# Patient Record
Sex: FEMALE | Race: WHITE | NOT HISPANIC OR LATINO | Employment: OTHER | URBAN - METROPOLITAN AREA
[De-identification: names, ages, dates, MRNs, and addresses within clinical notes are randomized per-mention and may not be internally consistent; named-entity substitution may affect disease eponyms.]

---

## 2017-01-03 ENCOUNTER — OFFICE VISIT (OUTPATIENT)
Dept: FAMILY MEDICINE CLINIC | Facility: CLINIC | Age: 62
End: 2017-01-03

## 2017-01-03 VITALS
TEMPERATURE: 98 F | BODY MASS INDEX: 29.88 KG/M2 | SYSTOLIC BLOOD PRESSURE: 138 MMHG | WEIGHT: 175 LBS | DIASTOLIC BLOOD PRESSURE: 108 MMHG | OXYGEN SATURATION: 99 % | HEIGHT: 64 IN | HEART RATE: 123 BPM

## 2017-01-03 DIAGNOSIS — J20.9 ACUTE BRONCHITIS, UNSPECIFIED ORGANISM: Primary | ICD-10-CM

## 2017-01-03 DIAGNOSIS — R05.9 COUGH: ICD-10-CM

## 2017-01-03 DIAGNOSIS — R50.9 FEVER, UNSPECIFIED FEVER CAUSE: ICD-10-CM

## 2017-01-03 LAB
EXPIRATION DATE: NORMAL
FLUAV AG NPH QL: NEGATIVE
FLUBV AG NPH QL: NEGATIVE
INTERNAL CONTROL: NORMAL
Lab: NORMAL

## 2017-01-03 PROCEDURE — 87804 INFLUENZA ASSAY W/OPTIC: CPT | Performed by: INTERNAL MEDICINE

## 2017-01-03 PROCEDURE — 71020 CHG CHEST X-RAY 2 VW: CPT | Performed by: INTERNAL MEDICINE

## 2017-01-03 PROCEDURE — 99214 OFFICE O/P EST MOD 30 MIN: CPT | Performed by: INTERNAL MEDICINE

## 2017-01-03 RX ORDER — LEVOFLOXACIN 500 MG/1
500 TABLET, FILM COATED ORAL DAILY
Qty: 7 TABLET | Refills: 0 | Status: SHIPPED | OUTPATIENT
Start: 2017-01-03 | End: 2017-01-16

## 2017-01-03 RX ORDER — ALBUTEROL SULFATE 90 UG/1
2 AEROSOL, METERED RESPIRATORY (INHALATION) EVERY 4 HOURS PRN
COMMUNITY
End: 2017-05-22 | Stop reason: SDUPTHER

## 2017-01-16 ENCOUNTER — TRANSCRIBE ORDERS (OUTPATIENT)
Dept: PAIN MEDICINE | Facility: HOSPITAL | Age: 62
End: 2017-01-16

## 2017-01-16 ENCOUNTER — HOSPITAL ENCOUNTER (OUTPATIENT)
Dept: GENERAL RADIOLOGY | Facility: HOSPITAL | Age: 62
Discharge: HOME | End: 2017-01-16

## 2017-01-16 ENCOUNTER — HOSPITAL ENCOUNTER (OUTPATIENT)
Dept: PAIN MEDICINE | Facility: HOSPITAL | Age: 62
Discharge: HOME | End: 2017-01-16
Admitting: SPECIALIST

## 2017-01-16 ENCOUNTER — ANESTHESIA (OUTPATIENT)
Dept: PAIN MEDICINE | Facility: HOSPITAL | Age: 62
End: 2017-01-16

## 2017-01-16 ENCOUNTER — ANESTHESIA EVENT (OUTPATIENT)
Dept: PAIN MEDICINE | Facility: HOSPITAL | Age: 62
End: 2017-01-16

## 2017-01-16 VITALS
HEIGHT: 64 IN | HEART RATE: 76 BPM | RESPIRATION RATE: 16 BRPM | BODY MASS INDEX: 29.88 KG/M2 | OXYGEN SATURATION: 92 % | WEIGHT: 175 LBS | DIASTOLIC BLOOD PRESSURE: 47 MMHG | SYSTOLIC BLOOD PRESSURE: 112 MMHG | TEMPERATURE: 98.7 F

## 2017-01-16 DIAGNOSIS — R52 PAIN: ICD-10-CM

## 2017-01-16 DIAGNOSIS — M54.5 LOW BACK PAIN, UNSPECIFIED BACK PAIN LATERALITY, UNSPECIFIED CHRONICITY, WITH SCIATICA PRESENCE UNSPECIFIED: Primary | ICD-10-CM

## 2017-01-16 PROCEDURE — 25010000002 METHYLPREDNISOLONE PER 80 MG: Performed by: ANESTHESIOLOGY

## 2017-01-16 PROCEDURE — C1755 CATHETER, INTRASPINAL: HCPCS

## 2017-01-16 PROCEDURE — 99156 MOD SED OTH PHYS/QHP 5/>YRS: CPT

## 2017-01-16 PROCEDURE — 25010000002 MIDAZOLAM PER 1 MG: Performed by: ANESTHESIOLOGY

## 2017-01-16 PROCEDURE — 77003 FLUOROGUIDE FOR SPINE INJECT: CPT

## 2017-01-16 PROCEDURE — 25010000002 FENTANYL CITRATE (PF) 100 MCG/2ML SOLUTION: Performed by: ANESTHESIOLOGY

## 2017-01-16 RX ORDER — OXYCODONE AND ACETAMINOPHEN 7.5; 325 MG/1; MG/1
1 TABLET ORAL EVERY 6 HOURS PRN
COMMUNITY
End: 2017-09-06

## 2017-01-16 RX ORDER — LIDOCAINE HYDROCHLORIDE 10 MG/ML
1 INJECTION, SOLUTION INFILTRATION; PERINEURAL ONCE
Status: DISCONTINUED | OUTPATIENT
Start: 2017-01-16 | End: 2017-01-17 | Stop reason: HOSPADM

## 2017-01-16 RX ORDER — SODIUM CHLORIDE 0.9 % (FLUSH) 0.9 %
1-10 SYRINGE (ML) INJECTION AS NEEDED
Status: DISCONTINUED | OUTPATIENT
Start: 2017-01-16 | End: 2017-01-17 | Stop reason: HOSPADM

## 2017-01-16 RX ORDER — MIDAZOLAM HYDROCHLORIDE 1 MG/ML
1 INJECTION INTRAMUSCULAR; INTRAVENOUS
Status: DISCONTINUED | OUTPATIENT
Start: 2017-01-16 | End: 2017-01-17 | Stop reason: HOSPADM

## 2017-01-16 RX ORDER — METHYLPREDNISOLONE ACETATE 80 MG/ML
80 INJECTION, SUSPENSION INTRA-ARTICULAR; INTRALESIONAL; INTRAMUSCULAR; SOFT TISSUE ONCE
Status: COMPLETED | OUTPATIENT
Start: 2017-01-16 | End: 2017-01-16

## 2017-01-16 RX ORDER — FENTANYL CITRATE 50 UG/ML
50 INJECTION, SOLUTION INTRAMUSCULAR; INTRAVENOUS
Status: DISCONTINUED | OUTPATIENT
Start: 2017-01-16 | End: 2017-01-17 | Stop reason: HOSPADM

## 2017-01-16 RX ADMIN — MIDAZOLAM 2 MG: 1 INJECTION INTRAMUSCULAR; INTRAVENOUS at 09:27

## 2017-01-16 RX ADMIN — FENTANYL CITRATE 100 MCG: 50 INJECTION INTRAMUSCULAR; INTRAVENOUS at 09:29

## 2017-01-16 RX ADMIN — METHYLPREDNISOLONE ACETATE 80 MG: 80 INJECTION, SUSPENSION INTRA-ARTICULAR; INTRALESIONAL; INTRAMUSCULAR; SOFT TISSUE at 09:33

## 2017-01-16 NOTE — IP AVS SNAPSHOT
AFTER VISIT SUMMARY             Camelia Quintanilla           About your hospitalization     You last received care in the:  [facility]    Unit phone number:  [phone]       Medications    If you or your caregiver advised us that you are currently taking a medication and that medication is marked below as “Resume”, this simply indicates that we have reviewed those medications to make sure our new therapy recommendations do not interfere.  If you have concerns about medications other than those new ones which we are prescribing today, please consult the physician who prescribed them (or your primary physician).  Our review of your home medications is not meant to indicate that we are directing their use.             Your Medications      CHANGE how you take these medications     pantoprazole 40 MG EC tablet   Take 1 tablet by mouth Daily.   According to our records, you may have been taking this medication differently.   Commonly known as:  PROTONIX   What changed:  when to take this             CONTINUE taking these medications     albuterol 108 (90 BASE) MCG/ACT inhaler   Inhale 2 puffs Every 4 (Four) Hours As Needed for wheezing or shortness of air.   Commonly known as:  PROVENTIL HFA;VENTOLIN HFA           BYSTOLIC 5 MG tablet   Take 5 mg by mouth Daily.   Generic drug:  nebivolol           celecoxib 200 MG capsule   Take 200 mg by mouth 2 (two) times a day.   Commonly known as:  CeleBREX           clotrimazole-betamethasone 1-0.05 % cream   Apply  topically 2 (two) times a day.   Commonly known as:  LOTRISONE           furosemide 20 MG tablet   Take 20 mg by mouth As Needed.   Commonly known as:  LASIX           hydroxychloroquine 200 MG tablet   Take 200 mg by mouth 2 (Two) Times a Day.   Commonly known as:  PLAQUENIL           levothyroxine 88 MCG tablet   Take 88 mcg by mouth Daily.   Commonly known as:  SYNTHROID, LEVOTHROID           MAXALT-MLT 10 MG disintegrating tablet   Take 10  mg by mouth 1 (One) Time As Needed.   Generic drug:  rizatriptan MLT           nitroglycerin 0.4 MG SL tablet   Place 0.4 mg under the tongue every 5 (five) minutes.   Commonly known as:  NITROSTAT           oxyCODONE-acetaminophen 7.5-325 MG per tablet   Take 1 tablet by mouth Every 6 (Six) Hours As Needed.   Commonly known as:  PERCOCET           pregabalin 75 MG capsule   Take 75 mg by mouth 2 (Two) Times a Day.   Commonly known as:  LYRICA           simvastatin 80 MG tablet   Take 40 mg by mouth Daily.   Commonly known as:  ZOCOR           temazepam 15 MG capsule   Take 15 mg by mouth At Night As Needed for sleep.   Commonly known as:  RESTORIL           venlafaxine  MG 24 hr capsule   TAKE ONE CAPSULE BY MOUTH DAILY   Commonly known as:  EFFEXOR-XR           vitamin D 35065 UNITS capsule capsule   Take 50,000 Units by mouth Every 7 (Seven) Days.   Commonly known as:  ERGOCALCIFEROL                    Instructions for After Discharge        Discharge References/Attachments     EPIDURAL STEROID INJECTION (ENGLISH)    EPIDURAL STEROID INJECTION, CARE AFTER (ENGLISH)    CONSCIOUS SEDATION, ADULT, CARE AFTER (ENGLISH)       Follow-ups for After Discharge        Scheduled Appointments     Mar 07, 2017  1:30 PM EST   Lumbar Epidural 3rd with TREATMENT RM 1 [facility]   [facility] ([place])    [address] [facility] [address] [phone]              [employer] Signup     Our records indicate that you have an active [employer] account.    You can view your After Visit Summary by going to [employer] and logging in with your [employer] username and password.  If you don't have a [employer] username and password but a parent or guardian has access to your record, the parent or guardian should login with their own [employer] username and password and access your record to view the After Visit Summary.    If you have questions, you can email  [email or web address] or call [phone] to talk to our MyChart staff.  Remember, MyChart is NOT to be used for urgent needs.  For medical emergencies, dial 911.           Summary of Your Hospitalization        Reason for Hospitalization     Your primary diagnosis was:  Not on File      Care Providers     Not on file      Your Allergies  Date Reviewed: 1/16/2017    Allergen Reactions    Adhesive Tape Not Noted    Must use paper tape         Morphine And Related Hives      Patient Belongings Returned     Document Return of Belongings Flowsheet     Were the patient bedside belongings sent home?   --   Belongings Retrieved from Security & Sent Home   --    Belongings Sent to Safe   --   Medications Retrieved from Pharmacy & Sent Home   --              More Information      Epidural Steroid Injection  An epidural steroid injection is given to relieve pain in your neck, back, or legs that is caused by the irritation or swelling of a nerve root. This procedure involves injecting a steroid and numbing medicine (anesthetic) into the epidural space. The epidural space is the space between the outer covering of your spinal cord and the bones that form your backbone (vertebra).   LET YOUR HEALTH CARE PROVIDER KNOW ABOUT:   · Any allergies you have.  · All medicines you are taking, including vitamins, herbs, eye drops, creams, and over-the-counter medicines such as aspirin.  · Previous problems you or members of your family have had with the use of anesthetics.  · Any blood disorders or blood clotting disorders you have.  · Previous surgeries you have had.  · Medical conditions you have.  RISKS AND COMPLICATIONS  Generally, this is a safe procedure. However, as with any procedure, complications can occur. Possible complications of epidural steroid injection include:  · Headache.  · Bleeding.  · Infection.  · Allergic reaction to the medicines.  · Damage to your nerves.  The response to this procedure depends on the  underlying cause of the pain and its duration. People who have long-term (chronic) pain are less likely to benefit from epidural steroids than are those people whose pain comes on strong and suddenly.  BEFORE THE PROCEDURE   · Ask your health care provider about changing or stopping your regular medicines. You may be advised to stop taking blood-thinning medicines a few days before the procedure.  · You may be given medicines to reduce anxiety.  · Arrange for someone to take you home after the procedure.  PROCEDURE   · You will remain awake during the procedure. You may receive medicine to make you relaxed.  · You will be asked to lie on your stomach.  · The injection site will be cleaned.  · The injection site will be numbed with a medicine (local anesthetic).  · A needle will be injected through your skin into the epidural space.  · Your health care provider will use an X-ray machine to ensure that the steroid is delivered closest to the affected nerve. You may have minimal discomfort at this time.  · Once the needle is in the right position, the local anesthetic and the steroid will be injected into the epidural space.  · The needle will then be removed and a bandage will be applied to the injection site.  AFTER THE PROCEDURE   · You may be monitored for a short time before you go home.  · You may feel weakness or numbness in your arm or leg, which disappears within hours.  · You may be allowed to eat, drink, and take your regular medicine.  · You may have soreness at the site of the injection.     This information is not intended to replace advice given to you by your health care provider. Make sure you discuss any questions you have with your health care provider.     Document Released: 03/26/2009 Document Revised: 08/20/2014 Document Reviewed: 06/06/2014  [employer] Interactive Patient Education ©2016 [employer]          Epidural Steroid Injection, Care After  Refer to this sheet in the next few weeks. These  instructions provide you with information on caring for yourself after your procedure. Your health care provider may also give you more specific instructions. Your treatment has been planned according to current medical practices, but problems sometimes occur. Call your health care provider if you have any problems or questions after your procedure.  WHAT TO EXPECT AFTER THE PROCEDURE  After your procedure, it is typical to have minimal discomfort at the injection site.  HOME CARE INSTRUCTIONS   · Avoid the use of heat on the injection site for 1 day.  · Do not take a tub bath or soak in water the day of the procedure.  · Remove the bandage on the day after the procedure.  · Resume your normal activities the day after the procedure.  · Apply ice to reduce the soreness around the injection site:    Put ice in a plastic bag.    Place a towel between your skin and the bag.    Leave the ice on for 20 minutes, 2-3 times a day.  · Follow up with your health care provider 7-10 days after the procedure.  SEEK MEDICAL CARE IF:   · You have a fever.  · You continue to have pain and soreness around the injection site, even after taking medicines.  · You have significant nausea or vomiting.  · You have a severe headache.  SEEK IMMEDIATE MEDICAL CARE IF:   · You have severe pain at the injection site, which is not relieved by medicines.  · You have a severe headache, stiff neck, or sensitivity to light.  · You have any new numbness or weakness in your legs or arms.  · You lose control over your bladder or bowel movements.  · You have difficulty breathing.     This information is not intended to replace advice given to you by your health care provider. Make sure you discuss any questions you have with your health care provider.     Document Released: 04/03/2012 Document Revised: 01/08/2016 Document Reviewed: 06/06/2014  [employer] Interactive Patient Education ©2016 [employer].          Moderate Conscious Sedation, Adult, Care  After  Refer to this sheet in the next few weeks. These instructions provide you with information on caring for yourself after your procedure. Your health care provider may also give you more specific instructions. Your treatment has been planned according to current medical practices, but problems sometimes occur. Call your health care provider if you have any problems or questions after your procedure.  WHAT TO EXPECT AFTER THE PROCEDURE   After your procedure:  · You may feel sleepy, clumsy, and have poor balance for several hours.  · Vomiting may occur if you eat too soon after the procedure.  HOME CARE INSTRUCTIONS  · Do not participate in any activities where you could become injured for at least 24 hours. Do not:    Drive.    Swim.    Ride a bicycle.    Operate heavy machinery.    Cook.    Use power tools.    Climb ladders.    Work from a high place.  · Do not make important decisions or sign legal documents until you are improved.  · If you vomit, drink water, juice, or soup when you can drink without vomiting. Make sure you have little or no nausea before eating solid foods.  · Only take over-the-counter or prescription medicines for pain, discomfort, or fever as directed by your health care provider.  · Make sure you and your family fully understand everything about the medicines given to you, including what side effects may occur.  · You should not drink alcohol, take sleeping pills, or take medicines that cause drowsiness for at least 24 hours.  · If you smoke, do not smoke without supervision.  · If you are feeling better, you may resume normal activities 24 hours after you were sedated.  · Keep all appointments with your health care provider.  SEEK MEDICAL CARE IF:  · Your skin is pale or bluish in color.  · You continue to feel nauseous or vomit.  · Your pain is getting worse and is not helped by medicine.  · You have bleeding or swelling.  · You are still sleepy or feeling clumsy after 24  hours.  SEEK IMMEDIATE MEDICAL CARE IF:  · You develop a rash.  · You have difficulty breathing.  · You develop any type of allergic problem.  · You have a fever.  MAKE SURE YOU:  · Understand these instructions.  · Will watch your condition.  · Will get help right away if you are not doing well or get worse.     This information is not intended to replace advice given to you by your health care provider. Make sure you discuss any questions you have with your health care provider.     Document Released: 10/08/2014 Document Revised: 01/08/2016 Document Reviewed: 10/08/2014  [employer] Interactive Patient Education ©2016 Elsevier Inc.            SYMPTOMS OF A STROKE    Call 911 or have someone take you to the Emergency Department if you have any of the following:    · Sudden numbness or weakness of your face, arm or leg especially on one side of the body  · Sudden confusion, diffiiculty speaking or trouble understanding   · Changes in your vision or loss of sight in one eye  · Sudden severe headache with no known cause  · sudden dizziness, trouble walking, loss of balance or coordination    It is important to seek emergency care right away if you have further stroke symptoms. If you get emergency help quickly, the powerful clot-dissolving medicines can reduce the disabilities caused by a stroke.     For more information:    American Stroke Association  8-047-0-STROKE  www.strokeassociation.org           IF YOU SMOKE OR USE TOBACCO PLEASE READ THE FOLLOWING:    Why is smoking bad for me?  Smoking increases the risk of heart disease, lung disease, vascular disease, stroke, and cancer.     If you smoke, STOP!    If you would like more information on quitting smoking, please visit the [employer] website: [email or web address]   This link will provide additional resources including the QUIT line and the Beat the Pack support groups.     For more information:    American Cancer  Society  ([phone]    American Heart Association  1-331.125.4319               YOU ARE THE MOST IMPORTANT FACTOR IN YOUR RECOVERY.     Follow all instructions carefully.     I have reviewed my discharge instructions with my nurse, including the following information, if applicable:     Information about my illness and diagnosis   Follow up appointments (including lab draws)   Wound Care   Equipment Needs   Medications (new and continuing) along with side effects   Preventative information such as vaccines and smoking cessations   Diet   Pain   I know when to contact my Doctor's office or seek emergency care      I want my nurse to describe the side effects of my medications: YES NO   If the answer is no, I understand the side effects of my medications: YES NO   My nurse described the side effects of my medications in a way that I could understand: YES NO   I have taken my personal belongings and my own medications with me at discharge: YES NO            I have received this information and my questions have been answered. I have discussed any concerns I see with this plan with the nurse or physician. I understand these instructions.    Signature of Patient or Responsible Person: _____________________________________    Date: _________________  Time: __________________    Signature of Healthcare Provider: _______________________________________  Date: _________________  Time: __________________

## 2017-01-16 NOTE — H&P
H&P  Date of Service: 2016 9:21 AM  Maciel Tracey MD   Anesthesiology   Expand All Collapse All    []Hide copied text  []Arden for attribution information     [facility]     History and Physical     Patient Name: Camelia Quintanilla  : 1955  MRN: [id number]  Date of Admission: 2016     Subjective        Patient is a 61 y.o. female presents with chief complaint of chronic low back and hips: bilateral pain.  Onset of symptoms was gradual starting several years ago. Symptoms are associated/aggravated by any activity. Symptoms improve with lying down and heating pad. She has had several epidurals in the past, the last in . A recent MRI only showed mild lumbar degeneration and she has been on chronic narcotics for over 2 years.     The following portions of the patients history were reviewed and updated as appropriate: current medications, allergies, past medical history, past surgical history, past family history, past social history and problem list                       Objective        Past Medical History:    Medical History         Past Medical History   Diagnosis Date   • Depression with anxiety 2016   • HLD (hyperlipidemia) 2016   • Precordial pain 2016   • Vitamin D deficiency 2016         Past Surgical History:    Surgical History          Past Surgical History   Procedure Laterality Date   • Cholecystectomy       • Appendectomy       • Abdominal hernia repair       • Hysterectomy       • Wrist fusion Right           Family History: No family history on file.  Social History:        Social History   Substance Use Topics   • Smoking status: Never Smoker   • Smokeless tobacco: Never Used   • Alcohol use No         Vital Signs Range for the last 24 hours  Temperature: Temp: [36.5 °C (97.7 °F)] 36.5 °C (97.7 °F)   Temp Source: Temp src: Oral   BP: BP: (128)/(69) 128/69   Pulse: Heart Rate: [71] 71   Respirations: Resp: [16] 16   SPO2: SpO2: [95 %] 95 %   O2 Amount (l/min):   "   O2 Devices O2 Device: room air   Weight: Weight: [170 lb (77.1 kg)] 170 lb (77.1 kg)      Flowsheet Rows           First Filed Value     Admission Height   64\" (162.6 cm) Documented at 11/23/2016 0854     Admission Weight   170 lb (77.1 kg) Documented at 11/23/2016 0854            --------------------------------------------------------------------------------      Current Medications           Current Outpatient Prescriptions   Medication Sig Dispense Refill   • celecoxib (CeleBREX) 200 MG capsule Take 200 mg by mouth 2 (two) times a day.       • clotrimazole-betamethasone (LOTRISONE) 1-0.05 % cream Apply topically 2 (two) times a day.       • furosemide (LASIX) 20 MG tablet Take 20 mg by mouth.       • HYDROcodone-acetaminophen (NORCO)  MG per tablet Take 1 tablet by mouth every 6 (six) hours.       • hydroxychloroquine (PLAQUENIL) 200 MG tablet Take by mouth.       • hyoscyamine sulfate (ANASPAZ) 0.125 MG tablet dispersible disintegrating tablet Take by mouth.       • isosorbide mononitrate (IMDUR) 60 MG 24 hr tablet Take 60 mg by mouth.       • levothyroxine (SYNTHROID, LEVOTHROID) 88 MCG tablet Take 88 mcg by mouth.       • nebivolol (BYSTOLIC) 5 MG tablet         • nitroglycerin (NITROSTAT) 0.4 MG SL tablet Place 0.4 mg under the tongue every 5 (five) minutes.       • pantoprazole (PROTONIX) 40 MG EC tablet TAKE ONE TABLET BY MOUTH TWICE A DAY BEFORE MEALS       • pregabalin (LYRICA) 75 MG capsule Take 75 mg by mouth.       • ranolazine (RANEXA) 1000 MG 12 hr tablet         • rizatriptan MLT (MAXALT-MLT) 10 MG disintegrating tablet Take 10 mg by mouth.       • simvastatin (ZOCOR) 80 MG tablet Take 80 mg by mouth.       • temazepam (RESTORIL) 15 MG capsule Take 15 mg by mouth At Night As Needed for sleep.       • venlafaxine XR (EFFEXOR-XR) 150 MG 24 hr capsule Take 1 capsule by mouth daily. 30 capsule 2   • vitamin D (ERGOCALCIFEROL) 83167 UNITS capsule capsule Take 50,000 Units by mouth.       • " zolpidem (AMBIEN) 10 MG tablet Take 10 mg by mouth daily.          Current Facility-Administered Medications   Medication Dose Route Frequency Provider Last Rate Last Dose   • FentaNYL Citrate (PF) (SUBLIMAZE) injection 50 mcg 50 mcg Intravenous Q5 Min PRN Maciel Tracey MD         • iopamidol (ISOVUE-M 200) injection 41% 12 mL Epidural Once in imaging Maciel Tracey MD         • lidocaine (XYLOCAINE) 1 % injection 1 mL 1 mL Intradermal Once Maciel Tracey MD         • methylPREDNISolone acetate (DEPO-medrol) injection 80 mg 80 mg Intra-articular Once Maciel Tracey MD         • midazolam (VERSED) injection 1 mg 1 mg Intravenous Q5 Min PRN Maciel Tracey MD         • sodium chloride 0.9 % flush 1-10 mL 1-10 mL Intravenous PRN Maciel Tracey MD         • sodium chloride 0.9 % flush 1-10 mL 1-10 mL Intravenous PRN Maciel Tracey MD                  --------------------------------------------------------------------------------  Assessment/Plan         Anesthesia Evaluation  Airway   Mallampati: II Dental - normal exam    Pulmonary - normal exam  Cardiovascular - normal exam   Neuro/Psych GI/Hepatic/Renal/Endo      Musculoskeletal   (+) Straight Leg Test,  Abdominal    Substance History  OB/GYN       Other                         Diagnosis and Plan     Treatment Plan  ASA 3       Lumbar Epidural Steroid Injection(LESI), With fluoroscopy, With sedation  Anesthetic plan and risks discussed with patient.     * No Diagnosis Codes entered *                                 LDDD--about 50% improved

## 2017-01-16 NOTE — ANESTHESIA PROCEDURE NOTES
PAIN Epidural block    Patient location during procedure: pain clinic  Start Time: 1/16/2017 9:23 AM  Stop Time: 1/16/2017 9:35 AM  Indication:at surgeon's request and procedure for pain  Performed By  Anesthesiologist: JEN MARTINEZ  Preanesthetic Checklist  Completed: patient identified, site marked, surgical consent, pre-op evaluation, timeout performed, IV checked, risks and benefits discussed and monitors and equipment checked  Additional Notes  Post-Op Diagnosis Codes:     * DDD (degenerative disc disease), lumbar (M51.36)    Epidural Block Prep:  Pt Position:prone  Sterile Tech:gloves, mask, cap and sterile barrier  Monitoring:blood pressure monitoring, continuous pulse oximetry and EKG  Epidural Block Procedure:  Approach:left paramedian  Location:lumbar  Level:4-5  Needle Type:Haroon  Needle Gauge:18 G  Cath Depth at skin:10 cm  Aspiration:negative  Test Dose:negative  Medications:  Depomedrol:80 mg  Preservative Free Saline:4mL    Post Assessment:  Dressing:occlusive dressing applied  Complications:no

## 2017-02-02 DIAGNOSIS — K21.9 GASTROESOPHAGEAL REFLUX DISEASE WITHOUT ESOPHAGITIS: ICD-10-CM

## 2017-02-03 RX ORDER — PANTOPRAZOLE SODIUM 40 MG/1
40 TABLET, DELAYED RELEASE ORAL DAILY
Qty: 90 TABLET | Refills: 0 | Status: SHIPPED | OUTPATIENT
Start: 2017-02-03 | End: 2017-02-06 | Stop reason: SDUPTHER

## 2017-02-06 ENCOUNTER — OFFICE VISIT (OUTPATIENT)
Dept: FAMILY MEDICINE CLINIC | Facility: CLINIC | Age: 62
End: 2017-02-06

## 2017-02-06 VITALS
HEIGHT: 64 IN | BODY MASS INDEX: 31.1 KG/M2 | HEART RATE: 95 BPM | WEIGHT: 182.2 LBS | OXYGEN SATURATION: 97 % | SYSTOLIC BLOOD PRESSURE: 134 MMHG | DIASTOLIC BLOOD PRESSURE: 84 MMHG

## 2017-02-06 DIAGNOSIS — D50.9 IRON DEFICIENCY ANEMIA, UNSPECIFIED IRON DEFICIENCY ANEMIA TYPE: Primary | ICD-10-CM

## 2017-02-06 DIAGNOSIS — K21.9 GASTROESOPHAGEAL REFLUX DISEASE WITHOUT ESOPHAGITIS: ICD-10-CM

## 2017-02-06 DIAGNOSIS — G89.29 CHRONIC RIGHT SHOULDER PAIN: ICD-10-CM

## 2017-02-06 DIAGNOSIS — R05.9 COUGH: ICD-10-CM

## 2017-02-06 DIAGNOSIS — M25.511 CHRONIC RIGHT SHOULDER PAIN: ICD-10-CM

## 2017-02-06 DIAGNOSIS — I10 ESSENTIAL HYPERTENSION: ICD-10-CM

## 2017-02-06 PROCEDURE — 99214 OFFICE O/P EST MOD 30 MIN: CPT | Performed by: INTERNAL MEDICINE

## 2017-02-06 RX ORDER — DICYCLOMINE HYDROCHLORIDE 10 MG/1
10 CAPSULE ORAL 3 TIMES DAILY PRN
COMMUNITY
Start: 2016-02-11 | End: 2017-02-06 | Stop reason: SDUPTHER

## 2017-02-06 RX ORDER — PANTOPRAZOLE SODIUM 40 MG/1
40 TABLET, DELAYED RELEASE ORAL DAILY
Qty: 30 TABLET | Refills: 12 | Status: SHIPPED | OUTPATIENT
Start: 2017-02-06 | End: 2018-02-07 | Stop reason: SDUPTHER

## 2017-02-06 RX ORDER — DICYCLOMINE HYDROCHLORIDE 10 MG/1
10 CAPSULE ORAL 3 TIMES DAILY PRN
Qty: 30 CAPSULE | Refills: 2 | Status: SHIPPED | OUTPATIENT
Start: 2017-02-06 | End: 2017-10-10 | Stop reason: RX

## 2017-02-06 RX ORDER — LEVOTHYROXINE SODIUM 0.07 MG/1
75 TABLET ORAL DAILY
COMMUNITY
Start: 2016-11-09 | End: 2018-04-05 | Stop reason: SDUPTHER

## 2017-02-06 RX ORDER — TRAZODONE HYDROCHLORIDE 100 MG/1
100 TABLET ORAL NIGHTLY
COMMUNITY
Start: 2017-01-18 | End: 2018-03-06 | Stop reason: SINTOL

## 2017-02-21 RX ORDER — PREGABALIN 75 MG/1
75 CAPSULE ORAL 2 TIMES DAILY
Qty: 60 CAPSULE | Refills: 0 | Status: SHIPPED | OUTPATIENT
Start: 2017-02-21 | End: 2017-03-29 | Stop reason: SDUPTHER

## 2017-02-23 ENCOUNTER — OFFICE VISIT (OUTPATIENT)
Dept: ORTHOPEDIC SURGERY | Facility: CLINIC | Age: 62
End: 2017-02-23

## 2017-02-23 VITALS — BODY MASS INDEX: 30.73 KG/M2 | WEIGHT: 180 LBS | HEIGHT: 64 IN | TEMPERATURE: 97.6 F

## 2017-02-23 DIAGNOSIS — M25.511 ACUTE PAIN OF BOTH SHOULDERS: Primary | ICD-10-CM

## 2017-02-23 DIAGNOSIS — IMO0002 BURSITIS/TENDONITIS, SHOULDER: ICD-10-CM

## 2017-02-23 DIAGNOSIS — M25.512 ACUTE PAIN OF BOTH SHOULDERS: Primary | ICD-10-CM

## 2017-02-23 PROCEDURE — 20610 DRAIN/INJ JOINT/BURSA W/O US: CPT | Performed by: ORTHOPAEDIC SURGERY

## 2017-02-23 PROCEDURE — 73030 X-RAY EXAM OF SHOULDER: CPT | Performed by: ORTHOPAEDIC SURGERY

## 2017-02-23 PROCEDURE — 99204 OFFICE O/P NEW MOD 45 MIN: CPT | Performed by: ORTHOPAEDIC SURGERY

## 2017-02-23 RX ADMIN — BUPIVACAINE HYDROCHLORIDE 4 ML: 5 INJECTION, SOLUTION PERINEURAL at 08:04

## 2017-02-23 RX ADMIN — METHYLPREDNISOLONE ACETATE 80 MG: 80 INJECTION, SUSPENSION INTRA-ARTICULAR; INTRALESIONAL; INTRAMUSCULAR; SOFT TISSUE at 08:04

## 2017-02-23 RX ADMIN — BUPIVACAINE HYDROCHLORIDE 4 ML: 5 INJECTION, SOLUTION EPIDURAL; INTRACAUDAL at 08:04

## 2017-02-23 NOTE — PROGRESS NOTES
New bi-lateral Shoulder      Patient: Camelia Quintanilla        YOB: 1955    Medical Record Number: [id number]        Chief Complaints: bi-lateral shoulder pain      History of Present Illness: This is a  61 y.o. female who presents with complaints of bilateral shoulder pain right is been ongoing for several months left is been going on the last 1-2 weeks.  No history of injury change in activity that she can recall.  Her pain is sometimes a 10 out of 10.  She has night pain she cannot get her bra on.  She states it has been ongoing for a year in the right she states there is a small mass there that is tender the left again is only been going on for a couple weeks.  Symptoms are described as severe intermittent stabbing aching past medical history marked for rheumatoid arthritis thyroid disease and depression anxiety all of which are stable          Allergies:   Allergies   Allergen Reactions   • Adhesive Tape      Must use paper tape   • Morphine And Related Hives       Medications:   Home Medications:  Current Outpatient Prescriptions on File Prior to Visit   Medication Sig   • albuterol (PROVENTIL HFA;VENTOLIN HFA) 108 (90 BASE) MCG/ACT inhaler Inhale 2 puffs Every 4 (Four) Hours As Needed for wheezing or shortness of air.   • celecoxib (CeleBREX) 200 MG capsule Take 200 mg by mouth 2 (two) times a day.   • clotrimazole-betamethasone (LOTRISONE) 1-0.05 % cream Apply  topically 2 (two) times a day.   • dicyclomine (BENTYL) 10 MG capsule Take 1 capsule by mouth 3 (Three) Times a Day As Needed (abdominal pain).   • furosemide (LASIX) 20 MG tablet Take 20 mg by mouth As Needed.   • hydroxychloroquine (PLAQUENIL) 200 MG tablet Take 200 mg by mouth 2 (Two) Times a Day.   • levothyroxine (SYNTHROID, LEVOTHROID) 75 MCG tablet    • nebivolol (BYSTOLIC) 5 MG tablet Take 5 mg by mouth Daily.   • nitroglycerin (NITROSTAT) 0.4 MG SL tablet Place 0.4 mg under the tongue every 5 (five) minutes.   •  oxyCODONE-acetaminophen (PERCOCET) 7.5-325 MG per tablet Take 1 tablet by mouth Every 6 (Six) Hours As Needed.   • pantoprazole (PROTONIX) 40 MG EC tablet Take 1 tablet by mouth Daily.   • pregabalin (LYRICA) 75 MG capsule Take 1 capsule by mouth 2 (Two) Times a Day.   • simvastatin (ZOCOR) 80 MG tablet Take 40 mg by mouth Daily.   • traZODone (DESYREL) 100 MG tablet Take 100 mg by mouth Every Night.   • venlafaxine XR (EFFEXOR-XR) 150 MG 24 hr capsule TAKE ONE CAPSULE BY MOUTH DAILY   • vitamin D (ERGOCALCIFEROL) 99908 UNITS capsule capsule Take 50,000 Units by mouth Every 7 (Seven) Days.   • rizatriptan MLT (MAXALT-MLT) 10 MG disintegrating tablet Take 10 mg by mouth 1 (One) Time As Needed.   • Simethicone (GAS RELIEF EXTRA STRENGTH PO)      No current facility-administered medications on file prior to visit.      Current Medications:  Scheduled Meds:  Continuous Infusions:  No current facility-administered medications for this visit.   PRN Meds:.    Past Medical History   Diagnosis Date   • Depression with anxiety 9/21/2016   • HLD (hyperlipidemia) 9/21/2016   • Precordial pain 9/21/2016   • Vitamin D deficiency 9/21/2016        Past Surgical History   Procedure Laterality Date   • Cholecystectomy     • Appendectomy     • Abdominal hernia repair     • Hysterectomy     • Wrist fusion Right         Social History     Occupational History   • Not on file.     Social History Main Topics   • Smoking status: Never Smoker   • Smokeless tobacco: Never Used   • Alcohol use No   • Drug use: No   • Sexual activity: Not on file    Social History     Social History Narrative      No family history on file.          Review of Systems: 14 point review of systems are remarkable for the pertinent positives listed in the chart by the patient the remainder are negative    Review of Systems      Physical Exam: 61 y.o. female  General Appearance:    Alert, cooperative, in no acute distress                 Vitals:    02/23/17 1353  "  Temp: 97.6 °F (36.4 °C)   Weight: 180 lb (81.6 kg)   Height: 64\" (162.6 cm)      Patient is alert and read ×3 no acute distress appears her above-listed at height weight and age.  Affect is normal respiratory rate is normal unlabored. Heart rate regular rate rhythm, sclera, dentition and hearing are normal for the purpose of this exam.    Ortho Exam Physical exam of the right shoulder reveals no overlying skin changes no lymphedema no lymphadenopathy.  Patient has active flexion 180 with mild symptoms abduction is similar external rotation is to 50 and internal rotation to the upper lumbar spine with mild symptoms.  Patient has good rotator cuff strength 4+ over 5 with isometric strength testing with pain.  Patient has a positive impingement and a positive Matthew sign.  Patient has good cervical range of motion which is full and asymptomatic no radicular symptoms.  Patient has a normal elbow exam.  Good distal pulses are present the mass that she is feeling anteriorly as is around the insertion of the pectoralis major do not feel a true mass in this area  Patient has pain with overhead activity and a positive Neer sign and a positive empty can sign  Physical exam of the left shoulder reveals no overlying skin changes no lymphedema no lymphadenopathy.  Patient has active flexion 180 with mild symptoms abduction is similar external rotation is to 50 and internal rotation to the upper lumbar spine with mild symptoms.  Patient has good rotator cuff strength 4+ over 5 with isometric strength testing with pain.  Patient has a positive impingement and a positive Matthew sign.  Patient has good cervical range of motion which is full and asymptomatic no radicular symptoms.  Patient has a normal elbow exam.  Good distal pulses are presentPatient has pain with overhead activity and a positive Neer sign and a positive empty can sign        Large Joint Arthrocentesis  Date/Time: 2/23/2017 8:04 AM  Consent given by: " patient  Site marked: site marked  Timeout: Immediately prior to procedure a time out was called to verify the correct patient, procedure, equipment, support staff and site/side marked as required   Supporting Documentation  Indications: pain   Procedure Details  Location: shoulder - R subacromial bursa  Preparation: Patient was prepped and draped in the usual sterile fashion  Needle size: 25 G  Approach: posterior  Medications administered: 4 mL bupivacaine; 80 mg methylPREDNISolone acetate 80 MG/ML  Patient tolerance: patient tolerated the procedure well with no immediate complications    Large Joint Arthrocentesis  Date/Time: 2/23/2017 8:04 AM  Consent given by: patient  Site marked: site marked  Timeout: Immediately prior to procedure a time out was called to verify the correct patient, procedure, equipment, support staff and site/side marked as required   Supporting Documentation  Indications: pain   Procedure Details  Location: shoulder - L subacromial bursa  Preparation: Patient was prepped and draped in the usual sterile fashion  Needle size: 25 G  Approach: posterior  Medications administered: 4 mL bupivacaine (PF) 0.5 %; 80 mg methylPREDNISolone acetate 80 MG/ML  Patient tolerance: patient tolerated the procedure well with no immediate complications                Radiology:   AP, Scapular Y and Axillary Lateral of the right and left shoulder were ordered/reviewed to evauate shoulder pain.  I've no comparative films.  These are essentially normal she has some acromioclavicular arthritis otherwise a heads well seated within the glenoid    Assessment/Plan:  Bilateral shoulder impingement I think she would improve from an injection and physical therapy.  She would like to see Arthur she is currently on Celebrex I will have her continue that.  I'll see her back in 4 weeks if she fails to improve we will pursue other means of testing.

## 2017-02-24 RX ORDER — BUPIVACAINE HYDROCHLORIDE 5 MG/ML
4 INJECTION, SOLUTION PERINEURAL
Status: COMPLETED | OUTPATIENT
Start: 2017-02-23 | End: 2017-02-23

## 2017-02-24 RX ORDER — METHYLPREDNISOLONE ACETATE 80 MG/ML
80 INJECTION, SUSPENSION INTRA-ARTICULAR; INTRALESIONAL; INTRAMUSCULAR; SOFT TISSUE
Status: COMPLETED | OUTPATIENT
Start: 2017-02-23 | End: 2017-02-23

## 2017-02-24 RX ORDER — BUPIVACAINE HYDROCHLORIDE 5 MG/ML
4 INJECTION, SOLUTION EPIDURAL; INTRACAUDAL
Status: COMPLETED | OUTPATIENT
Start: 2017-02-23 | End: 2017-02-23

## 2017-03-09 ENCOUNTER — APPOINTMENT (OUTPATIENT)
Dept: PAIN MEDICINE | Facility: HOSPITAL | Age: 62
End: 2017-03-09

## 2017-03-16 ENCOUNTER — ANESTHESIA EVENT (OUTPATIENT)
Dept: PAIN MEDICINE | Facility: HOSPITAL | Age: 62
End: 2017-03-16

## 2017-03-16 ENCOUNTER — HOSPITAL ENCOUNTER (OUTPATIENT)
Dept: PAIN MEDICINE | Facility: HOSPITAL | Age: 62
Discharge: HOME | End: 2017-03-16
Admitting: SPECIALIST

## 2017-03-16 ENCOUNTER — ANESTHESIA (OUTPATIENT)
Dept: PAIN MEDICINE | Facility: HOSPITAL | Age: 62
End: 2017-03-16

## 2017-03-16 ENCOUNTER — HOSPITAL ENCOUNTER (OUTPATIENT)
Dept: GENERAL RADIOLOGY | Facility: HOSPITAL | Age: 62
Discharge: HOME | End: 2017-03-16

## 2017-03-16 VITALS
HEIGHT: 64 IN | WEIGHT: 175 LBS | RESPIRATION RATE: 16 BRPM | BODY MASS INDEX: 29.88 KG/M2 | DIASTOLIC BLOOD PRESSURE: 64 MMHG | OXYGEN SATURATION: 99 % | TEMPERATURE: 98.1 F | SYSTOLIC BLOOD PRESSURE: 122 MMHG | HEART RATE: 71 BPM

## 2017-03-16 DIAGNOSIS — R52 PAIN: ICD-10-CM

## 2017-03-16 PROCEDURE — 77003 FLUOROGUIDE FOR SPINE INJECT: CPT

## 2017-03-16 PROCEDURE — C1755 CATHETER, INTRASPINAL: HCPCS

## 2017-03-16 PROCEDURE — 25010000002 MIDAZOLAM PER 1 MG: Performed by: ANESTHESIOLOGY

## 2017-03-16 PROCEDURE — 25010000002 FENTANYL CITRATE (PF) 100 MCG/2ML SOLUTION: Performed by: ANESTHESIOLOGY

## 2017-03-16 RX ORDER — SODIUM CHLORIDE 0.9 % (FLUSH) 0.9 %
1-10 SYRINGE (ML) INJECTION AS NEEDED
Status: DISCONTINUED | OUTPATIENT
Start: 2017-03-16 | End: 2017-03-17 | Stop reason: HOSPADM

## 2017-03-16 RX ORDER — LIDOCAINE HYDROCHLORIDE 10 MG/ML
1 INJECTION, SOLUTION INFILTRATION; PERINEURAL ONCE
Status: DISCONTINUED | OUTPATIENT
Start: 2017-03-16 | End: 2017-03-17 | Stop reason: HOSPADM

## 2017-03-16 RX ORDER — BUPIVACAINE HYDROCHLORIDE 2.5 MG/ML
30 INJECTION, SOLUTION EPIDURAL; INFILTRATION; INTRACAUDAL ONCE
Status: DISCONTINUED | OUTPATIENT
Start: 2017-03-16 | End: 2017-03-17 | Stop reason: HOSPADM

## 2017-03-16 RX ORDER — FENTANYL CITRATE 50 UG/ML
50 INJECTION, SOLUTION INTRAMUSCULAR; INTRAVENOUS
Status: DISCONTINUED | OUTPATIENT
Start: 2017-03-16 | End: 2017-03-17 | Stop reason: HOSPADM

## 2017-03-16 RX ORDER — MIDAZOLAM HYDROCHLORIDE 1 MG/ML
1 INJECTION INTRAMUSCULAR; INTRAVENOUS
Status: DISCONTINUED | OUTPATIENT
Start: 2017-03-16 | End: 2017-03-17 | Stop reason: HOSPADM

## 2017-03-16 RX ORDER — METHYLPREDNISOLONE ACETATE 80 MG/ML
80 INJECTION, SUSPENSION INTRA-ARTICULAR; INTRALESIONAL; INTRAMUSCULAR; SOFT TISSUE ONCE
Status: DISCONTINUED | OUTPATIENT
Start: 2017-03-16 | End: 2017-03-17 | Stop reason: HOSPADM

## 2017-03-16 RX ADMIN — FENTANYL CITRATE 50 MCG: 50 INJECTION INTRAMUSCULAR; INTRAVENOUS at 13:05

## 2017-03-16 RX ADMIN — MIDAZOLAM 2 MG: 1 INJECTION INTRAMUSCULAR; INTRAVENOUS at 13:05

## 2017-03-16 NOTE — ANESTHESIA PROCEDURE NOTES
PAIN Epidural block    Patient location during procedure: pain clinic  Indication:procedure for pain  Performed By  Anesthesiologist: SHLOMO GOODE  Preanesthetic Checklist  Completed: patient identified, site marked, surgical consent, pre-op evaluation, timeout performed, risks and benefits discussed and monitors and equipment checked  Additional Notes  Depomedrol - 80mg    Needle position confirmed by fluoroscopy and epidurogram using 2cc of [id number].    Diagnosis  Post-Op Diagnosis Codes:     * Degeneration, intervertebral disc, lumbar (M51.36)     * Lumbar radiculopathy (M54.16)    Epidural Block Prep:  Pt Position:prone  Sterile Tech:cap, gloves, mask and sterile barrier  Prep:chlorhexidine gluconate and isopropyl alcohol  Monitoring:blood pressure monitoring, continuous pulse oximetry and EKG  Epidural Block Procedure:  Approach:left paramedian  Guidance: fluoroscopy  Location:lumbar  Level:5-6  Needle Type:Tuohy  Needle Gauge:20  Aspiration:negative  Medications:  Depomedrol:80 mg  Preservative Free Saline:2mL  Isovue:2mL    Post Assessment:  Post-procedure: bandaide.  Pt Tolerance:patient tolerated the procedure well with no apparent complications  Complications:no

## 2017-03-16 NOTE — H&P
"[facility]    History and Physical    Patient Name: Camelia Quintanilla  :  1955  MRN:  [id number]  Date of Admission: 3/16/2017    Subjective     Patient is a 62 y.o. female presents with chief complaint of acute, intermitent, moderate, severe low back and hips: bilateral pain.  Onset of symptoms was gradual starting several months ago.  Symptoms are associated/aggravated by lifting, straining or twisting. Symptoms improve with relaxation    The following portions of the patients history were reviewed and updated as appropriate: current medications, allergies, past medical history, past surgical history, past family history, past social history and problem list                Objective     Past Medical History:   Past Medical History   Diagnosis Date   • Depression with anxiety 2016   • HLD (hyperlipidemia) 2016   • Precordial pain 2016   • Vitamin D deficiency 2016     Past Surgical History:   Past Surgical History   Procedure Laterality Date   • Cholecystectomy     • Appendectomy     • Abdominal hernia repair     • Hysterectomy     • Wrist fusion Right    • Epidural block injection       Family History: No family history on file.  Social History:   Social History   Substance Use Topics   • Smoking status: Never Smoker   • Smokeless tobacco: Never Used   • Alcohol use No       Vital Signs Range for the last 24 hours  Temperature: Temp:  [36.7 °C (98.1 °F)] 36.7 °C (98.1 °F)   Temp Source: Temp src: Oral   BP: BP: (135)/(112) 135/112   Pulse: Heart Rate:  [66] 66   Respirations: Resp:  [16] 16   SPO2: SpO2:  [98 %] 98 %   O2 Amount (l/min):     O2 Devices O2 Device: room air   Weight: Weight:  [175 lb (79.4 kg)] 175 lb (79.4 kg)     Flowsheet Rows         First Filed Value    Admission Height  64\" (162.6 cm) Documented at 2017 1243    Admission Weight  175 lb (79.4 kg) Documented at 2017 1243    "       --------------------------------------------------------------------------------    Current Outpatient Prescriptions   Medication Sig Dispense Refill   • albuterol (PROVENTIL HFA;VENTOLIN HFA) 108 (90 BASE) MCG/ACT inhaler Inhale 2 puffs Every 4 (Four) Hours As Needed for wheezing or shortness of air.     • celecoxib (CeleBREX) 200 MG capsule Take 200 mg by mouth 2 (two) times a day.     • clotrimazole-betamethasone (LOTRISONE) 1-0.05 % cream Apply  topically 2 (two) times a day.     • dicyclomine (BENTYL) 10 MG capsule Take 1 capsule by mouth 3 (Three) Times a Day As Needed (abdominal pain). 30 capsule 2   • furosemide (LASIX) 20 MG tablet Take 20 mg by mouth As Needed.     • hydroxychloroquine (PLAQUENIL) 200 MG tablet Take 200 mg by mouth 2 (Two) Times a Day.     • levothyroxine (SYNTHROID, LEVOTHROID) 75 MCG tablet      • nebivolol (BYSTOLIC) 5 MG tablet Take 5 mg by mouth Daily.     • nitroglycerin (NITROSTAT) 0.4 MG SL tablet Place 0.4 mg under the tongue every 5 (five) minutes.     • oxyCODONE-acetaminophen (PERCOCET) 7.5-325 MG per tablet Take 1 tablet by mouth Every 6 (Six) Hours As Needed.     • pantoprazole (PROTONIX) 40 MG EC tablet Take 1 tablet by mouth Daily. 30 tablet 12   • pregabalin (LYRICA) 75 MG capsule Take 1 capsule by mouth 2 (Two) Times a Day. 60 capsule 0   • rizatriptan MLT (MAXALT-MLT) 10 MG disintegrating tablet Take 10 mg by mouth 1 (One) Time As Needed.     • Simethicone (GAS RELIEF EXTRA STRENGTH PO)      • simvastatin (ZOCOR) 80 MG tablet Take 40 mg by mouth Daily.     • traZODone (DESYREL) 100 MG tablet Take 100 mg by mouth Every Night.     • venlafaxine XR (EFFEXOR-XR) 150 MG 24 hr capsule TAKE ONE CAPSULE BY MOUTH DAILY 30 capsule 1     Current Facility-Administered Medications   Medication Dose Route Frequency Provider Last Rate Last Dose   • bupivacaine PF (MARCAINE) 0.25 % injection 30 mL  30 mL Infiltration Once Umer Fong MD       • FentaNYL Citrate (PF)  (SUBLIMAZE) injection 50 mcg  50 mcg Intravenous Q5 Min PRN Umer Fong MD       • iopamidol (ISOVUE-M 200) injection 41%  2 mL Injection Once in imaging Umer Fong MD       • lidocaine (XYLOCAINE) 1 % injection 1 mL  1 mL Intradermal Once Umer Fong MD       • methylPREDNISolone acetate (DEPO-medrol) injection 80 mg  80 mg Intramuscular Once Umer Fong MD       • midazolam (VERSED) injection 1 mg  1 mg Intravenous Q5 Min PRN Umer Fong MD       • sodium chloride 0.9 % flush 1-10 mL  1-10 mL Intravenous PRN Umer Fong MD       • sodium chloride 0.9 % flush 1-10 mL  1-10 mL Intravenous PRN Umer Fong MD           --------------------------------------------------------------------------------  Assessment/Plan      Anesthesia Evaluation     Patient summary reviewed and Nursing notes reviewed   no history of anesthetic complications:  NPO Status: > 6 hours   Airway   Mallampati: II  TM distance: >3 FB  Neck ROM: full  Dental - normal exam     Pulmonary - negative pulmonary ROS and normal exam   Cardiovascular - normal exam    (+) hypertension, PVD,       Neuro/Psych- neuro exam normal  (+) headaches, psychiatric history Depression,    GI/Hepatic/Renal/Endo    (+) obesity,  GERD, chronic renal disease, diabetes mellitus, hypothyroidism,     Musculoskeletal     (+) Straight Leg Test, radiculopathy Left lower extremity and Right lower extremity  (-)  LAURA test  Abdominal  - normal exam   Substance History      OB/GYN          Other                               Diagnosis and Plan    Treatment Plan  ASA 3   Patient has had previous injection/procedure with 25-50% improvement.   Procedures: Lumbar Epidural Steroid Injection(LESI), With fluoroscopy,       Anesthetic plan and risks discussed with patient.          Diagnosis     * Degeneration, intervertebral disc, lumbar [M51.36]     * Lumbar radiculopathy [M54.16]

## 2017-04-24 DIAGNOSIS — E03.9 ACQUIRED HYPOTHYROIDISM: ICD-10-CM

## 2017-04-24 DIAGNOSIS — E78.5 HYPERLIPIDEMIA, UNSPECIFIED HYPERLIPIDEMIA TYPE: ICD-10-CM

## 2017-04-24 DIAGNOSIS — I10 ESSENTIAL HYPERTENSION: ICD-10-CM

## 2017-04-24 DIAGNOSIS — E11.9 TYPE 2 DIABETES MELLITUS WITHOUT COMPLICATION, UNSPECIFIED LONG TERM INSULIN USE STATUS: ICD-10-CM

## 2017-04-24 DIAGNOSIS — D50.9 IRON DEFICIENCY ANEMIA, UNSPECIFIED IRON DEFICIENCY ANEMIA TYPE: Primary | ICD-10-CM

## 2017-04-30 ENCOUNTER — RESULTS ENCOUNTER (OUTPATIENT)
Dept: FAMILY MEDICINE CLINIC | Facility: CLINIC | Age: 62
End: 2017-04-30

## 2017-04-30 DIAGNOSIS — E11.9 TYPE 2 DIABETES MELLITUS WITHOUT COMPLICATION, UNSPECIFIED LONG TERM INSULIN USE STATUS: ICD-10-CM

## 2017-04-30 DIAGNOSIS — D50.9 IRON DEFICIENCY ANEMIA, UNSPECIFIED IRON DEFICIENCY ANEMIA TYPE: ICD-10-CM

## 2017-04-30 DIAGNOSIS — I10 ESSENTIAL HYPERTENSION: ICD-10-CM

## 2017-04-30 DIAGNOSIS — E78.5 HYPERLIPIDEMIA, UNSPECIFIED HYPERLIPIDEMIA TYPE: ICD-10-CM

## 2017-04-30 DIAGNOSIS — E03.9 ACQUIRED HYPOTHYROIDISM: ICD-10-CM

## 2017-05-03 LAB
ALBUMIN SERPL-MCNC: 4.1 G/DL (ref 3.5–5.2)
ALBUMIN/CREAT UR: 15.5 MG/G CREAT (ref 0–30)
ALBUMIN/GLOB SERPL: 1.6 G/DL
ALP SERPL-CCNC: 77 U/L (ref 39–117)
ALT SERPL-CCNC: 16 U/L (ref 1–33)
APPEARANCE UR: ABNORMAL
AST SERPL-CCNC: 21 U/L (ref 1–32)
BACTERIA #/AREA URNS HPF: ABNORMAL /HPF
BASOPHILS # BLD AUTO: 0.07 10*3/MM3 (ref 0–0.2)
BASOPHILS NFR BLD AUTO: 1.2 % (ref 0–1.5)
BILIRUB SERPL-MCNC: 0.3 MG/DL (ref 0.1–1.2)
BILIRUB UR QL STRIP: NEGATIVE
BUN SERPL-MCNC: 16 MG/DL (ref 8–23)
BUN/CREAT SERPL: 21.1 (ref 7–25)
CALCIUM SERPL-MCNC: 8.9 MG/DL (ref 8.6–10.5)
CHLORIDE SERPL-SCNC: 102 MMOL/L (ref 98–107)
CHOLEST SERPL-MCNC: 179 MG/DL (ref 0–200)
CO2 SERPL-SCNC: 26.7 MMOL/L (ref 22–29)
COLOR UR: YELLOW
CREAT SERPL-MCNC: 0.76 MG/DL (ref 0.57–1)
CREAT UR-MCNC: 195.3 MG/DL
EOSINOPHIL # BLD AUTO: 0.26 10*3/MM3 (ref 0–0.7)
EOSINOPHIL NFR BLD AUTO: 4.4 % (ref 0.3–6.2)
EPI CELLS #/AREA URNS HPF: ABNORMAL /HPF
ERYTHROCYTE [DISTWIDTH] IN BLOOD BY AUTOMATED COUNT: 19.4 % (ref 11.7–13)
FERRITIN SERPL-MCNC: 44.47 NG/ML (ref 13–150)
GLOBULIN SER CALC-MCNC: 2.5 GM/DL
GLUCOSE SERPL-MCNC: 93 MG/DL (ref 65–99)
GLUCOSE UR QL: NEGATIVE
HBA1C MFR BLD: 5.6 % (ref 4.8–5.6)
HCT VFR BLD AUTO: 40 % (ref 35.6–45.5)
HDLC SERPL-MCNC: 57 MG/DL (ref 40–60)
HGB BLD-MCNC: 12.3 G/DL (ref 11.9–15.5)
HGB UR QL STRIP: ABNORMAL
IMM GRANULOCYTES # BLD: 0.02 10*3/MM3 (ref 0–0.03)
IMM GRANULOCYTES NFR BLD: 0.3 % (ref 0–0.5)
IRON SATN MFR SERPL: 18 % (ref 20–50)
IRON SERPL-MCNC: 72 MCG/DL (ref 37–145)
KETONES UR QL STRIP: NEGATIVE
LDLC SERPL CALC-MCNC: 88 MG/DL (ref 0–100)
LEUKOCYTE ESTERASE UR QL STRIP: NEGATIVE
LYMPHOCYTES # BLD AUTO: 2.09 10*3/MM3 (ref 0.9–4.8)
LYMPHOCYTES NFR BLD AUTO: 35.2 % (ref 19.6–45.3)
MCH RBC QN AUTO: 28.5 PG (ref 26.9–32)
MCHC RBC AUTO-ENTMCNC: 30.8 G/DL (ref 32.4–36.3)
MCV RBC AUTO: 92.8 FL (ref 80.5–98.2)
MICROALBUMIN UR-MCNC: 30.2 UG/ML
MONOCYTES # BLD AUTO: 0.6 10*3/MM3 (ref 0.2–1.2)
MONOCYTES NFR BLD AUTO: 10.1 % (ref 5–12)
NEUTROPHILS # BLD AUTO: 2.9 10*3/MM3 (ref 1.9–8.1)
NEUTROPHILS NFR BLD AUTO: 48.8 % (ref 42.7–76)
NITRITE UR QL STRIP: NEGATIVE
PH UR STRIP: 5.5 [PH] (ref 5–8)
PLATELET # BLD AUTO: 237 10*3/MM3 (ref 140–500)
POTASSIUM SERPL-SCNC: 4.5 MMOL/L (ref 3.5–5.2)
PROT SERPL-MCNC: 6.6 G/DL (ref 6–8.5)
PROT UR QL STRIP: ABNORMAL
RBC # BLD AUTO: 4.31 10*6/MM3 (ref 3.9–5.2)
RBC #/AREA URNS HPF: ABNORMAL /HPF
SODIUM SERPL-SCNC: 143 MMOL/L (ref 136–145)
SP GR UR: 1.02 (ref 1–1.03)
T3FREE SERPL-MCNC: 3.2 PG/ML (ref 2–4.4)
T4 FREE SERPL-MCNC: 1.23 NG/DL (ref 0.93–1.7)
TIBC SERPL-MCNC: 394 MCG/DL
TRIGL SERPL-MCNC: 169 MG/DL (ref 0–150)
TSH SERPL DL<=0.005 MIU/L-ACNC: 0.42 MIU/ML (ref 0.27–4.2)
UIBC SERPL-MCNC: 322 MCG/DL
UROBILINOGEN UR STRIP-MCNC: ABNORMAL MG/DL
VLDLC SERPL CALC-MCNC: 33.8 MG/DL (ref 5–40)
WBC # BLD AUTO: 5.94 10*3/MM3 (ref 4.5–10.7)
WBC #/AREA URNS HPF: ABNORMAL /HPF

## 2017-05-15 ENCOUNTER — OFFICE VISIT (OUTPATIENT)
Dept: FAMILY MEDICINE CLINIC | Facility: CLINIC | Age: 62
End: 2017-05-15

## 2017-05-15 VITALS
OXYGEN SATURATION: 94 % | HEART RATE: 74 BPM | WEIGHT: 182.1 LBS | HEIGHT: 64 IN | SYSTOLIC BLOOD PRESSURE: 140 MMHG | BODY MASS INDEX: 31.09 KG/M2 | DIASTOLIC BLOOD PRESSURE: 82 MMHG

## 2017-05-15 DIAGNOSIS — R06.02 SHORTNESS OF BREATH: ICD-10-CM

## 2017-05-15 DIAGNOSIS — R05.3 PERSISTENT COUGH: Primary | ICD-10-CM

## 2017-05-15 PROCEDURE — 71020 XR CHEST PA AND LATERAL: CPT | Performed by: INTERNAL MEDICINE

## 2017-05-15 PROCEDURE — 99214 OFFICE O/P EST MOD 30 MIN: CPT | Performed by: INTERNAL MEDICINE

## 2017-05-15 PROCEDURE — 94761 N-INVAS EAR/PLS OXIMETRY MLT: CPT | Performed by: INTERNAL MEDICINE

## 2017-05-15 RX ORDER — TOPIRAMATE 100 MG/1
100 TABLET, FILM COATED ORAL
COMMUNITY
Start: 2015-02-27 | End: 2017-09-06

## 2017-05-15 RX ORDER — ALBUTEROL SULFATE 90 UG/1
108 AEROSOL, METERED RESPIRATORY (INHALATION)
COMMUNITY
End: 2017-05-15

## 2017-05-15 RX ORDER — ERGOCALCIFEROL 1.25 MG/1
50000 CAPSULE ORAL
COMMUNITY
Start: 2017-03-29

## 2017-05-16 DIAGNOSIS — R06.02 SHORTNESS OF BREATH: ICD-10-CM

## 2017-05-16 DIAGNOSIS — R05.3 PERSISTENT COUGH: Primary | ICD-10-CM

## 2017-05-18 DIAGNOSIS — R06.02 SHORTNESS OF BREATH: ICD-10-CM

## 2017-05-18 DIAGNOSIS — R05.3 PERSISTENT COUGH: Primary | ICD-10-CM

## 2017-05-18 DIAGNOSIS — R91.8 LUNG FIELD ABNORMAL FINDING ON EXAMINATION: ICD-10-CM

## 2017-05-23 RX ORDER — ALBUTEROL SULFATE 90 UG/1
2 AEROSOL, METERED RESPIRATORY (INHALATION) EVERY 4 HOURS PRN
Qty: 18 G | Refills: 0 | Status: SHIPPED | OUTPATIENT
Start: 2017-05-23 | End: 2017-12-28 | Stop reason: SDUPTHER

## 2017-05-26 ENCOUNTER — HOSPITAL ENCOUNTER (OUTPATIENT)
Dept: RESPIRATORY THERAPY | Facility: HOSPITAL | Age: 62
Discharge: HOME | End: 2017-05-26
Attending: INTERNAL MEDICINE

## 2017-05-26 ENCOUNTER — HOSPITAL ENCOUNTER (OUTPATIENT)
Dept: CT IMAGING | Facility: HOSPITAL | Age: 62
Discharge: HOME | End: 2017-05-26
Attending: INTERNAL MEDICINE | Admitting: INTERNAL MEDICINE

## 2017-05-26 LAB — CREAT BLDA-MCNC: 0.8 MG/DL (ref 0.6–1.3)

## 2017-05-26 PROCEDURE — A9270 NON-COVERED ITEM OR SERVICE: HCPCS | Performed by: INTERNAL MEDICINE

## 2017-05-26 PROCEDURE — 94060 EVALUATION OF WHEEZING: CPT

## 2017-05-26 PROCEDURE — 63710000001 ALBUTEROL PER 1 MG: Performed by: INTERNAL MEDICINE

## 2017-05-26 PROCEDURE — 0 IOPAMIDOL 61 % SOLUTION: Performed by: INTERNAL MEDICINE

## 2017-05-26 PROCEDURE — 71260 CT THORAX DX C+: CPT

## 2017-05-26 PROCEDURE — 82565 ASSAY OF CREATININE: CPT

## 2017-05-26 RX ORDER — ALBUTEROL SULFATE 2.5 MG/3ML
2.5 SOLUTION RESPIRATORY (INHALATION) ONCE
Status: COMPLETED | OUTPATIENT
Start: 2017-05-26 | End: 2017-05-26

## 2017-05-26 RX ADMIN — IOPAMIDOL 85 ML: 612 INJECTION, SOLUTION INTRAVENOUS at 13:21

## 2017-05-26 RX ADMIN — ALBUTEROL SULFATE 2.5 MG: 2.5 SOLUTION RESPIRATORY (INHALATION) at 13:46

## 2017-06-06 ENCOUNTER — OFFICE VISIT (OUTPATIENT)
Dept: FAMILY MEDICINE CLINIC | Facility: CLINIC | Age: 62
End: 2017-06-06

## 2017-06-06 VITALS
HEIGHT: 64 IN | OXYGEN SATURATION: 94 % | WEIGHT: 186.2 LBS | SYSTOLIC BLOOD PRESSURE: 118 MMHG | HEART RATE: 75 BPM | DIASTOLIC BLOOD PRESSURE: 86 MMHG | BODY MASS INDEX: 31.79 KG/M2

## 2017-06-06 DIAGNOSIS — F41.8 DEPRESSION WITH ANXIETY: ICD-10-CM

## 2017-06-06 DIAGNOSIS — J45.40 MODERATE PERSISTENT ASTHMA WITHOUT COMPLICATION: Primary | ICD-10-CM

## 2017-06-06 PROBLEM — M79.7 FIBROMYALGIA SYNDROME: Status: ACTIVE | Noted: 2017-06-06

## 2017-06-06 PROCEDURE — 99214 OFFICE O/P EST MOD 30 MIN: CPT | Performed by: INTERNAL MEDICINE

## 2017-06-06 PROCEDURE — 94664 DEMO&/EVAL PT USE INHALER: CPT | Performed by: INTERNAL MEDICINE

## 2017-06-06 RX ORDER — BUDESONIDE AND FORMOTEROL FUMARATE DIHYDRATE 160; 4.5 UG/1; UG/1
2 AEROSOL RESPIRATORY (INHALATION) 2 TIMES DAILY
Qty: 10.2 G | Refills: 6 | Status: SHIPPED | OUTPATIENT
Start: 2017-06-06 | End: 2017-12-08 | Stop reason: ALTCHOICE

## 2017-06-06 RX ORDER — ISOSORBIDE MONONITRATE 60 MG/1
60 TABLET, EXTENDED RELEASE ORAL DAILY
COMMUNITY
Start: 2017-05-17 | End: 2019-08-20

## 2017-06-06 RX ORDER — ASCORBIC ACID 500 MG
500 TABLET ORAL 2 TIMES DAILY
COMMUNITY
End: 2020-04-16

## 2017-06-06 RX ORDER — PREGABALIN 75 MG/1
75 CAPSULE ORAL 2 TIMES DAILY
Qty: 60 CAPSULE | Refills: 1 | Status: SHIPPED | OUTPATIENT
Start: 2017-06-06 | End: 2017-08-22 | Stop reason: SDUPTHER

## 2017-07-17 DIAGNOSIS — I10 ESSENTIAL HYPERTENSION: Primary | ICD-10-CM

## 2017-07-17 DIAGNOSIS — D50.9 IRON DEFICIENCY ANEMIA, UNSPECIFIED IRON DEFICIENCY ANEMIA TYPE: ICD-10-CM

## 2017-08-21 ENCOUNTER — RESULTS ENCOUNTER (OUTPATIENT)
Dept: FAMILY MEDICINE CLINIC | Facility: CLINIC | Age: 62
End: 2017-08-21

## 2017-08-21 DIAGNOSIS — D50.9 IRON DEFICIENCY ANEMIA, UNSPECIFIED IRON DEFICIENCY ANEMIA TYPE: ICD-10-CM

## 2017-08-21 DIAGNOSIS — I10 ESSENTIAL HYPERTENSION: ICD-10-CM

## 2017-08-22 RX ORDER — PREGABALIN 75 MG/1
75 CAPSULE ORAL 2 TIMES DAILY
Qty: 60 CAPSULE | Refills: 0 | Status: SHIPPED | OUTPATIENT
Start: 2017-08-22 | End: 2017-09-26 | Stop reason: SDUPTHER

## 2017-09-06 ENCOUNTER — OFFICE VISIT (OUTPATIENT)
Dept: FAMILY MEDICINE CLINIC | Facility: CLINIC | Age: 62
End: 2017-09-06

## 2017-09-06 VITALS
OXYGEN SATURATION: 97 % | HEART RATE: 65 BPM | SYSTOLIC BLOOD PRESSURE: 122 MMHG | HEIGHT: 64 IN | DIASTOLIC BLOOD PRESSURE: 78 MMHG | BODY MASS INDEX: 29.88 KG/M2 | WEIGHT: 175 LBS | TEMPERATURE: 98.1 F

## 2017-09-06 DIAGNOSIS — J45.41 MODERATE PERSISTENT ASTHMA WITH ACUTE EXACERBATION: Primary | ICD-10-CM

## 2017-09-06 DIAGNOSIS — G47.00 INSOMNIA, UNSPECIFIED TYPE: ICD-10-CM

## 2017-09-06 PROCEDURE — 99214 OFFICE O/P EST MOD 30 MIN: CPT | Performed by: INTERNAL MEDICINE

## 2017-09-06 RX ORDER — HYDROCODONE BITARTRATE AND ACETAMINOPHEN 10; 325 MG/1; MG/1
1 TABLET ORAL DAILY PRN
COMMUNITY
Start: 2017-09-01 | End: 2021-01-22

## 2017-09-06 NOTE — PROGRESS NOTES
Subjective   Camelia Quintanilla is a 62 y.o. female who presents today for:    Anemia; Anxiety; and Depression    History of Present Illness   Anemia: Labs at our initial visit in September, 2016, showed an anemia with microcytic indices. Follow- up labs showed an iron deficiency. This has improved but fully rebounded with an iron supplement bid.  She does not recall having been told she was anemic in the past.    Chronic cough and shortness of breath led to the diagnosis of asthma within the past year and she was started on Symbicort.  This has helped significantly.  She has stopped using the spacer.  Over the last 5 days, she has developed a worsening, nonproductive cough, shortness of breath with chest tightness, and her symptoms are usually worse in the morning.  She feels somewhat better after the Symbicort, but she often feels like her relief is not complete.  Therefore, she uses her albuterol inhaler was significant benefit.      Ms. Quintanilla  reports that she has never smoked. She has never used smokeless tobacco. She reports that she does not drink alcohol or use illicit drugs.     Allergies   Allergen Reactions   • Adhesive Tape      Must use paper tape   • Morphine And Related Hives       Current Outpatient Prescriptions:   •  albuterol (PROVENTIL HFA;VENTOLIN HFA) 108 (90 BASE) MCG/ACT inhaler, Inhale 2 puffs Every 4 (Four) Hours As Needed for Wheezing or Shortness of Air., Disp: 18 g, Rfl: 0  •  budesonide-formoterol (SYMBICORT) 160-4.5 MCG/ACT inhaler, Inhale 2 puffs 2 (Two) Times a Day., Disp: 10.2 g, Rfl: 6  •  celecoxib (CeleBREX) 200 MG capsule, Take 200 mg by mouth 2 (two) times a day., Disp: , Rfl:   •  clotrimazole-betamethasone (LOTRISONE) 1-0.05 % cream, Apply  topically 2 (two) times a day., Disp: , Rfl:   •  dicyclomine (BENTYL) 10 MG capsule, Take 1 capsule by mouth 3 (Three) Times a Day As Needed (abdominal pain)., Disp: 30 capsule, Rfl: 2  •  furosemide (LASIX) 20 MG tablet, Take 20 mg by mouth As  Needed., Disp: , Rfl:   •  HYDROcodone-acetaminophen (NORCO)  MG per tablet, Take 1 tablet by mouth 2 (Two) Times a Day As Needed., Disp: , Rfl:   •  hydroxychloroquine (PLAQUENIL) 200 MG tablet, Take 200 mg by mouth 2 (Two) Times a Day., Disp: , Rfl:   •  IRON PO, Take 1 tablet by mouth 2 (Two) Times a Day., Disp: , Rfl:   •  isosorbide mononitrate (IMDUR) 60 MG 24 hr tablet, Take 60 mg by mouth Daily., Disp: , Rfl:   •  levothyroxine (SYNTHROID, LEVOTHROID) 75 MCG tablet, , Disp: , Rfl:   •  nebivolol (BYSTOLIC) 5 MG tablet, Take 5 mg by mouth Daily., Disp: , Rfl:   •  nitroglycerin (NITROSTAT) 0.4 MG SL tablet, Place 0.4 mg under the tongue every 5 (five) minutes., Disp: , Rfl:   •  pantoprazole (PROTONIX) 40 MG EC tablet, Take 1 tablet by mouth Daily., Disp: 30 tablet, Rfl: 12  •  pregabalin (LYRICA) 75 MG capsule, Take 1 capsule by mouth 2 (Two) Times a Day., Disp: 60 capsule, Rfl: 0  •  Simethicone (GAS RELIEF EXTRA STRENGTH PO), , Disp: , Rfl:   •  traZODone (DESYREL) 100 MG tablet, Take 100 mg by mouth Every Night., Disp: , Rfl:   •  vitamin C (ASCORBIC ACID) 500 MG tablet, Take 500 mg by mouth 2 (Two) Times a Day., Disp: , Rfl:   •  vitamin D (ERGOCALCIFEROL) 81110 UNITS capsule capsule, , Disp: , Rfl:   •  simvastatin (ZOCOR) 80 MG tablet, Take 40 mg by mouth Daily., Disp: , Rfl:       Review of Systems   Constitutional: Negative for chills, diaphoresis, fever and unexpected weight change.   HENT: Positive for congestion and rhinorrhea. Negative for sore throat.    Eyes: Negative for visual disturbance.   Respiratory: Positive for cough, chest tightness and shortness of breath.    Cardiovascular: Negative for chest pain, palpitations and leg swelling.   Gastrointestinal: Negative for nausea.   Allergic/Immunologic: Positive for environmental allergies.       Objective   Vitals:    09/06/17 1114   BP: 122/78   BP Location: Left arm   Patient Position: Sitting   Cuff Size: Adult   Pulse: 65   SpO2: 97%  "  Weight: 175 lb (79.4 kg)   Height: 64\" (162.6 cm)     Physical Exam  Well-developed, well-nourished, in no acute distress.  Alert and oriented ×4 and answers all questions appropriately.  Clear serous effusion behind both eardrums.  No erythema.  Boggy turbinates with clear drainage bilaterally.  No cervical or supraclavicular lymphadenopathy.  Normal respiratory effort noted.  Bibasilar crackles appreciated.  She also has crackles on the right anteriorly.  No wheezes appreciated.  Mildly prolonged expiratory phase.  Regular rate and rhythm without murmur.  No lower extremity edema and pedal pulses are full bilaterally    Assessment/Plan   Camelia was seen today for anemia and asthma.    Diagnoses and all orders for this visit:    Moderate persistent asthma with acute exacerbation    Insomnia, unspecified type  Comments:  Stop the trazoodone altogether.    Try the inhaler adjustments first and see how she does this week.  If no better next week, we will pursue additional evaluation with a chest x-ray and probably had Singulair to her regimen.  She will call if her symptoms escalate/worsen in the interim.  "

## 2017-09-06 NOTE — PATIENT INSTRUCTIONS
Albuterol MDI: 2 puffs before the Symbicort this week and whenever you feel like her asthma flares.  2-4 puffs with a spacer as needed in between the Symbicort doses.    Symbicort MDI: 2 puffs with a spacer twice a day every day and rinse your mouth out afterwards.

## 2017-09-28 RX ORDER — PREGABALIN 75 MG/1
75 CAPSULE ORAL 2 TIMES DAILY
Qty: 60 CAPSULE | Refills: 2 | Status: SHIPPED | OUTPATIENT
Start: 2017-09-28 | End: 2018-01-22 | Stop reason: SDUPTHER

## 2017-12-08 ENCOUNTER — OFFICE VISIT (OUTPATIENT)
Dept: FAMILY MEDICINE CLINIC | Facility: CLINIC | Age: 62
End: 2017-12-08

## 2017-12-08 VITALS
TEMPERATURE: 97.8 F | HEIGHT: 64 IN | WEIGHT: 180.1 LBS | OXYGEN SATURATION: 95 % | BODY MASS INDEX: 30.75 KG/M2 | HEART RATE: 76 BPM | SYSTOLIC BLOOD PRESSURE: 110 MMHG | DIASTOLIC BLOOD PRESSURE: 78 MMHG

## 2017-12-08 DIAGNOSIS — J45.40 MODERATE PERSISTENT ASTHMA WITHOUT COMPLICATION: Primary | ICD-10-CM

## 2017-12-08 PROCEDURE — 99214 OFFICE O/P EST MOD 30 MIN: CPT | Performed by: INTERNAL MEDICINE

## 2017-12-08 RX ORDER — AZITHROMYCIN 500 MG/1
500 TABLET, FILM COATED ORAL DAILY
Qty: 5 TABLET | Refills: 0 | Status: SHIPPED | OUTPATIENT
Start: 2017-12-08 | End: 2018-01-04

## 2017-12-08 NOTE — PROGRESS NOTES
Subjective   Camelia Quintanilla is a 62 y.o. female who presents today for:    Asthma; Cough; and Sinus Problem    History of Present Illness   Chronic cough and shortness of breath led to the diagnosis of asthma within the past year and she was started on Symbicort.  This initially helped significantly.       In early September, she developed a worsening, nonproductive cough, shortness of breath with chest tightness.  She felt somewhat better after the Symbicort, but not complete relief.  Therefore, she used her albuterol inhaler with significant but temporary benefit.  We asked her to use it more regularly prior to her Symbicort, then wean off of it.  Unfortunately, she was still requiring albuterol 4-5 times per week, sometimes 2-3 times per day.    Within the last week, she has developed upper respiratory infection symptoms once again.  This initially improved but it started to get worse.  She had a fever as high as 100°F yesterday, but has had no temperature today.    Her previous evaluation with a CT scan has showed residual scarring at both lung bases, but no other abnormalities.    Ms. Quintanilla  reports that she has never smoked. She has never used smokeless tobacco. She reports that she does not drink alcohol or use illicit drugs.     Allergies   Allergen Reactions   • Adhesive Tape      Must use paper tape   • Morphine And Related Hives       Current Outpatient Prescriptions:   •  albuterol (PROVENTIL HFA;VENTOLIN HFA) 108 (90 BASE) MCG/ACT inhaler, Inhale 2 puffs Every 4 (Four) Hours As Needed for Wheezing or Shortness of Air., Disp: 18 g, Rfl: 0  •  budesonide-formoterol (SYMBICORT) 160-4.5 MCG/ACT inhaler, Inhale 2 puffs 2 (Two) Times a Day., Disp: 10.2 g, Rfl: 6  •  celecoxib (CeleBREX) 200 MG capsule, Take 200 mg by mouth 2 (two) times a day., Disp: , Rfl:   •  clotrimazole-betamethasone (LOTRISONE) 1-0.05 % cream, Apply  topically 2 (two) times a day., Disp: , Rfl:   •  furosemide (LASIX) 20 MG tablet, Take 20  mg by mouth As Needed., Disp: , Rfl:   •  HYDROcodone-acetaminophen (NORCO)  MG per tablet, Take 1 tablet by mouth 2 (Two) Times a Day As Needed., Disp: , Rfl:   •  hydroxychloroquine (PLAQUENIL) 200 MG tablet, Take 200 mg by mouth 2 (Two) Times a Day., Disp: , Rfl:   •  hyoscyamine (LEVSIN) 0.125 MG SL tablet, Take 1 tablet by mouth Every 6 (Six) Hours As Needed for Cramping., Disp: 30 tablet, Rfl: 0  •  IRON PO, Take 1 tablet by mouth 2 (Two) Times a Day., Disp: , Rfl:   •  isosorbide mononitrate (IMDUR) 60 MG 24 hr tablet, Take 60 mg by mouth Daily., Disp: , Rfl:   •  levothyroxine (SYNTHROID, LEVOTHROID) 75 MCG tablet, Take 75 mcg by mouth Daily., Disp: , Rfl:   •  nebivolol (BYSTOLIC) 5 MG tablet, Take 5 mg by mouth Daily., Disp: , Rfl:   •  nitroglycerin (NITROSTAT) 0.4 MG SL tablet, Place 0.4 mg under the tongue every 5 (five) minutes., Disp: , Rfl:   •  pantoprazole (PROTONIX) 40 MG EC tablet, Take 1 tablet by mouth Daily., Disp: 30 tablet, Rfl: 12  •  pregabalin (LYRICA) 75 MG capsule, Take 1 capsule by mouth 2 (Two) Times a Day., Disp: 60 capsule, Rfl: 2  •  Simethicone (GAS RELIEF EXTRA STRENGTH PO), As Needed., Disp: , Rfl:   •  simvastatin (ZOCOR) 80 MG tablet, Take 40 mg by mouth Daily., Disp: , Rfl:   •  traZODone (DESYREL) 100 MG tablet, Take 100 mg by mouth Every Night., Disp: , Rfl:   •  vitamin C (ASCORBIC ACID) 500 MG tablet, Take 500 mg by mouth 2 (Two) Times a Day., Disp: , Rfl:   •  vitamin D (ERGOCALCIFEROL) 37332 UNITS capsule capsule, 50,000 Units Every 7 (Seven) Days., Disp: , Rfl:       Review of Systems   Constitutional: Positive for chills, fatigue and fever.   HENT: Positive for congestion.    Respiratory: Positive for cough, shortness of breath and wheezing.    Cardiovascular: Negative for chest pain, palpitations and leg swelling.   Gastrointestinal: Negative for abdominal pain, nausea and vomiting.   Musculoskeletal: Negative for arthralgias.         Objective   Vitals:     "12/08/17 1406   BP: 110/78   BP Location: Left arm   Patient Position: Sitting   Cuff Size: Large Adult   Pulse: 76   Temp: 97.8 °F (36.6 °C)   TempSrc: Oral   SpO2: 95%   Weight: 81.7 kg (180 lb 1.6 oz)   Height: 162.6 cm (64\")     Physical Exam  WD/WN, in NAD.  Conjunctivae not injected.  No cervical or SC LAD.  Speaks in full sentences.  Bibasilar crackles (chronic).  No wheezes.  Normal respiratory effort.      Assessment/Plan   Camelia was seen today for asthma, cough and sinus problem.    Diagnoses and all orders for this visit:    Moderate persistent asthma without complication  Acute bronchitis    -     fluticasone-salmeterol (ADVAIR DISKUS) 500-50 MCG/DOSE DISKUS; Inhale 1 puff 2 (Two) Times a Day.  -     azithromycin (ZITHROMAX) 500 MG tablet; Take 1 tablet by mouth Daily.    I believe this acute, five-day situation represents an acute bronchitis.  Therefore, she will take Zithromax.  She also has evidence of sinusitis, so we will extend that course to about a week.  If she is no better in about 4 or 5 days, she should contact our office so that we can add a steroid to the mix.  Otherwise, she will continue the albuterol when needed and the Symbicort twice a day every day.    Once she recovers from this acute infection, we will have her change the Symbicort over to Advair 500/50 (sample and prescription given to patient today).  Our hope is that this will work better than the Symbicort.  She may still need the addition of Singulair to the mix to help.    We will hold off starting a steroid or giving her a steroid injection today since she is not wheezing.  "

## 2017-12-29 RX ORDER — ALBUTEROL SULFATE 90 UG/1
AEROSOL, METERED RESPIRATORY (INHALATION)
Qty: 18 G | Refills: 1 | Status: SHIPPED | OUTPATIENT
Start: 2017-12-29 | End: 2019-05-02

## 2017-12-29 RX ORDER — PREDNISONE 10 MG/1
TABLET ORAL
Qty: 30 TABLET | Refills: 0 | Status: SHIPPED | OUTPATIENT
Start: 2017-12-29 | End: 2018-01-22

## 2018-01-04 ENCOUNTER — OFFICE VISIT (OUTPATIENT)
Dept: FAMILY MEDICINE CLINIC | Facility: CLINIC | Age: 63
End: 2018-01-04

## 2018-01-04 VITALS
TEMPERATURE: 98.1 F | SYSTOLIC BLOOD PRESSURE: 128 MMHG | DIASTOLIC BLOOD PRESSURE: 76 MMHG | WEIGHT: 178.1 LBS | OXYGEN SATURATION: 93 % | HEIGHT: 64 IN | HEART RATE: 75 BPM | BODY MASS INDEX: 30.4 KG/M2

## 2018-01-04 DIAGNOSIS — J45.40 MODERATE PERSISTENT ASTHMA, UNSPECIFIED WHETHER COMPLICATED: ICD-10-CM

## 2018-01-04 DIAGNOSIS — R05.3 COUGH, PERSISTENT: Primary | ICD-10-CM

## 2018-01-04 PROCEDURE — 99214 OFFICE O/P EST MOD 30 MIN: CPT | Performed by: NURSE PRACTITIONER

## 2018-01-04 PROCEDURE — 71046 X-RAY EXAM CHEST 2 VIEWS: CPT | Performed by: NURSE PRACTITIONER

## 2018-01-04 RX ORDER — LEVOFLOXACIN 750 MG/1
750 TABLET ORAL DAILY
Qty: 10 TABLET | Refills: 0 | Status: SHIPPED | OUTPATIENT
Start: 2018-01-04 | End: 2018-01-14

## 2018-01-04 NOTE — PROGRESS NOTES
Subjective   Camelia Quintanilla is a 62 y.o. female.     Cough and Wheezing     HPI Comments: Ms. Quintanilla presents today with complaints of a persistent cough. She is a patient of Dr. Hinds, this is the first time I have seen her and this is a new problem to me. She was seen by Dr. Hinds on 12/8/17 for these same symptoms and treated for acute bronchitis/moderate persistent asthma, acute sinusitis. She was started on 500 mg of Azithromycin, and she was changed from Symbicort to Advair. Her symptoms did not improve over the following weeks so she was started on Prednisone 10 mg with a tapering dose over 12 days. She started the medication on 12/29/17. She states she has increased the use of her nebulizer treatments to more than twice a day. She has been taking Day Quil and Night Quil for her cough. She reports having a low grade fever of 99 yesterday.     I have reviewed the patient's medical history in detail and updated the computerized patient record.    The following portions of the patient's history were reviewed and updated as appropriate: allergies, current medications, past family history, past medical history, past social history, past surgical history and problem list.       Current Outpatient Prescriptions:   •  celecoxib (CeleBREX) 200 MG capsule, Take 200 mg by mouth 2 (two) times a day., Disp: , Rfl:   •  clotrimazole-betamethasone (LOTRISONE) 1-0.05 % cream, Apply  topically 2 (two) times a day., Disp: , Rfl:   •  furosemide (LASIX) 20 MG tablet, Take 20 mg by mouth As Needed., Disp: , Rfl:   •  HYDROcodone-acetaminophen (NORCO)  MG per tablet, Take 1 tablet by mouth 2 (Two) Times a Day As Needed., Disp: , Rfl:   •  hydroxychloroquine (PLAQUENIL) 200 MG tablet, Take 200 mg by mouth 2 (Two) Times a Day., Disp: , Rfl:   •  hyoscyamine (LEVSIN) 0.125 MG SL tablet, Take 1 tablet by mouth Every 6 (Six) Hours As Needed for Cramping., Disp: 30 tablet, Rfl: 0  •  IRON PO, Take 1 tablet by mouth 2 (Two)  Times a Day., Disp: , Rfl:   •  isosorbide mononitrate (IMDUR) 60 MG 24 hr tablet, Take 60 mg by mouth Daily., Disp: , Rfl:   •  levothyroxine (SYNTHROID, LEVOTHROID) 75 MCG tablet, Take 75 mcg by mouth Daily., Disp: , Rfl:   •  nebivolol (BYSTOLIC) 5 MG tablet, Take 5 mg by mouth Daily., Disp: , Rfl:   •  nitroglycerin (NITROSTAT) 0.4 MG SL tablet, Place 0.4 mg under the tongue every 5 (five) minutes., Disp: , Rfl:   •  pantoprazole (PROTONIX) 40 MG EC tablet, Take 1 tablet by mouth Daily., Disp: 30 tablet, Rfl: 12  •  predniSONE (DELTASONE) 10 MG tablet, 4 PO daily x 3 days; 3 PO daily x 3 days; 2 PO daily x 3 days; 1 PO daily x 3 days, Disp: 30 tablet, Rfl: 0  •  pregabalin (LYRICA) 75 MG capsule, Take 1 capsule by mouth 2 (Two) Times a Day., Disp: 60 capsule, Rfl: 2  •  Simethicone (GAS RELIEF EXTRA STRENGTH PO), As Needed., Disp: , Rfl:   •  simvastatin (ZOCOR) 80 MG tablet, Take 40 mg by mouth Daily., Disp: , Rfl:   •  traZODone (DESYREL) 100 MG tablet, Take 100 mg by mouth Every Night., Disp: , Rfl:   •  VENTOLIN  (90 Base) MCG/ACT inhaler, INHALE 2 PUFFS EVERY 4 HOURS AS NEEDED FOR WHEEZING OR SHORTNESS OF AIR, Disp: 18 g, Rfl: 1  •  vitamin C (ASCORBIC ACID) 500 MG tablet, Take 500 mg by mouth 2 (Two) Times a Day., Disp: , Rfl:   •  vitamin D (ERGOCALCIFEROL) 93442 UNITS capsule capsule, 50,000 Units Every 7 (Seven) Days., Disp: , Rfl:   •  levoFLOXacin (LEVAQUIN) 750 MG tablet, Take 1 tablet by mouth Daily for 10 days., Disp: 10 tablet, Rfl: 0    Review of Systems   Constitutional: Positive for fatigue and fever (low grade). Negative for chills.   HENT: Negative.    Respiratory: Positive for cough, shortness of breath (with coughing) and wheezing.    Cardiovascular: Negative.    Skin: Negative.    Neurological: Negative.        Objective    Vitals:    01/04/18 1020   BP: 128/76   Pulse: 75   Temp: 98.1 °F (36.7 °C)   SpO2: 93%     Physical Exam   Constitutional: She is oriented to person, place, and  time.   HENT:   Head: Normocephalic.   Right Ear: External ear normal.   Left Ear: External ear normal.   Nose: Nose normal.   Mouth/Throat: Oropharynx is clear and moist.   Neck: Neck supple.   Cardiovascular: Normal rate, regular rhythm and normal heart sounds.    Pulmonary/Chest: Effort normal. No respiratory distress. She has decreased breath sounds in the right lower field and the left lower field. She has wheezes in the right middle field and the left middle field. She has no rhonchi. She has no rales.   Lymphadenopathy:     She has no cervical adenopathy.   Neurological: She is alert and oriented to person, place, and time.   Skin: Skin is warm and dry.   Psychiatric:   No acute distress   Vitals reviewed.      Assessment/Plan   Camelia was seen today for cough and wheezing.    Diagnoses and all orders for this visit:    Cough, persistent  -     XR Chest PA & Lateral  -     Ambulatory Referral to Pulmonology  -     levoFLOXacin (LEVAQUIN) 750 MG tablet; Take 1 tablet by mouth Daily for 10 days.    Moderate persistent asthma, unspecified whether complicated  -     Ambulatory Referral to Pulmonology  -     levoFLOXacin (LEVAQUIN) 750 MG tablet; Take 1 tablet by mouth Daily for 10 days.       1. I reviewed the chest x-ray my self and then with Dr. Hinds. We discussed that she will benefit from seeing a pulmonologist since she is having breakthrough symptoms of her asthma with the medications she has been on for the past month.   2. At this time she is to go back on the Symbicort 160/4.5 2 puffs twice a day ( a sample has been provided), she is to start Levofloxacin 750 mg daily x 10 days and she is to complete the prednisone she was started on last week.   3. She is to follow up with Dr. Hinds at her next scheduled appointment.

## 2018-01-05 DIAGNOSIS — J45.40 MODERATE PERSISTENT ASTHMA WITHOUT COMPLICATION: Primary | ICD-10-CM

## 2018-01-05 RX ORDER — MONTELUKAST SODIUM 10 MG/1
10 TABLET ORAL NIGHTLY
Qty: 30 TABLET | Refills: 0 | Status: SHIPPED | OUTPATIENT
Start: 2018-01-05 | End: 2018-01-31 | Stop reason: SDUPTHER

## 2018-01-22 ENCOUNTER — OFFICE VISIT (OUTPATIENT)
Dept: FAMILY MEDICINE CLINIC | Facility: CLINIC | Age: 63
End: 2018-01-22

## 2018-01-22 VITALS
DIASTOLIC BLOOD PRESSURE: 82 MMHG | BODY MASS INDEX: 30.71 KG/M2 | HEART RATE: 68 BPM | WEIGHT: 179.9 LBS | SYSTOLIC BLOOD PRESSURE: 140 MMHG | OXYGEN SATURATION: 98 % | HEIGHT: 64 IN

## 2018-01-22 DIAGNOSIS — M79.7 FIBROMYALGIA SYNDROME: ICD-10-CM

## 2018-01-22 DIAGNOSIS — Z23 FLU VACCINE NEED: ICD-10-CM

## 2018-01-22 DIAGNOSIS — J45.40 MODERATE PERSISTENT ASTHMA WITHOUT COMPLICATION: Primary | ICD-10-CM

## 2018-01-22 DIAGNOSIS — I10 ESSENTIAL HYPERTENSION: ICD-10-CM

## 2018-01-22 PROBLEM — E23.6 PITUITARY CYST (HCC): Status: ACTIVE | Noted: 2017-07-17

## 2018-01-22 PROCEDURE — 99214 OFFICE O/P EST MOD 30 MIN: CPT | Performed by: INTERNAL MEDICINE

## 2018-01-22 RX ORDER — BUDESONIDE AND FORMOTEROL FUMARATE DIHYDRATE 160; 4.5 UG/1; UG/1
2 AEROSOL RESPIRATORY (INHALATION)
Qty: 10.2 G | Refills: 5 | Status: SHIPPED | OUTPATIENT
Start: 2018-01-22 | End: 2019-03-15 | Stop reason: SDUPTHER

## 2018-01-22 RX ORDER — PREGABALIN 75 MG/1
75 CAPSULE ORAL 2 TIMES DAILY
Qty: 60 CAPSULE | Refills: 5 | Status: SHIPPED | OUTPATIENT
Start: 2018-01-22 | End: 2019-03-06 | Stop reason: ALTCHOICE

## 2018-01-22 RX ORDER — BUDESONIDE AND FORMOTEROL FUMARATE DIHYDRATE 160; 4.5 UG/1; UG/1
2 AEROSOL RESPIRATORY (INHALATION)
COMMUNITY
End: 2018-01-22 | Stop reason: SDUPTHER

## 2018-01-22 NOTE — PROGRESS NOTES
Subjective   Camelia Quintanilla is a 62 y.o. female who presents today for:    Bronchitis (F/u) and Elevated Blood Pressure (over the weekend readings of 150/70, 160/90)    History of Present Illness   Asthma  Chronic cough and shortness of breath led to the diagnosis of asthma last year and she was started on Symbicort.  This initially helped significantly.  However, her cough worsened in September and she developed an upper respiratory infection with fever in December.  Despite treating her with Zithromax and a course of prednisone and changing her inhaler from Symbicort to Advair, her symptoms have persisted.  Fever has resolved.    She still needs her albuterol at least twice a day.  Occasionally, she will use the third or fourth time.    HTN  Chronic, essential HTN.  She has been evaluated for chest pain, reportedly undergoing cardiac catheterization about 3 years ago. She states that she was diagnosed with a heart murmur and an irregular rhythm, but no cardiovascular disease. She was started on Ranexa and Imdur at that time, to the best of her recollection. She does not recall when she was started on a statin.    FMS  She was told she has fibromyalgia and has been maintained on Lyrica with decent benefit.  She is also maintained on hydroxychloroquine for rheumatoid arthritis that was diagnosed over 20 years ago.  She denies any history of lung involvement from her rheumatoid arthritis.  In addition to Lyrica and hydroxychloroquine, she takes Celebrex and Norco 10 on occasion.    Ms. Quintanilla  reports that she has never smoked. She has never used smokeless tobacco. She reports that she does not drink alcohol or use illicit drugs.     Allergies   Allergen Reactions   • Adhesive Tape      Must use paper tape   • Morphine And Related Hives       Current Outpatient Prescriptions:   •  budesonide-formoterol (SYMBICORT) 160-4.5 MCG/ACT inhaler, Inhale 2 puffs 2 (Two) Times a Day., Disp: 10.2 g, Rfl: 5  •  celecoxib (CeleBREX)  200 MG capsule, Take 200 mg by mouth 2 (two) times a day., Disp: , Rfl:   •  clotrimazole-betamethasone (LOTRISONE) 1-0.05 % cream, Apply  topically 2 (two) times a day., Disp: , Rfl:   •  furosemide (LASIX) 20 MG tablet, Take 20 mg by mouth As Needed., Disp: , Rfl:   •  HYDROcodone-acetaminophen (NORCO)  MG per tablet, Take 1 tablet by mouth 2 (Two) Times a Day As Needed., Disp: , Rfl:   •  hydroxychloroquine (PLAQUENIL) 200 MG tablet, Take 200 mg by mouth 2 (Two) Times a Day., Disp: , Rfl:   •  hyoscyamine (LEVSIN) 0.125 MG SL tablet, Take 1 tablet by mouth Every 6 (Six) Hours As Needed for Cramping., Disp: 30 tablet, Rfl: 0  •  IRON PO, Take 1 tablet by mouth 2 (Two) Times a Day., Disp: , Rfl:   •  isosorbide mononitrate (IMDUR) 60 MG 24 hr tablet, Take 60 mg by mouth Daily., Disp: , Rfl:   •  levothyroxine (SYNTHROID, LEVOTHROID) 75 MCG tablet, Take 75 mcg by mouth Daily., Disp: , Rfl:   •  montelukast (SINGULAIR) 10 MG tablet, Take 1 tablet by mouth Every Night., Disp: 30 tablet, Rfl: 0  •  nebivolol (BYSTOLIC) 5 MG tablet, Take 5 mg by mouth Daily., Disp: , Rfl:   •  nitroglycerin (NITROSTAT) 0.4 MG SL tablet, Place 0.4 mg under the tongue every 5 (five) minutes., Disp: , Rfl:   •  pantoprazole (PROTONIX) 40 MG EC tablet, Take 1 tablet by mouth Daily., Disp: 30 tablet, Rfl: 12  •  pregabalin (LYRICA) 75 MG capsule, Take 1 capsule by mouth 2 (Two) Times a Day., Disp: 60 capsule, Rfl: 5  •  Simethicone (GAS RELIEF EXTRA STRENGTH PO), As Needed., Disp: , Rfl:   •  simvastatin (ZOCOR) 80 MG tablet, Take 40 mg by mouth Daily., Disp: , Rfl:   •  traZODone (DESYREL) 100 MG tablet, Take 100 mg by mouth Every Night., Disp: , Rfl:   •  VENTOLIN  (90 Base) MCG/ACT inhaler, INHALE 2 PUFFS EVERY 4 HOURS AS NEEDED FOR WHEEZING OR SHORTNESS OF AIR, Disp: 18 g, Rfl: 1  •  vitamin C (ASCORBIC ACID) 500 MG tablet, Take 500 mg by mouth 2 (Two) Times a Day., Disp: , Rfl:   •  vitamin D (ERGOCALCIFEROL) 39755 UNITS  "capsule capsule, 50,000 Units Every 7 (Seven) Days., Disp: , Rfl:       Review of Systems      Objective   Vitals:    01/22/18 1214 01/22/18 1244   BP: 140/76 140/82   BP Location: Left arm Left arm   Patient Position: Sitting    Cuff Size: Adult    Pulse: 68    SpO2: 98%    Weight: 81.6 kg (179 lb 14.4 oz)    Height: 162.6 cm (64\")      Physical Exam  Well-developed, well-nourished, in no acute distress.  No thyromegaly or mass.  No cervical or supraclavicular lymphadenopathy noted.  Few bibasilar crackles appreciated  No wheezes appreciated.  Normal respiratory effort noted.  She coughs intermittently during the exam.  Regular rate and rhythm.  No S3 or S4.  No evidence of active synovitis in any extremity.      Assessment/Plan   Camelia was seen today for bronchitis and elevated blood pressure.    Diagnoses and all orders for this visit:    Moderate persistent asthma without complication    -     budesonide-formoterol (SYMBICORT) 160-4.5 MCG/ACT inhaler; Inhale 2 puffs 2 (Two) Times a Day.  Use it with a spacer.  Use albuterol immediately before doing the Symbicort.  If this does not give her any significant relief, she may need referral to a pulmonologist.  We may consider stopping her beta blocker to maximize her response to the beta agonists.  She has done well off Ranexa, so we may try stopping her Imdur.      Essential hypertension  No medication changes today, but we may need to make changes as noted above her pulmonary symptoms.    Fibromyalgia syndrome  -     pregabalin (LYRICA) 75 MG capsule; Take 1 capsule by mouth 2 (Two) Times a Day.  JANE report was obtained and reviewed during the course of today's office visit.  Lyrica prescription was provided today for the next 6 months.    Flu vaccine need  -     Flu Vaccine Quad PF 3YR+          "

## 2018-01-23 PROCEDURE — G0008 ADMIN INFLUENZA VIRUS VAC: HCPCS | Performed by: INTERNAL MEDICINE

## 2018-01-23 PROCEDURE — 90686 IIV4 VACC NO PRSV 0.5 ML IM: CPT | Performed by: INTERNAL MEDICINE

## 2018-01-31 DIAGNOSIS — J45.40 MODERATE PERSISTENT ASTHMA WITHOUT COMPLICATION: ICD-10-CM

## 2018-01-31 RX ORDER — MONTELUKAST SODIUM 10 MG/1
TABLET ORAL
Qty: 30 TABLET | Refills: 12 | Status: SHIPPED | OUTPATIENT
Start: 2018-01-31 | End: 2019-02-07 | Stop reason: SDUPTHER

## 2018-02-07 DIAGNOSIS — K21.9 GASTROESOPHAGEAL REFLUX DISEASE WITHOUT ESOPHAGITIS: ICD-10-CM

## 2018-02-07 RX ORDER — PANTOPRAZOLE SODIUM 40 MG/1
TABLET, DELAYED RELEASE ORAL
Qty: 90 TABLET | Refills: 3 | Status: SHIPPED | OUTPATIENT
Start: 2018-02-07 | End: 2018-05-07 | Stop reason: SDUPTHER

## 2018-03-06 ENCOUNTER — OFFICE VISIT (OUTPATIENT)
Dept: FAMILY MEDICINE CLINIC | Facility: CLINIC | Age: 63
End: 2018-03-06

## 2018-03-06 VITALS
OXYGEN SATURATION: 95 % | SYSTOLIC BLOOD PRESSURE: 132 MMHG | DIASTOLIC BLOOD PRESSURE: 84 MMHG | WEIGHT: 179.4 LBS | HEIGHT: 64 IN | HEART RATE: 67 BPM | BODY MASS INDEX: 30.63 KG/M2

## 2018-03-06 DIAGNOSIS — M25.571 ACUTE RIGHT ANKLE PAIN: ICD-10-CM

## 2018-03-06 DIAGNOSIS — J45.40 MODERATE PERSISTENT ASTHMA WITHOUT COMPLICATION: ICD-10-CM

## 2018-03-06 DIAGNOSIS — J30.89 CHRONIC NON-SEASONAL ALLERGIC RHINITIS, UNSPECIFIED TRIGGER: ICD-10-CM

## 2018-03-06 DIAGNOSIS — I10 ESSENTIAL HYPERTENSION: Primary | ICD-10-CM

## 2018-03-06 DIAGNOSIS — L91.8 SKIN TAG: ICD-10-CM

## 2018-03-06 PROCEDURE — 99214 OFFICE O/P EST MOD 30 MIN: CPT | Performed by: INTERNAL MEDICINE

## 2018-03-06 RX ORDER — IPRATROPIUM BROMIDE 42 UG/1
1 SPRAY, METERED NASAL 4 TIMES DAILY PRN
Qty: 15 ML | Refills: 12 | Status: SHIPPED | OUTPATIENT
Start: 2018-03-06 | End: 2019-08-20

## 2018-03-06 RX ORDER — PREDNISONE 20 MG/1
20 TABLET ORAL 2 TIMES DAILY
COMMUNITY
Start: 2018-03-01 | End: 2019-03-06

## 2018-03-06 RX ORDER — FLUTICASONE PROPIONATE 50 MCG
1 SPRAY, SUSPENSION (ML) NASAL 2 TIMES DAILY
Qty: 16 G | Refills: 12 | Status: SHIPPED | OUTPATIENT
Start: 2018-03-06 | End: 2020-11-03

## 2018-03-06 NOTE — PROGRESS NOTES
Subjective   Camelia Quintanilla is a 62 y.o. female who presents today for:    Hypertension (6 week f/u); Asthma (f/u); Fibromyalgia (f/u); Suspicious Skin Lesion (On back); Earache (Lt); and Leg Pain (Rt Lower)    History of Present Illness     She has chronic, benign, essential hypertension. This has been fairly well controlled with Bystolic and furosemide. She denies any cardiovascular symptoms.  She reportedly underwent cardiovascular testing approximately 3 or 4 years ago and was started on her next and indoor at that time, although she reports no findings of coronary artery disease.    She also takes simvastatin for dyslipidemia. She does not recall if it was started at the time of her cardiovascular evaluation.    She has chronic rheumatoid arthritis. She also reports having been diagnosed with fibromyalgia syndrome despite the diagnosis of rheumatoid arthritis.    She has asthma that was recently diagnosed. She now sees a pulmonologist and a high resolution CT scan is pending.     She has hypothyroidism treated with Synthroid 75 mcg once daily and followed by an endocrinologist.     Ms. Quintanilla  reports that she has never smoked. She has never used smokeless tobacco. She reports that she does not drink alcohol or use illicit drugs.     Allergies   Allergen Reactions   • Adhesive Tape      Must use paper tape   • Morphine And Related Hives       Current Outpatient Prescriptions:   •  budesonide-formoterol (SYMBICORT) 160-4.5 MCG/ACT inhaler, Inhale 2 puffs 2 (Two) Times a Day., Disp: 10.2 g, Rfl: 5  •  celecoxib (CeleBREX) 200 MG capsule, Take 200 mg by mouth 2 (two) times a day., Disp: , Rfl:   •  clotrimazole-betamethasone (LOTRISONE) 1-0.05 % cream, Apply  topically 2 (two) times a day., Disp: , Rfl:   •  furosemide (LASIX) 20 MG tablet, Take 20 mg by mouth As Needed., Disp: , Rfl:   •  HYDROcodone-acetaminophen (NORCO)  MG per tablet, Take 1 tablet by mouth 2 (Two) Times a Day As Needed., Disp: , Rfl:   •   hydroxychloroquine (PLAQUENIL) 200 MG tablet, Take 200 mg by mouth 2 (Two) Times a Day., Disp: , Rfl:   •  hyoscyamine (LEVSIN) 0.125 MG SL tablet, Take 1 tablet by mouth Every 6 (Six) Hours As Needed for Cramping., Disp: 30 tablet, Rfl: 0  •  IRON PO, Take 1 tablet by mouth 2 (Two) Times a Day., Disp: , Rfl:   •  isosorbide mononitrate (IMDUR) 60 MG 24 hr tablet, Take 60 mg by mouth Daily., Disp: , Rfl:   •  levothyroxine (SYNTHROID, LEVOTHROID) 75 MCG tablet, Take 75 mcg by mouth Daily., Disp: , Rfl:   •  montelukast (SINGULAIR) 10 MG tablet, TAKE ONE TABLET BY MOUTH ONCE NIGHTLY, Disp: 30 tablet, Rfl: 12  •  nebivolol (BYSTOLIC) 5 MG tablet, Take 5 mg by mouth Daily., Disp: , Rfl:   •  nitroglycerin (NITROSTAT) 0.4 MG SL tablet, Place 0.4 mg under the tongue every 5 (five) minutes., Disp: , Rfl:   •  pantoprazole (PROTONIX) 40 MG EC tablet, TAKE ONE TABLET BY MOUTH DAILY, Disp: 90 tablet, Rfl: 3  •  predniSONE (DELTASONE) 20 MG tablet, Take 20 mg by mouth 2 (Two) Times a Day., Disp: , Rfl:   •  pregabalin (LYRICA) 75 MG capsule, Take 1 capsule by mouth 2 (Two) Times a Day., Disp: 60 capsule, Rfl: 5  •  Simethicone (GAS RELIEF EXTRA STRENGTH PO), As Needed., Disp: , Rfl:   •  simvastatin (ZOCOR) 80 MG tablet, Take 40 mg by mouth Daily., Disp: , Rfl:   •  VENTOLIN  (90 Base) MCG/ACT inhaler, INHALE 2 PUFFS EVERY 4 HOURS AS NEEDED FOR WHEEZING OR SHORTNESS OF AIR, Disp: 18 g, Rfl: 1  •  vitamin C (ASCORBIC ACID) 500 MG tablet, Take 500 mg by mouth 2 (Two) Times a Day., Disp: , Rfl:   •  vitamin D (ERGOCALCIFEROL) 09754 UNITS capsule capsule, 50,000 Units Every 7 (Seven) Days., Disp: , Rfl:       Review of Systems   Constitutional: Negative.    HENT: Positive for congestion, ear pain, postnasal drip, sinus pressure and sore throat.    Eyes: Negative.    Respiratory: Positive for cough, shortness of breath and wheezing.    Cardiovascular: Negative.    Gastrointestinal: Negative.    Musculoskeletal: Positive for  "myalgias.   Allergic/Immunologic: Positive for environmental allergies.   Neurological: Negative.    Psychiatric/Behavioral: Negative.          Objective   Vitals:    03/06/18 1235 03/06/18 1307   BP: 142/80 132/84   BP Location: Left arm Left arm   Patient Position: Sitting    Cuff Size: Adult Large Adult   Pulse: 67    SpO2: 95%    Weight: 81.4 kg (179 lb 6.4 oz)    Height: 162.6 cm (64.02\")      Physical Exam    Well-developed and well-nourished, in no acute distress.  Regular rate and rhythm. No murmur appreciated.  Normal respiratory effort. Few bibasilar crackles. No wheezes noted.  Serous effusions behind both tympanic membranes. No erythema noted.  Oropharynx shows cobblestoning minimal erythema. No exudates.  Turbinates are boggy with pallor and clear discharge bilaterally. No purulent discharge appreciated.  No cervical or supraclavicular lymphadenopathy appreciated.  No lower extremity edema.  Mildly irritated skin tag on the back. No suspicious lesions.  Right ankle tenderness anteromedially and proximally, to the distal tibia; There is pain with plantar flexion of it with dorsiflexion versus resistance and even worsened but not with inversion. There is mild edema at the site. There is no erythema.      Assessment/Plan   Camelia was seen today for hypertension, asthma, fibromyalgia, suspicious skin lesion, earache and leg pain.    Diagnoses and all orders for this visit:    Essential hypertension  Comments:  Stop the OTC nasal spray    Moderate persistent asthma without complication  Comments:  Follow up with the pulmonologist; no allergist consult for now.    Chronic non-seasonal allergic rhinitis, unspecified trigger  Comments:  FLUTICASONE NS twice daily every day; IPRATROPIUM NS up to 4 times per day as need for congestion and runny nose.  Orders:  -     fluticasone (FLONASE) 50 MCG/ACT nasal spray; 1 spray into each nostril 2 (Two) Times a Day.  -     ipratropium (ATROVENT) 0.06 % nasal spray; 1 spray " into each nostril 4 (Four) Times a Day As Needed for Rhinitis.    Skin tag  Reassured patient.    Acute right ankle pain  Likely mild tenosynovitis. Continue the Celebrex already prescribed for her. Use ice water baths.  Limit ambulation.  She has a Cam walker at home; she was instructed to wear it while ambulating farther than trips to the bathroom.  Call if no better in 3-4 weeks (will need ortho's input).

## 2018-04-05 ENCOUNTER — OFFICE VISIT (OUTPATIENT)
Dept: FAMILY MEDICINE CLINIC | Facility: CLINIC | Age: 63
End: 2018-04-05

## 2018-04-05 VITALS
OXYGEN SATURATION: 98 % | HEIGHT: 64 IN | HEART RATE: 73 BPM | DIASTOLIC BLOOD PRESSURE: 74 MMHG | BODY MASS INDEX: 31.09 KG/M2 | WEIGHT: 182.1 LBS | SYSTOLIC BLOOD PRESSURE: 116 MMHG

## 2018-04-05 DIAGNOSIS — M25.571 ACUTE RIGHT ANKLE PAIN: ICD-10-CM

## 2018-04-05 DIAGNOSIS — J45.40 MODERATE PERSISTENT ASTHMA WITHOUT COMPLICATION: ICD-10-CM

## 2018-04-05 DIAGNOSIS — L91.8 INFLAMED SKIN TAG: Primary | ICD-10-CM

## 2018-04-05 PROCEDURE — 99213 OFFICE O/P EST LOW 20 MIN: CPT | Performed by: INTERNAL MEDICINE

## 2018-04-05 PROCEDURE — 11200 RMVL SKIN TAGS UP TO&INC 15: CPT | Performed by: INTERNAL MEDICINE

## 2018-04-05 RX ORDER — LEVOTHYROXINE SODIUM 0.07 MG/1
TABLET ORAL
COMMUNITY
Start: 2018-03-27 | End: 2019-08-20

## 2018-04-05 RX ORDER — SIMVASTATIN 80 MG
TABLET ORAL
COMMUNITY
Start: 2018-03-09 | End: 2018-04-05 | Stop reason: SDUPTHER

## 2018-04-05 NOTE — PROGRESS NOTES
Skin Problem (skin tag removal middle back)    Procedure note  Diagnosis: irritated skin tag on the mid back.  Indication: bleeding, irritation.  Description of procedure: Risks benefits and alternatives of the procedure were discussed with the patient and verbal consent was obtained.  Site was cleansed with isopropyl alcohol.  Site was then anesthetized with 0.5 mL of lidocaine 1% without epinephrine.  Hyfrecator was used for cauterization of the base, and in the process, the skin tag was  from the trunk.  Base of the skin tag was 5 mm in diameter.  Cautery was effective for hemostasis.  Site was dressed with antibiotic ointment and a bandage.    Patient was instructed to cleanse that site gently and apply antibiotic ointment and a bandage for one week, or until fully healed.  Return for signs/symptoms of infection.        Chief complaint: Right ankle pain, chronic  History of present illness: She has chronic rheumatoid arthritis and has a diagnosis of fibromyalgia as well.  She developed right ankle pain approximately 2 months ago.  She saw me one month ago and her exam was consistent with tenosynovitis of the ankle.  I asked her to wear the cam walker that she had at home, continue taking Celebrex that was prescribed for her rheumatoid arthritis, and report back today.  Ankle pain has improved significantly.  She has no pain when she ambulates short distances within the house.  She has pain when she will ambulates with the cam walker.  Unfortunately, she frequently goes without the boot while doing chores in her home, and that is when the pain recurs.  Overall, it is significantly better.  She has used ice packs to the site but is unable to do ice baths.      Review of systems: No leg edema.  Chronic joint pain at multiple sites.  Chronic cough and shortness of breath with occasional bouts of wheezing, improved.      Current Outpatient Prescriptions:   •  budesonide-formoterol (SYMBICORT) 160-4.5 MCG/ACT  inhaler, Inhale 2 puffs 2 (Two) Times a Day., Disp: 10.2 g, Rfl: 5  •  celecoxib (CeleBREX) 200 MG capsule, Take 200 mg by mouth 2 (two) times a day., Disp: , Rfl:   •  clotrimazole-betamethasone (LOTRISONE) 1-0.05 % cream, Apply  topically 2 (two) times a day., Disp: , Rfl:   •  fluticasone (FLONASE) 50 MCG/ACT nasal spray, 1 spray into each nostril 2 (Two) Times a Day., Disp: 16 g, Rfl: 12  •  furosemide (LASIX) 20 MG tablet, Take 20 mg by mouth As Needed., Disp: , Rfl:   •  HYDROcodone-acetaminophen (NORCO)  MG per tablet, Take 1 tablet by mouth 2 (Two) Times a Day As Needed., Disp: , Rfl:   •  hydroxychloroquine (PLAQUENIL) 200 MG tablet, Take 200 mg by mouth 2 (Two) Times a Day., Disp: , Rfl:   •  hyoscyamine (LEVSIN) 0.125 MG SL tablet, Take 1 tablet by mouth Every 6 (Six) Hours As Needed for Cramping., Disp: 30 tablet, Rfl: 0  •  ipratropium (ATROVENT) 0.06 % nasal spray, 1 spray into each nostril 4 (Four) Times a Day As Needed for Rhinitis., Disp: 15 mL, Rfl: 12  •  IRON PO, Take 1 tablet by mouth 2 (Two) Times a Day., Disp: , Rfl:   •  isosorbide mononitrate (IMDUR) 60 MG 24 hr tablet, Take 60 mg by mouth Daily., Disp: , Rfl:   •  levothyroxine (SYNTHROID, LEVOTHROID) 75 MCG tablet, TAKE ONE TABLET BY MOUTH DAILY, Disp: , Rfl:   •  montelukast (SINGULAIR) 10 MG tablet, TAKE ONE TABLET BY MOUTH ONCE NIGHTLY, Disp: 30 tablet, Rfl: 12  •  nebivolol (BYSTOLIC) 5 MG tablet, Take 5 mg by mouth Daily., Disp: , Rfl:   •  nitroglycerin (NITROSTAT) 0.4 MG SL tablet, Place 0.4 mg under the tongue every 5 (five) minutes., Disp: , Rfl:   •  pantoprazole (PROTONIX) 40 MG EC tablet, TAKE ONE TABLET BY MOUTH DAILY, Disp: 90 tablet, Rfl: 3  •  pregabalin (LYRICA) 75 MG capsule, Take 1 capsule by mouth 2 (Two) Times a Day., Disp: 60 capsule, Rfl: 5  •  Simethicone (GAS RELIEF EXTRA STRENGTH PO), As Needed., Disp: , Rfl:   •  simvastatin (ZOCOR) 80 MG tablet, Take 40 mg by mouth Daily., Disp: , Rfl:   •  VENTOLIN HFA  "108 (90 Base) MCG/ACT inhaler, INHALE 2 PUFFS EVERY 4 HOURS AS NEEDED FOR WHEEZING OR SHORTNESS OF AIR, Disp: 18 g, Rfl: 1  •  vitamin C (ASCORBIC ACID) 500 MG tablet, Take 500 mg by mouth 2 (Two) Times a Day., Disp: , Rfl:   •  vitamin D (ERGOCALCIFEROL) 99176 UNITS capsule capsule, 50,000 Units Every 7 (Seven) Days., Disp: , Rfl:   •  predniSONE (DELTASONE) 20 MG tablet, Take 20 mg by mouth 2 (Two) Times a Day., Disp: , Rfl:     /74 (BP Location: Right arm, Patient Position: Sitting, Cuff Size: Large Adult)   Pulse 73   Ht 162.6 cm (64\")   Wt 82.6 kg (182 lb 1.6 oz)   SpO2 98%   BMI 31.26 kg/m²     Mild tenderness at the distal right lower leg, medial aspect.  Pain with forced plantar flexion and dorsiflexion versus resistance.  No erythema or warmth.  No significant ankle effusion.  Normal respiratory effort.  Speaks in full sentences.          Camelia was seen today for skin problem.    Diagnoses and all orders for this visit:    Acute right ankle pain    Moderate persistent asthma without complication    Continue wearing the cam walker while ambulating any significant distance.  Continue the ice packs for flares.  Continue the Celebrex as prescribed for her rheumatoid arthritis.  Call if it worsens or if no better over the next 1-2 months.    We also discussed the results of her recent CT scan that showed bronchiectasis.  This is explained to her.  I have asked her to continue seeing the pulmonologist for assistance in treating her underlying lung disease/asthma.    With regard to the matters noted above, she was counseled for 15 minutes of the 20 minute office visit following the procedure to remove her skin tag.    "

## 2018-04-12 ENCOUNTER — TRANSCRIBE ORDERS (OUTPATIENT)
Dept: ADMINISTRATIVE | Facility: HOSPITAL | Age: 63
End: 2018-04-12

## 2018-04-12 DIAGNOSIS — J45.51 SEVERE PERSISTENT ASTHMA WITH ACUTE EXACERBATION: Primary | ICD-10-CM

## 2018-04-23 ENCOUNTER — HOSPITAL ENCOUNTER (OUTPATIENT)
Dept: INFUSION THERAPY | Facility: HOSPITAL | Age: 63
Discharge: HOME | End: 2018-04-23
Attending: INTERNAL MEDICINE

## 2018-05-03 NOTE — H&P
Seen and examined. No change in H&P. Patient has asthma with preeminent cough, dry in nature and difficult to control symptoms despite maximum conventional treatment. There's bronchiectasis on imaging (outside facility) raising the suspicion for infection and ABPA.   Plan for bronch with BAL RML and anterior basal segment of the RLL.   Patient does not have asthma exacerbation at this point.  We discussed the risks of procedures including bleeding, pneumothorax and respiratory failure,. Patient is agreeable.

## 2018-05-07 DIAGNOSIS — K21.9 GASTROESOPHAGEAL REFLUX DISEASE WITHOUT ESOPHAGITIS: ICD-10-CM

## 2018-05-07 RX ORDER — PANTOPRAZOLE SODIUM 40 MG/1
TABLET, DELAYED RELEASE ORAL
Qty: 90 TABLET | Refills: 2 | Status: SHIPPED | OUTPATIENT
Start: 2018-05-07 | End: 2019-02-01 | Stop reason: SDUPTHER

## 2018-05-11 ENCOUNTER — HOSPITAL ENCOUNTER (OUTPATIENT)
Facility: HOSPITAL | Age: 63
Setting detail: HOSPITAL OUTPATIENT SURGERY
Discharge: HOME | End: 2018-05-11
Attending: INTERNAL MEDICINE | Admitting: INTERNAL MEDICINE

## 2018-05-11 ENCOUNTER — ANESTHESIA EVENT (OUTPATIENT)
Dept: GASTROENTEROLOGY | Facility: HOSPITAL | Age: 63
End: 2018-05-11

## 2018-05-11 ENCOUNTER — ANESTHESIA (OUTPATIENT)
Dept: GASTROENTEROLOGY | Facility: HOSPITAL | Age: 63
End: 2018-05-11

## 2018-05-11 VITALS
WEIGHT: 179.5 LBS | HEART RATE: 66 BPM | SYSTOLIC BLOOD PRESSURE: 113 MMHG | OXYGEN SATURATION: 94 % | BODY MASS INDEX: 30.64 KG/M2 | RESPIRATION RATE: 18 BRPM | HEIGHT: 64 IN | TEMPERATURE: 97.7 F | DIASTOLIC BLOOD PRESSURE: 62 MMHG

## 2018-05-11 DIAGNOSIS — J47.9 BRONCHIECTASIS (HCC): ICD-10-CM

## 2018-05-11 LAB
APPEARANCE FLD: ABNORMAL
COLOR FLD: COLORLESS
EOSINOPHIL NFR FLD MANUAL: 1 %
LYMPHOCYTES NFR FLD MANUAL: 21 %
MONOS+MACROS NFR FLD: 48 %
NEUTROPHILS NFR FLD MANUAL: 30 %
RBC # FLD AUTO: 104 /MM3
WBC # FLD: 122 /MM3

## 2018-05-11 PROCEDURE — 89051 BODY FLUID CELL COUNT: CPT | Performed by: INTERNAL MEDICINE

## 2018-05-11 PROCEDURE — 87206 SMEAR FLUORESCENT/ACID STAI: CPT | Performed by: INTERNAL MEDICINE

## 2018-05-11 PROCEDURE — 25010000002 PROPOFOL 1000 MG/ML EMULSION: Performed by: ANESTHESIOLOGY

## 2018-05-11 PROCEDURE — 88312 SPECIAL STAINS GROUP 1: CPT | Performed by: INTERNAL MEDICINE

## 2018-05-11 PROCEDURE — 87071 CULTURE AEROBIC QUANT OTHER: CPT | Performed by: INTERNAL MEDICINE

## 2018-05-11 PROCEDURE — 87305 ASPERGILLUS AG IA: CPT | Performed by: INTERNAL MEDICINE

## 2018-05-11 PROCEDURE — 87116 MYCOBACTERIA CULTURE: CPT | Performed by: INTERNAL MEDICINE

## 2018-05-11 PROCEDURE — 87205 SMEAR GRAM STAIN: CPT | Performed by: INTERNAL MEDICINE

## 2018-05-11 PROCEDURE — 88112 CYTOPATH CELL ENHANCE TECH: CPT | Performed by: INTERNAL MEDICINE

## 2018-05-11 PROCEDURE — 25010000002 PROPOFOL 10 MG/ML EMULSION: Performed by: ANESTHESIOLOGY

## 2018-05-11 RX ORDER — LIDOCAINE HYDROCHLORIDE 10 MG/ML
INJECTION, SOLUTION EPIDURAL; INFILTRATION; INTRACAUDAL; PERINEURAL AS NEEDED
Status: DISCONTINUED | OUTPATIENT
Start: 2018-05-11 | End: 2018-05-11 | Stop reason: HOSPADM

## 2018-05-11 RX ORDER — SODIUM CHLORIDE 0.9 % (FLUSH) 0.9 %
3 SYRINGE (ML) INJECTION AS NEEDED
Status: DISCONTINUED | OUTPATIENT
Start: 2018-05-11 | End: 2018-05-11 | Stop reason: HOSPADM

## 2018-05-11 RX ORDER — SODIUM CHLORIDE, SODIUM LACTATE, POTASSIUM CHLORIDE, CALCIUM CHLORIDE 600; 310; 30; 20 MG/100ML; MG/100ML; MG/100ML; MG/100ML
1000 INJECTION, SOLUTION INTRAVENOUS CONTINUOUS
Status: DISCONTINUED | OUTPATIENT
Start: 2018-05-11 | End: 2018-05-11 | Stop reason: HOSPADM

## 2018-05-11 RX ORDER — LIDOCAINE HYDROCHLORIDE 20 MG/ML
INJECTION, SOLUTION EPIDURAL; INFILTRATION; INTRACAUDAL; PERINEURAL AS NEEDED
Status: DISCONTINUED | OUTPATIENT
Start: 2018-05-11 | End: 2018-05-11 | Stop reason: HOSPADM

## 2018-05-11 RX ORDER — LIDOCAINE HYDROCHLORIDE 20 MG/ML
INJECTION, SOLUTION INFILTRATION; PERINEURAL AS NEEDED
Status: DISCONTINUED | OUTPATIENT
Start: 2018-05-11 | End: 2018-05-11 | Stop reason: SURG

## 2018-05-11 RX ORDER — PROPOFOL 10 MG/ML
VIAL (ML) INTRAVENOUS AS NEEDED
Status: DISCONTINUED | OUTPATIENT
Start: 2018-05-11 | End: 2018-05-11 | Stop reason: SURG

## 2018-05-11 RX ADMIN — PROPOFOL 200 MCG/KG/MIN: 10 INJECTION, EMULSION INTRAVENOUS at 08:21

## 2018-05-11 RX ADMIN — SODIUM CHLORIDE, POTASSIUM CHLORIDE, SODIUM LACTATE AND CALCIUM CHLORIDE: 600; 310; 30; 20 INJECTION, SOLUTION INTRAVENOUS at 08:12

## 2018-05-11 RX ADMIN — PROPOFOL 40 MG: 10 INJECTION, EMULSION INTRAVENOUS at 08:20

## 2018-05-11 RX ADMIN — SODIUM CHLORIDE, POTASSIUM CHLORIDE, SODIUM LACTATE AND CALCIUM CHLORIDE 1000 ML: 600; 310; 30; 20 INJECTION, SOLUTION INTRAVENOUS at 07:43

## 2018-05-11 RX ADMIN — LIDOCAINE HYDROCHLORIDE 50 MG: 20 INJECTION, SOLUTION INFILTRATION; PERINEURAL at 08:17

## 2018-05-11 RX ADMIN — PROPOFOL 160 MG: 10 INJECTION, EMULSION INTRAVENOUS at 08:17

## 2018-05-11 RX ADMIN — PROPOFOL 30 MG: 10 INJECTION, EMULSION INTRAVENOUS at 08:24

## 2018-05-11 NOTE — ANESTHESIA POSTPROCEDURE EVALUATION
"Patient: Camelia Quintanilla    Procedure Summary     Date:  05/11/18 Room / Location:   [facility]    Anesthesia Start:  0812 Anesthesia Stop:  0842    Procedure:  BRONCHOSCOPY (N/A Bronchus) Diagnosis:      Surgeon:  Emma Blakely MD Provider:  Jenifer Chapman MD    Anesthesia Type:  general ASA Status:  3          Anesthesia Type: general  Last vitals  BP   105/70 (05/11/18 0901)   Temp   36.5 °C (97.7 °F) (05/11/18 0901)   Pulse   69 (05/11/18 0901)   Resp   16 (05/11/18 0901)     SpO2   96 % (05/11/18 0901)     Post Anesthesia Care and Evaluation    Patient location during evaluation: PHASE II  Patient participation: complete - patient participated  Level of consciousness: awake  Pain management: adequate  Airway patency: patent  Anesthetic complications: No anesthetic complications    Cardiovascular status: acceptable  Respiratory status: acceptable  Hydration status: acceptable    Comments: /70 (BP Location: Left arm, Patient Position: Lying)   Pulse 69   Temp 36.5 °C (97.7 °F) (Oral)   Resp 16   Ht 162.5 cm (63.98\")   Wt 81.4 kg (179 lb 8 oz)   SpO2 96%   BMI 30.83 kg/m²       "

## 2018-05-11 NOTE — ANESTHESIA PREPROCEDURE EVALUATION
Anesthesia Evaluation     Patient summary reviewed and Nursing notes reviewed   history of anesthetic complications: PONV  NPO Solid Status: > 8 hours  NPO Liquid Status: > 2 hours           Airway   Mallampati: II  Dental - normal exam     Pulmonary - normal exam   (+) asthma,   Cardiovascular - normal exam    (+) hypertension, PVD, hyperlipidemia,       Neuro/Psych  (+) headaches, psychiatric history Anxiety and Depression,     GI/Hepatic/Renal/Endo    (+)  GERD,  diabetes mellitus, hypothyroidism,     Musculoskeletal     Abdominal    Substance History      OB/GYN          Other   (+) arthritis                     Anesthesia Plan    ASA 3     general     Anesthetic plan and risks discussed with patient.

## 2018-05-11 NOTE — ANESTHESIA PROCEDURE NOTES
Airway  Urgency: elective    Airway not difficult    General Information and Staff    Patient location during procedure: OR  Anesthesiologist: JOSH GUERRERO    Indications and Patient Condition  Indications for airway management: airway protection    Preoxygenated: yes  MILS maintained throughout  Mask difficulty assessment: 0 - not attempted    Final Airway Details  Final airway type: supraglottic airway      Successful airway: classic  Size 4    Number of attempts at approach: 1

## 2018-05-13 LAB
BACTERIA SPEC AEROBE CULT: NORMAL
GRAM STN SPEC: NORMAL
GRAM STN SPEC: NORMAL

## 2018-05-14 LAB
CYTO UR: NORMAL
LAB AP CASE REPORT: NORMAL
LAB AP CLINICAL INFORMATION: NORMAL
Lab: NORMAL
PATH REPORT.FINAL DX SPEC: NORMAL
PATH REPORT.GROSS SPEC: NORMAL

## 2018-05-15 LAB — GALACTOMANNAN AG SPEC IA-ACNC: 0.07 INDEX (ref 0–0.49)

## 2018-06-22 LAB
MYCOBACTERIUM SPEC CULT: NORMAL
NIGHT BLUE STAIN TISS: NORMAL

## 2018-08-14 DIAGNOSIS — M79.7 FIBROMYALGIA SYNDROME: ICD-10-CM

## 2018-09-06 ENCOUNTER — OFFICE VISIT (OUTPATIENT)
Dept: FAMILY MEDICINE CLINIC | Facility: CLINIC | Age: 63
End: 2018-09-06

## 2018-09-06 VITALS
OXYGEN SATURATION: 97 % | HEIGHT: 64 IN | SYSTOLIC BLOOD PRESSURE: 122 MMHG | BODY MASS INDEX: 30.22 KG/M2 | HEART RATE: 78 BPM | WEIGHT: 177 LBS | DIASTOLIC BLOOD PRESSURE: 74 MMHG

## 2018-09-06 DIAGNOSIS — R30.0 DYSURIA: ICD-10-CM

## 2018-09-06 DIAGNOSIS — I10 ESSENTIAL HYPERTENSION: Primary | ICD-10-CM

## 2018-09-06 DIAGNOSIS — H60.502 ACUTE OTITIS EXTERNA OF LEFT EAR, UNSPECIFIED TYPE: ICD-10-CM

## 2018-09-06 LAB
BILIRUB BLD-MCNC: NEGATIVE MG/DL
CLARITY, POC: CLEAR
COLOR UR: ABNORMAL
GLUCOSE UR STRIP-MCNC: NEGATIVE MG/DL
KETONES UR QL: ABNORMAL
LEUKOCYTE EST, POC: NEGATIVE
NITRITE UR-MCNC: NEGATIVE MG/ML
PH UR: 5.5 [PH] (ref 5–8)
PROT UR STRIP-MCNC: NEGATIVE MG/DL
RBC # UR STRIP: ABNORMAL /UL
SP GR UR: 1.03 (ref 1–1.03)
UROBILINOGEN UR QL: NORMAL

## 2018-09-06 PROCEDURE — 69209 REMOVE IMPACTED EAR WAX UNI: CPT | Performed by: INTERNAL MEDICINE

## 2018-09-06 PROCEDURE — 81003 URINALYSIS AUTO W/O SCOPE: CPT | Performed by: INTERNAL MEDICINE

## 2018-09-06 PROCEDURE — 99214 OFFICE O/P EST MOD 30 MIN: CPT | Performed by: INTERNAL MEDICINE

## 2018-09-06 RX ORDER — BACLOFEN 10 MG/1
10 TABLET ORAL AS NEEDED
COMMUNITY

## 2018-09-06 NOTE — PROGRESS NOTES
Subjective   Camelia Quintanilla is a 63 y.o. female who presents today for:    Hypertension    History of Present Illness   She has chronic, benign, essential hypertension. This has been fairly well controlled with Bystolic and furosemide. She denies any cardiovascular symptoms.  She reportedly underwent cardiovascular testing approximately 3 or 4 years ago and was started on her statin and Imdur at that time, although she reports no findings of coronary artery disease.    Acute onset of left flank pain one day ago.  This has coincided with a month-long history of urinary frequency and a sense of incomplete bladder emptying.  She has had some mild dysuria as well.    While sliding down a and in [place], she fell, landing on the left side of her face.  This sent a significant amount of saline into the left ear.  She has had pain in that left ear since then, for the past week.    Ms. Quintanilla  reports that she has never smoked. She has never used smokeless tobacco. She reports that she does not drink alcohol or use drugs.     Allergies   Allergen Reactions   • Adhesive Tape Swelling     Must use paper tape   • Morphine And Related Hives and Itching       Current Outpatient Prescriptions:   •  baclofen (LIORESAL) 10 MG tablet, Take 10 mg by mouth 3 (Three) Times a Day., Disp: , Rfl:   •  budesonide-formoterol (SYMBICORT) 160-4.5 MCG/ACT inhaler, Inhale 2 puffs 2 (Two) Times a Day., Disp: 10.2 g, Rfl: 5  •  celecoxib (CeleBREX) 200 MG capsule, Take 200 mg by mouth 2 (two) times a day., Disp: , Rfl:   •  clotrimazole-betamethasone (LOTRISONE) 1-0.05 % cream, Apply  topically 2 (two) times a day., Disp: , Rfl:   •  fluticasone (FLONASE) 50 MCG/ACT nasal spray, 1 spray into each nostril 2 (Two) Times a Day., Disp: 16 g, Rfl: 12  •  furosemide (LASIX) 20 MG tablet, Take 20 mg by mouth As Needed., Disp: , Rfl:   •  HYDROcodone-acetaminophen (NORCO)  MG per tablet, Take 1 tablet by mouth 2 (Two) Times a Day As Needed., Disp: , Rfl:    •  hydroxychloroquine (PLAQUENIL) 200 MG tablet, Take 200 mg by mouth 2 (Two) Times a Day., Disp: , Rfl:   •  hyoscyamine (LEVSIN) 0.125 MG SL tablet, DISSOLVE ONE TABLET UNDER THE TONGUE EVERY 6 HOURS AS NEEDED FOR CRAMPING, Disp: 30 tablet, Rfl: 0  •  ipratropium (ATROVENT) 0.06 % nasal spray, 1 spray into each nostril 4 (Four) Times a Day As Needed for Rhinitis., Disp: 15 mL, Rfl: 12  •  IRON PO, Take 1 tablet by mouth 2 (Two) Times a Day., Disp: , Rfl:   •  isosorbide mononitrate (IMDUR) 60 MG 24 hr tablet, Take 60 mg by mouth Daily., Disp: , Rfl:   •  levothyroxine (SYNTHROID, LEVOTHROID) 75 MCG tablet, TAKE ONE TABLET BY MOUTH DAILY, Disp: , Rfl:   •  montelukast (SINGULAIR) 10 MG tablet, TAKE ONE TABLET BY MOUTH ONCE NIGHTLY, Disp: 30 tablet, Rfl: 12  •  nebivolol (BYSTOLIC) 5 MG tablet, Take 5 mg by mouth Daily., Disp: , Rfl:   •  nitroglycerin (NITROSTAT) 0.4 MG SL tablet, Place 0.4 mg under the tongue every 5 (five) minutes., Disp: , Rfl:   •  pantoprazole (PROTONIX) 40 MG EC tablet, TAKE ONE TABLET BY MOUTH DAILY, Disp: 90 tablet, Rfl: 2  •  pregabalin (LYRICA) 75 MG capsule, Take 1 capsule by mouth 2 (Two) Times a Day., Disp: 60 capsule, Rfl: 5  •  Simethicone (GAS RELIEF EXTRA STRENGTH PO), As Needed., Disp: , Rfl:   •  simvastatin (ZOCOR) 80 MG tablet, Take 40 mg by mouth Daily., Disp: , Rfl:   •  tiotropium (SPIRIVA) 18 MCG per inhalation capsule, Place 1 capsule into inhaler and inhale Daily., Disp: , Rfl:   •  VENTOLIN  (90 Base) MCG/ACT inhaler, INHALE 2 PUFFS EVERY 4 HOURS AS NEEDED FOR WHEEZING OR SHORTNESS OF AIR, Disp: 18 g, Rfl: 1  •  vitamin D (ERGOCALCIFEROL) 73783 UNITS capsule capsule, Take 50,000 Units by mouth Every 7 (Seven) Days., Disp: , Rfl:   •  predniSONE (DELTASONE) 20 MG tablet, Take 20 mg by mouth 2 (Two) Times a Day., Disp: , Rfl:   •  vitamin C (ASCORBIC ACID) 500 MG tablet, Take 500 mg by mouth 2 (Two) Times a Day., Disp: , Rfl:       Review of Systems  "  Constitutional: Negative for diaphoresis, fatigue and unexpected weight change.   HENT: Positive for ear pain and hearing loss.    Eyes: Negative for visual disturbance.   Respiratory: Negative for cough and chest tightness.    Cardiovascular: Negative for chest pain, palpitations and leg swelling.   Gastrointestinal: Positive for abdominal pain (Left flank).   Genitourinary: Positive for difficulty urinating, dysuria and frequency.   Musculoskeletal: Negative for myalgias.   Neurological: Negative for dizziness, syncope, numbness and headaches.   Hematological: Does not bruise/bleed easily.         Objective   Vitals:    09/06/18 1425   BP: 122/74   Pulse: 78   SpO2: 97%   Weight: 80.3 kg (177 lb)   Height: 162.6 cm (64\")     Physical Exam    Right canal and TM: Normal.  Left TM and canal: There is evidence of redness and still in the ER, although it back to the eardrum.  There is mild erythema of the canal and eardrum itself.  There is a minimal, serous effusion behind the eardrum.  The tympanic membrane is also retracted.  No evidence of cervical lymphadenopathy in the neck.  Palpation of the neck is nontender.  Regular rate and rhythm.  Mildly diminished S1 and S2.  No S3 or S4 or murmur.  Bowel sounds are normoactive; no bruit noted.  There is mild-moderate left CVA tenderness without rebound or guarding.  There is also suprapubic tenderness.  There is no lower extremity edema.  Pedal pulses are full bilaterally.        Camelia was seen today for hypertension.    Diagnoses and all orders for this visit:    Essential hypertension  Stable- no change in furosemide.    Dysuria  -     Urine Culture - Urine, Urine, Clean Catch  -     Urinalysis, Microscopic Only - Urine, Clean Catch  -     POC Urinalysis Dipstick is positive for blood and ketones.  Will treat if C&S is (+); hold ABX until then.    Acute otitis externa of left ear, unspecified type  -     neomycin-polymyxin-hydrocortisone (CORTISPORIN) 3.5-89013-5 " otic solution; Administer 3 drops into the left ear 4 (Four) Times a Day.

## 2018-09-08 LAB
BACTERIA #/AREA URNS HPF: ABNORMAL /[HPF]
BACTERIA UR CULT: NO GROWTH
BACTERIA UR CULT: NORMAL
CRYSTALS URNS MICRO: ABNORMAL
EPI CELLS #/AREA URNS HPF: ABNORMAL /HPF
MUCOUS THREADS URNS QL MICRO: PRESENT
RBC #/AREA URNS HPF: ABNORMAL /HPF
UNIDENT CRYS URNS QL MICRO: PRESENT
WBC #/AREA URNS HPF: ABNORMAL /HPF

## 2018-09-26 DIAGNOSIS — R92.8 ABNORMAL MAMMOGRAM OF BOTH BREASTS: Primary | ICD-10-CM

## 2018-10-29 ENCOUNTER — OFFICE VISIT (OUTPATIENT)
Dept: FAMILY MEDICINE CLINIC | Facility: CLINIC | Age: 63
End: 2018-10-29

## 2018-10-29 VITALS
SYSTOLIC BLOOD PRESSURE: 122 MMHG | OXYGEN SATURATION: 98 % | BODY MASS INDEX: 30.05 KG/M2 | DIASTOLIC BLOOD PRESSURE: 64 MMHG | TEMPERATURE: 98.1 F | HEART RATE: 81 BPM | WEIGHT: 176 LBS | HEIGHT: 64 IN

## 2018-10-29 DIAGNOSIS — J01.40 ACUTE NON-RECURRENT PANSINUSITIS: Primary | ICD-10-CM

## 2018-10-29 PROCEDURE — 99213 OFFICE O/P EST LOW 20 MIN: CPT | Performed by: NURSE PRACTITIONER

## 2018-10-29 RX ORDER — AMOXICILLIN AND CLAVULANATE POTASSIUM 875; 125 MG/1; MG/1
1 TABLET, FILM COATED ORAL 2 TIMES DAILY
Qty: 14 TABLET | Refills: 0 | Status: SHIPPED | OUTPATIENT
Start: 2018-10-29 | End: 2018-11-05

## 2018-10-29 NOTE — PROGRESS NOTES
Subjective   Camelia Quintanilla is a 63 y.o. female.     Chief Complaint   Patient presents with   • Sinusitis     a lot of drainage      She is a patient of Dr. Hinds and this is a new problem to me.      URI    This is a new problem. The current episode started in the past 7 days. The problem has been unchanged. There has been no fever (99.0). Associated symptoms include congestion, coughing, a plugged ear sensation, sinus pain, a sore throat and wheezing. Pertinent negatives include no headaches or rhinorrhea. She has tried antihistamine and decongestant for the symptoms.      I have reviewed the patient's medical history in detail and updated the computerized patient record.    The following portions of the patient's history were reviewed and updated as appropriate: allergies, current medications, past family history, past medical history, past social history, past surgical history and problem list.     Current Outpatient Prescriptions on File Prior to Visit   Medication Sig   • baclofen (LIORESAL) 10 MG tablet Take 10 mg by mouth 3 (Three) Times a Day.   • budesonide-formoterol (SYMBICORT) 160-4.5 MCG/ACT inhaler Inhale 2 puffs 2 (Two) Times a Day.   • celecoxib (CeleBREX) 200 MG capsule Take 200 mg by mouth 2 (two) times a day.   • clotrimazole-betamethasone (LOTRISONE) 1-0.05 % cream Apply  topically 2 (two) times a day.   • fluticasone (FLONASE) 50 MCG/ACT nasal spray 1 spray into each nostril 2 (Two) Times a Day.   • furosemide (LASIX) 20 MG tablet Take 20 mg by mouth As Needed.   • HYDROcodone-acetaminophen (NORCO)  MG per tablet Take 1 tablet by mouth 2 (Two) Times a Day As Needed.   • hydroxychloroquine (PLAQUENIL) 200 MG tablet Take 200 mg by mouth 2 (Two) Times a Day.   • hyoscyamine (LEVSIN) 0.125 MG SL tablet DISSOLVE ONE TABLET UNDER THE TONGUE EVERY 6 HOURS AS NEEDED FOR CRAMPING   • ipratropium (ATROVENT) 0.06 % nasal spray 1 spray into each nostril 4 (Four) Times a Day As Needed for  Rhinitis.   • IRON PO Take 1 tablet by mouth 2 (Two) Times a Day.   • isosorbide mononitrate (IMDUR) 60 MG 24 hr tablet Take 60 mg by mouth Daily.   • levothyroxine (SYNTHROID, LEVOTHROID) 75 MCG tablet TAKE ONE TABLET BY MOUTH DAILY   • montelukast (SINGULAIR) 10 MG tablet TAKE ONE TABLET BY MOUTH ONCE NIGHTLY   • nebivolol (BYSTOLIC) 5 MG tablet Take 5 mg by mouth Daily.   • nitroglycerin (NITROSTAT) 0.4 MG SL tablet Place 0.4 mg under the tongue every 5 (five) minutes.   • pantoprazole (PROTONIX) 40 MG EC tablet TAKE ONE TABLET BY MOUTH DAILY   • pregabalin (LYRICA) 75 MG capsule Take 1 capsule by mouth 2 (Two) Times a Day.   • Simethicone (GAS RELIEF EXTRA STRENGTH PO) As Needed.   • simvastatin (ZOCOR) 80 MG tablet Take 40 mg by mouth Daily.   • tiotropium (SPIRIVA) 18 MCG per inhalation capsule Place 1 capsule into inhaler and inhale Daily.   • VENTOLIN  (90 Base) MCG/ACT inhaler INHALE 2 PUFFS EVERY 4 HOURS AS NEEDED FOR WHEEZING OR SHORTNESS OF AIR   • vitamin C (ASCORBIC ACID) 500 MG tablet Take 500 mg by mouth 2 (Two) Times a Day.   • vitamin D (ERGOCALCIFEROL) 04273 UNITS capsule capsule Take 50,000 Units by mouth Every 7 (Seven) Days.   • neomycin-polymyxin-hydrocortisone (CORTISPORIN) 3.5-66508-0 otic solution Administer 3 drops into the left ear 4 (Four) Times a Day.   • predniSONE (DELTASONE) 20 MG tablet Take 20 mg by mouth 2 (Two) Times a Day.     No current facility-administered medications on file prior to visit.        Review of Systems   Constitutional: Negative for chills and fever.   HENT: Positive for congestion, sinus pressure and sore throat. Negative for dental problem and rhinorrhea.    Respiratory: Positive for cough and wheezing.    Cardiovascular: Negative.    Musculoskeletal: Negative for arthralgias and myalgias.   Neurological: Negative for headache.       Objective    Vitals:    10/29/18 1305   BP: 122/64   Pulse: 81   Temp: 98.1 °F (36.7 °C)   SpO2: 98%     Physical Exam    Constitutional: She is oriented to person, place, and time. She appears well-developed and well-nourished. She appears ill.   HENT:   Right Ear: Hearing, tympanic membrane, external ear and ear canal normal.   Left Ear: Hearing, tympanic membrane, external ear and ear canal normal.   Nose: Mucosal edema (red and swollen), rhinorrhea (clear drainage) and congestion present. Right sinus exhibits maxillary sinus tenderness and frontal sinus tenderness. Left sinus exhibits maxillary sinus tenderness and frontal sinus tenderness.   Mouth/Throat: Oropharynx is clear and moist.   Cardiovascular: Normal rate, regular rhythm and normal heart sounds.    Pulmonary/Chest: Effort normal and breath sounds normal. No respiratory distress. She has no wheezes. She has no rales.   Neurological: She is alert and oriented to person, place, and time.   Skin: Skin is warm and dry.   Psychiatric:   No acute distress   Vitals reviewed.        Assessment/Plan   Camelia was seen today for sinusitis.    Diagnoses and all orders for this visit:    Acute non-recurrent pansinusitis  -     amoxicillin-clavulanate (AUGMENTIN) 875-125 MG per tablet; Take 1 tablet by mouth 2 (Two) Times a Day for 7 days.          You have been diagnosed with acute sinusitis. You have been prescribed an antibiotic along with symptomatic treatment.  You may take Mucinex D for relieving congestion and cough.  If you have high blood pressure, do not take Mucinex D, instead opting for plain Mucinex and Coricidin HBP.  Take Ibuprofen or Tylenol as needed for pain or fever.  Oral antihistamine, such as Zyrtec or Claritin may help reduce ear pressure and relieve some nasal symptoms.  A saline nasal spray may be used to keep nose clear from discharge.  Be sure that you are increasing your intake of clear to decaffeinated fluids and get plenty of rest.  If your symptoms worsen or persist follow up as needed.

## 2018-11-20 ENCOUNTER — OFFICE VISIT (OUTPATIENT)
Dept: FAMILY MEDICINE CLINIC | Facility: CLINIC | Age: 63
End: 2018-11-20

## 2018-11-20 VITALS
WEIGHT: 179 LBS | BODY MASS INDEX: 30.56 KG/M2 | HEART RATE: 88 BPM | TEMPERATURE: 98.4 F | OXYGEN SATURATION: 96 % | RESPIRATION RATE: 14 BRPM | DIASTOLIC BLOOD PRESSURE: 68 MMHG | HEIGHT: 64 IN | SYSTOLIC BLOOD PRESSURE: 110 MMHG

## 2018-11-20 DIAGNOSIS — J01.41 ACUTE RECURRENT PANSINUSITIS: Primary | ICD-10-CM

## 2018-11-20 PROCEDURE — 99213 OFFICE O/P EST LOW 20 MIN: CPT | Performed by: NURSE PRACTITIONER

## 2018-11-20 RX ORDER — CLARITHROMYCIN 500 MG/1
500 TABLET, COATED ORAL 2 TIMES DAILY
Qty: 20 TABLET | Refills: 0 | Status: SHIPPED | OUTPATIENT
Start: 2018-11-20 | End: 2018-11-30

## 2018-11-20 NOTE — PROGRESS NOTES
Subjective   Camelia Quintanilla is a 63 y.o. female.     Chief Complaint   Patient presents with   • Nasal Congestion     x 1 month    • Cough     Sinus Problem   This is a recurrent problem. The current episode started in the past 7 days. The problem has been gradually worsening since onset. There has been no fever. Associated symptoms include congestion, coughing, headaches, a hoarse voice, sinus pressure and a sore throat. Pertinent negatives include no chills or shortness of breath. Past treatments include antibiotics (was on augmentin but stopped because it caused stomach upset). The treatment provided mild relief.     I have reviewed the patient's medical history in detail and updated the computerized patient record.     The following portions of the patient's history were reviewed and updated as appropriate: allergies, current medications, past family history, past medical history, past social history, past surgical history and problem list.       Current Outpatient Medications:   •  baclofen (LIORESAL) 10 MG tablet, Take 10 mg by mouth 3 (Three) Times a Day., Disp: , Rfl:   •  budesonide-formoterol (SYMBICORT) 160-4.5 MCG/ACT inhaler, Inhale 2 puffs 2 (Two) Times a Day., Disp: 10.2 g, Rfl: 5  •  celecoxib (CeleBREX) 200 MG capsule, Take 200 mg by mouth 2 (two) times a day., Disp: , Rfl:   •  clotrimazole-betamethasone (LOTRISONE) 1-0.05 % cream, Apply  topically 2 (two) times a day., Disp: , Rfl:   •  fluticasone (FLONASE) 50 MCG/ACT nasal spray, 1 spray into each nostril 2 (Two) Times a Day., Disp: 16 g, Rfl: 12  •  furosemide (LASIX) 20 MG tablet, Take 20 mg by mouth As Needed., Disp: , Rfl:   •  HYDROcodone-acetaminophen (NORCO)  MG per tablet, Take 1 tablet by mouth 2 (Two) Times a Day As Needed., Disp: , Rfl:   •  hydroxychloroquine (PLAQUENIL) 200 MG tablet, Take 200 mg by mouth 2 (Two) Times a Day., Disp: , Rfl:   •  hyoscyamine (LEVSIN) 0.125 MG SL tablet, DISSOLVE ONE TABLET UNDER THE TONGUE EVERY 6  HOURS AS NEEDED FOR CRAMPING, Disp: 30 tablet, Rfl: 0  •  ipratropium (ATROVENT) 0.06 % nasal spray, 1 spray into each nostril 4 (Four) Times a Day As Needed for Rhinitis., Disp: 15 mL, Rfl: 12  •  IRON PO, Take 1 tablet by mouth 2 (Two) Times a Day., Disp: , Rfl:   •  isosorbide mononitrate (IMDUR) 60 MG 24 hr tablet, Take 60 mg by mouth Daily., Disp: , Rfl:   •  levothyroxine (SYNTHROID, LEVOTHROID) 75 MCG tablet, TAKE ONE TABLET BY MOUTH DAILY, Disp: , Rfl:   •  montelukast (SINGULAIR) 10 MG tablet, TAKE ONE TABLET BY MOUTH ONCE NIGHTLY, Disp: 30 tablet, Rfl: 12  •  nebivolol (BYSTOLIC) 5 MG tablet, Take 5 mg by mouth Daily., Disp: , Rfl:   •  neomycin-polymyxin-hydrocortisone (CORTISPORIN) 3.5-94308-0 otic solution, Administer 3 drops into the left ear 4 (Four) Times a Day., Disp: 10 mL, Rfl: 0  •  nitroglycerin (NITROSTAT) 0.4 MG SL tablet, Place 0.4 mg under the tongue every 5 (five) minutes., Disp: , Rfl:   •  pantoprazole (PROTONIX) 40 MG EC tablet, TAKE ONE TABLET BY MOUTH DAILY, Disp: 90 tablet, Rfl: 2  •  predniSONE (DELTASONE) 20 MG tablet, Take 20 mg by mouth 2 (Two) Times a Day., Disp: , Rfl:   •  pregabalin (LYRICA) 75 MG capsule, Take 1 capsule by mouth 2 (Two) Times a Day., Disp: 60 capsule, Rfl: 5  •  Simethicone (GAS RELIEF EXTRA STRENGTH PO), As Needed., Disp: , Rfl:   •  simvastatin (ZOCOR) 80 MG tablet, Take 40 mg by mouth Daily., Disp: , Rfl:   •  tiotropium (SPIRIVA) 18 MCG per inhalation capsule, Place 1 capsule into inhaler and inhale Daily., Disp: , Rfl:   •  VENTOLIN  (90 Base) MCG/ACT inhaler, INHALE 2 PUFFS EVERY 4 HOURS AS NEEDED FOR WHEEZING OR SHORTNESS OF AIR, Disp: 18 g, Rfl: 1  •  vitamin C (ASCORBIC ACID) 500 MG tablet, Take 500 mg by mouth 2 (Two) Times a Day., Disp: , Rfl:   •  vitamin D (ERGOCALCIFEROL) 41952 UNITS capsule capsule, Take 50,000 Units by mouth Every 7 (Seven) Days., Disp: , Rfl:     Review of Systems   Constitutional: Negative for chills and fever.    HENT: Positive for congestion, hoarse voice, sinus pressure and sore throat.    Respiratory: Positive for cough. Negative for chest tightness, shortness of breath and wheezing.    Cardiovascular: Negative.    Musculoskeletal: Negative for myalgias.   Neurological: Positive for headache.     Vitals:    11/20/18 0931   BP: 110/68   Pulse: 88   Resp: 14   Temp: 98.4 °F (36.9 °C)   SpO2: 96%     Objective   Physical Exam   Constitutional: She appears well-developed and well-nourished.   Afebrile   HENT:   Right Ear: Hearing, tympanic membrane, external ear and ear canal normal.   Left Ear: Hearing, tympanic membrane, external ear and ear canal normal.   Nose: Mucosal edema (red and swollen), rhinorrhea and congestion present. Right sinus exhibits maxillary sinus tenderness and frontal sinus tenderness. Left sinus exhibits maxillary sinus tenderness and frontal sinus tenderness.   Mouth/Throat: Uvula is midline, oropharynx is clear and moist and mucous membranes are normal.         Assessment/Plan   Camelia was seen today for nasal congestion and cough.    Diagnoses and all orders for this visit:    Acute recurrent pansinusitis    Other orders  -     clarithromycin (BIAXIN) 500 MG tablet; Take 1 tablet by mouth 2 (Two) Times a Day for 10 days.    Ms. Quintanilla had failed treatment on Augmentin due to GI intolerance. I will start her on Clarithromycin 500 mg twice daily x 10 days for her sinusitis. She has been instructed to take an antihistamine also to aid with clearing up her sinus drainage. Follow up as needed.

## 2019-02-01 DIAGNOSIS — K21.9 GASTROESOPHAGEAL REFLUX DISEASE WITHOUT ESOPHAGITIS: ICD-10-CM

## 2019-02-01 RX ORDER — PANTOPRAZOLE SODIUM 40 MG/1
TABLET, DELAYED RELEASE ORAL
Qty: 90 TABLET | Refills: 1 | Status: SHIPPED | OUTPATIENT
Start: 2019-02-01 | End: 2019-08-05

## 2019-02-07 DIAGNOSIS — J45.40 MODERATE PERSISTENT ASTHMA WITHOUT COMPLICATION: ICD-10-CM

## 2019-02-08 RX ORDER — MONTELUKAST SODIUM 10 MG/1
10 TABLET ORAL NIGHTLY
Qty: 30 TABLET | Refills: 12 | Status: SHIPPED | OUTPATIENT
Start: 2019-02-08 | End: 2020-11-03

## 2019-03-06 ENCOUNTER — OFFICE VISIT (OUTPATIENT)
Dept: FAMILY MEDICINE CLINIC | Facility: CLINIC | Age: 64
End: 2019-03-06

## 2019-03-06 VITALS
SYSTOLIC BLOOD PRESSURE: 136 MMHG | HEIGHT: 64 IN | HEART RATE: 82 BPM | WEIGHT: 173.5 LBS | BODY MASS INDEX: 29.62 KG/M2 | OXYGEN SATURATION: 97 % | DIASTOLIC BLOOD PRESSURE: 82 MMHG

## 2019-03-06 DIAGNOSIS — I10 ESSENTIAL HYPERTENSION: Primary | ICD-10-CM

## 2019-03-06 DIAGNOSIS — F41.8 DEPRESSION WITH ANXIETY: ICD-10-CM

## 2019-03-06 DIAGNOSIS — D50.9 IRON DEFICIENCY ANEMIA, UNSPECIFIED IRON DEFICIENCY ANEMIA TYPE: ICD-10-CM

## 2019-03-06 PROCEDURE — 99214 OFFICE O/P EST MOD 30 MIN: CPT | Performed by: INTERNAL MEDICINE

## 2019-03-06 RX ORDER — GABAPENTIN 300 MG/1
600 CAPSULE ORAL NIGHTLY
COMMUNITY
Start: 2019-02-11

## 2019-03-06 RX ORDER — TIOTROPIUM BROMIDE INHALATION SPRAY 1.56 UG/1
1 SPRAY, METERED RESPIRATORY (INHALATION) EVERY MORNING
COMMUNITY
Start: 2019-01-30

## 2019-03-06 RX ORDER — SIMVASTATIN 40 MG
40 TABLET ORAL DAILY
COMMUNITY
Start: 2019-01-24 | End: 2019-08-05

## 2019-03-06 RX ORDER — VENLAFAXINE HYDROCHLORIDE 75 MG/1
75 CAPSULE, EXTENDED RELEASE ORAL DAILY
Qty: 30 CAPSULE | Refills: 5 | Status: SHIPPED | OUTPATIENT
Start: 2019-03-06 | End: 2019-04-12

## 2019-03-06 RX ORDER — NYSTATIN 100000 U/G
OINTMENT TOPICAL
COMMUNITY
End: 2019-08-05

## 2019-03-06 NOTE — PROGRESS NOTES
Subjective   Camelia Quintanilla is a 63 y.o. female who presents today for:    Hypertension    History of Present Illness       She also has history of depression treated with sertraline and venlafaxine for quite some time.  Her symptoms included marked anhedonia and reclusiveness and some anxiety symptoms mainly manifesting as a [place] phobia.  The medications were helping this regard.  She has been able to walk off of them over the last few years, stopping her Effexor in the spring 2017.  She has done fairly well since then, mainly because of an improvement in her overall health.  However, she feels the depressive and anxiety symptoms have started to creep back into her life this past winter.      She was found to have moderate persistent asthma and is now on Symbicort and Spiriva with good benefit.  She rarely uses her rescue inhaler.    She was also found to have an iron deficiency anemia.  She feels significantly better since correction of her anemia.  Her hemoglobin was over 14 in January.    She has underlying coronary artery disease and chronic hypertension.  She is maintained on Bystolic 5 mg once a day.  She also takes furosemide when needed for an increase in edema.    Ms. Quintanilla  reports that  has never smoked. she has never used smokeless tobacco. She reports that she does not drink alcohol or use drugs.     Allergies   Allergen Reactions   • Adhesive Tape Swelling     Must use paper tape   • Morphine And Related Hives and Itching       Current Outpatient Medications:   •  baclofen (LIORESAL) 10 MG tablet, Take 10 mg by mouth 3 (Three) Times a Day., Disp: , Rfl:   •  budesonide-formoterol (SYMBICORT) 160-4.5 MCG/ACT inhaler, Inhale 2 puffs 2 (Two) Times a Day., Disp: 10.2 g, Rfl: 5  •  celecoxib (CeleBREX) 200 MG capsule, Take 200 mg by mouth 2 (two) times a day., Disp: , Rfl:   •  clotrimazole-betamethasone (LOTRISONE) 1-0.05 % cream, Apply  topically 2 (two) times a day., Disp: , Rfl:   •  fluticasone (FLONASE)  50 MCG/ACT nasal spray, 1 spray into each nostril 2 (Two) Times a Day., Disp: 16 g, Rfl: 12  •  furosemide (LASIX) 20 MG tablet, Take 20 mg by mouth As Needed., Disp: , Rfl:   •  gabapentin (NEURONTIN) 300 MG capsule, Take 300 mg by mouth 2 (Two) Times a Day., Disp: , Rfl:   •  HYDROcodone-acetaminophen (NORCO)  MG per tablet, Take 1 tablet by mouth 2 (Two) Times a Day As Needed., Disp: , Rfl:   •  hydroxychloroquine (PLAQUENIL) 200 MG tablet, Take 200 mg by mouth 2 (Two) Times a Day., Disp: , Rfl:   •  hyoscyamine (LEVSIN) 0.125 MG SL tablet, DISSOLVE ONE TABLET UNDER THE TONGUE EVERY 6 HOURS AS NEEDED FOR CRAMPING, Disp: 30 tablet, Rfl: 0  •  ipratropium (ATROVENT) 0.06 % nasal spray, 1 spray into each nostril 4 (Four) Times a Day As Needed for Rhinitis., Disp: 15 mL, Rfl: 12  •  IRON PO, Take 1 tablet by mouth 2 (Two) Times a Day., Disp: , Rfl:   •  isosorbide mononitrate (IMDUR) 60 MG 24 hr tablet, Take 60 mg by mouth Daily., Disp: , Rfl:   •  levothyroxine (SYNTHROID, LEVOTHROID) 75 MCG tablet, TAKE ONE TABLET BY MOUTH DAILY, Disp: , Rfl:   •  montelukast (SINGULAIR) 10 MG tablet, Take 1 tablet by mouth Every Night., Disp: 30 tablet, Rfl: 12  •  nebivolol (BYSTOLIC) 5 MG tablet, Take 5 mg by mouth Daily., Disp: , Rfl:   •  nitroglycerin (NITROSTAT) 0.4 MG SL tablet, Place 0.4 mg under the tongue every 5 (five) minutes., Disp: , Rfl:   •  nystatin (MYCOSTATIN) 987598 UNIT/GM ointment, by Intratympanic route., Disp: , Rfl:   •  pantoprazole (PROTONIX) 40 MG EC tablet, TAKE ONE TABLET BY MOUTH DAILY, Disp: 90 tablet, Rfl: 1  •  Simethicone (GAS RELIEF EXTRA STRENGTH PO), As Needed., Disp: , Rfl:   •  simvastatin (ZOCOR) 40 MG tablet, Take 40 mg by mouth Daily., Disp: , Rfl:   •  SPIRIVA RESPIMAT 1.25 MCG/ACT aerosol solution inhaler, , Disp: , Rfl:   •  VENTOLIN  (90 Base) MCG/ACT inhaler, INHALE 2 PUFFS EVERY 4 HOURS AS NEEDED FOR WHEEZING OR SHORTNESS OF AIR, Disp: 18 g, Rfl: 1  •  vitamin C  "(ASCORBIC ACID) 500 MG tablet, Take 500 mg by mouth 2 (Two) Times a Day., Disp: , Rfl:   •  vitamin D (ERGOCALCIFEROL) 61082 UNITS capsule capsule, Take 50,000 Units by mouth Every 7 (Seven) Days., Disp: , Rfl:       Review of Systems   Constitutional: Negative for diaphoresis, fatigue, fever and unexpected weight change.   Eyes: Negative for visual disturbance.   Respiratory: Positive for cough (Rarely), shortness of breath (Intermittently) and wheezing (Rarely).    Cardiovascular: Negative for chest pain and palpitations.   Gastrointestinal: Negative for constipation and diarrhea.        Persistent reflux, controlled with the PPI.   Genitourinary: Negative for difficulty urinating.   Neurological: Negative for dizziness, syncope, speech difficulty, weakness and light-headedness.   Psychiatric/Behavioral: Positive for dysphoric mood. The patient is nervous/anxious.         Per HPI.         Objective   Vitals:    03/06/19 1210   BP: 136/82   BP Location: Right arm   Patient Position: Sitting   Cuff Size: Adult   Pulse: 82   SpO2: 97%   Weight: 78.7 kg (173 lb 8 oz)   Height: 162.6 cm (64\")     Physical Exam    Well-developed, well-nourished, in good spirits.  Alert and oriented x4.  Answers all questions appropriately.  Mood is upbeat and affect is appropriate.  Sclera are anicteric and the conjunctive are pink.  No periorbital edema appreciated.  No carotid bruit.  No thyromegaly or mass.  Chest is clear bilaterally.  Respiratory effort is normal.  Regular rate and rhythm.  No murmur appreciated.  No rub or gallop noted.  Abdomen is soft and nontender.  Bowel sounds are normoactive.  No bruit noted.  No lower extremity edema.  Pedal pulses are full bilaterally.        Camelia was seen today for hypertension.    Diagnoses and all orders for this visit:    Essential hypertension  Stable on the Bystolic only.    Iron deficiency anemia, unspecified iron deficiency anemia type  Outside labs were reviewed with the patient " today.  Hemoglobin looks good, she may cut the iron back to once a day.  At some point down the road she may try stopping it, but that will require continued monitoring thereafter.    Depression with anxiety  -     venlafaxine XR (EFFEXOR XR) 75 MG 24 hr capsule; Take 1 capsule by mouth Daily.  Will resume the Effexor XR 75 mg once a day.  She has done well on it in the past, so I doubt she will suffer any untoward side effects with it this time.

## 2019-03-15 RX ORDER — BUDESONIDE AND FORMOTEROL FUMARATE DIHYDRATE 160; 4.5 UG/1; UG/1
AEROSOL RESPIRATORY (INHALATION)
Qty: 10.2 G | Refills: 5 | Status: SHIPPED | OUTPATIENT
Start: 2019-03-15 | End: 2019-08-05

## 2019-03-27 ENCOUNTER — OFFICE VISIT (OUTPATIENT)
Dept: FAMILY MEDICINE CLINIC | Facility: CLINIC | Age: 64
End: 2019-03-27

## 2019-03-27 VITALS
HEIGHT: 64 IN | SYSTOLIC BLOOD PRESSURE: 112 MMHG | WEIGHT: 171.6 LBS | OXYGEN SATURATION: 95 % | HEART RATE: 68 BPM | DIASTOLIC BLOOD PRESSURE: 68 MMHG | BODY MASS INDEX: 29.3 KG/M2 | TEMPERATURE: 98.2 F

## 2019-03-27 DIAGNOSIS — Z00.00 MEDICARE ANNUAL WELLNESS VISIT, INITIAL: Primary | ICD-10-CM

## 2019-03-27 DIAGNOSIS — D50.0 IRON DEFICIENCY ANEMIA DUE TO CHRONIC BLOOD LOSS: ICD-10-CM

## 2019-03-27 DIAGNOSIS — I10 ESSENTIAL HYPERTENSION: ICD-10-CM

## 2019-03-27 DIAGNOSIS — E78.2 MIXED HYPERLIPIDEMIA: ICD-10-CM

## 2019-03-27 PROBLEM — J47.9 BRONCHIECTASIS WITHOUT COMPLICATION (HCC): Status: ACTIVE | Noted: 2019-03-27

## 2019-03-27 LAB
FERRITIN SERPL-MCNC: 123 NG/ML (ref 13–150)
HCT VFR BLD AUTO: 42.8 % (ref 34–46.6)
HGB BLD-MCNC: 14 G/DL (ref 12–15.9)

## 2019-03-27 PROCEDURE — 99214 OFFICE O/P EST MOD 30 MIN: CPT | Performed by: INTERNAL MEDICINE

## 2019-03-27 PROCEDURE — 82728 ASSAY OF FERRITIN: CPT | Performed by: INTERNAL MEDICINE

## 2019-03-27 PROCEDURE — G0438 PPPS, INITIAL VISIT: HCPCS | Performed by: INTERNAL MEDICINE

## 2019-03-27 PROCEDURE — 36415 COLL VENOUS BLD VENIPUNCTURE: CPT | Performed by: INTERNAL MEDICINE

## 2019-03-27 PROCEDURE — 96160 PT-FOCUSED HLTH RISK ASSMT: CPT | Performed by: INTERNAL MEDICINE

## 2019-03-27 PROCEDURE — 85014 HEMATOCRIT: CPT | Performed by: INTERNAL MEDICINE

## 2019-03-27 PROCEDURE — 85018 HEMOGLOBIN: CPT | Performed by: INTERNAL MEDICINE

## 2019-03-27 RX ORDER — ALBUTEROL SULFATE 2.5 MG/3ML
2.5 SOLUTION RESPIRATORY (INHALATION) EVERY 6 HOURS PRN
COMMUNITY

## 2019-03-27 NOTE — PROGRESS NOTES
Subjective   Camelia Quintanilla is a 64 y.o. female.     History of Present Illness   Patient was seen for a Medicare wellness exam.  She has a history of iron deficiency and levels were drawn today.  Blood pressures been running 110s over 80s.  Her lipids were controlled with diet exercise and a statin.    Dictated utilizing Dragon dictation. If there are questions or for further clarification, please contact me.  The following portions of the patient's history were reviewed and updated as appropriate: allergies, current medications, past family history, past medical history, past social history, past surgical history and problem list.    Review of Systems   Constitutional: Negative for fatigue and fever.   HENT: Positive for congestion. Negative for trouble swallowing.    Eyes: Negative for discharge and visual disturbance.   Respiratory: Negative for choking and shortness of breath.    Cardiovascular: Negative for chest pain and palpitations.   Gastrointestinal: Negative for abdominal pain and blood in stool.   Endocrine: Negative.    Genitourinary: Negative for genital sores and hematuria.   Musculoskeletal: Negative for gait problem and joint swelling.   Skin: Negative for color change, pallor, rash and wound.   Allergic/Immunologic: Positive for environmental allergies. Negative for immunocompromised state.   Neurological: Negative for facial asymmetry and speech difficulty.   Psychiatric/Behavioral: Negative for hallucinations and suicidal ideas.       Objective   Physical Exam   Constitutional: She is oriented to person, place, and time. She appears well-developed and well-nourished.   HENT:   Head: Normocephalic.   Eyes: Conjunctivae are normal. Pupils are equal, round, and reactive to light.   Neck: Normal range of motion. Neck supple.   Cardiovascular: Normal rate, regular rhythm and normal heart sounds.   Pulmonary/Chest: Effort normal and breath sounds normal.   Abdominal: Soft. Bowel sounds are normal.    Musculoskeletal: Normal range of motion.   Neurological: She is alert and oriented to person, place, and time.   Skin: Skin is warm and dry.   Psychiatric: She has a normal mood and affect. Her behavior is normal. Judgment and thought content normal.   Nursing note and vitals reviewed.      Assessment/Plan   Problems Addressed this Visit        Cardiovascular and Mediastinum    Hypertension    HLD (hyperlipidemia)       Hematopoietic and Hemostatic    Iron deficiency anemia    Relevant Orders    Ferritin    Hemoglobin & Hematocrit, Blood (Completed)    Occult Blood X 1, Stool - Stool, Per Rectum      Other Visit Diagnoses     Medicare annual wellness visit, initial    -  Primary

## 2019-03-27 NOTE — PATIENT INSTRUCTIONS
Medicare Wellness  Personal Prevention Plan of Service     Date of Office Visit:  2019  Encounter Provider:  Reji Romo MD  Place of Service:  [facility]  Patient Name: Camelia Quintanilla  :  1955    As part of the Medicare Wellness portion of your visit today, we are providing you with this personalized preventive plan of services (PPPS). This plan is based upon recommendations of the [place] Preventive Services Task Force (USPSTF) and the Advisory Committee on Immunization Practices (ACIP).    This lists the preventive care services that should be considered, and provides dates of when you are due. Items listed as completed are up-to-date and do not require any further intervention.    Health Maintenance   Topic Date Due   • PNEUMOCOCCAL VACCINE (19-64 MEDIUM RISK) (1 of 1 - PPSV23) 1974   • TDAP/TD VACCINES (1 - Tdap) 1974   • ZOSTER VACCINE (1 of 2) 2005   • HEPATITIS C SCREENING  2016   • DIABETIC FOOT EXAM  2016   • PAP SMEAR  2016   • URINE MICROALBUMIN  2018   • HEMOGLOBIN A1C  2019   • LIPID PANEL  2020   • MEDICARE ANNUAL WELLNESS  2020   • DIABETIC EYE EXAM  2020   • MAMMOGRAM  10/18/2020   • COLONOSCOPY  2026   • INFLUENZA VACCINE  Addressed       No orders of the defined types were placed in this encounter.      No Follow-up on file.

## 2019-03-27 NOTE — PROGRESS NOTES
Initial Medicare Wellness Visit   The ABC's of the Annual Wellness Visit    Chief Complaint   Patient presents with   • new pcpdd       HPI:  Camelia Quintanilla, -1955, is a 64 y.o. female who presents for an Initial Medicare Wellness Visit.    Recent Hospitalizations:  Recently treated at the following:  Other: [facility].    Current Medical Providers:  Patient Care Team:  Mian Hinds MD as PCP - General (Internal Medicine)  Emanuel Keller MD as Consulting Physician (Rheumatology)  Citlali Yi MD as Consulting Physician (Physical Medicine and Rehabilitation)  Deedee Jett MD as Consulting Physician (Cardiology)  Freedom Storey MD as Consulting Physician (Endocrinology)  Jose Alfredo oRdrigues MD as Consulting Physician (Gastroenterology)  Sekou Greenwood MD as Surgeon (Orthopedic Surgery)  Terrell Paez MD as Consulting Physician (Neurosurgery)    Health Habits and Functional and Cognitive Screening and Depression Screening:  Functional & Cognitive Status 3/27/2019   Do you have difficulty preparing food and eating? No   Do you have difficulty bathing yourself, getting dressed or grooming yourself? No   Do you have difficulty using the toilet? No   Do you have difficulty moving around from place to place? No   Do you have trouble with steps or getting out of a bed or a chair? No   In the past year have you fallen or experienced a near fall? No   Current Diet Well Balanced Diet   Dental Exam Up to date   Eye Exam Up to date   Exercise (times per week) 7 times per week   Current Exercise Activities Include Aerobics   Do you need help using the phone?  No   Are you deaf or do you have serious difficulty hearing?  No   Do you need help with transportation? No   Do you need help shopping? No   Do you need help preparing meals?  No   Do you need help with housework?  No   Do you need help with laundry? No   Do you need help taking your medications? No   Do you need help managing money? No   Do you ever drive  or ride in a car without wearing a seat belt? No   Have you felt unusual stress, anger or loneliness in the last month? No   Who do you live with? Spouse   If you need help, do you have trouble finding someone available to you? No   Have you been bothered in the last four weeks by sexual problems? No   Do you have difficulty concentrating, remembering or making decisions? No       Compared to one year ago, the patient feels her physical health is the same and her mental health is the same.    Depression Screen:  PHQ-2/PHQ-9 Depression Screening 3/27/2019   Little interest or pleasure in doing things 0   Feeling down, depressed, or hopeless 0   Trouble falling or staying asleep, or sleeping too much 0   Feeling tired or having little energy 0   Poor appetite or overeating 0   Feeling bad about yourself - or that you are a failure or have let yourself or your family down 0   Trouble concentrating on things, such as reading the newspaper or watching television 0   Moving or speaking so slowly that other people could have noticed. Or the opposite - being so fidgety or restless that you have been moving around a lot more than usual 0   Thoughts that you would be better off dead, or of hurting yourself in some way 0   Total Score 0   If you checked off any problems, how difficult have these problems made it for you to do your work, take care of things at home, or get along with other people? Not difficult at all         Past Medical/Family/Social History:  The following portions of the patient's history were reviewed and updated as appropriate: allergies, current medications, past family history, past medical history, past social history, past surgical history and problem list.    Allergies   Allergen Reactions   • Adhesive Tape Swelling     Must use paper tape   • Morphine And Related Hives and Itching         Current Outpatient Medications:   •  albuterol (PROVENTIL) (2.5 MG/3ML) 0.083% nebulizer solution, Take 2.5 mg  by nebulization Every 6 (Six) Hours As Needed for Wheezing (asthma)., Disp: , Rfl:   •  baclofen (LIORESAL) 10 MG tablet, Take 10 mg by mouth 3 (Three) Times a Day., Disp: , Rfl:   •  celecoxib (CeleBREX) 200 MG capsule, Take 200 mg by mouth 2 (two) times a day., Disp: , Rfl:   •  clotrimazole-betamethasone (LOTRISONE) 1-0.05 % cream, Apply  topically 2 (two) times a day., Disp: , Rfl:   •  furosemide (LASIX) 20 MG tablet, Take 20 mg by mouth As Needed., Disp: , Rfl:   •  gabapentin (NEURONTIN) 300 MG capsule, Take 300 mg by mouth 2 (Two) Times a Day., Disp: , Rfl:   •  HYDROcodone-acetaminophen (NORCO)  MG per tablet, Take 1 tablet by mouth 2 (Two) Times a Day As Needed., Disp: , Rfl:   •  hydroxychloroquine (PLAQUENIL) 200 MG tablet, Take 200 mg by mouth 2 (Two) Times a Day., Disp: , Rfl:   •  hyoscyamine (LEVSIN) 0.125 MG SL tablet, DISSOLVE ONE TABLET UNDER THE TONGUE EVERY 6 HOURS AS NEEDED FOR CRAMPING, Disp: 30 tablet, Rfl: 0  •  IRON PO, Take 1 tablet by mouth 2 (Two) Times a Day., Disp: , Rfl:   •  isosorbide mononitrate (IMDUR) 60 MG 24 hr tablet, Take 60 mg by mouth Daily., Disp: , Rfl:   •  levothyroxine (SYNTHROID, LEVOTHROID) 75 MCG tablet, TAKE ONE TABLET BY MOUTH DAILY, Disp: , Rfl:   •  montelukast (SINGULAIR) 10 MG tablet, Take 1 tablet by mouth Every Night., Disp: 30 tablet, Rfl: 12  •  nebivolol (BYSTOLIC) 5 MG tablet, Take 5 mg by mouth Daily., Disp: , Rfl:   •  nitroglycerin (NITROSTAT) 0.4 MG SL tablet, Place 0.4 mg under the tongue every 5 (five) minutes., Disp: , Rfl:   •  pantoprazole (PROTONIX) 40 MG EC tablet, TAKE ONE TABLET BY MOUTH DAILY, Disp: 90 tablet, Rfl: 1  •  Simethicone (GAS RELIEF EXTRA STRENGTH PO), As Needed., Disp: , Rfl:   •  simvastatin (ZOCOR) 40 MG tablet, Take 40 mg by mouth Daily., Disp: , Rfl:   •  SPIRIVA RESPIMAT 1.25 MCG/ACT aerosol solution inhaler, , Disp: , Rfl:   •  SYMBICORT 160-4.5 MCG/ACT inhaler, INHALE TWO PUFFS BY MOUTH TWICE A DAY, Disp: 10.2 g,  Rfl: 5  •  venlafaxine XR (EFFEXOR XR) 75 MG 24 hr capsule, Take 1 capsule by mouth Daily., Disp: 30 capsule, Rfl: 5  •  VENTOLIN  (90 Base) MCG/ACT inhaler, INHALE 2 PUFFS EVERY 4 HOURS AS NEEDED FOR WHEEZING OR SHORTNESS OF AIR, Disp: 18 g, Rfl: 1  •  vitamin C (ASCORBIC ACID) 500 MG tablet, Take 500 mg by mouth 2 (Two) Times a Day., Disp: , Rfl:   •  vitamin D (ERGOCALCIFEROL) 77072 UNITS capsule capsule, Take 50,000 Units by mouth Every 7 (Seven) Days., Disp: , Rfl:   •  fluticasone (FLONASE) 50 MCG/ACT nasal spray, 1 spray into each nostril 2 (Two) Times a Day., Disp: 16 g, Rfl: 12  •  ipratropium (ATROVENT) 0.06 % nasal spray, 1 spray into each nostril 4 (Four) Times a Day As Needed for Rhinitis., Disp: 15 mL, Rfl: 12  •  nystatin (MYCOSTATIN) 496222 UNIT/GM ointment, by Intratympanic route., Disp: , Rfl:     Aspirin use counseling: Does not need ASA (and currently is not on it)    Current medication list contains no high risk medications.  No harmful drug interactions have been identified.     Family History   Problem Relation Age of Onset   • COPD Mother    • Cancer Mother         heart tumor   • COPD Father    • Hypertension Father    • No Known Problems Sister    • Cancer Brother         testicular   • Cancer Paternal Aunt         breast   • Malig Hyperthermia Neg Hx        Social History     Tobacco Use   • Smoking status: Never Smoker   • Smokeless tobacco: Never Used   Substance Use Topics   • Alcohol use: No       Past Surgical History:   Procedure Laterality Date   • ABDOMINAL HERNIA REPAIR     • APPENDECTOMY     • BRONCHOSCOPY N/A 5/11/2018    Procedure: BRONCHOSCOPY;  Surgeon: Emma Blakely MD;  Location: [facility] ENDOSCOPY;  Service: Pulmonary   • CHOLECYSTECTOMY     • EPIDURAL BLOCK     • HYSTERECTOMY     • WRIST FUSION Right        Patient Active Problem List   Diagnosis   • Hypertension   • Hypothyroidism   • Gastroesophageal reflux disease   • Irritable bowel syndrome   • Migraine with  "aura   • Renal artery stenosis (CMS/HCC)   • Rheumatoid arthritis (CMS/HCC)   • Thyroid nodule   • Fatigue   • Type 2 diabetes mellitus (CMS/HCC)   • Chronic adrenal insufficiency (CMS/HCC)   • Depression with anxiety   • HLD (hyperlipidemia)   • Vitamin D deficiency   • Precordial pain   • Iron deficiency anemia   • Acute pain of both shoulders   • Bursitis/tendonitis, shoulder   • Moderate persistent asthma without complication   • Fibromyalgia syndrome   • Pituitary cyst (CMS/HCC)       Review of Systems    Objective     Vitals:    03/27/19 1425   BP: 112/68   Pulse: 68   Temp: 98.2 °F (36.8 °C)   SpO2: 95%   Weight: 77.8 kg (171 lb 9.6 oz)   Height: 162.6 cm (64\")       Patient's Body mass index is 29.46 kg/m². BMI is within normal parameters. No follow-up required..      No exam data present    The patient has no evidence of cognitve impairment.     Physical Exam    Recent Lab Results:  Lab Results   Component Value Date    GLU 93 05/02/2017     Lab Results   Component Value Date    TRIG 169 (H) 05/02/2017    HDL 57 05/02/2017    VLDL 33.8 05/02/2017         Assessment/Plan   Age-appropriate Screening Schedule:  Refer to the list below for future screening recommendations based on patient's age, sex and/or medical conditions.      Health Maintenance   Topic Date Due   • PNEUMOCOCCAL VACCINE (19-64 MEDIUM RISK) (1 of 1 - PPSV23) 03/09/1974   • TDAP/TD VACCINES (1 - Tdap) 03/09/1974   • ZOSTER VACCINE (1 of 2) 03/09/2005   • DIABETIC FOOT EXAM  09/21/2016   • PAP SMEAR  09/21/2016   • URINE MICROALBUMIN  05/02/2018   • HEMOGLOBIN A1C  07/21/2019   • LIPID PANEL  01/21/2020   • DIABETIC EYE EXAM  03/27/2020   • MAMMOGRAM  10/18/2020   • COLONOSCOPY  02/04/2026   • INFLUENZA VACCINE  Addressed       Medicare Risks and Personalized Health Plan:  NA      CMS-Preventive Services Quick Reference  Medicare Preventive Services Addressed:  NA    Advance Care Planning:  Patient has an advance directive - a copy has been " provided and is visible in patient header    Diagnoses and all orders for this visit:    1. Essential hypertension (Primary)    2. Iron deficiency anemia due to chronic blood loss    3. Mixed hyperlipidemia        An After Visit Summary and PPPS with all of these plans were given to the patient.      Follow Up:  No Follow-up on file.

## 2019-04-03 ENCOUNTER — LAB (OUTPATIENT)
Dept: FAMILY MEDICINE CLINIC | Facility: CLINIC | Age: 64
End: 2019-04-03

## 2019-04-03 DIAGNOSIS — D50.0 IRON DEFICIENCY ANEMIA DUE TO CHRONIC BLOOD LOSS: Primary | ICD-10-CM

## 2019-04-03 LAB — HEMOCCULT STL QL IA: NEGATIVE

## 2019-04-03 PROCEDURE — 82274 ASSAY TEST FOR BLOOD FECAL: CPT | Performed by: INTERNAL MEDICINE

## 2019-04-12 ENCOUNTER — OFFICE VISIT (OUTPATIENT)
Dept: FAMILY MEDICINE CLINIC | Facility: CLINIC | Age: 64
End: 2019-04-12

## 2019-04-12 VITALS
RESPIRATION RATE: 14 BRPM | TEMPERATURE: 98.4 F | WEIGHT: 168 LBS | OXYGEN SATURATION: 96 % | HEART RATE: 81 BPM | HEIGHT: 64 IN | DIASTOLIC BLOOD PRESSURE: 70 MMHG | BODY MASS INDEX: 28.68 KG/M2 | SYSTOLIC BLOOD PRESSURE: 110 MMHG

## 2019-04-12 DIAGNOSIS — N81.10 BLADDER PROLAPSE, FEMALE, ACQUIRED: ICD-10-CM

## 2019-04-12 DIAGNOSIS — R19.7 DIARRHEA, UNSPECIFIED TYPE: Primary | ICD-10-CM

## 2019-04-12 LAB
ALBUMIN SERPL-MCNC: 4.3 G/DL (ref 3.5–5.2)
ALP SERPL-CCNC: 63 U/L (ref 39–117)
ALT SERPL W P-5'-P-CCNC: 18 U/L (ref 1–33)
AMYLASE SERPL-CCNC: 36 U/L (ref 28–100)
AST SERPL-CCNC: 20 U/L (ref 1–32)
BILIRUB CONJ SERPL-MCNC: <0.2 MG/DL (ref 0.2–0.3)
BILIRUB INDIRECT SERPL-MCNC: ABNORMAL MG/DL
BILIRUB SERPL-MCNC: 0.3 MG/DL (ref 0.2–1.2)
PROT SERPL-MCNC: 7.5 G/DL (ref 6–8.5)

## 2019-04-12 PROCEDURE — 36415 COLL VENOUS BLD VENIPUNCTURE: CPT | Performed by: INTERNAL MEDICINE

## 2019-04-12 PROCEDURE — 99213 OFFICE O/P EST LOW 20 MIN: CPT | Performed by: INTERNAL MEDICINE

## 2019-04-12 PROCEDURE — 82150 ASSAY OF AMYLASE: CPT | Performed by: INTERNAL MEDICINE

## 2019-04-12 PROCEDURE — 80076 HEPATIC FUNCTION PANEL: CPT | Performed by: INTERNAL MEDICINE

## 2019-04-12 RX ORDER — CHOLESTYRAMINE 4 G/9G
1 POWDER, FOR SUSPENSION ORAL
Qty: 30 PACKET | Refills: 1 | Status: SHIPPED | OUTPATIENT
Start: 2019-04-12 | End: 2019-08-05

## 2019-04-12 NOTE — PROGRESS NOTES
Subjective   Camelia Quintanilla is a 64 y.o. female.     History of Present Illness   She was seen for diarrhea.  Started diarrhea approximately 1 week ago.  Patient did start an SNRI and does have irritable bowel.  She was advised to stop the medication she has been on it less than 1 month.  She was given stool containers for stool workup.  Also was blood was also taken.  Given Questran to slow up the diarrhea.  She was also referred to GYN for possible bladder prolapse.  Follow-up in 1 week.  She was also instructed to continue her pound weight at 3-4 8 ounces daily.    Dictated utilizing Dragon dictation. If there are questions or for further clarification, please contact me.  The following portions of the patient's history were reviewed and updated as appropriate: allergies, current medications, past family history, past medical history, past social history, past surgical history and problem list.    Review of Systems   Constitutional: Negative for fatigue and fever.   HENT: Positive for congestion. Negative for trouble swallowing.    Eyes: Negative for discharge and visual disturbance.   Respiratory: Negative for choking and shortness of breath.    Cardiovascular: Negative for chest pain and palpitations.   Gastrointestinal: Positive for diarrhea. Negative for abdominal pain and blood in stool.   Endocrine: Negative.    Genitourinary: Negative for genital sores and hematuria.   Musculoskeletal: Negative for gait problem and joint swelling.   Skin: Negative for color change, pallor, rash and wound.   Allergic/Immunologic: Positive for environmental allergies. Negative for immunocompromised state.   Neurological: Negative for facial asymmetry and speech difficulty.   Psychiatric/Behavioral: Negative for hallucinations and suicidal ideas.       Objective   Physical Exam   Constitutional: She is oriented to person, place, and time. She appears well-developed and well-nourished.   HENT:   Head: Normocephalic.   Eyes:  Conjunctivae are normal. Pupils are equal, round, and reactive to light.   Neck: Normal range of motion. Neck supple.   Cardiovascular: Normal rate, regular rhythm and normal heart sounds.   Pulmonary/Chest: Effort normal and breath sounds normal.   Abdominal: Soft. Bowel sounds are normal. She exhibits no distension and no mass. There is no tenderness. There is no rebound and no guarding. No hernia.   Musculoskeletal: Normal range of motion.   Neurological: She is alert and oriented to person, place, and time.   Skin: Skin is warm and dry.   Psychiatric: She has a normal mood and affect. Her behavior is normal. Judgment and thought content normal.   Nursing note and vitals reviewed.      Assessment/Plan   Problems Addressed this Visit     None      Visit Diagnoses     Diarrhea, unspecified type    -  Primary    Relevant Orders    Fecal Leukocytes - Stool, Per Rectum    Clostridium Difficile Toxin, PCR - Stool, Per Rectum    Stool Culture - Stool, Per Rectum    Amylase    Hepatic Function Panel    Bladder prolapse, female, acquired        Relevant Orders    Ambulatory Referral to Obstetrics / Gynecology (Completed)

## 2019-05-02 ENCOUNTER — OFFICE VISIT (OUTPATIENT)
Dept: OBSTETRICS AND GYNECOLOGY | Facility: CLINIC | Age: 64
End: 2019-05-02

## 2019-05-02 VITALS
SYSTOLIC BLOOD PRESSURE: 111 MMHG | BODY MASS INDEX: 28.99 KG/M2 | DIASTOLIC BLOOD PRESSURE: 73 MMHG | WEIGHT: 169 LBS | HEART RATE: 74 BPM

## 2019-05-02 DIAGNOSIS — R39.15 URGENCY OF URINATION: Primary | ICD-10-CM

## 2019-05-02 PROCEDURE — 99203 OFFICE O/P NEW LOW 30 MIN: CPT | Performed by: OBSTETRICS & GYNECOLOGY

## 2019-05-02 RX ORDER — AMLODIPINE BESYLATE 2.5 MG/1
2.5 TABLET ORAL NIGHTLY
COMMUNITY
Start: 2019-04-17 | End: 2020-11-03

## 2019-05-03 NOTE — PROGRESS NOTES
Subjective   Camelia Quintanilla is a 64 y.o. female.     Cc:  Urinary issues    History of Present Illness - Patient is a 64 year old female who presented with complaints of incomplete emptying of bladder.  Patient also reports urgency symptoms.  When she tries to empty, small volumes are released.  No dysuria or frequency.  Patient wears a pad from time to time, but not daily.  For heavy exertion, she leaks but she does not describe routine stress incontinence symptoms.  She denies fevers or chills.  Patient had a hysterectomy in past for benign indications.  She has had no major complications in pregnancy or deliveries.    The following portions of the patient's history were reviewed and updated as appropriate:   She  has a past medical history of Depression with anxiety (9/21/2016), Fibromyalgia syndrome (6/6/2017), Heart palpitations, HLD (hyperlipidemia) (9/21/2016), Hypertension, Lung infection, Moderate persistent asthma without complication (6/6/2017), PONV (postoperative nausea and vomiting), Precordial pain (9/21/2016), and Vitamin D deficiency (9/21/2016).  She  has a past surgical history that includes Cholecystectomy; Appendectomy; Abdominal hernia repair; Hysterectomy; Wrist fusion (Right); Epidural block injection; and Bronchoscopy (N/A, 5/11/2018).  Her family history includes COPD in her father and mother; Cancer in her brother, mother, and paternal aunt; Hypertension in her father; No Known Problems in her sister.  She  reports that she has never smoked. She has never used smokeless tobacco. She reports that she does not drink alcohol or use drugs.  Current Outpatient Medications   Medication Sig Dispense Refill   • albuterol (PROVENTIL) (2.5 MG/3ML) 0.083% nebulizer solution Take 2.5 mg by nebulization Every 6 (Six) Hours As Needed for Wheezing (asthma).     • amLODIPine (NORVASC) 2.5 MG tablet      • baclofen (LIORESAL) 10 MG tablet Take 10 mg by mouth 3 (Three) Times a Day.     • celecoxib (CeleBREX)  200 MG capsule Take 200 mg by mouth 2 (two) times a day.     • cholestyramine (QUESTRAN) 4 g packet Take 1 packet by mouth 3 (Three) Times a Day With Meals. 30 packet 1   • clotrimazole-betamethasone (LOTRISONE) 1-0.05 % cream Apply  topically 2 (two) times a day.     • fluticasone (FLONASE) 50 MCG/ACT nasal spray 1 spray into each nostril 2 (Two) Times a Day. 16 g 12   • furosemide (LASIX) 20 MG tablet Take 20 mg by mouth As Needed.     • gabapentin (NEURONTIN) 300 MG capsule Take 300 mg by mouth 2 (Two) Times a Day.     • HYDROcodone-acetaminophen (NORCO)  MG per tablet Take 1 tablet by mouth 2 (Two) Times a Day As Needed.     • hydroxychloroquine (PLAQUENIL) 200 MG tablet Take 200 mg by mouth 2 (Two) Times a Day.     • hyoscyamine (LEVSIN) 0.125 MG SL tablet Dissolve one Tablet Under The Toungue Every 6  Hours as Needed for cramping 30 tablet 3   • ipratropium (ATROVENT) 0.06 % nasal spray 1 spray into each nostril 4 (Four) Times a Day As Needed for Rhinitis. 15 mL 12   • isosorbide mononitrate (IMDUR) 60 MG 24 hr tablet Take 60 mg by mouth Daily.     • ISOSORBIDE MONONITRATE PO Take 80 mg by mouth Daily.     • levothyroxine (SYNTHROID, LEVOTHROID) 75 MCG tablet TAKE ONE TABLET BY MOUTH DAILY     • montelukast (SINGULAIR) 10 MG tablet Take 1 tablet by mouth Every Night. 30 tablet 12   • nebivolol (BYSTOLIC) 5 MG tablet Take 5 mg by mouth Daily.     • nitroglycerin (NITROSTAT) 0.4 MG SL tablet Place 0.4 mg under the tongue every 5 (five) minutes.     • nystatin (MYCOSTATIN) 599163 UNIT/GM ointment by Intratympanic route.     • pantoprazole (PROTONIX) 40 MG EC tablet TAKE ONE TABLET BY MOUTH DAILY 90 tablet 1   • Simethicone (GAS RELIEF EXTRA STRENGTH PO) As Needed.     • simvastatin (ZOCOR) 40 MG tablet Take 40 mg by mouth Daily.     • SPIRIVA RESPIMAT 1.25 MCG/ACT aerosol solution inhaler      • SYMBICORT 160-4.5 MCG/ACT inhaler INHALE TWO PUFFS BY MOUTH TWICE A DAY 10.2 g 5   • vitamin C (ASCORBIC ACID)  500 MG tablet Take 500 mg by mouth 2 (Two) Times a Day.     • vitamin D (ERGOCALCIFEROL) 98278 UNITS capsule capsule Take 50,000 Units by mouth Every 7 (Seven) Days.       No current facility-administered medications for this visit.      She is allergic to adhesive tape and morphine and related..    Review of Systems   Constitutional: Negative for chills and fever.   HENT: Negative for dental problem and hearing loss.    Eyes: Negative for photophobia and visual disturbance.   Respiratory: Negative for shortness of breath and wheezing.    Cardiovascular: Negative for chest pain and palpitations.   Gastrointestinal: Negative for diarrhea and vomiting.   Endocrine: Negative for polydipsia and polyuria.   Genitourinary: Positive for difficulty urinating and urgency.   Musculoskeletal: Negative for back pain and gait problem.   Skin: Negative for pallor and rash.   Neurological: Negative for tremors and speech difficulty.   Hematological: Negative for adenopathy. Does not bruise/bleed easily.   Psychiatric/Behavioral: Negative for behavioral problems and confusion.       Objective   Physical Exam   Constitutional: She appears well-developed and well-nourished.   HENT:   Left Ear: External ear normal.   Nose: Nose normal.   Eyes: Conjunctivae are normal.   Neck: Normal range of motion. Neck supple. No thyromegaly present.   Cardiovascular: Normal rate, regular rhythm and normal heart sounds.   Pulmonary/Chest: Effort normal. No stridor. She has no wheezes.   Abdominal: Soft. There is no tenderness. There is no guarding.   Genitourinary: Pelvic exam was performed with patient supine. There is no tenderness or lesion on the right labia. There is no tenderness or lesion on the left labia. No tenderness in the vagina. No signs of injury around the vagina.   Genitourinary Comments: Uterus and cervix absent.  Adnexa nonpalpable.   Musculoskeletal: Normal range of motion. She exhibits no edema.   Neurological: She is alert.  Coordination normal.   Skin: Skin is warm and dry.   Psychiatric: She has a normal mood and affect. Her behavior is normal. Judgment and thought content normal.   Vitals reviewed.      Assessment/Plan   Camelia was seen today for urine leakage.    Diagnoses and all orders for this visit:    Urgency of urination  -     Urine Culture - Urine, Urine, Clean Catch  -     If urine culture is positive, treat with course of antibiotics.  If urine culture is negative or patient does not improve, consider Detrol/Ditropan.  If that treatment fails, consideration for urologic work up.  Plan reviewed with patient.    Julio Umaña MD

## 2019-05-04 LAB
BACTERIA UR CULT: NO GROWTH
BACTERIA UR CULT: NORMAL

## 2019-05-06 RX ORDER — TOLTERODINE TARTRATE 2 MG/1
2 TABLET, EXTENDED RELEASE ORAL 2 TIMES DAILY
Qty: 60 TABLET | Refills: 3 | Status: SHIPPED | OUTPATIENT
Start: 2019-05-06 | End: 2019-08-05

## 2019-05-07 ENCOUNTER — TELEPHONE (OUTPATIENT)
Dept: OBSTETRICS AND GYNECOLOGY | Facility: CLINIC | Age: 64
End: 2019-05-07

## 2019-05-07 NOTE — TELEPHONE ENCOUNTER
----- Message from Julio Umaña MD sent at 5/6/2019  3:51 PM EDT -----  Eugenia - Let her know that her urine culture was negative.  I called in some medication to try to stabilize her bladder.

## 2019-05-20 ENCOUNTER — TELEPHONE (OUTPATIENT)
Dept: FAMILY MEDICINE CLINIC | Facility: CLINIC | Age: 64
End: 2019-05-20

## 2019-05-20 DIAGNOSIS — R92.2 INCONCLUSIVE MAMMOGRAM: ICD-10-CM

## 2019-05-20 DIAGNOSIS — N63.0 BREAST NODULE: Primary | ICD-10-CM

## 2019-05-20 NOTE — TELEPHONE ENCOUNTER
HAD ABNORMAL MAMMOGRAM 6 MONTHS AGO, THEY NEED AN ORDER FOR MAMMOGRAM AND US OF RIGHT BREAST SENT TO [facility].

## 2019-05-25 ENCOUNTER — OFFICE VISIT (OUTPATIENT)
Dept: RETAIL CLINIC | Facility: CLINIC | Age: 64
End: 2019-05-25

## 2019-05-25 VITALS
BODY MASS INDEX: 29.37 KG/M2 | DIASTOLIC BLOOD PRESSURE: 72 MMHG | SYSTOLIC BLOOD PRESSURE: 118 MMHG | HEART RATE: 84 BPM | WEIGHT: 172 LBS | HEIGHT: 64 IN | TEMPERATURE: 99 F | OXYGEN SATURATION: 95 % | RESPIRATION RATE: 18 BRPM

## 2019-05-25 DIAGNOSIS — R05.9 COUGH: ICD-10-CM

## 2019-05-25 DIAGNOSIS — J40 BRONCHITIS: Primary | ICD-10-CM

## 2019-05-25 DIAGNOSIS — R50.9 FEVER, UNSPECIFIED FEVER CAUSE: ICD-10-CM

## 2019-05-25 DIAGNOSIS — R06.2 WHEEZING ON EXPIRATION: ICD-10-CM

## 2019-05-25 PROCEDURE — 99213 OFFICE O/P EST LOW 20 MIN: CPT | Performed by: NURSE PRACTITIONER

## 2019-05-25 RX ORDER — BENZONATATE 100 MG/1
100 CAPSULE ORAL 3 TIMES DAILY PRN
Qty: 30 CAPSULE | Refills: 0 | Status: SHIPPED | OUTPATIENT
Start: 2019-05-25 | End: 2019-06-04

## 2019-05-25 RX ORDER — PREDNISONE 10 MG/1
TABLET ORAL
Qty: 48 TABLET | Refills: 0 | Status: SHIPPED | OUTPATIENT
Start: 2019-05-25 | End: 2019-05-25

## 2019-05-25 RX ORDER — AZITHROMYCIN 250 MG/1
TABLET, FILM COATED ORAL
Qty: 6 TABLET | Refills: 0 | Status: SHIPPED | OUTPATIENT
Start: 2019-05-25 | End: 2019-08-05

## 2019-05-25 RX ORDER — PREDNISONE 20 MG/1
TABLET ORAL
Qty: 20 TABLET | Refills: 0 | Status: SHIPPED | OUTPATIENT
Start: 2019-05-25 | End: 2019-07-22

## 2019-05-25 RX ORDER — ATORVASTATIN CALCIUM 20 MG/1
20 TABLET, FILM COATED ORAL NIGHTLY
COMMUNITY
End: 2021-06-15 | Stop reason: DRUGHIGH

## 2019-05-25 NOTE — PROGRESS NOTES
"Subjective   Camelia Quintanilla is a 64 y.o. female.     /72 (BP Location: Left arm, Patient Position: Sitting, Cuff Size: Adult)   Pulse 84   Temp 99 °F (37.2 °C) (Oral)   Resp 18   Ht 162.6 cm (64\")   Wt 78 kg (172 lb)   SpO2 95%   BMI 29.52 kg/m²       Cough   This is a new problem. The current episode started in the past 7 days. The problem has been gradually worsening. The problem occurs constantly. The cough is productive of purulent sputum. Associated symptoms include chills, headaches, nasal congestion, postnasal drip, rhinorrhea and a sore throat. Nothing aggravates the symptoms. She has tried ipratropium inhaler, rest, steroid inhaler and a beta-agonist inhaler for the symptoms. The treatment provided no relief. Her past medical history is significant for bronchitis, environmental allergies and pneumonia.        The following portions of the patient's history were reviewed and updated as appropriate: current medications, past family history, past medical history, past social history, past surgical history and problem list.    Review of Systems   Constitutional: Positive for chills.   HENT: Positive for postnasal drip, rhinorrhea and sore throat.    Eyes: Negative.    Respiratory: Positive for cough.    Cardiovascular: Negative.    Gastrointestinal: Negative.    Endocrine: Negative.    Genitourinary: Negative.    Musculoskeletal: Negative.    Skin: Negative.    Allergic/Immunologic: Positive for environmental allergies.   Neurological: Positive for headache.   Hematological: Negative.    Psychiatric/Behavioral: Negative.        Objective   Physical Exam   Constitutional: She is oriented to person, place, and time. She appears well-developed and well-nourished.   HENT:   Head: Normocephalic and atraumatic.   Right Ear: Hearing, tympanic membrane, external ear and ear canal normal.   Left Ear: Hearing, tympanic membrane, external ear and ear canal normal.   Nose: Sinus tenderness and congestion " present.   Mouth/Throat: Uvula is midline and oropharynx is clear and moist.   Eyes: Conjunctivae and EOM are normal. Pupils are equal, round, and reactive to light.   Neck: Normal range of motion.   Cardiovascular: Normal rate, regular rhythm and normal heart sounds.   Pulmonary/Chest: Effort normal. She has decreased breath sounds. She has wheezes in the right upper field, the left upper field and the left lower field.   Abdominal: Soft. Bowel sounds are normal.   Musculoskeletal:   Slight limp   Neurological: She is alert and oriented to person, place, and time.   Skin: Skin is warm and dry. Capillary refill takes less than 2 seconds.   Psychiatric: She has a normal mood and affect.   Vitals reviewed.        Assessment/Plan   Camelia was seen today for cough.    Diagnoses and all orders for this visit:    Bronchitis  -     azithromycin (ZITHROMAX) 250 MG tablet; Take 2 tablets the first day, then 1 tablet daily for 4 days.    Cough  -     benzonatate (TESSALON PERLES) 100 MG capsule; Take 1 capsule by mouth 3 (Three) Times a Day As Needed for Cough for up to 10 days.    Wheezing on expiration  -     predniSONE (DELTASONE) 20 MG tablet; Take 2 tabs PO BID for 5 days    Fever, unspecified fever cause    Other orders  -     Discontinue: predniSONE (DELTASONE) 10 MG (48) tablet pack; As directed      Pt to take otc tylenol for fever. Pt has nebulizer at home, she prefers to do tx at home. States she did not do it yet because she wanted to be assessed first. Pt to take neb tx every 4 to 6 hours prn. F/u if no improvement, pt v/u     Acute Bronchitis, Adult  Acute bronchitis is when air tubes (bronchi) in the lungs suddenly get swollen. The condition can make it hard to breathe. It can also cause these symptoms:  · A cough.  · Coughing up clear, yellow, or green mucus.  · Wheezing.  · Chest congestion.  · Shortness of breath.  · A fever.  · Body aches.  · Chills.  · A sore throat.    Follow these instructions at  home:  Medicines  · Take over-the-counter and prescription medicines only as told by your doctor.  · If you were prescribed an antibiotic medicine, take it as told by your doctor. Do not stop taking the antibiotic even if you start to feel better.  General instructions    · Rest.  · Drink enough fluids to keep your pee (urine) pale yellow.  · Avoid smoking and secondhand smoke. If you smoke and you need help quitting, ask your doctor. Quitting will help your lungs heal faster.  · Use an inhaler, cool mist vaporizer, or humidifier as told by your doctor.  · Keep all follow-up visits as told by your doctor. This is important.  How is this prevented?  To lower your risk of getting this condition again:  · Wash your hands often with soap and water. If you cannot use soap and water, use hand .  · Avoid contact with people who have cold symptoms.  · Try not to touch your hands to your mouth, nose, or eyes.  · Make sure to get the flu shot every year.    Contact a doctor if:  · Your symptoms do not get better in 2 weeks.  Get help right away if:  · You cough up blood.  · You have chest pain.  · You have very bad shortness of breath.  · You become dehydrated.  · You faint (pass out) or keep feeling like you are going to pass out.  · You keep throwing up (vomiting).  · You have a very bad headache.  · Your fever or chills gets worse.  This information is not intended to replace advice given to you by your health care provider. Make sure you discuss any questions you have with your health care provider.  Document Released: 06/05/2009 Document Revised: 08/01/2018 Document Reviewed: 06/07/2017  [employer] Patient Education © 2019 [employer]

## 2019-05-25 NOTE — PATIENT INSTRUCTIONS
Acute Bronchitis, Adult  Acute bronchitis is when air tubes (bronchi) in the lungs suddenly get swollen. The condition can make it hard to breathe. It can also cause these symptoms:  · A cough.  · Coughing up clear, yellow, or green mucus.  · Wheezing.  · Chest congestion.  · Shortness of breath.  · A fever.  · Body aches.  · Chills.  · A sore throat.    Follow these instructions at home:  Medicines  · Take over-the-counter and prescription medicines only as told by your doctor.  · If you were prescribed an antibiotic medicine, take it as told by your doctor. Do not stop taking the antibiotic even if you start to feel better.  General instructions    · Rest.  · Drink enough fluids to keep your pee (urine) pale yellow.  · Avoid smoking and secondhand smoke. If you smoke and you need help quitting, ask your doctor. Quitting will help your lungs heal faster.  · Use an inhaler, cool mist vaporizer, or humidifier as told by your doctor.  · Keep all follow-up visits as told by your doctor. This is important.  How is this prevented?  To lower your risk of getting this condition again:  · Wash your hands often with soap and water. If you cannot use soap and water, use hand .  · Avoid contact with people who have cold symptoms.  · Try not to touch your hands to your mouth, nose, or eyes.  · Make sure to get the flu shot every year.    Contact a doctor if:  · Your symptoms do not get better in 2 weeks.  Get help right away if:  · You cough up blood.  · You have chest pain.  · You have very bad shortness of breath.  · You become dehydrated.  · You faint (pass out) or keep feeling like you are going to pass out.  · You keep throwing up (vomiting).  · You have a very bad headache.  · Your fever or chills gets worse.  This information is not intended to replace advice given to you by your health care provider. Make sure you discuss any questions you have with your health care provider.  Document Released: 06/05/2009  Document Revised: 08/01/2018 Document Reviewed: 06/07/2017  [employer] Interactive Patient Education © 2019 [employer]

## 2019-07-01 ENCOUNTER — TELEPHONE (OUTPATIENT)
Dept: FAMILY MEDICINE CLINIC | Facility: CLINIC | Age: 64
End: 2019-07-01

## 2019-07-01 NOTE — TELEPHONE ENCOUNTER
PATIENT WANTS TO KNOW IF RLS HAS SEEN MAMMOGRAM REPORT AND ALSO NEEDS U/S GUIDED BIOPSY DONE. PLEASE ADVISE.   Patient Seen in: [facility] Emergency Department    History   Patient presents with:  Alcohol Intoxication (neurologic)    Stated Complaint: etoh intoxication    HPI    45-year-old female with history of alcohol abuse represents to the emergency lauri atraumatic. Sclerae anicteric. Conjunctivae show no pallor. Oropharynx clear, mucous membranes moist   Neck: supple, no rigidity   Lungs: good air exchange and clear   Heart: regular rate rhythm and no murmur   Abdomen: Soft and nontender.   No abdominal she once again is now refusing evaluation and will not participate in crisis workers discussion. She is requesting to leave and her mother has arrived to the emergency room to take her home.   Patient strongly encouraged to seek help with her alcohol abuse

## 2019-07-02 DIAGNOSIS — N63.0 BREAST NODULE: Primary | ICD-10-CM

## 2019-07-08 ENCOUNTER — TELEPHONE (OUTPATIENT)
Dept: SURGERY | Facility: CLINIC | Age: 64
End: 2019-07-08

## 2019-07-08 NOTE — TELEPHONE ENCOUNTER
Scheduled New PT appt with Dr MARINELLI  7-29-19 arrive 11:30      Mailed pw    Spoke to pt she v/u with appt    pre screened for possible bx    Good on all questions.    vivek

## 2019-07-22 ENCOUNTER — OFFICE VISIT (OUTPATIENT)
Dept: SURGERY | Facility: CLINIC | Age: 64
End: 2019-07-22

## 2019-07-22 ENCOUNTER — HOSPITAL ENCOUNTER (OUTPATIENT)
Dept: SURGERY | Facility: HOSPITAL | Age: 64
Discharge: HOME | End: 2019-07-22

## 2019-07-22 ENCOUNTER — HOSPITAL ENCOUNTER (OUTPATIENT)
Dept: SURGERY | Facility: HOSPITAL | Age: 64
Discharge: HOME | End: 2019-07-22
Admitting: SURGERY

## 2019-07-22 VITALS
HEART RATE: 74 BPM | HEIGHT: 64 IN | DIASTOLIC BLOOD PRESSURE: 62 MMHG | WEIGHT: 171 LBS | OXYGEN SATURATION: 96 % | BODY MASS INDEX: 29.19 KG/M2 | SYSTOLIC BLOOD PRESSURE: 126 MMHG

## 2019-07-22 DIAGNOSIS — I20.8 ANGINA EFFORT: ICD-10-CM

## 2019-07-22 DIAGNOSIS — N64.4 MASTODYNIA: ICD-10-CM

## 2019-07-22 DIAGNOSIS — G89.29 OTHER CHRONIC PAIN: ICD-10-CM

## 2019-07-22 DIAGNOSIS — R92.8 ABNORMAL ULTRASOUND OF BREAST: Primary | ICD-10-CM

## 2019-07-22 DIAGNOSIS — M06.9 RHEUMATOID ARTHRITIS, INVOLVING UNSPECIFIED SITE, UNSPECIFIED RHEUMATOID FACTOR PRESENCE: ICD-10-CM

## 2019-07-22 DIAGNOSIS — R92.8 ABNORMAL ULTRASOUND OF BREAST: ICD-10-CM

## 2019-07-22 DIAGNOSIS — R92.8 ABNORMAL MAMMOGRAM: ICD-10-CM

## 2019-07-22 PROCEDURE — 88305 TISSUE EXAM BY PATHOLOGIST: CPT | Performed by: SURGERY

## 2019-07-22 PROCEDURE — 19083 BX BREAST 1ST LESION US IMAG: CPT | Performed by: SURGERY

## 2019-07-22 PROCEDURE — 19084 BX BREAST ADD LESION US IMAG: CPT | Performed by: SURGERY

## 2019-07-22 PROCEDURE — 99204 OFFICE O/P NEW MOD 45 MIN: CPT | Performed by: SURGERY

## 2019-07-22 RX ORDER — ACETAMINOPHEN,DIPHENHYDRAMINE HCL 500; 25 MG/1; MG/1
1 TABLET, FILM COATED ORAL NIGHTLY PRN
COMMUNITY
End: 2021-03-20

## 2019-07-22 RX ORDER — RANOLAZINE 1000 MG/1
1000 TABLET, EXTENDED RELEASE ORAL EVERY 12 HOURS SCHEDULED
COMMUNITY
Start: 2019-07-02

## 2019-07-22 NOTE — PROGRESS NOTES
Chief Complaint: Camelia Quintanilla is a 64 y.o. female who was seen in consultation at the request of Reji Romo MD  for abnormal breast imaging.  Right Breast     History of Present Illness:  Patient presents with abnormal breast imaging and breast pain.  She reports breast pain UOQ at the site of the 11:00 lesion on her imaging.   She noted no new palpable masses, skin changes, nipple discharge, nipple changes prior to her most recent imaging.    Her most recent imaging includes the followin/10/2018   [facility] Quintanilla  BILATERAL SCREENING MAMMOGRAM  Heterogeneously dense. Interval occurring a small nodule projecting as central left breast. Small superficial nodule left lateral subareolar location is stable. Small nodule also developed outer hemisphere right breast middle third. Larger nodule central aspect of the right breast containing a single coarse calcification appears stable.  BIRADS Category 0    10/11/2018   Peter Quintanilla  BILATERAL DIAGNOSTIC MAMMOGRAM  BILATERAL BREAST ULTRASOUND  Heterogeneously dense. The developing nodular density deep central left breast along the nipple line does not efface. This will be further evaluated with left breast ultrasound today. Stable benign-appearing nodule in the anterior lateral periareolar left breast. The developing nodular density in the lateral hemisphere of the right breast also dos note efface.  ULTRASOUND  Left breast:  Nodule in the deep central left breast appears to correspond to a simple appearing cyst along the 12:00 retroareolar left 6 x 8 x 7 mm. no suspicious solid masses in the deep central left breast.  Right breast:  10:00 axis, 4 cm from the nipple, demonstrates an area of dense breast tissue measuring 1.5 x 1.8 x 0.9 cm. This corresponds to an island of benign dense breast tissue and coarse calcification that has been stable for several years. 8:00 axis, 4 cm from the nipple, demonstrates an ovoid,  solid-appearing structure measuring 1 x 0.5 x 0.5 cm. may correspond to the developing nodular density on mammography. This may represent a proabably benign fibroadenoma. Upon review 2011, this may have been present since that time. Six-month follow-up right breast mammogram and ultrasound will be recommended. There is also an incidental simple appearing cyst 9:00 axis, 3 cm from the nipple, measuring 5 x 3 x 6 mm.  BIRADS Category 3    06/27/2019   [facility]  DIAGNOSTIC RIGHT DIGITAL MAMMOGRAM  RIGHT BREAST ULTRASOUND  Heterogeneously dense.  The right breast mass 8:00-:00 at 6.6 cm from the nipple appears stable. Measuring 1.1 cm AP x 0.7 cm TRV.  Right breast focal asymmetry at 10:00-11:00 at mid depth at about 5 cm from the nipple, that contains a stable course calcifications, appears to have become larger since 10/11/2018.  ULTRASOUND: Right breast 8:00 at 4 cm from the nipple and 11:00 at 3 cm from the nipple.  Right breast mixed solid and cystic mass in the right breast at 8:00 at 4 cm from the nipple is stable in size, 0.4 x 0.5 x 1.0 cm. Since the margins of this sonographic mass have become partially irregular and angulated, the mass is considered to be suspicious for possible malignancy.  Right breast 11:00 at 3 cm from the nipple measures 0.7 x 1.1 x 1.6 cm. Today, a new solid mural nodule that measures 0.4 x 0.7 cm and is mostly hypoechoic with a punctate hyperechoic focus and no internal vascular flow was seen by the radiologist. This new nodule is concerning for possible malignancy.  IMPRESSION:  1. Right breast at 8:00 at 4 cm from the nipple needs to be biopsied. BIRADS Category 4.  2. The right breast sonographic calcified hypoechoic area at 11:00 at 3 cm from the nipple needs to be biopsied under ultrasound guidance. BIRADS Category 4.    On the right breast she has had 2 excisional biopsies for cyst 10 or 15 years ago.  In the left breast she has had 2 excisional  biopsies for cyst.  She has had her uterus and ovaries removed in 1978, is postmenopausal, and takes nor hormones.  Her family history includes the following: She has 1 daughter, 1 son, 2 sisters, one maternal aunt, 4 paternal aunts.  She has a paternal aunt who had breast cancer at uncertain age.  She has chronic pain from the fibromyalgia and takes baclofen, gabapentin, Norco one per day with Dr. Holm.  She has angina and sees Dr. Trimble for this and is not on any blood thinner.  She has rheumatoid arthritis and sees Dr. Keller for this and takes Celebrex and Plaquenil.  She is here for evaluation.    Review of Systems:  Review of Systems   HENT:   Positive for tinnitus.    Respiratory: Positive for wheezing.    Cardiovascular: Positive for palpitations.   Gastrointestinal: Positive for constipation.   Endocrine: Positive for hot flashes.   Musculoskeletal: Positive for arthralgias, back pain and myalgias.   Psychiatric/Behavioral: Positive for sleep disturbance. The patient is nervous/anxious.    All other systems reviewed and are negative.       Past Medical and Surgical History:  Breast Biopsy History:  Patient has had the following breast biopsies:Bilateral Breast Biopsies 10-15 yrs ago; benign   Breast Cancer HIstory:  Patient does not have a past medical history of breast cancer.  Breast Operations, and year:  None   Obstetric/Gynecologic History:  Age menstrual periods began: 13  Patient is postmenopausal due to removal of her uterus and both ovaries in the following year: [id number]   Number of pregnancies:1  Number of live births: 1  Number of abortions or miscarriages: 0  Age of delivery of first child: 21  Patient did not breast feed.  Length of time taking birth control pills: 4 yrs   Patient took hormone replacement during the following dates:  10 yrs  Patient had uterus and ovaries removed.     Past Surgical History:   Procedure Laterality Date   • ABDOMINAL HERNIA REPAIR     • APPENDECTOMY     •  "BREAST BIOPSY      right and left benign 10-15 yrs ago   • BRONCHOSCOPY N/A 5/11/2018    Procedure: BRONCHOSCOPY;  Surgeon: Emma Blakely MD;  Location: [facility] ENDOSCOPY;  Service: Pulmonary   • CARDIAC CATHETERIZATION     • CHOLECYSTECTOMY     • EPIDURAL BLOCK     • HYSTERECTOMY     • WRIST FUSION Right      Past Medical History:   Diagnosis Date   • Depression with anxiety 9/21/2016   • Fibromyalgia syndrome 6/6/2017   • Heart palpitations    • HLD (hyperlipidemia) 9/21/2016   • Hypertension    • Lung infection    • Moderate persistent asthma without complication 6/6/2017   • PONV (postoperative nausea and vomiting)    • Precordial pain 9/21/2016   • Vitamin D deficiency 9/21/2016       Prior Hospitalizations, other than for surgery or childbirth, and year:  Heart Cath    Social History     Socioeconomic History   • Marital status:      Spouse name: Not on file   • Number of children: Not on file   • Years of education: Not on file   • Highest education level: Not on file   Tobacco Use   • Smoking status: Never Smoker   • Smokeless tobacco: Never Used   Substance and Sexual Activity   • Alcohol use: No   • Drug use: No   • Sexual activity: Defer     Patient is .  Patient is retired.  Patient does not drink caffeine.    Family History:  Family History   Problem Relation Age of Onset   • COPD Mother    • Cancer Mother         heart tumor   • COPD Father    • Hypertension Father    • No Known Problems Sister    • Cancer Brother         testicular   • Breast cancer Paternal Aunt         unknown age    • Malig Hyperthermia Neg Hx        Vital Signs:  /62 (BP Location: Left arm, Patient Position: Sitting, Cuff Size: Adult)   Pulse 74   Ht 162.6 cm (64\")   Wt 77.6 kg (171 lb)   LMP  (LMP Unknown)   SpO2 96%   Breastfeeding? No   BMI 29.35 kg/m²      Medications:    Current Outpatient Medications:   •  albuterol (PROVENTIL) (2.5 MG/3ML) 0.083% nebulizer solution, Take 2.5 mg by nebulization " Every 6 (Six) Hours As Needed for Wheezing (asthma)., Disp: , Rfl:   •  amLODIPine (NORVASC) 2.5 MG tablet, , Disp: , Rfl:   •  atorvastatin (LIPITOR) 20 MG tablet, Take 20 mg by mouth Daily., Disp: , Rfl:   •  baclofen (LIORESAL) 10 MG tablet, Take 10 mg by mouth 3 (Three) Times a Day., Disp: , Rfl:   •  celecoxib (CeleBREX) 200 MG capsule, Take 200 mg by mouth 2 (two) times a day., Disp: , Rfl:   •  clotrimazole-betamethasone (LOTRISONE) 1-0.05 % cream, Apply  topically 2 (two) times a day., Disp: , Rfl:   •  diphenhydrAMINE-acetaminophen (TYLENOL PM)  MG tablet per tablet, Take 1 tablet by mouth At Night As Needed for Sleep., Disp: , Rfl:   •  fluticasone (FLONASE) 50 MCG/ACT nasal spray, 1 spray into each nostril 2 (Two) Times a Day., Disp: 16 g, Rfl: 12  •  furosemide (LASIX) 20 MG tablet, Take 20 mg by mouth As Needed., Disp: , Rfl:   •  gabapentin (NEURONTIN) 300 MG capsule, Take 300 mg by mouth 2 (Two) Times a Day., Disp: , Rfl:   •  HYDROcodone-acetaminophen (NORCO)  MG per tablet, Take 1 tablet by mouth 2 (Two) Times a Day As Needed., Disp: , Rfl:   •  hydroxychloroquine (PLAQUENIL) 200 MG tablet, Take 200 mg by mouth 2 (Two) Times a Day., Disp: , Rfl:   •  hyoscyamine (LEVSIN) 0.125 MG SL tablet, Dissolve one Tablet Under The Toungue Every 6  Hours as Needed for cramping, Disp: 30 tablet, Rfl: 3  •  ipratropium (ATROVENT) 0.06 % nasal spray, 1 spray into each nostril 4 (Four) Times a Day As Needed for Rhinitis., Disp: 15 mL, Rfl: 12  •  isosorbide mononitrate (IMDUR) 60 MG 24 hr tablet, Take 60 mg by mouth Daily., Disp: , Rfl:   •  ISOSORBIDE MONONITRATE PO, Take 80 mg by mouth Daily., Disp: , Rfl:   •  levothyroxine (SYNTHROID, LEVOTHROID) 75 MCG tablet, TAKE ONE TABLET BY MOUTH DAILY, Disp: , Rfl:   •  montelukast (SINGULAIR) 10 MG tablet, Take 1 tablet by mouth Every Night., Disp: 30 tablet, Rfl: 12  •  nebivolol (BYSTOLIC) 5 MG tablet, Take 5 mg by mouth Daily., Disp: , Rfl:   •   "nitroglycerin (NITROSTAT) 0.4 MG SL tablet, Place 0.4 mg under the tongue every 5 (five) minutes., Disp: , Rfl:   •  pantoprazole (PROTONIX) 40 MG EC tablet, TAKE ONE TABLET BY MOUTH DAILY, Disp: 90 tablet, Rfl: 1  •  ranolazine (RANEXA) 1000 MG 12 hr tablet, , Disp: , Rfl:   •  SPIRIVA RESPIMAT 1.25 MCG/ACT aerosol solution inhaler, , Disp: , Rfl:   •  SYMBICORT 160-4.5 MCG/ACT inhaler, INHALE TWO PUFFS BY MOUTH TWICE A DAY, Disp: 10.2 g, Rfl: 5  •  vitamin D (ERGOCALCIFEROL) 63650 UNITS capsule capsule, Take 50,000 Units by mouth Every 7 (Seven) Days., Disp: , Rfl:   •  azithromycin (ZITHROMAX) 250 MG tablet, Take 2 tablets the first day, then 1 tablet daily for 4 days., Disp: 6 tablet, Rfl: 0  •  cholestyramine (QUESTRAN) 4 g packet, Take 1 packet by mouth 3 (Three) Times a Day With Meals., Disp: 30 packet, Rfl: 1  •  nystatin (MYCOSTATIN) 100446 UNIT/GM ointment, by Intratympanic route., Disp: , Rfl:   •  predniSONE (DELTASONE) 20 MG tablet, Take 2 tabs PO BID for 5 days, Disp: 20 tablet, Rfl: 0  •  Simethicone (GAS RELIEF EXTRA STRENGTH PO), As Needed., Disp: , Rfl:   •  simvastatin (ZOCOR) 40 MG tablet, Take 40 mg by mouth Daily., Disp: , Rfl:   •  tolterodine (DETROL) 2 MG tablet, Take 1 tablet by mouth 2 (Two) Times a Day for 120 days., Disp: 60 tablet, Rfl: 3  •  vitamin C (ASCORBIC ACID) 500 MG tablet, Take 500 mg by mouth 2 (Two) Times a Day., Disp: , Rfl:      Allergies:  Allergies   Allergen Reactions   • Adhesive Tape Swelling     Must use paper tape   • Morphine And Related Hives and Itching       Physical Examination:  /62 (BP Location: Left arm, Patient Position: Sitting, Cuff Size: Adult)   Pulse 74   Ht 162.6 cm (64\")   Wt 77.6 kg (171 lb)   LMP  (LMP Unknown)   SpO2 96%   Breastfeeding? No   BMI 29.35 kg/m²   General Appearance:  Patient is in no distress.  She is well kept and has an overweight build.   Psychiatric:  Patient with appropriate mood and affect. Alert and oriented to " self, time, and place.    Breast, RIGHT:  medium sized, 38B,  symmetric with the contralateral side.  Breast skin is without erythema, edema, rashes.  There are no visible abnormalities upon inspection during the arm-raising maneuver or with hands on hips in the sitting position. There is no nipple retraction, discharge or nipple/areolar skin changes.There are no masses palpable in the sitting or supine positions.  There are well-healed periareolar incisions x2 around the right areola from past excisional biopsies of cysts.  There is tenderness to palpation at the 11:00, 3 cm from the nipple location of known imaging finding.  No overlying skin changes.    Breast, LEFT:  medium sized, 38B, symmetric with the contralateral side.  Breast skin is without erythema, edema, rashes.  There are no visible abnormalities upon inspection during the arm-raising maneuver or with hands on hips in the sitting position. There is no nipple  discharge or nipple/areolar skin changes.there is nipple clefting on the left that is asymmetric from the right that has been there for many many years she tells me.  There are no masses palpable in the sitting or supine positions.  Radial incision lower inner quadrant from remote excisional biopsy of the cyst.  Irregular and somewhat hypertrophic excision lower outer quadrant left breast from remote excision of cyst.    Lymphatic:  There is no axillary, cervical, infraclavicular, or supraclavicular adenopathy bilaterally.  Eyes:  Pupils are round and reactive to light.  Cardiovascular:  Heart rate and rhythm are regular.  Respiratory:  Lungs are clear bilaterally with no crackles or wheezes in any lung field.  Gastrointestinal:  Abdomen is soft, nondistended, and nontender.     Musculoskeletal:  Good strength in all 4 extremities.   There is good range of motion in both shoulders.    Skin:  No new skin lesions or rashes on the skin excluding the breast (see breast exam  above).        Imagin/10/2018   [facility] [employer]  [facility]  BILATERAL SCREENING MAMMOGRAM  Heterogeneously dense. Interval occurring a small nodule projecting as central left breast. Small superficial nodule left lateral subareolar location is stable. Small nodule also developed outer hemisphere right breast middle third. Larger nodule central aspect of the right breast containing a single coarse calcification appears stable.  BIRADS Category 0    10/11/2018   [facility]   [facility]  BILATERAL DIAGNOSTIC MAMMOGRAM  BILATERAL BREAST ULTRASOUND  Heterogeneously dense. The developing nodular density deep central left breast along the nipple line does not efface. This will be further evaluated with left breast ultrasound today. Stable benign-appearing nodule in the anterior lateral periareolar left breast. The developing nodular density in the lateral hemisphere of the right breast also dos note efface.  ULTRASOUND  Left breast:  Nodule in the deep central left breast appears to correspond to a simple appearing cyst along the 12:00 retroareolar left 6 x 8 x 7 mm. no suspicious solid masses in the deep central left breast.  Right breast:  10:00 axis, 4 cm from the nipple, demonstrates an area of dense breast tissue measuring 1.5 x 1.8 x 0.9 cm. This corresponds to an island of benign dense breast tissue and coarse calcification that has been stable for several years. 8:00 axis, 4 cm from the nipple, demonstrates an ovoid, solid-appearing structure measuring 1 x 0.5 x 0.5 cm. may correspond to the developing nodular density on mammography. This may represent a proabably benign fibroadenoma. Upon review , this may have been present since that time. Six-month follow-up right breast mammogram and ultrasound will be recommended. There is also an incidental simple appearing cyst 9:00 axis, 3 cm from the nipple, measuring 5 x 3 x 6 mm.  BIRADS Category 3    2019   [facility]   [facility]  DIAGNOSTIC RIGHT DIGITAL MAMMOGRAM  RIGHT BREAST ULTRASOUND  Heterogeneously dense.  The right breast mass 8:00-:00 at 6.6 cm from the nipple appears stable. Measuring 1.1 cm AP x 0.7 cm TRV.  Right breast focal asymmetry at 10:00-11:00 at mid depth at about 5 cm from the nipple, that contains a stable course calcifications, appears to have become larger since 10/11/2018.  ULTRASOUND: Right breast 8:00 at 4 cm from the nipple and 11:00 at 3 cm from the nipple.  Right breast mixed solid and cystic mass in the right breast at 8:00 at 4 cm from the nipple is stable in size, 0.4 x 0.5 x 1.0 cm. Since the margins of this sonographic mass have become partially irregular and angulated, the mass is considered to be suspicious for possible malignancy.  Right breast 11:00 at 3 cm from the nipple measures 0.7 x 1.1 x 1.6 cm. Today, a new solid mural nodule that measures 0.4 x 0.7 cm and is mostly hypoechoic with a punctate hyperechoic focus and no internal vascular flow was seen by the radiologist. This new nodule is concerning for possible malignancy.  IMPRESSION:  3. Right breast at 8:00 at 4 cm from the nipple needs to be biopsied. BIRADS Category 4.  4. The right breast sonographic calcified hypoechoic area at 11:00 at 3 cm from the nipple needs to be biopsied under ultrasound guidance. BIRADS Category 4.      Procedures:  Percutaneous ultrasound-guided vacuum-assisted excisional breast biopsy x 2 separate sites, with 2 separate incisions  Indication:  ultrasound-visible breast mass x 2 separate sites , BR4 at each site  Location: RIGHT 11:0, 3 CFN and RIGHT breast 8:00, 4 CFN  Consent:  The risks, benefits, and alternatives to the procedure were discussed with the patient, who understood and wished to proceed.  The risks described included, but were not limited to, bleeding, infection, pneumothorax, and inadequate sampling requiring either repeat percutaneous or open excisional biopsy.  Description of Procedure:    After the patient was positioned supine on the procedure table, I located each  lesion using ultrasound.  At the right breast 11:00 3 cm from the nipple location there is a hypoechoic irregularly marginated wider than tall mass. It measures in the antiradial dimension 1.39 x 1.0 cm and in the radial dimension 1.38 x 0.79 cm.  The second lesion is at the right breast 8:00, 4 cm from the nipple location is a 1.1 x 0.6 cm hypoechoic lesion in the antiradial dimension, that is wider than tall with good through-transmission.  It measures in the radial dimension 0.68 x 0.56 cm.each of the 2 separate biopsy sites, I performed the following: I prepped and draped the breast skin in sterile fashion.  I anesthetized the breast skin at each separate site of anticipated mammotomy with 1% lidocaine with epinephrine.  I then anesthetized the underlying subcutaneous tissue and breast parenchyma surrounding each separate lesion with 1% lidocaine with epinephrine under ultrasound visualization and guidance. I then made a nicking incision each separate site with an 11blade and inserted the 10G encore biopsy device from inferolateral to superomedial  under each separate lesion under ultrasound guidance.   I then took 15 sequential core samples from the first site and 5 core samples from the second site, using the automated sampling of the encore device.  There will be a remnant from the first biopsy site lesion.  The second biopsy site lesion appeared to be removed in its entirety.   I removed the probe, then placed a hydromark marker, using a stiff introducer, into each separate biopsy site. We held manual compression for 10 minutes, placed steri-strips at the mammotomy site, and wrapped the patient in a 6 inch super ace wrap with an ice-pack.  Marker placed: hydromark x2  Tolerance: The patient tolerated the procedure well.  Disposition: We will see her back within a week to review her pathology.    Assessment:   Diagnosis Plan   1.  Abnormal ultrasound of breast  US Guided Breast Biopsy With & Without Device initial Right    US Guided Breast Biopsy With & Without Device Each Additional Right    Tissue Pathology Exam   2. Abnormal mammogram     3. Mastodynia     4. Rheumatoid arthritis, involving unspecified site, unspecified rheumatoid factor presence (CMS/HCC)     5. Other chronic pain     6. Angina effort (CMS/HCC)     1-2  Right breast 11:00, 3 cm from the nipple, tender to palpation, no exam correlate, 1.6 cm on ultrasound, BI-RADS 4.    Right breast 8:00, 4 cm from the nipple, no exam correlate, 1 cm on ultrasound, BI-RADS 4.    3  Right breast tenderness at the site of the 11:00 imaging finding above.    4  Dr. Keller sees and she is taking Celebrex and Plaquenil.    5  For fibromyalgia, baclofen, gabapentin, Norco 10/325, 1/day,- managed by Dr Rae    6-  Angina, managed by her cardiologist Dr Teran- takes Imdur, mononitrate, Bystolic, Nitrostat, Ranexa, Lasix, amlodipine    Plan:  We reviewed her most recent imaging, her history, and her exam together today.  The lesion of concern on her imaging is: Right breast 11:00, 3 center meter from the nipple and right breast 8:00, 4 cm from the nipple.    We discussed that the designation of a BIRADS 4 signifies that there is a 3-89% possibility of malignancy accounting for the imaging finding. We discussed the recommendation to biopsy all BIRADS 4 lesions. We discussed a second option, and recommended only if 1- the above lesion was not technically amenable to either percutaneous or to surgical biopsy, or 2-if the patient refused biopsy. This option would be imaging surveillance in 3-6 months. She understood and wished to proceed with biopsy.    We plan to perform the following biopsy : Right breast percutaneous ultrasound-guided core biopsy x2 separate sites today one in the upper outer quadrant and one in the lower outer quadrant at the site of imaging abnormalities.        He tolerated  the procedures well.    I will call her with the pathology report as it returns, and we have given her after care instructions post biopsy.    Her next routine bilateral mammogram will be due October 12, 2019 at [facility].      Giovanna Collier MD        We have spent 50 minutes in visit today, 25 in face to face .      Next Appointment:  Return for post biopsy visit.      EMR Dragon/transcription disclaimer:    Much of this encounter note is an electronic transcription/translocation of spoken language to printed text.  The electronic translation of spoken language may permit erroneous, or at times, nonsensical words or phrases to be inadvertently transcribed.  Although I have reviewed the note from such areas, some may still exist.

## 2019-07-23 ENCOUNTER — TELEPHONE (OUTPATIENT)
Dept: SURGERY | Facility: CLINIC | Age: 64
End: 2019-07-23

## 2019-07-23 LAB
CYTO UR: NORMAL
LAB AP CASE REPORT: NORMAL
LAB AP CLINICAL INFORMATION: NORMAL
LAB AP DIAGNOSIS COMMENT: NORMAL
PATH REPORT.FINAL DX SPEC: NORMAL
PATH REPORT.GROSS SPEC: NORMAL

## 2019-07-23 NOTE — TELEPHONE ENCOUNTER
Pathology from July 22, 2019 right breast biopsies in the office.  Right breast 8:00, 4 cm from the nipple returned as focal atypical duct hyperplasia involving a single gland, 0.22 mm maximally.  Ramone cyst, sclerosing adenosis, fibroadenomatoid hyperplasia, associated micro calcifications.  Right breast 11:00, 3 center meter from the nipple, duct ectasia, apocrine metaplasia, sclerosing adenosis, usual duct hyperplasia, associated micro calcifications.  I will call and let her know.      We will arrange medical oncology and discuss excision at her FU vist.    Final Diagnosis   1.  Breast, Right 8 o'clock Position, 4 cm FN, Core Biopsy:                 A.  Focal atypical duct hyperplasia involving a single gland, 0.22 mm maximally.               B.  Clustered cysts, sclerosing adenosis, and fibroadenomatoid hyperplasia with associated microcalcifications.       2.  Breast, Right 11 o'clock Position, 3 cm FN, Core Biopsy:                A.  Duct ectasia, apocrine metaplasia, sclerosing adenosis, and usual duct hyperplasia with associated                     microcalcifications.      [facility]/brb    Electronically signed by Rita Ramos MD on 7/23/2019 at 1332   Comment    This case is shared at internal consensus conference with Rao Davis and  who concur.  This case is discussed with Dr. Collier.     [facility]/brb

## 2019-08-02 ENCOUNTER — PREP FOR SURGERY (OUTPATIENT)
Dept: OTHER | Facility: HOSPITAL | Age: 64
End: 2019-08-02

## 2019-08-02 ENCOUNTER — OFFICE VISIT (OUTPATIENT)
Dept: SURGERY | Facility: CLINIC | Age: 64
End: 2019-08-02

## 2019-08-02 ENCOUNTER — TELEPHONE (OUTPATIENT)
Dept: SURGERY | Facility: CLINIC | Age: 64
End: 2019-08-02

## 2019-08-02 ENCOUNTER — TRANSCRIBE ORDERS (OUTPATIENT)
Dept: SURGERY | Facility: CLINIC | Age: 64
End: 2019-08-02

## 2019-08-02 VITALS
SYSTOLIC BLOOD PRESSURE: 118 MMHG | OXYGEN SATURATION: 97 % | HEIGHT: 64 IN | HEART RATE: 66 BPM | BODY MASS INDEX: 29.4 KG/M2 | DIASTOLIC BLOOD PRESSURE: 64 MMHG | WEIGHT: 172.2 LBS

## 2019-08-02 DIAGNOSIS — N60.91 ATYPICAL DUCTAL HYPERPLASIA OF RIGHT BREAST: Primary | ICD-10-CM

## 2019-08-02 DIAGNOSIS — M06.9 RHEUMATOID ARTHRITIS, INVOLVING UNSPECIFIED SITE, UNSPECIFIED RHEUMATOID FACTOR PRESENCE: ICD-10-CM

## 2019-08-02 DIAGNOSIS — I20.8 ANGINA EFFORT: ICD-10-CM

## 2019-08-02 DIAGNOSIS — R92.8 ABNORMAL MAMMOGRAM: ICD-10-CM

## 2019-08-02 DIAGNOSIS — N60.91 ATYPICAL HYPERPLASIA OF RIGHT BREAST: ICD-10-CM

## 2019-08-02 DIAGNOSIS — N60.11 FIBROCYSTIC DISEASE OF RIGHT BREAST: ICD-10-CM

## 2019-08-02 DIAGNOSIS — G89.29 OTHER CHRONIC PAIN: ICD-10-CM

## 2019-08-02 PROCEDURE — 99215 OFFICE O/P EST HI 40 MIN: CPT | Performed by: SURGERY

## 2019-08-02 RX ORDER — SODIUM CHLORIDE, SODIUM LACTATE, POTASSIUM CHLORIDE, CALCIUM CHLORIDE 600; 310; 30; 20 MG/100ML; MG/100ML; MG/100ML; MG/100ML
100 INJECTION, SOLUTION INTRAVENOUS CONTINUOUS
Status: CANCELLED | OUTPATIENT
Start: 2019-08-15

## 2019-08-02 RX ORDER — DIAZEPAM 5 MG/1
10 TABLET ORAL ONCE
Status: CANCELLED | OUTPATIENT
Start: 2019-08-15 | End: 2019-08-02

## 2019-08-02 RX ORDER — CEFAZOLIN SODIUM 2 G/100ML
2 INJECTION, SOLUTION INTRAVENOUS ONCE
Status: CANCELLED | OUTPATIENT
Start: 2019-08-29 | End: 2019-08-02

## 2019-08-02 NOTE — PROGRESS NOTES
Chief Complaint: Camelia Quintanilla is a 64 y.o. female who was seen in consultation at the request of   Reji Romo MD   for abnormal breast imaging.  Right Breast, Post Biopsy Visit.     History of Present Illness:  Patient presents with abnormal breast imaging and breast pain.  She reports breast pain UOQ at the site of the 11:00 lesion on her imaging.   She noted no new palpable masses, skin changes, nipple discharge, nipple changes prior to her most recent imaging.    Her most recent imaging includes the followin/10/2018   Peter Quintanilla  BILATERAL SCREENING MAMMOGRAM  Heterogeneously dense. Interval occurring a small nodule projecting as central left breast. Small superficial nodule left lateral subareolar location is stable. Small nodule also developed outer hemisphere right breast middle third. Larger nodule central aspect of the right breast containing a single coarse calcification appears stable.  BIRADS Category 0    10/11/2018   Peter Quintanilla  BILATERAL DIAGNOSTIC MAMMOGRAM  BILATERAL BREAST ULTRASOUND  Heterogeneously dense. The developing nodular density deep central left breast along the nipple line does not efface. This will be further evaluated with left breast ultrasound today. Stable benign-appearing nodule in the anterior lateral periareolar left breast. The developing nodular density in the lateral hemisphere of the right breast also dos note efface.  ULTRASOUND  Left breast:  Nodule in the deep central left breast appears to correspond to a simple appearing cyst along the 12:00 retroareolar left 6 x 8 x 7 mm. no suspicious solid masses in the deep central left breast.  Right breast:  10:00 axis, 4 cm from the nipple, demonstrates an area of dense breast tissue measuring 1.5 x 1.8 x 0.9 cm. This corresponds to an island of benign dense breast tissue and coarse calcification that has been stable for several years. 8:00 axis, 4 cm from the nipple,  demonstrates an ovoid, solid-appearing structure measuring 1 x 0.5 x 0.5 cm. may correspond to the developing nodular density on mammography. This may represent a proabably benign fibroadenoma. Upon review 2011, this may have been present since that time. Six-month follow-up right breast mammogram and ultrasound will be recommended. There is also an incidental simple appearing cyst 9:00 axis, 3 cm from the nipple, measuring 5 x 3 x 6 mm.  BIRADS Category 3    06/27/2019   [facility]  DIAGNOSTIC RIGHT DIGITAL MAMMOGRAM  RIGHT BREAST ULTRASOUND  Heterogeneously dense.  The right breast mass 8:00-:00 at 6.6 cm from the nipple appears stable. Measuring 1.1 cm AP x 0.7 cm TRV.  Right breast focal asymmetry at 10:00-11:00 at mid depth at about 5 cm from the nipple, that contains a stable course calcifications, appears to have become larger since 10/11/2018.  ULTRASOUND: Right breast 8:00 at 4 cm from the nipple and 11:00 at 3 cm from the nipple.  Right breast mixed solid and cystic mass in the right breast at 8:00 at 4 cm from the nipple is stable in size, 0.4 x 0.5 x 1.0 cm. Since the margins of this sonographic mass have become partially irregular and angulated, the mass is considered to be suspicious for possible malignancy.  Right breast 11:00 at 3 cm from the nipple measures 0.7 x 1.1 x 1.6 cm. Today, a new solid mural nodule that measures 0.4 x 0.7 cm and is mostly hypoechoic with a punctate hyperechoic focus and no internal vascular flow was seen by the radiologist. This new nodule is concerning for possible malignancy.  IMPRESSION:  1. Right breast at 8:00 at 4 cm from the nipple needs to be biopsied. BIRADS Category 4.  2. The right breast sonographic calcified hypoechoic area at 11:00 at 3 cm from the nipple needs to be biopsied under ultrasound guidance. BIRADS Category 4.    On the right breast she has had 2 excisional biopsies for cyst 10 or 15 years ago.  In the left breast she has  had 2 excisional biopsies for cyst.  She has had her uterus and ovaries removed in 1978, is postmenopausal, and takes nor hormones.  Her family history includes the following: She has 1 daughter, 1 son, 2 sisters, one maternal aunt, 4 paternal aunts.  She has a paternal aunt who had breast cancer at uncertain age.  She has chronic pain from the fibromyalgia and takes baclofen, gabapentin, Norco one per day with Dr. oHlm.  She has angina and sees Dr. Trimble for this and is not on any blood thinner.  She has rheumatoid arthritis and sees Dr. Keller for this and takes Celebrex and Plaquenil.    Interval History:  Pathology from July 22, 2019 right breast biopsies in the office.  Right breast 8:00, 4 cm from the nipple returned as focal atypical duct hyperplasia involving a single gland, 0.22 mm maximally.  Ramone cyst, sclerosing adenosis, fibroadenomatoid hyperplasia, associated micro calcifications.  Right breast 11:00, 3 center meter from the nipple, duct ectasia, apocrine metaplasia, sclerosing adenosis, usual duct hyperplasia, associated micro calcifications.    Reports some bruising. LOQ. Denies redness, warmth or drainage from the incision.    Review of Systems:  Review of Systems   Constitutional: Positive for unexpected weight change (1.2 lb wt gain).   HENT:   Positive for tinnitus.    Respiratory: Positive for wheezing.    Cardiovascular: Positive for palpitations.   Gastrointestinal: Positive for constipation.   Endocrine: Positive for hot flashes.   Musculoskeletal: Positive for arthralgias, back pain and myalgias.   Psychiatric/Behavioral: Positive for sleep disturbance. The patient is nervous/anxious.    All other systems reviewed and are negative.       Past Medical and Surgical History:  Breast Biopsy History:  Patient has had the following breast biopsies:Bilateral Breast Biopsies 10-15 yrs ago; benign   Breast Cancer HIstory:  Patient does not have a past medical history of breast cancer.  Breast  Operations, and year:  None   Obstetric/Gynecologic History:  Age menstrual periods began: 13  Patient is postmenopausal due to removal of her uterus and both ovaries in the following year: [id number]   Number of pregnancies:1  Number of live births: 1  Number of abortions or miscarriages: 0  Age of delivery of first child: 21  Patient did not breast feed.  Length of time taking birth control pills: 4 yrs   Patient took hormone replacement during the following dates:  10 yrs  Patient had uterus and ovaries removed.     Past Surgical History:   Procedure Laterality Date   • ABDOMINAL HERNIA REPAIR     • APPENDECTOMY     • BREAST BIOPSY      right and left benign 10-15 yrs ago   • BRONCHOSCOPY N/A 5/11/2018    Procedure: BRONCHOSCOPY;  Surgeon: Emma Blakely MD;  Location: [facility] ENDOSCOPY;  Service: Pulmonary   • CARDIAC CATHETERIZATION     • CHOLECYSTECTOMY     • EPIDURAL BLOCK     • HYSTERECTOMY     • WRIST FUSION Right      Past Medical History:   Diagnosis Date   • Depression with anxiety 9/21/2016   • Fibromyalgia syndrome 6/6/2017   • Heart palpitations    • HLD (hyperlipidemia) 9/21/2016   • Hypertension    • Lung infection    • Moderate persistent asthma without complication 6/6/2017   • PONV (postoperative nausea and vomiting)    • Precordial pain 9/21/2016   • Vitamin D deficiency 9/21/2016       Prior Hospitalizations, other than for surgery or childbirth, and year:  Heart Cath    Social History     Socioeconomic History   • Marital status:      Spouse name: Not on file   • Number of children: Not on file   • Years of education: Not on file   • Highest education level: Not on file   Tobacco Use   • Smoking status: Never Smoker   • Smokeless tobacco: Never Used   Substance and Sexual Activity   • Alcohol use: No   • Drug use: No   • Sexual activity: Defer     Patient is .  Patient is retired.  Patient does not drink caffeine.    Family History:  Family History   Problem Relation Age of Onset  "  • COPD Mother    • Cancer Mother         heart tumor   • COPD Father    • Hypertension Father    • No Known Problems Sister    • Cancer Brother         testicular   • Breast cancer Paternal Aunt         unknown age    • Malig Hyperthermia Neg Hx        Vital Signs:  /64 (BP Location: Left arm, Patient Position: Sitting, Cuff Size: Adult)   Pulse 66   Ht 162.6 cm (64\")   Wt 78.1 kg (172 lb 3.2 oz)   LMP  (LMP Unknown)   SpO2 97%   Breastfeeding? No   BMI 29.56 kg/m²      Medications:    Current Outpatient Medications:   •  albuterol (PROVENTIL) (2.5 MG/3ML) 0.083% nebulizer solution, Take 2.5 mg by nebulization Every 6 (Six) Hours As Needed for Wheezing (asthma)., Disp: , Rfl:   •  amLODIPine (NORVASC) 2.5 MG tablet, , Disp: , Rfl:   •  atorvastatin (LIPITOR) 20 MG tablet, Take 20 mg by mouth Daily., Disp: , Rfl:   •  azithromycin (ZITHROMAX) 250 MG tablet, Take 2 tablets the first day, then 1 tablet daily for 4 days., Disp: 6 tablet, Rfl: 0  •  baclofen (LIORESAL) 10 MG tablet, Take 10 mg by mouth 3 (Three) Times a Day., Disp: , Rfl:   •  celecoxib (CeleBREX) 200 MG capsule, Take 200 mg by mouth 2 (two) times a day., Disp: , Rfl:   •  cholestyramine (QUESTRAN) 4 g packet, Take 1 packet by mouth 3 (Three) Times a Day With Meals., Disp: 30 packet, Rfl: 1  •  clotrimazole-betamethasone (LOTRISONE) 1-0.05 % cream, Apply  topically 2 (two) times a day., Disp: , Rfl:   •  diphenhydrAMINE-acetaminophen (TYLENOL PM)  MG tablet per tablet, Take 1 tablet by mouth At Night As Needed for Sleep., Disp: , Rfl:   •  fluticasone (FLONASE) 50 MCG/ACT nasal spray, 1 spray into each nostril 2 (Two) Times a Day., Disp: 16 g, Rfl: 12  •  furosemide (LASIX) 20 MG tablet, Take 20 mg by mouth As Needed., Disp: , Rfl:   •  gabapentin (NEURONTIN) 300 MG capsule, Take 300 mg by mouth 2 (Two) Times a Day., Disp: , Rfl:   •  HYDROcodone-acetaminophen (NORCO)  MG per tablet, Take 1 tablet by mouth 2 (Two) Times a Day " As Needed., Disp: , Rfl:   •  hydroxychloroquine (PLAQUENIL) 200 MG tablet, Take 200 mg by mouth 2 (Two) Times a Day., Disp: , Rfl:   •  hyoscyamine (LEVSIN) 0.125 MG SL tablet, Dissolve one Tablet Under The Toungue Every 6  Hours as Needed for cramping, Disp: 30 tablet, Rfl: 3  •  ipratropium (ATROVENT) 0.06 % nasal spray, 1 spray into each nostril 4 (Four) Times a Day As Needed for Rhinitis., Disp: 15 mL, Rfl: 12  •  isosorbide mononitrate (IMDUR) 60 MG 24 hr tablet, Take 60 mg by mouth Daily., Disp: , Rfl:   •  ISOSORBIDE MONONITRATE PO, Take 80 mg by mouth Daily., Disp: , Rfl:   •  levothyroxine (SYNTHROID, LEVOTHROID) 75 MCG tablet, TAKE ONE TABLET BY MOUTH DAILY, Disp: , Rfl:   •  montelukast (SINGULAIR) 10 MG tablet, Take 1 tablet by mouth Every Night., Disp: 30 tablet, Rfl: 12  •  nebivolol (BYSTOLIC) 5 MG tablet, Take 5 mg by mouth Daily., Disp: , Rfl:   •  nitroglycerin (NITROSTAT) 0.4 MG SL tablet, Place 0.4 mg under the tongue every 5 (five) minutes., Disp: , Rfl:   •  nystatin (MYCOSTATIN) 406831 UNIT/GM ointment, by Intratympanic route., Disp: , Rfl:   •  pantoprazole (PROTONIX) 40 MG EC tablet, TAKE ONE TABLET BY MOUTH DAILY, Disp: 90 tablet, Rfl: 1  •  ranolazine (RANEXA) 1000 MG 12 hr tablet, , Disp: , Rfl:   •  Simethicone (GAS RELIEF EXTRA STRENGTH PO), As Needed., Disp: , Rfl:   •  simvastatin (ZOCOR) 40 MG tablet, Take 40 mg by mouth Daily., Disp: , Rfl:   •  SPIRIVA RESPIMAT 1.25 MCG/ACT aerosol solution inhaler, , Disp: , Rfl:   •  SYMBICORT 160-4.5 MCG/ACT inhaler, INHALE TWO PUFFS BY MOUTH TWICE A DAY, Disp: 10.2 g, Rfl: 5  •  tolterodine (DETROL) 2 MG tablet, Take 1 tablet by mouth 2 (Two) Times a Day for 120 days., Disp: 60 tablet, Rfl: 3  •  vitamin C (ASCORBIC ACID) 500 MG tablet, Take 500 mg by mouth 2 (Two) Times a Day., Disp: , Rfl:   •  vitamin D (ERGOCALCIFEROL) 89445 UNITS capsule capsule, Take 50,000 Units by mouth Every 7 (Seven) Days., Disp: , Rfl:      Allergies:  Allergies  "  Allergen Reactions   • Adhesive Tape Swelling     Must use paper tape   • Morphine And Related Hives and Itching       Physical Examination:  /64 (BP Location: Left arm, Patient Position: Sitting, Cuff Size: Adult)   Pulse 66   Ht 162.6 cm (64\")   Wt 78.1 kg (172 lb 3.2 oz)   LMP  (LMP Unknown)   SpO2 97%   Breastfeeding? No   BMI 29.56 kg/m²   General Appearance:  Patient is in no distress.  She is well kept and has an overweight build.   Psychiatric:  Patient with appropriate mood and affect. Alert and oriented to self, time, and place.    Breast, RIGHT:  medium sized, 38B,  symmetric with the contralateral side.  Breast skin is without erythema, edema, rashes.  There are no visible abnormalities upon inspection during the arm-raising maneuver or with hands on hips in the sitting position. There is no nipple retraction, discharge or nipple/areolar skin changes.There are no masses palpable in the sitting or supine positions.  There are well-healed periareolar incisions x2 around the right areola from past excisional biopsies of cysts.  2 well-healing mammotomy's lateral right breast with no erythema warmth or drainage.  There are mild ecchymoses lower outer quadrant from her recent biopsy.    Breast, LEFT:  medium sized, 38B, symmetric with the contralateral side.  Breast skin is without erythema, edema, rashes.  There are no visible abnormalities upon inspection during the arm-raising maneuver or with hands on hips in the sitting position. There is no nipple  discharge or nipple/areolar skin changes.there is nipple clefting on the left that is asymmetric from the right that has been there for many many years she tells me.  There are no masses palpable in the sitting or supine positions.  Radial incision lower inner quadrant from remote excisional biopsy of the cyst.  Irregular and somewhat hypertrophic excision lower outer quadrant left breast from remote excision of cyst.    Lymphatic:  There is no " axillary, cervical, infraclavicular, or supraclavicular adenopathy bilaterally.  Eyes:  Pupils are round and reactive to light.  Cardiovascular:  Heart rate and rhythm are regular.  Respiratory:  Lungs are clear bilaterally with no crackles or wheezes in any lung field.  Gastrointestinal:  Abdomen is soft, nondistended, and nontender.     Musculoskeletal:  Good strength in all 4 extremities.   There is good range of motion in both shoulders.    Skin:  No new skin lesions or rashes on the skin excluding the breast (see breast exam above).        Imagin/10/2018   [facility]  BILATERAL SCREENING MAMMOGRAM  Heterogeneously dense. Interval occurring a small nodule projecting as central left breast. Small superficial nodule left lateral subareolar location is stable. Small nodule also developed outer hemisphere right breast middle third. Larger nodule central aspect of the right breast containing a single coarse calcification appears stable.  BIRADS Category 0    10/11/2018   [facility]  BILATERAL DIAGNOSTIC MAMMOGRAM  BILATERAL BREAST ULTRASOUND  Heterogeneously dense. The developing nodular density deep central left breast along the nipple line does not efface. This will be further evaluated with left breast ultrasound today. Stable benign-appearing nodule in the anterior lateral periareolar left breast. The developing nodular density in the lateral hemisphere of the right breast also dos note efface.  ULTRASOUND  Left breast:  Nodule in the deep central left breast appears to correspond to a simple appearing cyst along the 12:00 retroareolar left 6 x 8 x 7 mm. no suspicious solid masses in the deep central left breast.  Right breast:  10:00 axis, 4 cm from the nipple, demonstrates an area of dense breast tissue measuring 1.5 x 1.8 x 0.9 cm. This corresponds to an island of benign dense breast tissue and coarse calcification that has been stable for several years. 8:00  axis, 4 cm from the nipple, demonstrates an ovoid, solid-appearing structure measuring 1 x 0.5 x 0.5 cm. may correspond to the developing nodular density on mammography. This may represent a proabably benign fibroadenoma. Upon review 2011, this may have been present since that time. Six-month follow-up right breast mammogram and ultrasound will be recommended. There is also an incidental simple appearing cyst 9:00 axis, 3 cm from the nipple, measuring 5 x 3 x 6 mm.  BIRADS Category 3    06/27/2019   [facility]  DIAGNOSTIC RIGHT DIGITAL MAMMOGRAM  RIGHT BREAST ULTRASOUND  Heterogeneously dense.  The right breast mass 8:00-:00 at 6.6 cm from the nipple appears stable. Measuring 1.1 cm AP x 0.7 cm TRV.  Right breast focal asymmetry at 10:00-11:00 at mid depth at about 5 cm from the nipple, that contains a stable course calcifications, appears to have become larger since 10/11/2018.  ULTRASOUND: Right breast 8:00 at 4 cm from the nipple and 11:00 at 3 cm from the nipple.  Right breast mixed solid and cystic mass in the right breast at 8:00 at 4 cm from the nipple is stable in size, 0.4 x 0.5 x 1.0 cm. Since the margins of this sonographic mass have become partially irregular and angulated, the mass is considered to be suspicious for possible malignancy.  Right breast 11:00 at 3 cm from the nipple measures 0.7 x 1.1 x 1.6 cm. Today, a new solid mural nodule that measures 0.4 x 0.7 cm and is mostly hypoechoic with a punctate hyperechoic focus and no internal vascular flow was seen by the radiologist. This new nodule is concerning for possible malignancy.  IMPRESSION:  3. Right breast at 8:00 at 4 cm from the nipple needs to be biopsied. BIRADS Category 4.  4. The right breast sonographic calcified hypoechoic area at 11:00 at 3 cm from the nipple needs to be biopsied under ultrasound guidance. BIRADS Category 4.    Pathology:   Pathology from July 22, 2019 right breast biopsies in the  office.  Right breast 8:00, 4 cm from the nipple returned as focal atypical duct hyperplasia involving a single gland, 0.22 mm maximally.  Ramone cyst, sclerosing adenosis, fibroadenomatoid hyperplasia, associated micro calcifications.  Right breast 11:00, 3 center meter from the nipple, duct ectasia, apocrine metaplasia, sclerosing adenosis, usual duct hyperplasia, associated micro calcifications.  I will call and let her know.        We will arrange medical oncology and discuss excision at her FU vist.         Final Diagnosis   1.  Breast, Right 8 o'clock Position, 4 cm FN, Core Biopsy:                 A.  Focal atypical duct hyperplasia involving a single gland, 0.22 mm maximally.               B.  Clustered cysts, sclerosing adenosis, and fibroadenomatoid hyperplasia with associated microcalcifications.       2.  Breast, Right 11 o'clock Position, 3 cm FN, Core Biopsy:                A.  Duct ectasia, apocrine metaplasia, sclerosing adenosis, and usual duct hyperplasia with associated                     microcalcifications.      mec/brb    Electronically signed by Rita Ramos MD on 7/23/2019 at 1332   Comment     This case is shared at internal consensus conference with Rao Davis and  who concur.  This case is discussed with Dr. Collier.     mec/brb                   Procedures:  Percutaneous ultrasound-guided vacuum-assisted excisional breast biopsy x 2 separate sites, with 2 separate incisions 7-22-19  Indication:  ultrasound-visible breast mass x 2 separate sites , BR4 at each site  Location: RIGHT 11:0, 3 CFN and RIGHT breast 8:00, 4 CFN  Consent:  The risks, benefits, and alternatives to the procedure were discussed with the patient, who understood and wished to proceed.  The risks described included, but were not limited to, bleeding, infection, pneumothorax, and inadequate sampling requiring either repeat percutaneous or open excisional biopsy.  Description of Procedure:   After the  patient was positioned supine on the procedure table, I located each  lesion using ultrasound.  At the right breast 11:00 3 cm from the nipple location there is a hypoechoic irregularly marginated wider than tall mass. It measures in the antiradial dimension 1.39 x 1.0 cm and in the radial dimension 1.38 x 0.79 cm.  The second lesion is at the right breast 8:00, 4 cm from the nipple location is a 1.1 x 0.6 cm hypoechoic lesion in the antiradial dimension, that is wider than tall with good through-transmission.  It measures in the radial dimension 0.68 x 0.56 cm.each of the 2 separate biopsy sites, I performed the following: I prepped and draped the breast skin in sterile fashion.  I anesthetized the breast skin at each separate site of anticipated mammotomy with 1% lidocaine with epinephrine.  I then anesthetized the underlying subcutaneous tissue and breast parenchyma surrounding each separate lesion with 1% lidocaine with epinephrine under ultrasound visualization and guidance. I then made a nicking incision each separate site with an 11blade and inserted the 10G encore biopsy device from inferolateral to superomedial  under each separate lesion under ultrasound guidance.   I then took 15 sequential core samples from the first site and 5 core samples from the second site, using the automated sampling of the encore device.  There will be a remnant from the first biopsy site lesion.  The second biopsy site lesion appeared to be removed in its entirety.   I removed the probe, then placed a hydromark marker, using a stiff introducer, into each separate biopsy site. We held manual compression for 10 minutes, placed steri-strips at the mammotomy site, and wrapped the patient in a 6 inch super ace wrap with an ice-pack.  Marker placed: hydromark x2  Tolerance: The patient tolerated the procedure well.  Disposition: We will see her back within a week to review her pathology.    Assessment:   Diagnosis Plan   1. Atypical  ductal hyperplasia of right breast  Ambulatory Referral to Hematology / Oncology    MRI Breast Bilateral With & Without Contrast    Mammo Diagnostic Right With CAD   2. Abnormal mammogram     3. Rheumatoid arthritis, involving unspecified site, unspecified rheumatoid factor presence (CMS/HCC)     4. Other chronic pain     5. Angina effort (CMS/HCC)     6. Fibrocystic disease of right breast       6-  Right breast 11:00, 3 cm from the nipple, tender to palpation, no exam correlate, 1.6 cm on ultrasound, BI-RADS 4.  Pathology from July 22, 2019 right breast biopsies in the office.  Right breast 11:00, 3 center meter from the nipple, duct ectasia, apocrine metaplasia, sclerosing adenosis, usual duct hyperplasia, associated micro calcifications.    1-  Right breast 8:00, 4 cm from the nipple, no exam correlate, 1 cm on ultrasound, BI-RADS 4.  Pathology from July 22, 2019 right breast biopsies in the office.  Right breast 8:00, 4 cm from the nipple returned as focal atypical duct hyperplasia involving a single gland, 0.22 mm maximally.  Ramone cyst, sclerosing adenosis, fibroadenomatoid hyperplasia, associated micro calcifications.      3-  Dr. Keller sees and she is taking Celebrex and Plaquenil.    4-  For fibromyalgia, baclofen, gabapentin, Norco 10/325, 1/day,- managed by Dr Rae    5-  Angina, managed by her cardiologist Dr Teran- takes Imdur, mononitrate, Bystolic, Nitrostat, Ranexa, Lasix, amlodipine    Plan:  We reviewed her pathology and examination together today.    We discussed that atypical hyperplasia confers a lifetime risk for developing breast cancer in either breast.  We discussed the typical management is increased surveillance and risk reduction.  For increased surveillance we recommend annual MRI along with mammography.  For risk reduction we recommend hormone blocking medications and weight management.    We also discussed excision of the area in the right breast 8:00 location at the site of  atypical hyperplasia.  We discussed the procedure of right breast needle localized lumpectomy for atypical duct hyperplasia.  Prior to surgery we will have her get a breast MRI and also a right mammogram (to assess for clip position).  I will arrange for her to see medical oncology.  We discussed the risks of the surgery being bleeding and infection.  She wished to proceed.    I performed a CRA risk assessment based on her family history, reproductive history and recent atypical biopsy.  This returned her lifetime breast cancer risk as 17 to 31%  Return to her 5-year breast cancer risk as 4.2%        Her next routine bilateral mammogram will be due October 12, 2019 at [facility].      Giovanna Collier MD        We have spent 40 minutes in visit today, 30 in face to face .      Next Appointment:  Return for surgery.      EMR Dragon/transcription disclaimer:    Much of this encounter note is an electronic transcription/translocation of spoken language to printed text.  The electronic translation of spoken language may permit erroneous, or at times, nonsensical words or phrases to be inadvertently transcribed.  Although I have reviewed the note from such areas, some may still exist.

## 2019-08-05 ENCOUNTER — APPOINTMENT (OUTPATIENT)
Dept: PREADMISSION TESTING | Facility: HOSPITAL | Age: 64
End: 2019-08-05

## 2019-08-05 VITALS
DIASTOLIC BLOOD PRESSURE: 73 MMHG | RESPIRATION RATE: 18 BRPM | OXYGEN SATURATION: 96 % | TEMPERATURE: 98.4 F | BODY MASS INDEX: 29.09 KG/M2 | HEIGHT: 64 IN | SYSTOLIC BLOOD PRESSURE: 117 MMHG | WEIGHT: 170.38 LBS | HEART RATE: 75 BPM

## 2019-08-05 RX ORDER — POLYETHYLENE GLYCOL 3350 17 G/17G
17 POWDER, FOR SOLUTION ORAL NIGHTLY
COMMUNITY

## 2019-08-05 RX ORDER — NEBIVOLOL 2.5 MG/1
10 TABLET ORAL EVERY MORNING
COMMUNITY
End: 2019-08-20

## 2019-08-05 RX ORDER — PANTOPRAZOLE SODIUM 40 MG/1
40 TABLET, DELAYED RELEASE ORAL NIGHTLY
COMMUNITY
End: 2019-08-28 | Stop reason: SDUPTHER

## 2019-08-05 RX ORDER — BUDESONIDE AND FORMOTEROL FUMARATE DIHYDRATE 160; 4.5 UG/1; UG/1
2 AEROSOL RESPIRATORY (INHALATION) DAILY
COMMUNITY

## 2019-08-05 RX ORDER — ISOSORBIDE MONONITRATE 120 MG/1
120 TABLET, EXTENDED RELEASE ORAL DAILY
COMMUNITY

## 2019-08-07 ENCOUNTER — APPOINTMENT (OUTPATIENT)
Dept: OTHER | Facility: HOSPITAL | Age: 64
End: 2019-08-07

## 2019-08-07 ENCOUNTER — DOCUMENTATION (OUTPATIENT)
Dept: NUTRITION | Facility: HOSPITAL | Age: 64
End: 2019-08-07

## 2019-08-07 ENCOUNTER — HOSPITAL ENCOUNTER (OUTPATIENT)
Dept: MRI IMAGING | Facility: HOSPITAL | Age: 64
Discharge: HOME | End: 2019-08-07
Admitting: SURGERY

## 2019-08-07 ENCOUNTER — HOSPITAL ENCOUNTER (OUTPATIENT)
Dept: MAMMOGRAPHY | Facility: HOSPITAL | Age: 64
Discharge: HOME | End: 2019-08-07

## 2019-08-07 DIAGNOSIS — N60.91 ATYPICAL DUCTAL HYPERPLASIA OF RIGHT BREAST: ICD-10-CM

## 2019-08-07 PROCEDURE — C8937 CAD BREAST MRI: HCPCS

## 2019-08-07 PROCEDURE — 0 GADOBENATE DIMEGLUMINE 529 MG/ML SOLUTION: Performed by: SURGERY

## 2019-08-07 PROCEDURE — A9577 INJ MULTIHANCE: HCPCS | Performed by: SURGERY

## 2019-08-07 PROCEDURE — C8908 MRI W/O FOL W/CONT, BREAST,: HCPCS

## 2019-08-07 PROCEDURE — 77065 DX MAMMO INCL CAD UNI: CPT

## 2019-08-07 RX ADMIN — GADOBENATE DIMEGLUMINE 15 ML: 529 INJECTION, SOLUTION INTRAVENOUS at 08:52

## 2019-08-07 NOTE — PROGRESS NOTES
Oncology Nutrition     Patient Name:  Camelia Quintanilla  YOB: 1955  MRN: [id number]  Date:  08/07/19  Physician:  Amira    Type of Cancer Treatment:   Surgery:   Type:  Lumpectomy planned for 8/15    Patient Active Problem List   Diagnosis   • Hypertension   • Hypothyroidism   • Gastroesophageal reflux disease   • Irritable bowel syndrome   • Migraine with aura   • Renal artery stenosis (CMS/HCC)   • Rheumatoid arthritis (CMS/HCC)   • Thyroid nodule   • Fatigue   • Type 2 diabetes mellitus (CMS/HCC)   • Chronic adrenal insufficiency (CMS/HCC)   • Depression with anxiety   • HLD (hyperlipidemia)   • Vitamin D deficiency   • Precordial pain   • Iron deficiency anemia   • Acute pain of both shoulders   • Bursitis/tendonitis, shoulder   • Moderate persistent asthma without complication   • Fibromyalgia syndrome   • Pituitary cyst (CMS/HCC)   • Bronchiectasis without complication (CMS/HCC)   • Atypical ductal hyperplasia of right breast       Current Outpatient Medications   Medication Sig Dispense Refill   • albuterol (PROVENTIL) (2.5 MG/3ML) 0.083% nebulizer solution Take 2.5 mg by nebulization Every 6 (Six) Hours As Needed for Wheezing (asthma).     • amLODIPine (NORVASC) 2.5 MG tablet Take 2.5 mg by mouth Every Night.     • atorvastatin (LIPITOR) 20 MG tablet Take 20 mg by mouth Daily.     • baclofen (LIORESAL) 10 MG tablet Take 10 mg by mouth Daily.     • budesonide-formoterol (SYMBICORT) 160-4.5 MCG/ACT inhaler Inhale 2 puffs 2 (Two) Times a Day.     • celecoxib (CeleBREX) 200 MG capsule Take 200 mg by mouth 2 (two) times a day.     • clotrimazole-betamethasone (LOTRISONE) 1-0.05 % cream Apply  topically to the appropriate area as directed 2 (Two) Times a Day As Needed.     • diphenhydrAMINE-acetaminophen (TYLENOL PM)  MG tablet per tablet Take 1 tablet by mouth At Night As Needed for Sleep.     • fluticasone (FLONASE) 50 MCG/ACT nasal spray 1 spray into each nostril 2 (Two) Times a Day. (Patient  taking differently: 1 spray into the nostril(s) as directed by provider 2 (Two) Times a Day As Needed.) 16 g 12   • furosemide (LASIX) 20 MG tablet Take 20 mg by mouth As Needed.     • gabapentin (NEURONTIN) 300 MG capsule Take 300 mg by mouth 2 (Two) Times a Day.     • HYDROcodone-acetaminophen (NORCO)  MG per tablet Take 1 tablet by mouth Daily As Needed.     • hydroxychloroquine (PLAQUENIL) 200 MG tablet Take 200 mg by mouth 2 (Two) Times a Day.     • hyoscyamine (LEVSIN) 0.125 MG SL tablet Dissolve one Tablet Under The Toungue Every 6  Hours as Needed for cramping (Patient taking differently: Take 0.125 mg by mouth Every 6 (Six) Hours As Needed. Dissolve one Tablet Under The Toungue Every 6  Hours as Needed for cramping) 30 tablet 3   • ipratropium (ATROVENT) 0.06 % nasal spray 1 spray into each nostril 4 (Four) Times a Day As Needed for Rhinitis. 15 mL 12   • isosorbide mononitrate (IMDUR) 120 MG 24 hr tablet Take 120 mg by mouth Every Morning.     • isosorbide mononitrate (IMDUR) 60 MG 24 hr tablet Take 60 mg by mouth Daily.     • levothyroxine (SYNTHROID, LEVOTHROID) 75 MCG tablet TAKE ONE TABLET BY MOUTH DAILY     • montelukast (SINGULAIR) 10 MG tablet Take 1 tablet by mouth Every Night. 30 tablet 12   • nebivolol (BYSTOLIC) 2.5 MG tablet Take 2.5 mg by mouth Every Morning.     • nebivolol (BYSTOLIC) 5 MG tablet Take 5 mg by mouth Every Night.     • nitroglycerin (NITROSTAT) 0.4 MG SL tablet Place 0.4 mg under the tongue every 5 (five) minutes.     • pantoprazole (PROTONIX) 40 MG EC tablet Take 40 mg by mouth Every Night.     • polyethylene glycol (MIRALAX) packet Take 17 g by mouth Daily.     • ranolazine (RANEXA) 1000 MG 12 hr tablet Take 1,000 mg by mouth Every 12 (Twelve) Hours.     • Simethicone (GAS RELIEF EXTRA STRENGTH PO) As Needed.     • SPIRIVA RESPIMAT 1.25 MCG/ACT aerosol solution inhaler Inhale 1 puff Every Morning.     • vitamin C (ASCORBIC ACID) 500 MG tablet Take 500 mg by mouth 2  "(Two) Times a Day.     • vitamin D (ERGOCALCIFEROL) 96235 UNITS capsule capsule Take 50,000 Units by mouth Every 7 (Seven) Days.       No current facility-administered medications for this visit.        Glycemic Risk:   n/a    Weight:   Height: 64\"  Weight: 170 lbs.  Usual Body Weight: 170 lbs.   BMI: 29  Weight has been stable    Oral Food Intake:  Regular Diet - No Restrictions    Hydration Status:   How many 8 ounce glass of water of fluid do you drink per day?  8    Nutrition Symptoms:   No Problems with Eating    Activity:   Normal with no limitations     reports that she has never smoked. She has never used smokeless tobacco. She reports that she does not drink alcohol or use drugs.    Evaluation of Nutritional Risk:   Patient identified to be at nutritional risk and/or for nutritional consultation; will follow patient through course of treatment.   Patient Education    Met with patient today to discuss breast cancer nutrition. Provided evidenced based material with emphasis on a plant based diet. Gave her sample menus, grocery list, meal ideas, etc and answered her questions.   Will be available as needed.       Electronically signed by:  Jses Jaquez RD, LD  10:53 AM  "

## 2019-08-08 ENCOUNTER — TELEPHONE (OUTPATIENT)
Dept: SURGERY | Facility: CLINIC | Age: 64
End: 2019-08-08

## 2019-08-08 DIAGNOSIS — R92.8 ABNORMAL MRI, BREAST: Primary | ICD-10-CM

## 2019-08-08 NOTE — TELEPHONE ENCOUNTER
Bilateral breast MRI August 7, 2019 [facility]: Artifact from a biopsy marking clip upper outer quadrant right breast.  No abnormal enhancement adjacent to the clips.  There is a 9 mm diameter nodule lateral retroareolar left breast immediately beneath the skin surface most likely an intramammary lymph node or fibroadenoma.  Further evaluation with ultrasound is recommended.  BI-RADS 0.  We will arrange for a left ultrasound prior to seeing me back.    Right mammogram same day shows 2 new marking clips upper outer quadrant both are spring shaped.  No underlying mammographic abnormality.

## 2019-08-12 ENCOUNTER — HOSPITAL ENCOUNTER (OUTPATIENT)
Dept: ULTRASOUND IMAGING | Facility: HOSPITAL | Age: 64
Discharge: HOME | End: 2019-08-12
Admitting: SURGERY

## 2019-08-12 DIAGNOSIS — R92.8 ABNORMAL MRI, BREAST: ICD-10-CM

## 2019-08-12 PROCEDURE — 76642 ULTRASOUND BREAST LIMITED: CPT

## 2019-08-13 ENCOUNTER — CONSULT (OUTPATIENT)
Dept: ONCOLOGY | Facility: CLINIC | Age: 64
End: 2019-08-13

## 2019-08-13 ENCOUNTER — HOSPITAL ENCOUNTER (OUTPATIENT)
Dept: SURGERY | Facility: HOSPITAL | Age: 64
Discharge: HOME | End: 2019-08-13
Admitting: SURGERY

## 2019-08-13 ENCOUNTER — OFFICE VISIT (OUTPATIENT)
Dept: SURGERY | Facility: CLINIC | Age: 64
End: 2019-08-13

## 2019-08-13 ENCOUNTER — TELEPHONE (OUTPATIENT)
Dept: SURGERY | Facility: CLINIC | Age: 64
End: 2019-08-13

## 2019-08-13 ENCOUNTER — LAB (OUTPATIENT)
Dept: LAB | Facility: HOSPITAL | Age: 64
End: 2019-08-13

## 2019-08-13 ENCOUNTER — CLINICAL SUPPORT (OUTPATIENT)
Dept: ONCOLOGY | Facility: CLINIC | Age: 64
End: 2019-08-13

## 2019-08-13 VITALS
TEMPERATURE: 98.1 F | WEIGHT: 170.1 LBS | SYSTOLIC BLOOD PRESSURE: 108 MMHG | HEART RATE: 69 BPM | HEIGHT: 63 IN | RESPIRATION RATE: 16 BRPM | DIASTOLIC BLOOD PRESSURE: 69 MMHG | BODY MASS INDEX: 30.14 KG/M2 | OXYGEN SATURATION: 95 %

## 2019-08-13 VITALS
HEART RATE: 61 BPM | HEIGHT: 64 IN | SYSTOLIC BLOOD PRESSURE: 117 MMHG | OXYGEN SATURATION: 97 % | WEIGHT: 167 LBS | DIASTOLIC BLOOD PRESSURE: 71 MMHG | BODY MASS INDEX: 28.51 KG/M2

## 2019-08-13 DIAGNOSIS — N60.11 FIBROCYSTIC DISEASE OF RIGHT BREAST: ICD-10-CM

## 2019-08-13 DIAGNOSIS — N63.0 LUMP OR MASS IN BREAST: Primary | ICD-10-CM

## 2019-08-13 DIAGNOSIS — N60.91 ATYPICAL DUCTAL HYPERPLASIA OF RIGHT BREAST: ICD-10-CM

## 2019-08-13 DIAGNOSIS — M06.9 RHEUMATOID ARTHRITIS, INVOLVING UNSPECIFIED SITE, UNSPECIFIED RHEUMATOID FACTOR PRESENCE: ICD-10-CM

## 2019-08-13 DIAGNOSIS — R92.8 ABNORMAL ULTRASOUND OF BREAST: ICD-10-CM

## 2019-08-13 DIAGNOSIS — C50.919 MALIGNANT NEOPLASM OF FEMALE BREAST, UNSPECIFIED ESTROGEN RECEPTOR STATUS, UNSPECIFIED LATERALITY, UNSPECIFIED SITE OF BREAST (HCC): Primary | ICD-10-CM

## 2019-08-13 DIAGNOSIS — D64.9 ANEMIA, UNSPECIFIED TYPE: ICD-10-CM

## 2019-08-13 DIAGNOSIS — I20.8 ANGINA EFFORT: ICD-10-CM

## 2019-08-13 DIAGNOSIS — R92.8 ABNORMAL MRI, BREAST: ICD-10-CM

## 2019-08-13 DIAGNOSIS — N63.0 LUMP OR MASS IN BREAST: ICD-10-CM

## 2019-08-13 DIAGNOSIS — N60.99 ATYPICAL DUCTAL HYPERPLASIA OF BREAST: Primary | ICD-10-CM

## 2019-08-13 DIAGNOSIS — R92.8 ABNORMAL MAMMOGRAM: ICD-10-CM

## 2019-08-13 DIAGNOSIS — G89.29 OTHER CHRONIC PAIN: ICD-10-CM

## 2019-08-13 DIAGNOSIS — R92.8 OTHER ABNORMAL AND INCONCLUSIVE FINDINGS ON DIAGNOSTIC IMAGING OF BREAST: ICD-10-CM

## 2019-08-13 LAB
BASOPHILS # BLD AUTO: 0.01 10*3/MM3 (ref 0–0.2)
BASOPHILS NFR BLD AUTO: 0.1 % (ref 0–1.5)
DEPRECATED RDW RBC AUTO: 49.5 FL (ref 37–54)
EOSINOPHIL # BLD AUTO: 0.02 10*3/MM3 (ref 0–0.4)
EOSINOPHIL NFR BLD AUTO: 0.2 % (ref 0.3–6.2)
ERYTHROCYTE [DISTWIDTH] IN BLOOD BY AUTOMATED COUNT: 13.9 % (ref 12.3–15.4)
HCT VFR BLD AUTO: 44.2 % (ref 34–46.6)
HGB BLD-MCNC: 14 G/DL (ref 12–15.9)
IMM GRANULOCYTES # BLD AUTO: 0.04 10*3/MM3 (ref 0–0.05)
IMM GRANULOCYTES NFR BLD AUTO: 0.5 % (ref 0–0.5)
LYMPHOCYTES # BLD AUTO: 2.54 10*3/MM3 (ref 0.7–3.1)
LYMPHOCYTES NFR BLD AUTO: 31.2 % (ref 19.6–45.3)
MCH RBC QN AUTO: 30.4 PG (ref 26.6–33)
MCHC RBC AUTO-ENTMCNC: 31.7 G/DL (ref 31.5–35.7)
MCV RBC AUTO: 95.9 FL (ref 79–97)
MONOCYTES # BLD AUTO: 0.71 10*3/MM3 (ref 0.1–0.9)
MONOCYTES NFR BLD AUTO: 8.7 % (ref 5–12)
NEUTROPHILS # BLD AUTO: 4.81 10*3/MM3 (ref 1.7–7)
NEUTROPHILS NFR BLD AUTO: 59.3 % (ref 42.7–76)
NRBC BLD AUTO-RTO: 0 /100 WBC (ref 0–0.2)
PLATELET # BLD AUTO: 228 10*3/MM3 (ref 140–450)
PMV BLD AUTO: 10.6 FL (ref 6–12)
RBC # BLD AUTO: 4.61 10*6/MM3 (ref 3.77–5.28)
WBC NRBC COR # BLD: 8.13 10*3/MM3 (ref 3.4–10.8)

## 2019-08-13 PROCEDURE — 88305 TISSUE EXAM BY PATHOLOGIST: CPT | Performed by: SURGERY

## 2019-08-13 PROCEDURE — 85025 COMPLETE CBC W/AUTO DIFF WBC: CPT | Performed by: INTERNAL MEDICINE

## 2019-08-13 PROCEDURE — 99214 OFFICE O/P EST MOD 30 MIN: CPT | Performed by: SURGERY

## 2019-08-13 PROCEDURE — 19083 BX BREAST 1ST LESION US IMAG: CPT | Performed by: SURGERY

## 2019-08-13 PROCEDURE — 36415 COLL VENOUS BLD VENIPUNCTURE: CPT | Performed by: INTERNAL MEDICINE

## 2019-08-13 PROCEDURE — 99205 OFFICE O/P NEW HI 60 MIN: CPT | Performed by: INTERNAL MEDICINE

## 2019-08-13 RX ORDER — ALBUTEROL SULFATE 90 UG/1
2 AEROSOL, METERED RESPIRATORY (INHALATION) EVERY 4 HOURS PRN
COMMUNITY

## 2019-08-13 NOTE — TELEPHONE ENCOUNTER
Pt surgery has been canceled for this Thursday. She needs a bx..  Pt is aware of this.    Pt to see Dr MARINELLI this morning for her bx.    Pt is aware  vivek

## 2019-08-13 NOTE — PROGRESS NOTES
Chief Complaint: Camelia Quintanilla is a 64 y.o. female who was seen in consultation at the request of   Reji Romo MD   for abnormal breast imaging.  Right Breast, Post Biopsy Visit.     History of Present Illness:  Patient presents with abnormal breast imaging and breast pain.  She reports breast pain UOQ at the site of the 11:00 lesion on her imaging.   She noted no new palpable masses, skin changes, nipple discharge, nipple changes prior to her most recent imaging.    Her most recent imaging includes the followin/10/2018   Peter Quintanilla  BILATERAL SCREENING MAMMOGRAM  Heterogeneously dense. Interval occurring a small nodule projecting as central left breast. Small superficial nodule left lateral subareolar location is stable. Small nodule also developed outer hemisphere right breast middle third. Larger nodule central aspect of the right breast containing a single coarse calcification appears stable.  BIRADS Category 0    10/11/2018   Peter Quintanilla  BILATERAL DIAGNOSTIC MAMMOGRAM  BILATERAL BREAST ULTRASOUND  Heterogeneously dense. The developing nodular density deep central left breast along the nipple line does not efface. This will be further evaluated with left breast ultrasound today. Stable benign-appearing nodule in the anterior lateral periareolar left breast. The developing nodular density in the lateral hemisphere of the right breast also dos note efface.  ULTRASOUND  Left breast:  Nodule in the deep central left breast appears to correspond to a simple appearing cyst along the 12:00 retroareolar left 6 x 8 x 7 mm. no suspicious solid masses in the deep central left breast.  Right breast:  10:00 axis, 4 cm from the nipple, demonstrates an area of dense breast tissue measuring 1.5 x 1.8 x 0.9 cm. This corresponds to an island of benign dense breast tissue and coarse calcification that has been stable for several years. 8:00 axis, 4 cm from the nipple,  demonstrates an ovoid, solid-appearing structure measuring 1 x 0.5 x 0.5 cm. may correspond to the developing nodular density on mammography. This may represent a proabably benign fibroadenoma. Upon review 2011, this may have been present since that time. Six-month follow-up right breast mammogram and ultrasound will be recommended. There is also an incidental simple appearing cyst 9:00 axis, 3 cm from the nipple, measuring 5 x 3 x 6 mm.  BIRADS Category 3    06/27/2019   [facility]  DIAGNOSTIC RIGHT DIGITAL MAMMOGRAM  RIGHT BREAST ULTRASOUND  Heterogeneously dense.  The right breast mass 8:00-:00 at 6.6 cm from the nipple appears stable. Measuring 1.1 cm AP x 0.7 cm TRV.  Right breast focal asymmetry at 10:00-11:00 at mid depth at about 5 cm from the nipple, that contains a stable course calcifications, appears to have become larger since 10/11/2018.  ULTRASOUND: Right breast 8:00 at 4 cm from the nipple and 11:00 at 3 cm from the nipple.  Right breast mixed solid and cystic mass in the right breast at 8:00 at 4 cm from the nipple is stable in size, 0.4 x 0.5 x 1.0 cm. Since the margins of this sonographic mass have become partially irregular and angulated, the mass is considered to be suspicious for possible malignancy.  Right breast 11:00 at 3 cm from the nipple measures 0.7 x 1.1 x 1.6 cm. Today, a new solid mural nodule that measures 0.4 x 0.7 cm and is mostly hypoechoic with a punctate hyperechoic focus and no internal vascular flow was seen by the radiologist. This new nodule is concerning for possible malignancy.  IMPRESSION:  1. Right breast at 8:00 at 4 cm from the nipple needs to be biopsied. BIRADS Category 4.  2. The right breast sonographic calcified hypoechoic area at 11:00 at 3 cm from the nipple needs to be biopsied under ultrasound guidance. BIRADS Category 4.    On the right breast she has had 2 excisional biopsies for cyst 10 or 15 years ago.  In the left breast she has  had 2 excisional biopsies for cyst.  She has had her uterus and ovaries removed in 1978, is postmenopausal, and takes nor hormones.  Her family history includes the following: She has 1 daughter, 1 son, 2 sisters, one maternal aunt, 4 paternal aunts.  She has a paternal aunt who had breast cancer at uncertain age.  She has chronic pain from the fibromyalgia and takes baclofen, gabapentin, Norco one per day with Dr. Holm.  She has angina and sees Dr. Trimble for this and is not on any blood thinner.  She has rheumatoid arthritis and sees Dr. Keller for this and takes Celebrex and Plaquenil.    Pathology from July 22, 2019 right breast biopsies in the office.  Right breast 8:00, 4 cm from the nipple returned as focal atypical duct hyperplasia involving a single gland, 0.22 mm maximally.  Ramone cyst, sclerosing adenosis, fibroadenomatoid hyperplasia, associated micro calcifications.  Right breast 11:00, 3 center meter from the nipple, duct ectasia, apocrine metaplasia, sclerosing adenosis, usual duct hyperplasia, associated micro calcifications.    Reports some bruising. LOQ. Denies redness, warmth or drainage from the incision.      Interval History:  In the interim, Mrs. Quintanilla had her MRI and mammogram, as follows:  Bilateral breast MRI August 7, 2019 [facility]: Artifact from a biopsy marking clip upper outer quadrant right breast.  No abnormal enhancement adjacent to the clips.  There is a 9 mm diameter nodule lateral retroareolar left breast immediately beneath the skin surface most likely an intramammary lymph node or fibroadenoma.  Further evaluation with ultrasound is recommended.  BI-RADS 0.     Right mammogram same day shows 2 new marking clips upper outer quadrant both are spring shaped.  No underlying mammographic abnormality.    I then arranged for a LEFT breast ultrasound, as below:    [facility] August 12, 2019 left ultrasound.  Retroareolar left breast 3:00 there is a  microlobulated hypoechoic mass that measures 8 mm.  Correlation with ultrasound-guided biopsy is recommended BI-RADS 4.    She is here to review.    Review of Systems:  Review of Systems   Constitutional: Positive for unexpected weight change (1.2 lb wt gain).   HENT:   Positive for tinnitus.    Respiratory: Positive for wheezing.    Cardiovascular: Positive for palpitations.   Gastrointestinal: Positive for constipation.   Endocrine: Positive for hot flashes.   Musculoskeletal: Positive for arthralgias, back pain and myalgias.   Psychiatric/Behavioral: Positive for sleep disturbance. The patient is nervous/anxious.    All other systems reviewed and are negative.       Past Medical and Surgical History:  Breast Biopsy History:  Patient has had the following breast biopsies:Bilateral Breast Biopsies 10-15 yrs ago; benign   Breast Cancer HIstory:  Patient does not have a past medical history of breast cancer.  Breast Operations, and year:  None   Obstetric/Gynecologic History:  Age menstrual periods began: 13  Patient is postmenopausal due to removal of her uterus and both ovaries in the following year: [id number]   Number of pregnancies:1  Number of live births: 1  Number of abortions or miscarriages: 0  Age of delivery of first child: 21  Patient did not breast feed.  Length of time taking birth control pills: 4 yrs   Patient took hormone replacement during the following dates:  10 yrs  Patient had uterus and ovaries removed.     Past Surgical History:   Procedure Laterality Date   • ABDOMINAL HERNIA REPAIR     • APPENDECTOMY     • BREAST BIOPSY      right and left benign 10-15 yrs ago   • BRONCHOSCOPY N/A 5/11/2018    Procedure: BRONCHOSCOPY;  Surgeon: Emma Blakely MD;  Location: [facility] ENDOSCOPY;  Service: Pulmonary   • CARDIAC CATHETERIZATION     • CHOLECYSTECTOMY     • COLONOSCOPY  2015   • ENDOSCOPY  2015   • EPIDURAL BLOCK     • HERNIA REPAIR     • HYSTERECTOMY     • KNEE SURGERY     • OOPHORECTOMY     •  TONSILLECTOMY     • WRIST FUSION Right      Past Medical History:   Diagnosis Date   • Asthma    • Breast lump     RIGHT   • Bronchitis 06/2019   • Depression with anxiety 9/21/2016   • Diabetes mellitus (CMS/HCC)    • Disease of thyroid gland     HYPOTHYROIDISM   • Fibrocystic breast    • Fibromyalgia syndrome 6/6/2017   • GERD (gastroesophageal reflux disease)    • Heart palpitations    • History of thyroid nodule    • HLD (hyperlipidemia) 9/21/2016   • Hypertension    • Hypothyroidism    • IBS (irritable bowel syndrome)    • Lung infection 06/2019   • Moderate persistent asthma without complication 6/6/2017   • PONV (postoperative nausea and vomiting)    • Precordial pain 9/21/2016   • Rheumatoid arthritis (CMS/[facility])    • Scoliosis    • Upper respiratory infection 06/2019   • Vitamin D deficiency 9/21/2016       Prior Hospitalizations, other than for surgery or childbirth, and year:  Heart [facility]    Social History     Socioeconomic History   • Marital status:      Spouse name: Zbigniew   • Number of children: Not on file   • Years of education: Not on file   • Highest education level: Not on file   Occupational History     Employer: DISABLED   Tobacco Use   • Smoking status: Never Smoker   • Smokeless tobacco: Never Used   Substance and Sexual Activity   • Alcohol use: No   • Drug use: No   • Sexual activity: Defer     Patient is .  Patient is retired.  Patient does not drink caffeine.    Family History:  Family History   Problem Relation Age of Onset   • COPD Mother    • Cancer Mother         heart tumor   • COPD Father    • Hypertension Father    • No Known Problems Sister    • Cancer Brother         testicular   • Breast cancer Paternal Aunt         unknown age    • Malig Hyperthermia Neg Hx        Vital Signs:  LMP  (LMP Unknown)      Medications:    Current Outpatient Medications:   •  albuterol (PROVENTIL) (2.5 MG/3ML) 0.083% nebulizer solution, Take 2.5 mg by nebulization Every 6 (Six) Hours As  Needed for Wheezing (asthma)., Disp: , Rfl:   •  amLODIPine (NORVASC) 2.5 MG tablet, Take 2.5 mg by mouth Every Night., Disp: , Rfl:   •  atorvastatin (LIPITOR) 20 MG tablet, Take 20 mg by mouth Daily., Disp: , Rfl:   •  baclofen (LIORESAL) 10 MG tablet, Take 10 mg by mouth Daily., Disp: , Rfl:   •  budesonide-formoterol (SYMBICORT) 160-4.5 MCG/ACT inhaler, Inhale 2 puffs 2 (Two) Times a Day., Disp: , Rfl:   •  celecoxib (CeleBREX) 200 MG capsule, Take 200 mg by mouth 2 (two) times a day., Disp: , Rfl:   •  clotrimazole-betamethasone (LOTRISONE) 1-0.05 % cream, Apply  topically to the appropriate area as directed 2 (Two) Times a Day As Needed., Disp: , Rfl:   •  diphenhydrAMINE-acetaminophen (TYLENOL PM)  MG tablet per tablet, Take 1 tablet by mouth At Night As Needed for Sleep., Disp: , Rfl:   •  fluticasone (FLONASE) 50 MCG/ACT nasal spray, 1 spray into each nostril 2 (Two) Times a Day. (Patient taking differently: 1 spray into the nostril(s) as directed by provider 2 (Two) Times a Day As Needed.), Disp: 16 g, Rfl: 12  •  furosemide (LASIX) 20 MG tablet, Take 20 mg by mouth As Needed., Disp: , Rfl:   •  gabapentin (NEURONTIN) 300 MG capsule, Take 300 mg by mouth 2 (Two) Times a Day., Disp: , Rfl:   •  HYDROcodone-acetaminophen (NORCO)  MG per tablet, Take 1 tablet by mouth Daily As Needed., Disp: , Rfl:   •  hydroxychloroquine (PLAQUENIL) 200 MG tablet, Take 200 mg by mouth 2 (Two) Times a Day., Disp: , Rfl:   •  hyoscyamine (LEVSIN) 0.125 MG SL tablet, Dissolve one Tablet Under The Toungue Every 6  Hours as Needed for cramping (Patient taking differently: Take 0.125 mg by mouth Every 6 (Six) Hours As Needed. Dissolve one Tablet Under The Toungue Every 6  Hours as Needed for cramping), Disp: 30 tablet, Rfl: 3  •  ipratropium (ATROVENT) 0.06 % nasal spray, 1 spray into each nostril 4 (Four) Times a Day As Needed for Rhinitis., Disp: 15 mL, Rfl: 12  •  isosorbide mononitrate (IMDUR) 120 MG 24 hr tablet,  "Take 120 mg by mouth Every Morning., Disp: , Rfl:   •  isosorbide mononitrate (IMDUR) 60 MG 24 hr tablet, Take 60 mg by mouth Daily., Disp: , Rfl:   •  levothyroxine (SYNTHROID, LEVOTHROID) 75 MCG tablet, TAKE ONE TABLET BY MOUTH DAILY, Disp: , Rfl:   •  montelukast (SINGULAIR) 10 MG tablet, Take 1 tablet by mouth Every Night., Disp: 30 tablet, Rfl: 12  •  nebivolol (BYSTOLIC) 2.5 MG tablet, Take 2.5 mg by mouth Every Morning., Disp: , Rfl:   •  nebivolol (BYSTOLIC) 5 MG tablet, Take 5 mg by mouth Every Night., Disp: , Rfl:   •  nitroglycerin (NITROSTAT) 0.4 MG SL tablet, Place 0.4 mg under the tongue every 5 (five) minutes., Disp: , Rfl:   •  pantoprazole (PROTONIX) 40 MG EC tablet, Take 40 mg by mouth Every Night., Disp: , Rfl:   •  polyethylene glycol (MIRALAX) packet, Take 17 g by mouth Daily., Disp: , Rfl:   •  ranolazine (RANEXA) 1000 MG 12 hr tablet, Take 1,000 mg by mouth Every 12 (Twelve) Hours., Disp: , Rfl:   •  Simethicone (GAS RELIEF EXTRA STRENGTH PO), As Needed., Disp: , Rfl:   •  SPIRIVA RESPIMAT 1.25 MCG/ACT aerosol solution inhaler, Inhale 1 puff Every Morning., Disp: , Rfl:   •  vitamin C (ASCORBIC ACID) 500 MG tablet, Take 500 mg by mouth 2 (Two) Times a Day., Disp: , Rfl:   •  vitamin D (ERGOCALCIFEROL) 00858 UNITS capsule capsule, Take 50,000 Units by mouth Every 7 (Seven) Days., Disp: , Rfl:      Allergies:  Allergies   Allergen Reactions   • Adhesive Tape Swelling     Must use paper tape   • Morphine And Related Hives and Itching   • Metal Rash       Physical Examination:  /71   Pulse 61   Ht 162.6 cm (64\")   Wt 75.8 kg (167 lb)   LMP  (LMP Unknown)   SpO2 97%   Breastfeeding? No   BMI 28.67 kg/m²     LMP  (LMP Unknown)   General Appearance:  Patient is in no distress.  She is well kept and has an overweight build.   Psychiatric:  Patient with appropriate mood and affect. Alert and oriented to self, time, and place.    Breast, RIGHT:  medium sized, 38B,  symmetric with the " contralateral side.  Breast skin is without erythema, edema, rashes.  There are no visible abnormalities upon inspection during the arm-raising maneuver or with hands on hips in the sitting position. There is no nipple retraction, discharge or nipple/areolar skin changes.There are no masses palpable in the sitting or supine positions.  There are well-healed periareolar incisions x2 around the right areola from past excisional biopsies of cysts.  2 well-healing mammotomies lateral right breast from recent core biopsies.    Breast, LEFT:  medium sized, 38B, symmetric with the contralateral side.  Breast skin is without erythema, edema, rashes.  There are no visible abnormalities upon inspection during the arm-raising maneuver or with hands on hips in the sitting position. There is no nipple  discharge or nipple/areolar skin changes.there is nipple clefting on the left that is asymmetric from the right that has been there for many many years she tells me.  There are no masses palpable in the sitting or supine positions.  Radial incision lower inner quadrant from remote excisional biopsy of the cyst.  Irregular and somewhat hypertrophic excision lower outer quadrant left breast from remote excision of cyst.    Lymphatic:  There is no axillary, cervical, infraclavicular, or supraclavicular adenopathy bilaterally.  Eyes:  Pupils are round and reactive to light.  Cardiovascular:  Heart rate and rhythm are regular.  Respiratory:  Lungs are clear bilaterally with no crackles or wheezes in any lung field.  Gastrointestinal:  Abdomen is soft, nondistended, and nontender.     Musculoskeletal:  Good strength in all 4 extremities.   There is good range of motion in both shoulders.    Skin:  No new skin lesions or rashes on the skin excluding the breast (see breast exam above).        Imagin/10/2018   [facility]  BILATERAL SCREENING MAMMOGRAM  Heterogeneously dense. Interval occurring a small nodule  projecting as central left breast. Small superficial nodule left lateral subareolar location is stable. Small nodule also developed outer hemisphere right breast middle third. Larger nodule central aspect of the right breast containing a single coarse calcification appears stable.  BIRADS Category 0    10/11/2018   [facility]  [employer]  BILATERAL DIAGNOSTIC MAMMOGRAM  BILATERAL BREAST ULTRASOUND  Heterogeneously dense. The developing nodular density deep central left breast along the nipple line does not efface. This will be further evaluated with left breast ultrasound today. Stable benign-appearing nodule in the anterior lateral periareolar left breast. The developing nodular density in the lateral hemisphere of the right breast also dos note efface.  ULTRASOUND  Left breast:  Nodule in the deep central left breast appears to correspond to a simple appearing cyst along the 12:00 retroareolar left 6 x 8 x 7 mm. no suspicious solid masses in the deep central left breast.  Right breast:  10:00 axis, 4 cm from the nipple, demonstrates an area of dense breast tissue measuring 1.5 x 1.8 x 0.9 cm. This corresponds to an island of benign dense breast tissue and coarse calcification that has been stable for several years. 8:00 axis, 4 cm from the nipple, demonstrates an ovoid, solid-appearing structure measuring 1 x 0.5 x 0.5 cm. may correspond to the developing nodular density on mammography. This may represent a proabably benign fibroadenoma. Upon review 2011, this may have been present since that time. Six-month follow-up right breast mammogram and ultrasound will be recommended. There is also an incidental simple appearing cyst 9:00 axis, 3 cm from the nipple, measuring 5 x 3 x 6 mm.  BIRADS Category 3    06/27/2019   [facility]   Radiology  [employer]  DIAGNOSTIC RIGHT DIGITAL MAMMOGRAM  RIGHT BREAST ULTRASOUND  Heterogeneously dense.  The right breast mass 8:00-:00 at 6.6 cm from the nipple appears  stable. Measuring 1.1 cm AP x 0.7 cm TRV.  Right breast focal asymmetry at 10:00-11:00 at mid depth at about 5 cm from the nipple, that contains a stable course calcifications, appears to have become larger since 10/11/2018.  ULTRASOUND: Right breast 8:00 at 4 cm from the nipple and 11:00 at 3 cm from the nipple.  Right breast mixed solid and cystic mass in the right breast at 8:00 at 4 cm from the nipple is stable in size, 0.4 x 0.5 x 1.0 cm. Since the margins of this sonographic mass have become partially irregular and angulated, the mass is considered to be suspicious for possible malignancy.  Right breast 11:00 at 3 cm from the nipple measures 0.7 x 1.1 x 1.6 cm. Today, a new solid mural nodule that measures 0.4 x 0.7 cm and is mostly hypoechoic with a punctate hyperechoic focus and no internal vascular flow was seen by the radiologist. This new nodule is concerning for possible malignancy.  IMPRESSION:  3. Right breast at 8:00 at 4 cm from the nipple needs to be biopsied. BIRADS Category 4.  4. The right breast sonographic calcified hypoechoic area at 11:00 at 3 cm from the nipple needs to be biopsied under ultrasound guidance. BIRADS Category 4.      Bilateral breast MRI August 7, 2019 [facility]: Artifact from a biopsy marking clip upper outer quadrant right breast.  No abnormal enhancement adjacent to the clips.  There is a 9 mm diameter nodule lateral retroareolar left breast immediately beneath the skin surface most likely an intramammary lymph node or fibroadenoma.  Further evaluation with ultrasound is recommended.  BI-RADS 0.     Right mammogram same day shows 2 new marking clips upper outer quadrant both are spring shaped.  No underlying mammographic abnormality.    [facility] August 12, 2019 left ultrasound.  Retroareolar left breast 3:00 there is a microlobulated hypoechoic mass that measures 8 mm.  Correlation with ultrasound-guided biopsy is recommended BI-RADS  4.    Pathology:   Pathology from July 22, 2019 right breast biopsies in the office.  Right breast 8:00, 4 cm from the nipple returned as focal atypical duct hyperplasia involving a single gland, 0.22 mm maximally.  Ramone cyst, sclerosing adenosis, fibroadenomatoid hyperplasia, associated micro calcifications.  Right breast 11:00, 3 center meter from the nipple, duct ectasia, apocrine metaplasia, sclerosing adenosis, usual duct hyperplasia, associated micro calcifications.           Final Diagnosis   1.  Breast, Right 8 o'clock Position, 4 cm FN, Core Biopsy:                 A.  Focal atypical duct hyperplasia involving a single gland, 0.22 mm maximally.               B.  Clustered cysts, sclerosing adenosis, and fibroadenomatoid hyperplasia with associated microcalcifications.       2.  Breast, Right 11 o'clock Position, 3 cm FN, Core Biopsy:                A.  Duct ectasia, apocrine metaplasia, sclerosing adenosis, and usual duct hyperplasia with associated                     microcalcifications.      mec/brb    Electronically signed by Rita Ramos MD on 7/23/2019 at 1332   Comment     This case is shared at internal consensus conference with Rao Davis and  who concur.  This case is discussed with Dr. Collier.     mec/brb           Procedures:  Percutaneous ultrasound-guided vacuum-assisted excisional breast biopsy x 2 separate sites, with 2 separate incisions 7-22-19  Indication:  ultrasound-visible breast mass x 2 separate sites , BR4 at each site  Location: RIGHT 11:0, 3 CFN and RIGHT breast 8:00, 4 CFN  Consent:  The risks, benefits, and alternatives to the procedure were discussed with the patient, who understood and wished to proceed.  The risks described included, but were not limited to, bleeding, infection, pneumothorax, and inadequate sampling requiring either repeat percutaneous or open excisional biopsy.  Description of Procedure:   After the patient was positioned supine on the  procedure table, I located each  lesion using ultrasound.  At the right breast 11:00 3 cm from the nipple location there is a hypoechoic irregularly marginated wider than tall mass. It measures in the antiradial dimension 1.39 x 1.0 cm and in the radial dimension 1.38 x 0.79 cm.  The second lesion is at the right breast 8:00, 4 cm from the nipple location is a 1.1 x 0.6 cm hypoechoic lesion in the antiradial dimension, that is wider than tall with good through-transmission.  It measures in the radial dimension 0.68 x 0.56 cm.each of the 2 separate biopsy sites, I performed the following: I prepped and draped the breast skin in sterile fashion.  I anesthetized the breast skin at each separate site of anticipated mammotomy with 1% lidocaine with epinephrine.  I then anesthetized the underlying subcutaneous tissue and breast parenchyma surrounding each separate lesion with 1% lidocaine with epinephrine under ultrasound visualization and guidance. I then made a nicking incision each separate site with an 11blade and inserted the 10G encore biopsy device from inferolateral to superomedial  under each separate lesion under ultrasound guidance.   I then took 15 sequential core samples from the first site and 5 core samples from the second site, using the automated sampling of the encore device.  There will be a remnant from the first biopsy site lesion.  The second biopsy site lesion appeared to be removed in its entirety.   I removed the probe, then placed a hydromark marker, using a stiff introducer, into each separate biopsy site. We held manual compression for 10 minutes, placed steri-strips at the mammotomy site, and wrapped the patient in a 6 inch super ace wrap with an ice-pack.  Marker placed: hydromark x2  Tolerance: The patient tolerated the procedure well.  Disposition: We will see her back within a week to review her pathology.      Percutaneous ultrasound-guided vacuum-assisted excisional breast biopsy  8-13-19  Indication:  ultrasound-visible breast mass  Location: LEFT 3:00 RA  Consent:  The risks, benefits, and alternatives to the procedure were discussed with the patient, who understood and wished to proceed.  The risks described included, but were not limited to, bleeding, infection, pneumothorax, and inadequate sampling requiring either repeat percutaneous or open excisional biopsy.  Description of Procedure:   After the patient was positioned supine on the procedure table, I located the lesion using ultrasound.  I prepped and draped the breast skin in sterile fashion.  I anesthetized the breast skin at the site of anticipated mammotomy with 1% lidocaine with epinephrine.  I then anesthetized the underlying subcutaneous tissue and breast parenchyma surrounding the lesion with 1% lidocaine with epinephrine under ultrasound visualization and guidance. I then made a nicking incision with an 11blade and inserted the 10G encore biopsy device from inferolateral to superomedial under the lesion under ultrasound guidance.   I then took 11 sequential core samples using the automated sampling of the encore device, until the lesion disappeared on ultrasound. There was no residual lesion visible at the conclusion of the procedure.  I removed the probe, then placed a hydromark marker, using a stiff introducer, into the biopsy site. We held manual compression for 10 minutes, placed steri-strips at the mammotomy site, and wrapped the patient in a 6 inch super ace wrap with an ice-pack.  Marker placed: hydromark  Tolerance: The patient tolerated the procedure well.  Disposition: We will see her back within a week to review her pathology.    Assessment:   Diagnosis Plan   1. Lump or mass in breast  US Guided Breast Biopsy With & Without Device initial Left    Tissue Pathology Exam   2. Other abnormal and inconclusive findings on diagnostic imaging of breast   US Guided Breast Biopsy With & Without Device initial Left   3.  Abnormal MRI, breast     4. Abnormal ultrasound of breast     5. Atypical ductal hyperplasia of right breast     6. Abnormal mammogram     7. Rheumatoid arthritis, involving unspecified site, unspecified rheumatoid factor presence (CMS/HCC)     8. Other chronic pain     9. Angina effort (CMS/HCC)     10. Fibrocystic disease of right breast       1-4  LEFT breast 3:00 RA- seen on MRI done for ADH- US correlate 8mm, BR4- target for biopsy today      10-  Right breast 11:00, 3 cm from the nipple, tender to palpation, no exam correlate, 1.6 cm on ultrasound, BI-RADS 4.  Pathology from July 22, 2019 right breast biopsies in the office.  Right breast 11:00, 3 center meter from the nipple, duct ectasia, apocrine metaplasia, sclerosing adenosis, usual duct hyperplasia, associated micro calcifications.    5  Right breast 8:00, 4 cm from the nipple, no exam correlate, 1 cm on ultrasound, BI-RADS 4.  Pathology from July 22, 2019 right breast biopsies in the office.  Right breast 8:00, 4 cm from the nipple returned as focal atypical duct hyperplasia involving a single gland, 0.22 mm maximally.  Ramone cyst, sclerosing adenosis, fibroadenomatoid hyperplasia, associated micro calcifications.    7-  Dr. Keller sees and she is taking Celebrex and Plaquenil.    8-  For fibromyalgia, baclofen, gabapentin, Norco 10/325, 1/day,- managed by Dr Rae    9-  Angina, managed by her cardiologist Dr Teran- takes Imdur, mononitrate, Bystolic, Nitrostat, Ranexa, Lasix, amlodipine    Plan:  We reviewed her interval history, imaging, imaging reports and bedside ultrasound as well as her exam together today.  This lesion is not palpable.  We discussed the need to sample this prior to going to surgery in case this were to need excision as well.  We discussed and reviewed the nature of BI-RADS 4 lesions.  She wished to proceed.      We discussed the procedure of a left breast ultrasound-guided core excisional biopsy today she wished to proceed.   She tolerated the procedure well.  We will see her back for a post biopsy visit.  At that time we will get her rescheduled for her right sided procedure as well.  She has a appointment with medical oncology this afternoon for the atypical hyperplasia.        Previously, we discussed excision of the area in the right breast 8:00 location at the site of atypical hyperplasia.  We discussed the procedure of right breast needle localized lumpectomy for atypical duct hyperplasia.    I performed a CRA risk assessment based on her family history, reproductive history and recent atypical biopsy.  This returned her lifetime breast cancer risk as 17 to 31%  Return to her 5-year breast cancer risk as 4.2%      Her next routine bilateral mammogram will be due October 12, 2019 at [facility].      Giovanna Collier MD        We have spent 25 minutes in visit today, 15 in face to face .      Next Appointment:  Return for post biopsy visit.      EMR Dragon/transcription disclaimer:    Much of this encounter note is an electronic transcription/translocation of spoken language to printed text.  The electronic translation of spoken language may permit erroneous, or at times, nonsensical words or phrases to be inadvertently transcribed.  Although I have reviewed the note from such areas, some may still exist.

## 2019-08-13 NOTE — TELEPHONE ENCOUNTER
[facility] August 12, 2019 left ultrasound.  Retroareolar left breast 3:00 there is a microlobulated hypoechoic mass that measures 8 mm.  Correlation with ultrasound-guided biopsy is recommended BI-RADS 4.    We will see her back and do a same day biopsy.

## 2019-08-13 NOTE — PROGRESS NOTES
Subjective     REASON FOR CONSULTATION:  1.  Focal atypical hyperplasia of the right breast who was to be scheduled for right breast lumpectomy,    2.  MRI showed a lesion in the left breast which was atypical and patient underwent ultrasound-guided biopsy of the left breast nodule.  Path pending this was done today by Dr. Collier  Provide an opinion on any further workup or treatment                             REQUESTING PHYSICIAN: Dr. Giovanna Romo    RECORDS OBTAINED:  Records of the patients history including those obtained from the referring provider were reviewed and summarized in detail.    HISTORY OF PRESENT ILLNESS:  The patient is a 64 y.o. year old female who is here for an opinion about the above issue.    History of Present Illness patient is a 64-year-old female who underwent mammogram at [facility] in September 2018 and was asked to repeat diagnostic mammogram and ultrasound in 6 months    September 10, 2018  Findings  Breast is heterogeneously dense.  There is a small nodule projecting in the left breast centrally.  Small superficial nodule left lateral subareolar location stable.  Small nodule has also developed in the outer hemisphere right breast middle third.  Both nodules are imaged.  Larger nodule in the more central aspect of right breast containing a single coarse calcification appears stable.  Additional bilateral lateral breast imaging is recommended to include true lateral projections.  Suggested spot compression views and targeted breast ultrasound.    October 18, 2018:  Bilateral digital diagnostic mammogram and diagnostic bilateral ultrasound done  Impression: Probable benign mammogram  1.  Benign-appearing simple cyst in the deep central left breast corresponding to the new mammographic density.  This can be followed up with yearly mammograms.  2.  Solid appearing nodule in the right breast along 8:00 which  represented the developing nodular density on mammography and has likely been stable for several years.  This is favored to represent probably a benign fibroadenoma and can be followed up with right breast mammogram and ultrasound in 6 months to ensure stability.    6/27/2019: Diagnostic right digital mammogram and diagnostic right breast ultrasound  Right breast mass 8 to 9 o'clock position, 6.6 cm from the nipple, stable with circumscribed nodular lobular margins measuring 1.1 cm AP x0.7 cm.    Right breast focal asymmetry 10:00 to 11 o'clock position, 5 cm from the nipple contains stable coarse calcification which appears to have become larger on only the right spot since October 2018.    Ultrasound right breast the solid and cystic mass in the right breast at 8 o'clock position at 4 cm from the nipple is stable measuring 0.4 x 0.5 x 1 cm.  On October 11, 2018 ultrasound measured 0.5 x 1 x 0.5 cm and appeared to contain solid components on today's exam the mass consists of solid components and 2 small cystic areas within without thin septations.  The right breast sonographic irregularity mostly hypoechoic area 11:00 at 3 cm from the nipple measured 0.7 x 1.1 x 1.6 cm.  On October 11, 2018 this area measured 0.9 x 1.8 x 1.5 cm.  There is a new solid mural nodule 0.4 x 0.7 cm.  The new nodule is concerning for possible malignancy.    Impression the right breast ultrasound mixed solid and cystic mass at 8 o'clock position, 4 cm from the nipple needs to be biopsied under ultrasound guidance.    2.  The breast right ultrasound calcified area at 11:00, 3 cm from the nipple needs to be biopsied under ultrasound guidance because of a new solid hypoechoic mural nodule.    July 22, 2019: Pathology  Ultrasound-guided biopsy  Final Diagnosis   1.  Breast, Right 8 o'clock Position, 4 cm FN, Core Biopsy:                 A.  Focal atypical duct hyperplasia involving a single gland, 0.22 mm maximally.               B.  Clustered  cysts, sclerosing adenosis, and fibroadenomatoid hyperplasia with associated microcalcifications.       2.  Breast, Right 11 o'clock Position, 3 cm FN, Core Biopsy:                A.  Duct ectasia, apocrine metaplasia, sclerosing adenosis, and usual duct hyperplasia with associated                     microcalcifications.      August 7, 2019: Bilateral breast MRI  Artifact from a biopsy marking clip upper outer quadrant right breast.  No abnormal enhancement adjacent to the clips.  There is a 9 mm diameter nodule lateral retroareolar left breast immediately beneath the skin surface most likely an intramammary lymph node or fibroadenoma.  Further evaluation with ultrasound is recommended.  BI-RADS 0.     Right mammogram same day shows 2 new marking clips upper outer quadrant both are spring shaped.  No underlying mammographic abnormality.    August 12, 2019: Left breast ultrasound, retroareolar left breast there is microlobulated hypoechoic mass that measures 8 mm ultrasound-guided core biopsy recommended.    Patient underwent left breast ultrasound-guided biopsy by Dr. Collier as of August 13, 2019.    Patient was to have surgery on the right breast but because of this new lesion on the left breast that was seen on MRI she had to undergo biopsy first of the right breast lesion prior to surgery.    Patient is to hear from Dr. Collier about the surgery date    Obstetric/Gynecologic History:  Age menstrual periods began: 13  Patient is postmenopausal due to removal of her uterus and both ovaries in the following year: [id number]   Number of pregnancies:1  Number of live births: 1  Number of abortions or miscarriages: 0  Age of delivery of first child: 21  Patient did not breast feed.  Length of time taking birth control pills: 4 yrs   Patient took hormone replacement during the following dates:  10 yrs  Patient had uterus and ovaries removed.       Past Medical History:   Diagnosis Date   • Asthma    • Breast lump      RIGHT   • Bronchitis 06/2019   • Depression with anxiety 9/21/2016   • Diabetes mellitus (CMS/HCC)    • Disease of thyroid gland     HYPOTHYROIDISM   • Fibrocystic breast    • Fibromyalgia syndrome 6/6/2017   • GERD (gastroesophageal reflux disease)    • Heart palpitations    • History of thyroid nodule    • HLD (hyperlipidemia) 9/21/2016   • Hypertension    • Hypothyroidism    • IBS (irritable bowel syndrome)    • Lung infection 06/2019   • Moderate persistent asthma without complication 6/6/2017   • PONV (postoperative nausea and vomiting)    • Precordial pain 9/21/2016   • Rheumatoid arthritis (CMS/HCC)    • Scoliosis    • Upper respiratory infection 06/2019   • Vitamin D deficiency 9/21/2016        Past Surgical History:   Procedure Laterality Date   • ABDOMINAL HERNIA REPAIR     • APPENDECTOMY     • BREAST BIOPSY      right and left benign 10-15 yrs ago   • BRONCHOSCOPY N/A 5/11/2018    Procedure: BRONCHOSCOPY;  Surgeon: Emma Blakely MD;  Location: [facility] ENDOSCOPY;  Service: Pulmonary   • CARDIAC CATHETERIZATION     • CHOLECYSTECTOMY     • COLONOSCOPY  2015   • ENDOSCOPY  2015   • EPIDURAL BLOCK     • HERNIA REPAIR     • HYSTERECTOMY     • KNEE SURGERY     • OOPHORECTOMY     • TONSILLECTOMY     • WRIST FUSION Right         Current Outpatient Medications on File Prior to Visit   Medication Sig Dispense Refill   • albuterol sulfate  (90 Base) MCG/ACT inhaler Inhale 2 puffs Every 4 (Four) Hours As Needed for Wheezing.     • amLODIPine (NORVASC) 2.5 MG tablet Take 2.5 mg by mouth Every Night.     • atorvastatin (LIPITOR) 20 MG tablet Take 20 mg by mouth Daily.     • baclofen (LIORESAL) 10 MG tablet Take 10 mg by mouth Daily.     • budesonide-formoterol (SYMBICORT) 160-4.5 MCG/ACT inhaler Inhale 2 puffs 2 (Two) Times a Day.     • celecoxib (CeleBREX) 200 MG capsule Take 200 mg by mouth 2 (two) times a day.     • clotrimazole-betamethasone (LOTRISONE) 1-0.05 % cream Apply  topically to the appropriate area  as directed 2 (Two) Times a Day As Needed.     • diphenhydrAMINE-acetaminophen (TYLENOL PM)  MG tablet per tablet Take 1 tablet by mouth At Night As Needed for Sleep.     • fluticasone (FLONASE) 50 MCG/ACT nasal spray 1 spray into each nostril 2 (Two) Times a Day. (Patient taking differently: 1 spray into the nostril(s) as directed by provider 2 (Two) Times a Day As Needed.) 16 g 12   • furosemide (LASIX) 20 MG tablet Take 20 mg by mouth As Needed.     • gabapentin (NEURONTIN) 300 MG capsule Take 300 mg by mouth 2 (Two) Times a Day.     • HYDROcodone-acetaminophen (NORCO)  MG per tablet Take 1 tablet by mouth Daily As Needed.     • hydroxychloroquine (PLAQUENIL) 200 MG tablet Take 200 mg by mouth 2 (Two) Times a Day.     • hyoscyamine (LEVSIN) 0.125 MG SL tablet Dissolve one Tablet Under The Toungue Every 6  Hours as Needed for cramping (Patient taking differently: Take 0.125 mg by mouth Every 6 (Six) Hours As Needed. Dissolve one Tablet Under The Toungue Every 6  Hours as Needed for cramping) 30 tablet 3   • isosorbide mononitrate (IMDUR) 120 MG 24 hr tablet Take 120 mg by mouth Every Morning.     • levothyroxine (SYNTHROID, LEVOTHROID) 75 MCG tablet TAKE ONE TABLET BY MOUTH DAILY     • montelukast (SINGULAIR) 10 MG tablet Take 1 tablet by mouth Every Night. 30 tablet 12   • nebivolol (BYSTOLIC) 5 MG tablet Take 5 mg by mouth Every Night.     • nitroglycerin (NITROSTAT) 0.4 MG SL tablet Place 0.4 mg under the tongue every 5 (five) minutes.     • nystatin (MYCOSTATIN) 920875 UNIT/ML suspension Swish and swallow 500,000 Units 2 (Two) Times a Day.     • pantoprazole (PROTONIX) 40 MG EC tablet Take 40 mg by mouth Every Night.     • polyethylene glycol (MIRALAX) packet Take 17 g by mouth Daily.     • ranolazine (RANEXA) 1000 MG 12 hr tablet Take 1,000 mg by mouth Every 12 (Twelve) Hours.     • Simethicone (GAS RELIEF EXTRA STRENGTH PO) As Needed.     • SPIRIVA RESPIMAT 1.25 MCG/ACT aerosol solution inhaler  Inhale 1 puff Every Morning.     • vitamin C (ASCORBIC ACID) 500 MG tablet Take 500 mg by mouth 2 (Two) Times a Day.     • vitamin D (ERGOCALCIFEROL) 74022 UNITS capsule capsule Take 50,000 Units by mouth Every 7 (Seven) Days.     • albuterol (PROVENTIL) (2.5 MG/3ML) 0.083% nebulizer solution Take 2.5 mg by nebulization Every 6 (Six) Hours As Needed for Wheezing (asthma).     • ipratropium (ATROVENT) 0.06 % nasal spray 1 spray into each nostril 4 (Four) Times a Day As Needed for Rhinitis. 15 mL 12   • isosorbide mononitrate (IMDUR) 60 MG 24 hr tablet Take 60 mg by mouth Daily.     • nebivolol (BYSTOLIC) 2.5 MG tablet Take 2.5 mg by mouth Every Morning.       No current facility-administered medications on file prior to visit.         ALLERGIES:    Allergies   Allergen Reactions   • Adhesive Tape Swelling     Must use paper tape   • Morphine And Related Hives and Itching   • Metal Rash      Patient is disabled, she is to work at [employer] in the past.  She does not smoke or drink alcohol.    Social History     Socioeconomic History   • Marital status:      Spouse name: Zbigniew   • Number of children: Not on file   • Years of education: Not on file   • Highest education level: Not on file   Occupational History     Employer: DISABLED   Tobacco Use   • Smoking status: Never Smoker   • Smokeless tobacco: Never Used   Substance and Sexual Activity   • Alcohol use: No   • Drug use: No   • Sexual activity: Defer        Family History   Problem Relation Age of Onset   • COPD Mother    • Cancer Mother         heart tumor   • COPD Father    • Hypertension Father    • No Known Problems Sister    • Cancer Brother         testicular   • Breast cancer Paternal Aunt         unknown age    • Malig Hyperthermia Neg Hx      History is consistent with chest wall cancer in mother who  of cancer at age 68, unsure what type.  Brother had testicular cancer at age 2 but is now 59 and in good health.  Maternal aunt with  "history of breast cancer in her 40s and  from it    Review of Systems   Constitutional: Negative for appetite change, chills, diaphoresis, fatigue, fever and unexpected weight change.   HENT: Negative for hearing loss, sore throat and trouble swallowing.    Respiratory: Negative for cough, chest tightness, shortness of breath and wheezing.    Cardiovascular: Negative for chest pain, palpitations and leg swelling.   Gastrointestinal: Negative for abdominal distention, abdominal pain, constipation, diarrhea, nausea and vomiting.   Genitourinary: Negative for dysuria, frequency, hematuria and urgency.   Musculoskeletal: Negative for joint swelling.        No muscle weakness.   Skin: Negative for rash and wound.   Neurological: Negative for seizures, syncope, speech difficulty, weakness, numbness and headaches.   Hematological: Negative for adenopathy. Does not bruise/bleed easily.   Psychiatric/Behavioral: Negative for behavioral problems, confusion and suicidal ideas.        Objective     Vitals:    19 1449   BP: 108/69   Pulse: 69   Resp: 16   Temp: 98.1 °F (36.7 °C)   TempSrc: Oral   SpO2: 95%   Weight: 77.2 kg (170 lb 1.6 oz)   Height: 159.5 cm (62.8\")   PainSc:   3   PainLoc: Breast  Comment: Lt breast pain     Current Status 2019   ECOG score 0       Physical Exam      GENERAL:  Well-developed, well-nourished in no acute distress.   SKIN:  Warm, dry without rashes, purpura or petechiae.  EYES:  Pupils equal, round and reactive to light.  EOMs intact.  Conjunctivae normal.  EARS:  Hearing intact.  NOSE:  Septum midline.  No excoriations or nasal discharge.  MOUTH:  Tongue is well-papillated; no stomatitis or ulcers.  Lips normal.  THROAT:  Oropharynx without lesions or exudates.  NECK:  Supple with good range of motion; no thyromegaly or masses, no JVD.  LYMPHATICS:  No cervical, supraclavicular, axillary or inguinal adenopathy.  CHEST:  Lungs clear to auscultation. Good airflow.  BREAST: Right " breast: No skin changes, no evidence of breast mass, no nipple discharge, no evidence of any right axillary adenopathy or right supraclavicular adenopathy, status post right breast biopsy  Left breast: No evidence of any skin changes, no evidence of any left breast mass and no evidence of left nipple discharge as well as no left axillary adenopathy or left supraclavicular adenopathy.,  Status post left breast biopsy today  CARDIAC:  Regular rate and rhythm without murmurs, rubs or gallops. Normal S1,S2.  ABDOMEN:  Soft, nontender with no hepatosplenomegaly or masses.  EXTREMITIES:  No clubbing, cyanosis or edema.  NEUROLOGICAL:  Cranial Nerves II-XII grossly intact.  No focal neurological deficits.  PSYCHIATRIC:  Normal affect and mood.        RECENT LABS:  Hematology WBC   Date Value Ref Range Status   07/18/2019 5.81 4.5 - 11.0 10*3/uL Final     RBC   Date Value Ref Range Status   07/18/2019 4.41 4.0 - 5.2 10*6/uL Final     Hemoglobin   Date Value Ref Range Status   07/18/2019 13.1 12.0 - 16.0 g/dL Final     Hematocrit   Date Value Ref Range Status   07/18/2019 43.3 36.0 - 46.0 % Final     Platelets   Date Value Ref Range Status   07/18/2019 235 140 - 440 10*3/uL Final           BI-RADS CATEGORY 1: Negative.    Tissue Pathology Right Breast 07/22/19  Final Diagnosis   1.  Breast, Right 8 o'clock Position, 4 cm FN, Core Biopsy:                 A.  Focal atypical duct hyperplasia involving a single gland, 0.22 mm maximally.               B.  Clustered cysts, sclerosing adenosis, and fibroadenomatoid hyperplasia with associated microcalcifications.       2.  Breast, Right 11 o'clock Position, 3 cm FN, Core Biopsy:                A.  Duct ectasia, apocrine metaplasia, sclerosing adenosis, and usual duct hyperplasia with associated                     microcalcifications.           Assessment/Plan     1.  Focal atypical ductal hyperplasia of the right breast at 8 o'clock position.   Right breast, 8 o'clock position, 4  cm from the nipple, path returned as focal atypical duct hyperplasia involving the single gland, 0.22 mm.sclerosing adenosis, fibroadenomatoid hyperplasia, associated micro calcifications.       Right breast, 11 o'clock position, 3 cm from the nipple 1.6 cm on ultrasound.  Pathology from July 2019 right breast biopsies  duct ectasia, apocrine metaplasia, sclerosing adenosis, usual duct hyperplasia, associated micro calcifications    2.  Left breast, 3 o'clock position, MRI done shows abnormality, 9 mm nodular nodule lateral retroareolar left breast immediately beneath the skin surface most likely intramammary lymph node or fibroadenoma.  Ultrasound recommended.  August 12, 2019 left breast ultrasound: Retroareolar left breast, 3 o'clock position, microlobulated hypoechoic mass which measures 8 mm.  Ultrasound-guided biopsy done on August 13, 2019.  Results pending.    · Patient is now awaiting surgery by Dr. Collier on both breasts.  · Risk assessment done based on family history, reproductive history and recent atypical biopsy.  The 5-year breast cancer risk is 4.2%.  And lifetime breast cancer risk is 17 to 31%.  · Patient has been referred here but given that she is undergoing surgery we would need to await the path on surgery prior to making any final recommendations.  · If only atypical duct hyperplasia present then certainly she could be a candidate for chemoprophylaxis.  But given that she has not yet had surgery, she tells me she is undergoing lumpectomy , and await surgery prior to making any recommendation    3.  History of fibromyalgia, followed by Dr. Holm    4.  Atypical angina, cardiac cath negative followed by Dr. Jett    5.  Rheumatoid arthritis followed by Dr. Keller on Celebrex and Plaquenil    Plan:  1. Await patient's surgery right breast needle localized lumpectomy for atypical ductal hyperplasia,  by Dr. Collier  2. .Left breast ultrasound-guided biopsy done today, path  pending  3. Patient was initially referred for chemoprophylaxis for atypical duct hyperplasia history.  4. Will await above pathology and right needle localized lumpectomy which is going to be scheduled next week prior to making any recommendation.  5. Follow-up with me in 4 weeks.    Thank you for allowing me to participate in the care of this patient    Kayla Saldivar MD

## 2019-08-14 ENCOUNTER — TELEPHONE (OUTPATIENT)
Dept: SURGERY | Facility: CLINIC | Age: 64
End: 2019-08-14

## 2019-08-14 NOTE — TELEPHONE ENCOUNTER
[facility] group August 13, 2019: Note from Dr. Saldivar: Weight patient's surgery pathology report.  Follow-up in 4 weeks

## 2019-08-15 ENCOUNTER — TELEPHONE (OUTPATIENT)
Dept: SURGERY | Facility: CLINIC | Age: 64
End: 2019-08-15

## 2019-08-15 ENCOUNTER — APPOINTMENT (OUTPATIENT)
Dept: MAMMOGRAPHY | Facility: HOSPITAL | Age: 64
End: 2019-08-15

## 2019-08-15 NOTE — TELEPHONE ENCOUNTER
I let her know that her in office biopsy was benign. We are re-scheduling her original surgery. Nasima

## 2019-08-15 NOTE — PROGRESS NOTES
"RAMINW met with the patient prior to her consultation with Dr. Saldivar about her DCIS. She was in an exam room and had scored a 7 on the Distress Questionnaire. Pt said she had just had her 3rd breast biopsy today, 2 in 1 breast and 1 in the other. The first 2 biopsies showed \"actively growing cells.\" She is anxious about the results of the 3rd. She said that these cells are estrogen driven.    Pt has a been  for 45 years. She lives in her own home, and has 2 children. One lives in the area, and the other is in [place]. She has 3 grandchildren. Her  had prostate cancer and thyroid cancer twice. He is doing well at this time. Her  works for [employer] in the accounting department. He hopes to retire in December. The patient is on social security disability due to her rheumatoid arthritis, asthma and bronchial disease.     Pt was alert and oriented x 3. She scored a 7 on the Distress Questionnaire, checking only \"insurance/financial\" as an area of distress. With her medicare replacement plan, she will have an annual deductible and OOP. SASHA discussed with her that there are financial assistance programs available, should she need it. She feels that she is handling her diagnosis well, but will feel better after she learns the results of the 3rd biopsy. Availability of social workers and resources were explained. She did not have any interest in any support groups at this time. She seemed to be a good historian, and was open when discussing her feelings. Assistance was offered as needed in the future.  "

## 2019-08-15 NOTE — TELEPHONE ENCOUNTER
Pathology from her left breast in office biopsy August 13, 2019 returned as sclerosing adenosis, fibroadenomatous change, usual hyperplasia.  No atypia.  This is concordant and we will let her know benign.    Final Diagnosis   1. Core Biopsies, Left Breast, 3 O'clock:               A. Sclerosing adenosis.               B. Background of fibroadenomatous change.               C. Florid intraductal hyperplasia, usual type.               D. No significant cytologic atypia is identified.     toni/rylan

## 2019-08-16 ENCOUNTER — TELEPHONE (OUTPATIENT)
Dept: SURGERY | Facility: CLINIC | Age: 64
End: 2019-08-16

## 2019-08-16 PROBLEM — N60.91 ATYPICAL DUCTAL HYPERPLASIA OF RIGHT BREAST: Status: ACTIVE | Noted: 2019-08-02

## 2019-08-16 NOTE — TELEPHONE ENCOUNTER
[place] [facility] [place]    Right Breast Nl Lumpectomy, for Atypical Hyperplasia...    Arrive          5:45  NL               7:30  Surgery       9:00    PAT 8-20-19 @ 3:30  Post OP 9-10-19 arrive 1:15    Spoke to pt she v/u with appts  vyl

## 2019-08-20 ENCOUNTER — APPOINTMENT (OUTPATIENT)
Dept: PREADMISSION TESTING | Facility: HOSPITAL | Age: 64
End: 2019-08-20

## 2019-08-20 VITALS
HEIGHT: 63 IN | OXYGEN SATURATION: 93 % | SYSTOLIC BLOOD PRESSURE: 113 MMHG | HEART RATE: 70 BPM | BODY MASS INDEX: 30.37 KG/M2 | DIASTOLIC BLOOD PRESSURE: 57 MMHG | WEIGHT: 171.4 LBS | RESPIRATION RATE: 16 BRPM | TEMPERATURE: 97.5 F

## 2019-08-20 DIAGNOSIS — N60.91 ATYPICAL DUCTAL HYPERPLASIA OF RIGHT BREAST: ICD-10-CM

## 2019-08-20 LAB
ANION GAP SERPL CALCULATED.3IONS-SCNC: 13.2 MMOL/L (ref 5–15)
BUN BLD-MCNC: 22 MG/DL (ref 8–23)
BUN/CREAT SERPL: 22.9 (ref 7–25)
CALCIUM SPEC-SCNC: 9.4 MG/DL (ref 8.6–10.5)
CHLORIDE SERPL-SCNC: 105 MMOL/L (ref 98–107)
CO2 SERPL-SCNC: 23.8 MMOL/L (ref 22–29)
CREAT BLD-MCNC: 0.96 MG/DL (ref 0.57–1)
GFR SERPL CREATININE-BSD FRML MDRD: 59 ML/MIN/1.73
GLUCOSE BLD-MCNC: 113 MG/DL (ref 65–99)
POTASSIUM BLD-SCNC: 3.7 MMOL/L (ref 3.5–5.2)
SODIUM BLD-SCNC: 142 MMOL/L (ref 136–145)

## 2019-08-20 PROCEDURE — 80048 BASIC METABOLIC PNL TOTAL CA: CPT | Performed by: SURGERY

## 2019-08-20 PROCEDURE — 36415 COLL VENOUS BLD VENIPUNCTURE: CPT

## 2019-08-20 RX ORDER — LEVOTHYROXINE SODIUM 0.07 MG/1
75 TABLET ORAL DAILY
COMMUNITY
End: 2021-05-05

## 2019-08-20 NOTE — DISCHARGE INSTRUCTIONS
Take the following medications the morning of surgery with a small sip of water: INHALERS, ISOSORBIDE AND BYSTOLIC    ARRIVAL TIME 05:45        General Instructions:  • Do not eat solid food after midnight the night before surgery.  • You may drink clear liquids day of surgery but must stop at least one hour before your hospital arrival time.  • It is beneficial for you to have a clear drink that contains carbohydrates the day of surgery.  We suggest a 12 to 20 ounce bottle of Gatorade or Powerade for non-diabetic patients or a 12 to 20 ounce bottle of G2 or Powerade Zero for diabetic patients. (Pediatric patients, are not advised to drink a 12 to 20 ounce carbohydrate drink)    Clear liquids are liquids you can see through.  Nothing red in color.     Plain water                               Sports drinks  Sodas                                   Gelatin (Jell-O)  Fruit juices without pulp such as white grape juice and apple juice  Popsicles that contain no fruit or yogurt  Tea or coffee (no cream or milk added)  Gatorade / Powerade  G2 / Powerade Zero      • Patients who avoid smoking, chewing tobacco and alcohol for 4 weeks prior to surgery have a reduced risk of post-operative complications.  Quit smoking as many days before surgery as you can.  • Do not smoke, use chewing tobacco or drink alcohol the day of surgery.   • If applicable bring your C-PAP/ BI-PAP machine.  • Bring any papers given to you in the doctor’s office.  • Wear clean comfortable clothes and socks.  • Do not wear contact lenses, false eyelashes or make-up.  Bring a case for your glasses.   • Bring crutches or walker if applicable.  • Remove all piercings.  Leave jewelry and any other valuables at home.  • Hair extensions with metal clips must be removed prior to surgery.  • The Pre-Admission Testing nurse will instruct you to bring medications if unable to obtain an accurate list in Pre-Admission Testing.            Preventing a Surgical Site  Infection:  • For 2 to 3 days before surgery, avoid shaving with a razor because the razor can irritate skin and make it easier to develop an infection.    • Any areas of open skin can increase the risk of a post-operative wound infection by allowing bacteria to enter and travel throughout the body.  Notify your surgeon if you have any skin wounds / rashes even if it is not near the expected surgical site.  The area will need assessed to determine if surgery should be delayed until it is healed.  • The night prior to surgery sleep in a clean bed with clean clothing.  Do not allow pets to sleep with you.  • Shower on the morning of surgery using a fresh bar of anti-bacterial soap (such as Dial) and clean washcloth.  Dry with a clean towel and dress in clean clothing.  • Ask your surgeon if you will be receiving antibiotics prior to surgery.  • Make sure you, your family, and all healthcare providers clean their hands with soap and water or an alcohol based hand  before caring for you or your wound.    Day of surgery:  Upon arrival, a Pre-op nurse and Anesthesiologist will review your health history, obtain vital signs, and answer questions you may have.  The only belongings needed at this time will be a list of your home medications and if applicable your C-PAP/BI-PAP machine.  If you are staying overnight your family can leave the rest of your belongings in the car and bring them to your room later.  A Pre-op nurse will start an IV and you may receive medication in preparation for surgery, including something to help you relax.  Your family will be able to see you in the Pre-op area.  While you are in surgery your family should notify the waiting room  if they leave the waiting room area and provide a contact phone number.    Please be aware that surgery does come with discomfort.  We want to make every effort to control your discomfort so please discuss any uncontrolled symptoms with your  nurse.   Your doctor will most likely have prescribed pain medications.      If you are going home after surgery you will receive individualized written care instructions before being discharged.  A responsible adult must drive you to and from the hospital on the day of your surgery and stay with you for 24 hours.    If you are staying overnight following surgery, you will be transported to your hospital room following the recovery period.  [facility] has all private rooms.    You have received a list of surgical assistants for your reference.  If you have any questions please call Pre-Admission Testing at [phone].  Deductibles and co-payments are collected on the day of service. Please be prepared to pay the required co-pay, deductible or deposit on the day of service as defined by your plan.

## 2019-08-28 RX ORDER — PANTOPRAZOLE SODIUM 40 MG/1
40 TABLET, DELAYED RELEASE ORAL NIGHTLY
Qty: 90 TABLET | Refills: 3 | Status: SHIPPED | OUTPATIENT
Start: 2019-08-28 | End: 2020-08-19

## 2019-08-29 ENCOUNTER — HOSPITAL ENCOUNTER (OUTPATIENT)
Facility: HOSPITAL | Age: 64
Setting detail: HOSPITAL OUTPATIENT SURGERY
Discharge: HOME | End: 2019-08-29
Attending: SURGERY | Admitting: SURGERY

## 2019-08-29 ENCOUNTER — ANESTHESIA EVENT (OUTPATIENT)
Dept: PERIOP | Facility: HOSPITAL | Age: 64
End: 2019-08-29

## 2019-08-29 ENCOUNTER — APPOINTMENT (OUTPATIENT)
Dept: GENERAL RADIOLOGY | Facility: HOSPITAL | Age: 64
End: 2019-08-29

## 2019-08-29 ENCOUNTER — ANESTHESIA (OUTPATIENT)
Dept: PERIOP | Facility: HOSPITAL | Age: 64
End: 2019-08-29

## 2019-08-29 ENCOUNTER — HOSPITAL ENCOUNTER (OUTPATIENT)
Dept: MAMMOGRAPHY | Facility: HOSPITAL | Age: 64
Discharge: HOME | End: 2019-08-29

## 2019-08-29 VITALS
SYSTOLIC BLOOD PRESSURE: 117 MMHG | TEMPERATURE: 97.8 F | DIASTOLIC BLOOD PRESSURE: 67 MMHG | OXYGEN SATURATION: 68 % | RESPIRATION RATE: 16 BRPM | HEART RATE: 65 BPM

## 2019-08-29 DIAGNOSIS — N60.91 ATYPICAL DUCTAL HYPERPLASIA OF RIGHT BREAST: ICD-10-CM

## 2019-08-29 PROCEDURE — 25010000002 PROPOFOL 10 MG/ML EMULSION: Performed by: NURSE ANESTHETIST, CERTIFIED REGISTERED

## 2019-08-29 PROCEDURE — 25010000002 DEXAMETHASONE PER 1 MG: Performed by: NURSE ANESTHETIST, CERTIFIED REGISTERED

## 2019-08-29 PROCEDURE — 25010000003 LIDOCAINE 1 % SOLUTION 20 ML VIAL: Performed by: SURGERY

## 2019-08-29 PROCEDURE — 76098 X-RAY EXAM SURGICAL SPECIMEN: CPT

## 2019-08-29 PROCEDURE — 25010000002 ONDANSETRON PER 1 MG: Performed by: NURSE ANESTHETIST, CERTIFIED REGISTERED

## 2019-08-29 PROCEDURE — 25010000002 MIDAZOLAM PER 1 MG: Performed by: ANESTHESIOLOGY

## 2019-08-29 PROCEDURE — 88307 TISSUE EXAM BY PATHOLOGIST: CPT | Performed by: SURGERY

## 2019-08-29 PROCEDURE — 25010000003 CEFAZOLIN IN DEXTROSE 2-4 GM/100ML-% SOLUTION: Performed by: SURGERY

## 2019-08-29 PROCEDURE — 25010000002 FENTANYL CITRATE (PF) 100 MCG/2ML SOLUTION: Performed by: NURSE ANESTHETIST, CERTIFIED REGISTERED

## 2019-08-29 PROCEDURE — 25010000002 FENTANYL CITRATE (PF) 100 MCG/2ML SOLUTION: Performed by: ANESTHESIOLOGY

## 2019-08-29 PROCEDURE — 19301 PARTIAL MASTECTOMY: CPT | Performed by: SURGERY

## 2019-08-29 PROCEDURE — 25010000003 LIDOCAINE 1 % SOLUTION: Performed by: SURGERY

## 2019-08-29 RX ORDER — CEFAZOLIN SODIUM 2 G/100ML
2 INJECTION, SOLUTION INTRAVENOUS ONCE
Status: COMPLETED | OUTPATIENT
Start: 2019-08-29 | End: 2019-08-29

## 2019-08-29 RX ORDER — SCOLOPAMINE TRANSDERMAL SYSTEM 1 MG/1
1 PATCH, EXTENDED RELEASE TRANSDERMAL ONCE
Status: DISCONTINUED | OUTPATIENT
Start: 2019-08-29 | End: 2019-08-29 | Stop reason: HOSPADM

## 2019-08-29 RX ORDER — SODIUM CHLORIDE, SODIUM LACTATE, POTASSIUM CHLORIDE, CALCIUM CHLORIDE 600; 310; 30; 20 MG/100ML; MG/100ML; MG/100ML; MG/100ML
100 INJECTION, SOLUTION INTRAVENOUS CONTINUOUS
Status: DISCONTINUED | OUTPATIENT
Start: 2019-08-29 | End: 2019-08-29 | Stop reason: HOSPADM

## 2019-08-29 RX ORDER — PROPOFOL 10 MG/ML
VIAL (ML) INTRAVENOUS AS NEEDED
Status: DISCONTINUED | OUTPATIENT
Start: 2019-08-29 | End: 2019-08-29 | Stop reason: SURG

## 2019-08-29 RX ORDER — LIDOCAINE HYDROCHLORIDE 10 MG/ML
0.5 INJECTION, SOLUTION EPIDURAL; INFILTRATION; INTRACAUDAL; PERINEURAL ONCE AS NEEDED
Status: DISCONTINUED | OUTPATIENT
Start: 2019-08-29 | End: 2019-08-29 | Stop reason: HOSPADM

## 2019-08-29 RX ORDER — ONDANSETRON 2 MG/ML
INJECTION INTRAMUSCULAR; INTRAVENOUS AS NEEDED
Status: DISCONTINUED | OUTPATIENT
Start: 2019-08-29 | End: 2019-08-29 | Stop reason: SURG

## 2019-08-29 RX ORDER — ACETAMINOPHEN 650 MG/1
650 SUPPOSITORY RECTAL ONCE AS NEEDED
Status: DISCONTINUED | OUTPATIENT
Start: 2019-08-29 | End: 2019-08-29 | Stop reason: HOSPADM

## 2019-08-29 RX ORDER — SODIUM CHLORIDE, SODIUM LACTATE, POTASSIUM CHLORIDE, CALCIUM CHLORIDE 600; 310; 30; 20 MG/100ML; MG/100ML; MG/100ML; MG/100ML
9 INJECTION, SOLUTION INTRAVENOUS CONTINUOUS
Status: DISCONTINUED | OUTPATIENT
Start: 2019-08-29 | End: 2019-08-29 | Stop reason: HOSPADM

## 2019-08-29 RX ORDER — HYDROMORPHONE HYDROCHLORIDE 1 MG/ML
0.5 INJECTION, SOLUTION INTRAMUSCULAR; INTRAVENOUS; SUBCUTANEOUS
Status: DISCONTINUED | OUTPATIENT
Start: 2019-08-29 | End: 2019-08-29 | Stop reason: HOSPADM

## 2019-08-29 RX ORDER — FENTANYL CITRATE 50 UG/ML
50 INJECTION, SOLUTION INTRAMUSCULAR; INTRAVENOUS
Status: DISCONTINUED | OUTPATIENT
Start: 2019-08-29 | End: 2019-08-29 | Stop reason: HOSPADM

## 2019-08-29 RX ORDER — DIAZEPAM 5 MG/1
10 TABLET ORAL ONCE
Status: COMPLETED | OUTPATIENT
Start: 2019-08-29 | End: 2019-08-29

## 2019-08-29 RX ORDER — PROMETHAZINE HYDROCHLORIDE 25 MG/ML
12.5 INJECTION, SOLUTION INTRAMUSCULAR; INTRAVENOUS ONCE AS NEEDED
Status: DISCONTINUED | OUTPATIENT
Start: 2019-08-29 | End: 2019-08-29 | Stop reason: HOSPADM

## 2019-08-29 RX ORDER — HYDROCODONE BITARTRATE AND ACETAMINOPHEN 7.5; 325 MG/1; MG/1
1 TABLET ORAL ONCE AS NEEDED
Status: COMPLETED | OUTPATIENT
Start: 2019-08-29 | End: 2019-08-29

## 2019-08-29 RX ORDER — ACETAMINOPHEN 325 MG/1
650 TABLET ORAL ONCE AS NEEDED
Status: DISCONTINUED | OUTPATIENT
Start: 2019-08-29 | End: 2019-08-29 | Stop reason: HOSPADM

## 2019-08-29 RX ORDER — ONDANSETRON 2 MG/ML
4 INJECTION INTRAMUSCULAR; INTRAVENOUS ONCE AS NEEDED
Status: DISCONTINUED | OUTPATIENT
Start: 2019-08-29 | End: 2019-08-29 | Stop reason: HOSPADM

## 2019-08-29 RX ORDER — SODIUM CHLORIDE 0.9 % (FLUSH) 0.9 %
3-10 SYRINGE (ML) INJECTION AS NEEDED
Status: DISCONTINUED | OUTPATIENT
Start: 2019-08-29 | End: 2019-08-29 | Stop reason: HOSPADM

## 2019-08-29 RX ORDER — FLUMAZENIL 0.1 MG/ML
0.2 INJECTION INTRAVENOUS AS NEEDED
Status: DISCONTINUED | OUTPATIENT
Start: 2019-08-29 | End: 2019-08-29 | Stop reason: HOSPADM

## 2019-08-29 RX ORDER — PROMETHAZINE HYDROCHLORIDE 25 MG/1
25 SUPPOSITORY RECTAL ONCE AS NEEDED
Status: DISCONTINUED | OUTPATIENT
Start: 2019-08-29 | End: 2019-08-29 | Stop reason: HOSPADM

## 2019-08-29 RX ORDER — DEXAMETHASONE SODIUM PHOSPHATE 10 MG/ML
INJECTION INTRAMUSCULAR; INTRAVENOUS AS NEEDED
Status: DISCONTINUED | OUTPATIENT
Start: 2019-08-29 | End: 2019-08-29 | Stop reason: SURG

## 2019-08-29 RX ORDER — FAMOTIDINE 10 MG/ML
20 INJECTION, SOLUTION INTRAVENOUS ONCE
Status: COMPLETED | OUTPATIENT
Start: 2019-08-29 | End: 2019-08-29

## 2019-08-29 RX ORDER — NALOXONE HCL 0.4 MG/ML
0.2 VIAL (ML) INJECTION AS NEEDED
Status: DISCONTINUED | OUTPATIENT
Start: 2019-08-29 | End: 2019-08-29 | Stop reason: HOSPADM

## 2019-08-29 RX ORDER — PROMETHAZINE HYDROCHLORIDE 25 MG/ML
6.25 INJECTION, SOLUTION INTRAMUSCULAR; INTRAVENOUS
Status: DISCONTINUED | OUTPATIENT
Start: 2019-08-29 | End: 2019-08-29 | Stop reason: HOSPADM

## 2019-08-29 RX ORDER — PROMETHAZINE HYDROCHLORIDE 25 MG/1
25 TABLET ORAL ONCE AS NEEDED
Status: DISCONTINUED | OUTPATIENT
Start: 2019-08-29 | End: 2019-08-29 | Stop reason: HOSPADM

## 2019-08-29 RX ORDER — EPHEDRINE SULFATE 50 MG/ML
5 INJECTION, SOLUTION INTRAVENOUS ONCE AS NEEDED
Status: DISCONTINUED | OUTPATIENT
Start: 2019-08-29 | End: 2019-08-29 | Stop reason: HOSPADM

## 2019-08-29 RX ORDER — DIPHENHYDRAMINE HCL 25 MG
25 CAPSULE ORAL
Status: DISCONTINUED | OUTPATIENT
Start: 2019-08-29 | End: 2019-08-29 | Stop reason: HOSPADM

## 2019-08-29 RX ORDER — OXYCODONE AND ACETAMINOPHEN 7.5; 325 MG/1; MG/1
1 TABLET ORAL ONCE AS NEEDED
Status: DISCONTINUED | OUTPATIENT
Start: 2019-08-29 | End: 2019-08-29 | Stop reason: HOSPADM

## 2019-08-29 RX ORDER — GLYCOPYRROLATE 0.2 MG/ML
INJECTION INTRAMUSCULAR; INTRAVENOUS AS NEEDED
Status: DISCONTINUED | OUTPATIENT
Start: 2019-08-29 | End: 2019-08-29 | Stop reason: SURG

## 2019-08-29 RX ORDER — MIDAZOLAM HYDROCHLORIDE 1 MG/ML
1 INJECTION INTRAMUSCULAR; INTRAVENOUS
Status: DISCONTINUED | OUTPATIENT
Start: 2019-08-29 | End: 2019-08-29 | Stop reason: HOSPADM

## 2019-08-29 RX ORDER — SODIUM CHLORIDE 0.9 % (FLUSH) 0.9 %
3 SYRINGE (ML) INJECTION EVERY 12 HOURS SCHEDULED
Status: DISCONTINUED | OUTPATIENT
Start: 2019-08-29 | End: 2019-08-29 | Stop reason: HOSPADM

## 2019-08-29 RX ORDER — DIPHENHYDRAMINE HYDROCHLORIDE 50 MG/ML
12.5 INJECTION INTRAMUSCULAR; INTRAVENOUS
Status: DISCONTINUED | OUTPATIENT
Start: 2019-08-29 | End: 2019-08-29 | Stop reason: HOSPADM

## 2019-08-29 RX ORDER — LABETALOL HYDROCHLORIDE 5 MG/ML
5 INJECTION, SOLUTION INTRAVENOUS
Status: DISCONTINUED | OUTPATIENT
Start: 2019-08-29 | End: 2019-08-29 | Stop reason: HOSPADM

## 2019-08-29 RX ORDER — HYDRALAZINE HYDROCHLORIDE 20 MG/ML
5 INJECTION INTRAMUSCULAR; INTRAVENOUS
Status: DISCONTINUED | OUTPATIENT
Start: 2019-08-29 | End: 2019-08-29 | Stop reason: HOSPADM

## 2019-08-29 RX ORDER — LIDOCAINE HYDROCHLORIDE 40 MG/ML
SOLUTION TOPICAL
Status: DISCONTINUED
Start: 2019-08-29 | End: 2019-08-29 | Stop reason: HOSPADM

## 2019-08-29 RX ORDER — LIDOCAINE HYDROCHLORIDE 10 MG/ML
3 INJECTION, SOLUTION INFILTRATION; PERINEURAL ONCE
Status: COMPLETED | OUTPATIENT
Start: 2019-08-29 | End: 2019-08-29

## 2019-08-29 RX ORDER — MIDAZOLAM HYDROCHLORIDE 1 MG/ML
2 INJECTION INTRAMUSCULAR; INTRAVENOUS
Status: DISCONTINUED | OUTPATIENT
Start: 2019-08-29 | End: 2019-08-29 | Stop reason: HOSPADM

## 2019-08-29 RX ORDER — LIDOCAINE HYDROCHLORIDE 20 MG/ML
INJECTION, SOLUTION INFILTRATION; PERINEURAL AS NEEDED
Status: DISCONTINUED | OUTPATIENT
Start: 2019-08-29 | End: 2019-08-29 | Stop reason: SURG

## 2019-08-29 RX ADMIN — MIDAZOLAM 1 MG: 1 INJECTION INTRAMUSCULAR; INTRAVENOUS at 08:26

## 2019-08-29 RX ADMIN — SODIUM CHLORIDE, POTASSIUM CHLORIDE, SODIUM LACTATE AND CALCIUM CHLORIDE 9 ML/HR: 600; 310; 30; 20 INJECTION, SOLUTION INTRAVENOUS at 08:26

## 2019-08-29 RX ADMIN — GLYCOPYRROLATE 0.2 MG: 0.2 INJECTION INTRAMUSCULAR; INTRAVENOUS at 09:11

## 2019-08-29 RX ADMIN — DIAZEPAM 10 MG: 5 TABLET ORAL at 06:39

## 2019-08-29 RX ADMIN — ONDANSETRON 4 MG: 2 INJECTION INTRAMUSCULAR; INTRAVENOUS at 09:12

## 2019-08-29 RX ADMIN — FENTANYL CITRATE 50 MCG: 50 INJECTION INTRAMUSCULAR; INTRAVENOUS at 09:20

## 2019-08-29 RX ADMIN — LIDOCAINE HYDROCHLORIDE 40 MG: 20 INJECTION, SOLUTION INFILTRATION; PERINEURAL at 09:13

## 2019-08-29 RX ADMIN — FENTANYL CITRATE 50 MCG: 50 INJECTION, SOLUTION INTRAMUSCULAR; INTRAVENOUS at 10:20

## 2019-08-29 RX ADMIN — LIDOCAINE HYDROCHLORIDE 3 ML: 10 INJECTION, SOLUTION INFILTRATION; PERINEURAL at 07:40

## 2019-08-29 RX ADMIN — DEXAMETHASONE SODIUM PHOSPHATE 8 MG: 10 INJECTION INTRAMUSCULAR; INTRAVENOUS at 09:38

## 2019-08-29 RX ADMIN — HYDROCODONE BITARTRATE AND ACETAMINOPHEN 1 TABLET: 7.5; 325 TABLET ORAL at 10:20

## 2019-08-29 RX ADMIN — FENTANYL CITRATE 50 MCG: 50 INJECTION, SOLUTION INTRAMUSCULAR; INTRAVENOUS at 10:26

## 2019-08-29 RX ADMIN — CEFAZOLIN SODIUM 2 G: 2 INJECTION, SOLUTION INTRAVENOUS at 09:18

## 2019-08-29 RX ADMIN — FAMOTIDINE 20 MG: 10 INJECTION INTRAVENOUS at 08:27

## 2019-08-29 RX ADMIN — SCOPALAMINE 1 PATCH: 1 PATCH, EXTENDED RELEASE TRANSDERMAL at 08:27

## 2019-08-29 RX ADMIN — PROPOFOL 200 MG: 10 INJECTION, EMULSION INTRAVENOUS at 09:13

## 2019-08-29 RX ADMIN — SODIUM CHLORIDE, POTASSIUM CHLORIDE, SODIUM LACTATE AND CALCIUM CHLORIDE: 600; 310; 30; 20 INJECTION, SOLUTION INTRAVENOUS at 09:10

## 2019-08-29 RX ADMIN — FENTANYL CITRATE 50 MCG: 50 INJECTION INTRAMUSCULAR; INTRAVENOUS at 09:41

## 2019-08-29 NOTE — ANESTHESIA POSTPROCEDURE EVALUATION
Patient: Camelia Quintanilla    Procedure Summary     Date:  08/29/19 Room / Location:   [facility]    Anesthesia Start:  0909 Anesthesia Stop:  1013    Procedure:  right breast needle localized lumpectomy for atypical duct hyperplasia. (Right Breast) Diagnosis:       Atypical ductal hyperplasia of right breast      (Atypical ductal hyperplasia of right breast [N60.91])    Surgeon:  Giovanna Collier MD Provider:  Aleks Castillo MD    Anesthesia Type:  general ASA Status:  3          Anesthesia Type: general  Last vitals  BP   133/71 (08/29/19 0607)   Temp   36.7 °C (98 °F) (08/29/19 0607)   Pulse   66 (08/29/19 0607)   Resp   16 (08/29/19 0607)     SpO2   94 % (08/29/19 0832)     Post Anesthesia Care and Evaluation    Patient location during evaluation: bedside  Patient participation: complete - patient participated  Level of consciousness: awake  Pain score: 2  Pain management: adequate  Airway patency: patent  Anesthetic complications: No anesthetic complications    Cardiovascular status: acceptable  Respiratory status: acceptable  Hydration status: acceptable    Comments: /71 (BP Location: Left arm, Patient Position: Sitting)   Pulse 66   Temp 36.7 °C (98 °F) (Oral)   Resp 16   LMP  (LMP Unknown)   SpO2 94%

## 2019-08-29 NOTE — ANESTHESIA PREPROCEDURE EVALUATION
Anesthesia Evaluation     Patient summary reviewed and Nursing notes reviewed   history of anesthetic complications: PONV  NPO Solid Status: > 8 hours  NPO Liquid Status: > 2 hours           Airway   Mallampati: II  Dental - normal exam     Pulmonary - normal exam   (+) asthma,   Cardiovascular - normal exam    (+) hypertension, PVD, hyperlipidemia,       Neuro/Psych  (+) headaches, psychiatric history Anxiety and Depression,     GI/Hepatic/Renal/Endo    (+)  GERD,  diabetes mellitus, hypothyroidism,     Musculoskeletal     Abdominal    Substance History      OB/GYN          Other   (+) arthritis                       Anesthesia Plan    ASA 3     general     Anesthetic plan, all risks, benefits, and alternatives have been provided, discussed and informed consent has been obtained with: patient.

## 2019-08-29 NOTE — ANESTHESIA PROCEDURE NOTES
Airway  Urgency: elective    Airway not difficult    General Information and Staff    Patient location during procedure: OR  Anesthesiologist: Aleks Castillo MD  CRNA: Mariah Alvarado CRNA    Indications and Patient Condition  Indications for airway management: airway protection    Preoxygenated: yes  Mask difficulty assessment: 1 - vent by mask    Final Airway Details  Final airway type: supraglottic airway      Successful airway: unique  Size 4    Number of attempts at approach: 1

## 2019-08-30 LAB
CYTO UR: NORMAL
LAB AP CASE REPORT: NORMAL
LAB AP CLINICAL INFORMATION: NORMAL
LAB AP INTRADEPARTMENTAL CONSULT: NORMAL
PATH REPORT.FINAL DX SPEC: NORMAL
PATH REPORT.GROSS SPEC: NORMAL

## 2019-09-03 ENCOUNTER — TELEPHONE (OUTPATIENT)
Dept: SURGERY | Facility: CLINIC | Age: 64
End: 2019-09-03

## 2019-09-03 NOTE — TELEPHONE ENCOUNTER
8-29-19 RIGHT NL lumpectomy returned as  No residual atypical hyperplasia.  We will let her know    Final Diagnosis   1.  Right Breast, Needle-Localized Lumpectomy (17 grams):                A.  Benign breast parenchyma with calcifications associated with sclerosing adenosis and columnar cell change.               B.  No residual atypical hyperplasia.                C.  Background breast with fibroadenoma and usual ductal hyperplasia.                D.  Clip and biopsy site changes present.      jab/brb

## 2019-09-10 ENCOUNTER — OFFICE VISIT (OUTPATIENT)
Dept: SURGERY | Facility: CLINIC | Age: 64
End: 2019-09-10

## 2019-09-10 VITALS
SYSTOLIC BLOOD PRESSURE: 116 MMHG | BODY MASS INDEX: 28.68 KG/M2 | HEIGHT: 64 IN | HEART RATE: 71 BPM | OXYGEN SATURATION: 98 % | DIASTOLIC BLOOD PRESSURE: 68 MMHG | WEIGHT: 168 LBS

## 2019-09-10 DIAGNOSIS — G89.29 OTHER CHRONIC PAIN: ICD-10-CM

## 2019-09-10 DIAGNOSIS — N60.11 FIBROCYSTIC DISEASE OF RIGHT BREAST: ICD-10-CM

## 2019-09-10 DIAGNOSIS — R92.8 ABNORMAL MRI, BREAST: Primary | ICD-10-CM

## 2019-09-10 DIAGNOSIS — R92.8 ABNORMAL ULTRASOUND OF BREAST: ICD-10-CM

## 2019-09-10 DIAGNOSIS — Z12.31 ENCOUNTER FOR SCREENING MAMMOGRAM FOR MALIGNANT NEOPLASM OF BREAST: ICD-10-CM

## 2019-09-10 DIAGNOSIS — M06.9 RHEUMATOID ARTHRITIS, INVOLVING UNSPECIFIED SITE, UNSPECIFIED RHEUMATOID FACTOR PRESENCE: ICD-10-CM

## 2019-09-10 DIAGNOSIS — N60.91 ATYPICAL DUCTAL HYPERPLASIA OF RIGHT BREAST: ICD-10-CM

## 2019-09-10 DIAGNOSIS — I20.8 ANGINA EFFORT: ICD-10-CM

## 2019-09-10 PROCEDURE — 99024 POSTOP FOLLOW-UP VISIT: CPT | Performed by: SURGERY

## 2019-09-12 ENCOUNTER — LAB (OUTPATIENT)
Dept: LAB | Facility: HOSPITAL | Age: 64
End: 2019-09-12

## 2019-09-12 ENCOUNTER — OFFICE VISIT (OUTPATIENT)
Dept: ONCOLOGY | Facility: CLINIC | Age: 64
End: 2019-09-12

## 2019-09-12 VITALS
BODY MASS INDEX: 29.04 KG/M2 | HEIGHT: 64 IN | TEMPERATURE: 97.7 F | WEIGHT: 170.1 LBS | DIASTOLIC BLOOD PRESSURE: 66 MMHG | OXYGEN SATURATION: 97 % | RESPIRATION RATE: 18 BRPM | SYSTOLIC BLOOD PRESSURE: 106 MMHG | HEART RATE: 74 BPM

## 2019-09-12 DIAGNOSIS — N60.99 ATYPICAL DUCTAL HYPERPLASIA OF BREAST: ICD-10-CM

## 2019-09-12 DIAGNOSIS — D64.9 ANEMIA, UNSPECIFIED TYPE: ICD-10-CM

## 2019-09-12 DIAGNOSIS — C50.919 MALIGNANT NEOPLASM OF FEMALE BREAST, UNSPECIFIED ESTROGEN RECEPTOR STATUS, UNSPECIFIED LATERALITY, UNSPECIFIED SITE OF BREAST (HCC): ICD-10-CM

## 2019-09-12 DIAGNOSIS — Z78.0 POST-MENOPAUSAL: ICD-10-CM

## 2019-09-12 DIAGNOSIS — Z79.811 USE OF ANASTROZOLE (ARIMIDEX): ICD-10-CM

## 2019-09-12 DIAGNOSIS — M81.0 OSTEOPOROSIS, UNSPECIFIED OSTEOPOROSIS TYPE, UNSPECIFIED PATHOLOGICAL FRACTURE PRESENCE: Primary | ICD-10-CM

## 2019-09-12 DIAGNOSIS — C50.219 MALIGNANT NEOPLASM OF UPPER-INNER QUADRANT OF FEMALE BREAST, UNSPECIFIED ESTROGEN RECEPTOR STATUS, UNSPECIFIED LATERALITY (HCC): ICD-10-CM

## 2019-09-12 DIAGNOSIS — K58.9 IRRITABLE BOWEL SYNDROME WITHOUT DIARRHEA: ICD-10-CM

## 2019-09-12 DIAGNOSIS — M06.9 RHEUMATOID ARTHRITIS, INVOLVING UNSPECIFIED SITE, UNSPECIFIED RHEUMATOID FACTOR PRESENCE: ICD-10-CM

## 2019-09-12 LAB
ALBUMIN SERPL-MCNC: 4.3 G/DL (ref 3.5–5.2)
ALBUMIN/GLOB SERPL: 1.6 G/DL (ref 1.1–2.4)
ALP SERPL-CCNC: 79 U/L (ref 38–116)
ALT SERPL W P-5'-P-CCNC: 19 U/L (ref 0–33)
ANION GAP SERPL CALCULATED.3IONS-SCNC: 13.9 MMOL/L (ref 5–15)
AST SERPL-CCNC: 15 U/L (ref 0–32)
BASOPHILS # BLD AUTO: 0.01 10*3/MM3 (ref 0–0.2)
BASOPHILS NFR BLD AUTO: 0.1 % (ref 0–1.5)
BILIRUB SERPL-MCNC: 0.5 MG/DL (ref 0.2–1.2)
BUN BLD-MCNC: 19 MG/DL (ref 6–20)
BUN/CREAT SERPL: 20 (ref 7.3–30)
CALCIUM SPEC-SCNC: 9.2 MG/DL (ref 8.5–10.2)
CHLORIDE SERPL-SCNC: 106 MMOL/L (ref 98–107)
CO2 SERPL-SCNC: 24.1 MMOL/L (ref 22–29)
CREAT BLD-MCNC: 0.95 MG/DL (ref 0.6–1.1)
DEPRECATED RDW RBC AUTO: 48.6 FL (ref 37–54)
EOSINOPHIL # BLD AUTO: 0 10*3/MM3 (ref 0–0.4)
EOSINOPHIL NFR BLD AUTO: 0 % (ref 0.3–6.2)
ERYTHROCYTE [DISTWIDTH] IN BLOOD BY AUTOMATED COUNT: 13.5 % (ref 12.3–15.4)
GFR SERPL CREATININE-BSD FRML MDRD: 59 ML/MIN/1.73
GLOBULIN UR ELPH-MCNC: 2.7 GM/DL (ref 1.8–3.5)
GLUCOSE BLD-MCNC: 107 MG/DL (ref 74–124)
HCT VFR BLD AUTO: 43.9 % (ref 34–46.6)
HGB BLD-MCNC: 13.7 G/DL (ref 12–15.9)
IMM GRANULOCYTES # BLD AUTO: 0.02 10*3/MM3 (ref 0–0.05)
IMM GRANULOCYTES NFR BLD AUTO: 0.3 % (ref 0–0.5)
LYMPHOCYTES # BLD AUTO: 2.54 10*3/MM3 (ref 0.7–3.1)
LYMPHOCYTES NFR BLD AUTO: 34.5 % (ref 19.6–45.3)
MCH RBC QN AUTO: 30.6 PG (ref 26.6–33)
MCHC RBC AUTO-ENTMCNC: 31.2 G/DL (ref 31.5–35.7)
MCV RBC AUTO: 98 FL (ref 79–97)
MONOCYTES # BLD AUTO: 0.47 10*3/MM3 (ref 0.1–0.9)
MONOCYTES NFR BLD AUTO: 6.4 % (ref 5–12)
NEUTROPHILS # BLD AUTO: 4.33 10*3/MM3 (ref 1.7–7)
NEUTROPHILS NFR BLD AUTO: 58.7 % (ref 42.7–76)
NRBC BLD AUTO-RTO: 0 /100 WBC (ref 0–0.2)
PLATELET # BLD AUTO: 207 10*3/MM3 (ref 140–450)
PMV BLD AUTO: 10.6 FL (ref 6–12)
POTASSIUM BLD-SCNC: 3.7 MMOL/L (ref 3.5–4.7)
PROT SERPL-MCNC: 7 G/DL (ref 6.3–8)
RBC # BLD AUTO: 4.48 10*6/MM3 (ref 3.77–5.28)
SODIUM BLD-SCNC: 144 MMOL/L (ref 134–145)
WBC NRBC COR # BLD: 7.37 10*3/MM3 (ref 3.4–10.8)

## 2019-09-12 PROCEDURE — 85025 COMPLETE CBC W/AUTO DIFF WBC: CPT | Performed by: INTERNAL MEDICINE

## 2019-09-12 PROCEDURE — G0463 HOSPITAL OUTPT CLINIC VISIT: HCPCS | Performed by: INTERNAL MEDICINE

## 2019-09-12 PROCEDURE — 36415 COLL VENOUS BLD VENIPUNCTURE: CPT | Performed by: INTERNAL MEDICINE

## 2019-09-12 PROCEDURE — 80053 COMPREHEN METABOLIC PANEL: CPT | Performed by: INTERNAL MEDICINE

## 2019-09-12 PROCEDURE — 99214 OFFICE O/P EST MOD 30 MIN: CPT | Performed by: INTERNAL MEDICINE

## 2019-09-12 RX ORDER — ANASTROZOLE 1 MG/1
1 TABLET ORAL DAILY
Qty: 30 TABLET | Refills: 5 | Status: SHIPPED | OUTPATIENT
Start: 2019-09-12 | End: 2019-10-12

## 2019-09-12 NOTE — PROGRESS NOTES
Subjective     REASON FOR CONSULTATION:  1.  Focal atypical ductal hyperplasia of the right breast who was to be scheduled for right breast lumpectomy,    2.  MRI showed a lesion in the left breast which was atypical and patient underwent ultrasound-guided biopsy of the left breast nodule.  Pathology from July 22, 2019 on the right breast biopsy in the office showed atypical duct hyperplasia involving the single gland, 0.22 mm maximally.      RECORDS OBTAINED:  Records of the patients history including those obtained from the referring provider were reviewed and summarized in detail.    HISTORY OF PRESENT ILLNESS:  The patient is a 64 y.o. year old female who is here for an opinion about the above issue.    History of Present Illness   Patient is a 64 year-old female with history of atypical duct hyperplasia diagnosed back in July 2019 and was done on the right breast biopsy.  More recently Dr. Collier took her on August 29, 2019 for right needle localization lumpectomy but pathology is negative and there is no residual atypical hyperplasia present.    Patient was referred here for chemoprophylaxis given history of atypical duct hyperplasia.  Patient also has been arranged for MRI as well as mammogram in 2020.  She has been asked for self breast exam.  In the meantime we will start her on chemoprophylaxis now that we have the lumpectomy specimen, we will start Arimidex 1 mg daily.      Oncologic history:     Patient is a 64-year-old female who underwent mammogram at [facility] in September 2018 and was asked to repeat diagnostic mammogram and ultrasound in 6 months    September 10, 2018  Findings  Breast is heterogeneously dense.  There is a small nodule projecting in the left breast centrally.  Small superficial nodule left lateral subareolar location stable.  Small nodule has also developed in the outer hemisphere right breast middle third.  Both nodules are imaged.  Larger nodule in the more central  aspect of right breast containing a single coarse calcification appears stable.  Additional bilateral lateral breast imaging is recommended to include true lateral projections.  Suggested spot compression views and targeted breast ultrasound.    October 18, 2018:  Bilateral digital diagnostic mammogram and diagnostic bilateral ultrasound done  Impression: Probable benign mammogram  1.  Benign-appearing simple cyst in the deep central left breast corresponding to the new mammographic density.  This can be followed up with yearly mammograms.  2.  Solid appearing nodule in the right breast along 8:00 which represented the developing nodular density on mammography and has likely been stable for several years.  This is favored to represent probably a benign fibroadenoma and can be followed up with right breast mammogram and ultrasound in 6 months to ensure stability.    6/27/2019: Diagnostic right digital mammogram and diagnostic right breast ultrasound  Right breast mass 8 to 9 o'clock position, 6.6 cm from the nipple, stable with circumscribed nodular lobular margins measuring 1.1 cm AP x0.7 cm.    Right breast focal asymmetry 10:00 to 11 o'clock position, 5 cm from the nipple contains stable coarse calcification which appears to have become larger on only the right spot since October 2018.    Ultrasound right breast the solid and cystic mass in the right breast at 8 o'clock position at 4 cm from the nipple is stable measuring 0.4 x 0.5 x 1 cm.  On October 11, 2018 ultrasound measured 0.5 x 1 x 0.5 cm and appeared to contain solid components on today's exam the mass consists of solid components and 2 small cystic areas within without thin septations.  The right breast sonographic irregularity mostly hypoechoic area 11:00 at 3 cm from the nipple measured 0.7 x 1.1 x 1.6 cm.  On October 11, 2018 this area measured 0.9 x 1.8 x 1.5 cm.  There is a new solid mural nodule 0.4 x 0.7 cm.  The new nodule is concerning for  possible malignancy.    Impression the right breast ultrasound mixed solid and cystic mass at 8 o'clock position, 4 cm from the nipple needs to be biopsied under ultrasound guidance.    2.  The breast right ultrasound calcified area at 11:00, 3 cm from the nipple needs to be biopsied under ultrasound guidance because of a new solid hypoechoic mural nodule.    July 22, 2019: Pathology  Ultrasound-guided biopsy  Final Diagnosis   1.  Breast, Right 8 o'clock Position, 4 cm FN, Core Biopsy:                 A.  Focal atypical duct hyperplasia involving a single gland, 0.22 mm maximally.               B.  Clustered cysts, sclerosing adenosis, and fibroadenomatoid hyperplasia with associated microcalcifications.       2.  Breast, Right 11 o'clock Position, 3 cm FN, Core Biopsy:                A.  Duct ectasia, apocrine metaplasia, sclerosing adenosis, and usual duct hyperplasia with associated                     microcalcifications.      August 7, 2019: Bilateral breast MRI  Artifact from a biopsy marking clip upper outer quadrant right breast.  No abnormal enhancement adjacent to the clips.  There is a 9 mm diameter nodule lateral retroareolar left breast immediately beneath the skin surface most likely an intramammary lymph node or fibroadenoma.  Further evaluation with ultrasound is recommended.  BI-RADS 0.     Right mammogram same day shows 2 new marking clips upper outer quadrant both are spring shaped.  No underlying mammographic abnormality.    August 12, 2019: Left breast ultrasound, retroareolar left breast there is microlobulated hypoechoic mass that measures 8 mm ultrasound-guided core biopsy recommended.    Patient underwent left breast ultrasound-guided biopsy by Dr. Collier as of August 13, 2019.    Patient was to have surgery on the right breast but because of this new lesion on the left breast that was seen on MRI she had to undergo biopsy first of the right breast lesion prior to surgery.    Patient is  to hear from Dr. Collier about the surgery date    Obstetric/Gynecologic History:  Age menstrual periods began: 13  Patient is postmenopausal due to removal of her uterus and both ovaries in the following year: 9902-6296   Number of pregnancies:1  Number of live births: 1  Number of abortions or miscarriages: 0  Age of delivery of first child: 21  Patient did not breast feed.  Length of time taking birth control pills: 4 yrs   Patient took hormone replacement during the following dates:  10 yrs  Patient had uterus and ovaries removed.       Past Medical History:   Diagnosis Date   • Asthma    • Breast lump     RIGHT   • Bronchiectasis (CMS/HCC)    • Depression with anxiety 9/21/2016   • Disease of thyroid gland     HYPOTHYROIDISM   • Fibrocystic breast    • Fibromyalgia syndrome 6/6/2017   • GERD (gastroesophageal reflux disease)    • Heart palpitations    • History of thyroid nodule    • HLD (hyperlipidemia) 9/21/2016   • Hypertension    • Hypothyroidism    • IBS (irritable bowel syndrome)    • Moderate persistent asthma without complication 6/6/2017   • PONV (postoperative nausea and vomiting)    • Precordial pain 9/21/2016   • Rheumatoid arthritis (CMS/HCC)    • Scoliosis    • Thrush    • Vitamin D deficiency 9/21/2016        Past Surgical History:   Procedure Laterality Date   • ABDOMINAL HERNIA REPAIR     • APPENDECTOMY     • BREAST BIOPSY      right and left benign 10-15 yrs ago   • BREAST LUMPECTOMY Right 8/29/2019    Procedure: right breast needle localized lumpectomy for atypical duct hyperplasia.;  Surgeon: Giovanna Collier MD;  [place] [facility] [place] [facility] [place]  Service: General   • BRONCHOSCOPY N/A 5/11/2018    Procedure: BRONCHOSCOPY;  Surgeon: Emma Blakely MD;  Location: [facility] ENDOSCOPY;  Service: Pulmonary   • CARDIAC CATHETERIZATION     • CHOLECYSTECTOMY     • COLONOSCOPY  2015   • ENDOSCOPY  2015   • EPIDURAL BLOCK     • HERNIA REPAIR     • HYSTERECTOMY     • KNEE SURGERY     • OOPHORECTOMY     •  TONSILLECTOMY     • WRIST FUSION Right         Current Outpatient Medications on File Prior to Visit   Medication Sig Dispense Refill   • albuterol sulfate  (90 Base) MCG/ACT inhaler Inhale 2 puffs Every 4 (Four) Hours As Needed for Wheezing.     • amLODIPine (NORVASC) 2.5 MG tablet Take 2.5 mg by mouth Every Night.     • atorvastatin (LIPITOR) 20 MG tablet Take 20 mg by mouth Daily.     • baclofen (LIORESAL) 10 MG tablet Take 10 mg by mouth Daily.     • budesonide-formoterol (SYMBICORT) 160-4.5 MCG/ACT inhaler Inhale 2 puffs 2 (Two) Times a Day.     • celecoxib (CeleBREX) 200 MG capsule Take 200 mg by mouth 2 (two) times a day.     • clotrimazole-betamethasone (LOTRISONE) 1-0.05 % cream Apply  topically to the appropriate area as directed 2 (Two) Times a Day As Needed.     • diphenhydrAMINE-acetaminophen (TYLENOL PM)  MG tablet per tablet Take 1 tablet by mouth At Night As Needed for Sleep.     • furosemide (LASIX) 20 MG tablet Take 20 mg by mouth As Needed.     • gabapentin (NEURONTIN) 300 MG capsule Take 300 mg by mouth 2 (Two) Times a Day.     • HYDROcodone-acetaminophen (NORCO)  MG per tablet Take 1 tablet by mouth Daily As Needed.     • hydroxychloroquine (PLAQUENIL) 200 MG tablet Take 200 mg by mouth 2 (Two) Times a Day.     • hyoscyamine (LEVSIN) 0.125 MG SL tablet Dissolve one Tablet Under The Toungue Every 6  Hours as Needed for cramping (Patient taking differently: Take 0.125 mg by mouth Every 6 (Six) Hours As Needed. Dissolve one Tablet Under The Toungue Every 6  Hours as Needed for cramping) 30 tablet 3   • isosorbide mononitrate (IMDUR) 120 MG 24 hr tablet Take 120 mg by mouth Every Morning.     • levothyroxine (SYNTHROID, LEVOTHROID) 75 MCG tablet Take 75 mcg by mouth Daily.     • montelukast (SINGULAIR) 10 MG tablet Take 1 tablet by mouth Every Night. 30 tablet 12   • nebivolol (BYSTOLIC) 5 MG tablet Take 10 mg by mouth 2 (Two) Times a Day.     • nitroglycerin (NITROSTAT) 0.4 MG SL  tablet Place 0.4 mg under the tongue every 5 (five) minutes.     • nystatin (MYCOSTATIN) 013452 UNIT/ML suspension Swish and swallow 500,000 Units 2 (Two) Times a Day.     • pantoprazole (PROTONIX) 40 MG EC tablet Take 1 tablet by mouth Every Night. 90 tablet 3   • polyethylene glycol (MIRALAX) packet Take 17 g by mouth Daily.     • ranolazine (RANEXA) 1000 MG 12 hr tablet Take 1,000 mg by mouth Every 12 (Twelve) Hours.     • SPIRIVA RESPIMAT 1.25 MCG/ACT aerosol solution inhaler Inhale 1 puff Every Morning.     • vitamin D (ERGOCALCIFEROL) 66208 UNITS capsule capsule Take 50,000 Units by mouth Every 7 (Seven) Days.     • albuterol (PROVENTIL) (2.5 MG/3ML) 0.083% nebulizer solution Take 2.5 mg by nebulization Every 6 (Six) Hours As Needed for Wheezing (asthma).     • fluticasone (FLONASE) 50 MCG/ACT nasal spray 1 spray into each nostril 2 (Two) Times a Day. (Patient taking differently: 1 spray into the nostril(s) as directed by provider 2 (Two) Times a Day As Needed.) 16 g 12   • Simethicone (GAS RELIEF EXTRA STRENGTH PO) As Needed.     • vitamin C (ASCORBIC ACID) 500 MG tablet Take 500 mg by mouth 2 (Two) Times a Day.       No current facility-administered medications on file prior to visit.         ALLERGIES:    Allergies   Allergen Reactions   • Adhesive Tape Swelling     Must use paper tape   • Morphine And Related Hives and Itching   • Metal Rash      Patient is disabled, she is to work at [employer] in the past.  She does not smoke or drink alcohol.    Social History     Socioeconomic History   • Marital status:      Spouse name: Zbigniew   • Number of children: Not on file   • Years of education: High school   • Highest education level: Not on file   Occupational History     Employer: DISABLED   Tobacco Use   • Smoking status: Never Smoker   • Smokeless tobacco: Never Used   Substance and Sexual Activity   • Alcohol use: No   • Drug use: No        Family History   Problem Relation Age of Onset  "  • COPD Mother    • Cancer Mother         heart tumor   • Heart disease Mother    • COPD Father    • Hypertension Father    • No Known Problems Sister    • Cancer Brother         testicular   • Breast cancer Paternal Aunt         unknown age    • Heart disease Maternal Grandmother    • Malig Hyperthermia Neg Hx      History is consistent with chest wall cancer in mother who  of cancer at age 68, unsure what type.  Brother had testicular cancer at age 2 but is now 59 and in good health.  Maternal aunt with history of breast cancer in her 40s and  from it    Review of Systems   Constitutional: Negative for appetite change, chills, diaphoresis, fatigue, fever and unexpected weight change.   HENT: Negative for hearing loss, sore throat and trouble swallowing.    Respiratory: Negative for cough, chest tightness, shortness of breath and wheezing.    Cardiovascular: Negative for chest pain, palpitations and leg swelling.   Gastrointestinal: Negative for abdominal distention, abdominal pain, constipation, diarrhea, nausea and vomiting.   Genitourinary: Negative for dysuria, frequency, hematuria and urgency.   Musculoskeletal: Negative for joint swelling.        No muscle weakness.   Skin: Negative for rash and wound.   Neurological: Negative for seizures, syncope, speech difficulty, weakness, numbness and headaches.   Hematological: Negative for adenopathy. Does not bruise/bleed easily.   Psychiatric/Behavioral: Negative for behavioral problems, confusion and suicidal ideas.        Objective     Vitals:    19 0818   BP: 106/66   Pulse: 74   Resp: 18   Temp: 97.7 °F (36.5 °C)   TempSrc: Oral   SpO2: 97%   Weight: 77.2 kg (170 lb 1.6 oz)   Height: 162.6 cm (64.02\")   PainSc: 0-No pain     Current Status 2019   ECOG score 0       Physical Exam      GENERAL:  Well-developed, well-nourished in no acute distress.   SKIN:  Warm, dry without rashes, purpura or petechiae.  EYES:  Pupils equal, round and reactive " to light.  EOMs intact.  Conjunctivae normal.  EARS:  Hearing intact.  NOSE:  Septum midline.  No excoriations or nasal discharge.  MOUTH:  Tongue is well-papillated; no stomatitis or ulcers.  Lips normal.  THROAT:  Oropharynx without lesions or exudates.  NECK:  Supple with good range of motion; no thyromegaly or masses, no JVD.  LYMPHATICS:  No cervical, supraclavicular, axillary or inguinal adenopathy.  CHEST:  Lungs clear to auscultation. Good airflow.  BREAST: Right breast: No skin changes, no evidence of breast mass, no nipple discharge, no evidence of any right axillary adenopathy or right supraclavicular adenopathy, status post right breast biopsy  Left breast: No evidence of any skin changes, no evidence of any left breast mass and no evidence of left nipple discharge as well as no left axillary adenopathy or left supraclavicular adenopathy.,  Status post left breast biopsy today  CARDIAC:  Regular rate and rhythm without murmurs, rubs or gallops. Normal S1,S2.  ABDOMEN:  Soft, nontender with no hepatosplenomegaly or masses.  EXTREMITIES:  No clubbing, cyanosis or edema.  NEUROLOGICAL:  Cranial Nerves II-XII grossly intact.  No focal neurological deficits.  PSYCHIATRIC:  Normal affect and mood.        RECENT LABS:  Hematology WBC   Date Value Ref Range Status   09/12/2019 7.37 3.40 - 10.80 10*3/mm3 Final   07/18/2019 5.81 4.5 - 11.0 10*3/uL Final     RBC   Date Value Ref Range Status   09/12/2019 4.48 3.77 - 5.28 10*6/mm3 Final   07/18/2019 4.41 4.0 - 5.2 10*6/uL Final     Hemoglobin   Date Value Ref Range Status   09/12/2019 13.7 12.0 - 15.9 g/dL Final   07/18/2019 13.1 12.0 - 16.0 g/dL Final     Hematocrit   Date Value Ref Range Status   09/12/2019 43.9 34.0 - 46.6 % Final   07/18/2019 43.3 36.0 - 46.0 % Final     Platelets   Date Value Ref Range Status   09/12/2019 207 140 - 450 10*3/mm3 Final   07/18/2019 235 140 - 440 10*3/uL Final           BI-RADS CATEGORY 1: Negative.    Tissue Pathology Right  Breast 07/22/19  Final Diagnosis   1.  Breast, Right 8 o'clock Position, 4 cm FN, Core Biopsy:                 A.  Focal atypical duct hyperplasia involving a single gland, 0.22 mm maximally.               B.  Clustered cysts, sclerosing adenosis, and fibroadenomatoid hyperplasia with associated microcalcifications.       2.  Breast, Right 11 o'clock Position, 3 cm FN, Core Biopsy:                A.  Duct ectasia, apocrine metaplasia, sclerosing adenosis, and usual duct hyperplasia with associated                     microcalcifications.           Assessment/Plan     1.  Focal atypical ductal hyperplasia of the right breast at 8 o'clock position.   Right breast, 8 o'clock position, 4 cm from the nipple, path returned as focal atypical duct hyperplasia involving the single gland, 0.22 mm.sclerosing adenosis, fibroadenomatoid hyperplasia, associated micro calcifications.  · August 29, 2019 patient underwent needle localization lumpectomy on the right breast and there was no evidence of any atypical hyperplasia.  Reviewed the pathology.  · Patient has been referred here for chemoprophylaxis and discussed in length the side effects of Arimidex.       Right breast, 11 o'clock position, 3 cm from the nipple 1.6 cm on ultrasound.  Pathology from July 2019 right breast biopsies  duct ectasia, apocrine metaplasia, sclerosing adenosis, usual duct hyperplasia, associated micro calcifications    2.  Left breast, 3 o'clock position, MRI done shows abnormality, 9 mm nodular nodule lateral retroareolar left breast immediately beneath the skin surface most likely intramammary lymph node or fibroadenoma.  Ultrasound recommended.  August 12, 2019 left breast ultrasound: Retroareolar left breast, 3 o'clock position, microlobulated hypoechoic mass which measures 8 mm.  Ultrasound-guided biopsy done on August 13, 2019.  Results pending.    · Patient is now awaiting surgery by Dr. Collier on both breasts.  · Risk assessment done based  on family history, reproductive history and recent atypical biopsy.  The 5-year breast cancer risk is 4.2%.  And lifetime breast cancer risk is 17 to 31%.  · Patient has been referred here but given that she is undergoing surgery we would need to await the path on surgery prior to making any final recommendations.  · If only atypical duct hyperplasia present then certainly she could be a candidate for chemoprophylaxis.  But given that she has not yet had surgery, she tells me she is undergoing lumpectomy , and await surgery prior to making any recommendation  · August 29, 2019 underwent right needle localization lumpectomy and pathology on this is benign    3.  History of fibromyalgia, followed by Dr. Holm    4.  Atypical angina, cardiac cath negative followed by Dr. Jett    5.  Rheumatoid arthritis followed by Dr. Keller on Celebrex and Plaquenil    Plan:  1. Start Arimidex 1 mg daily  2. Reviewed pathology from August 29, 2019  3. In 6 weeks for toxicity check with nurse practitioner  4. Follow-up with me in 4 months with labs        Kayla Saldivar MD

## 2019-09-26 ENCOUNTER — TELEPHONE (OUTPATIENT)
Dept: ONCOLOGY | Facility: CLINIC | Age: 64
End: 2019-09-26

## 2019-09-26 DIAGNOSIS — N64.89 OTHER SPECIFIED DISORDERS OF BREAST: ICD-10-CM

## 2019-09-26 DIAGNOSIS — N64.9 DISORDER OF BREAST: ICD-10-CM

## 2019-09-26 DIAGNOSIS — N60.99 ATYPICAL DUCTAL HYPERPLASIA OF BREAST: ICD-10-CM

## 2019-09-26 DIAGNOSIS — D50.0 IRON DEFICIENCY ANEMIA DUE TO CHRONIC BLOOD LOSS: Primary | ICD-10-CM

## 2019-09-26 NOTE — TELEPHONE ENCOUNTER
Pt. States she has been on the arimidex 1mg daily for about 2 wks now.  Pt. Reports headaches everyday and some days it is off and on all day long.  Pt. Was treated for migraines with topamax years ago from her pcp.  Hasn't had a migraine for sometime now.  No nausea or vomiting.  No visual changes.  steady of feet.   All d/w dr. Saldivar, pt. Instructed to stop the arimidex for now.  Will bring her back at the end of October to see a np when dr. Saldivar is in the office.  Will send a message to the appt desk to call pt. With date and time.  Understanding noted.

## 2019-09-26 NOTE — TELEPHONE ENCOUNTER
----- Message from Irma Moran sent at 9/26/2019 11:50 AM EDT -----  Contact: [phone]  Pt been having headaches a lot.

## 2019-10-10 ENCOUNTER — HOSPITAL ENCOUNTER (OUTPATIENT)
Dept: BONE DENSITY | Facility: HOSPITAL | Age: 64
Discharge: HOME | End: 2019-10-10
Admitting: INTERNAL MEDICINE

## 2019-10-10 DIAGNOSIS — Z79.811 USE OF ANASTROZOLE (ARIMIDEX): ICD-10-CM

## 2019-10-10 DIAGNOSIS — M81.0 OSTEOPOROSIS, UNSPECIFIED OSTEOPOROSIS TYPE, UNSPECIFIED PATHOLOGICAL FRACTURE PRESENCE: ICD-10-CM

## 2019-10-10 DIAGNOSIS — Z78.0 POST-MENOPAUSAL: ICD-10-CM

## 2019-10-10 DIAGNOSIS — C50.919 MALIGNANT NEOPLASM OF FEMALE BREAST, UNSPECIFIED ESTROGEN RECEPTOR STATUS, UNSPECIFIED LATERALITY, UNSPECIFIED SITE OF BREAST (HCC): ICD-10-CM

## 2019-10-10 PROCEDURE — 77080 DXA BONE DENSITY AXIAL: CPT

## 2019-10-11 ENCOUNTER — OFFICE VISIT (OUTPATIENT)
Dept: FAMILY MEDICINE CLINIC | Facility: CLINIC | Age: 64
End: 2019-10-11

## 2019-10-11 VITALS
HEIGHT: 64 IN | OXYGEN SATURATION: 98 % | WEIGHT: 169.2 LBS | TEMPERATURE: 98.8 F | DIASTOLIC BLOOD PRESSURE: 72 MMHG | BODY MASS INDEX: 28.89 KG/M2 | SYSTOLIC BLOOD PRESSURE: 112 MMHG | HEART RATE: 80 BPM

## 2019-10-11 DIAGNOSIS — E78.00 PURE HYPERCHOLESTEROLEMIA: ICD-10-CM

## 2019-10-11 DIAGNOSIS — I10 ESSENTIAL HYPERTENSION: Primary | ICD-10-CM

## 2019-10-11 DIAGNOSIS — E03.9 HYPOTHYROIDISM, UNSPECIFIED TYPE: ICD-10-CM

## 2019-10-11 PROCEDURE — G0009 ADMIN PNEUMOCOCCAL VACCINE: HCPCS | Performed by: INTERNAL MEDICINE

## 2019-10-11 PROCEDURE — 90674 CCIIV4 VAC NO PRSV 0.5 ML IM: CPT | Performed by: INTERNAL MEDICINE

## 2019-10-11 PROCEDURE — G0008 ADMIN INFLUENZA VIRUS VAC: HCPCS | Performed by: INTERNAL MEDICINE

## 2019-10-11 PROCEDURE — 99214 OFFICE O/P EST MOD 30 MIN: CPT | Performed by: INTERNAL MEDICINE

## 2019-10-11 PROCEDURE — 90732 PPSV23 VACC 2 YRS+ SUBQ/IM: CPT | Performed by: INTERNAL MEDICINE

## 2019-10-11 RX ORDER — ALENDRONATE SODIUM 70 MG/1
70 TABLET ORAL
Qty: 4 TABLET | Refills: 3 | Status: SHIPPED | OUTPATIENT
Start: 2019-10-11 | End: 2020-08-21

## 2019-10-11 NOTE — PROGRESS NOTES
Subjective   Camelia Quintanilla is a 64 y.o. female.     History of Present Illness   Patient was seen for hypertension.  Blood pressures been running 120s over 80s.  Cholesterol is being controlled with her diet and exercise.  Patient is on levothyroxine to help control her hypothyroidism.    Dictated utilizing Dragon dictation. If there are questions or for further clarification, please contact me.  The following portions of the patient's history were reviewed and updated as appropriate: allergies, current medications, past family history, past medical history, past social history, past surgical history and problem list.    Review of Systems   Constitutional: Negative for fatigue and fever.   HENT: Positive for congestion. Negative for trouble swallowing.    Eyes: Negative for discharge and visual disturbance.   Respiratory: Negative for choking and shortness of breath.    Cardiovascular: Negative for chest pain and palpitations.   Gastrointestinal: Negative for abdominal pain and blood in stool.   Endocrine: Negative.    Genitourinary: Negative for genital sores and hematuria.   Musculoskeletal: Negative for gait problem and joint swelling.   Skin: Negative for color change, pallor, rash and wound.   Allergic/Immunologic: Positive for environmental allergies. Negative for immunocompromised state.   Neurological: Negative for facial asymmetry and speech difficulty.   Psychiatric/Behavioral: Negative for hallucinations and suicidal ideas.       Objective   Physical Exam   Constitutional: She is oriented to person, place, and time. She appears well-developed and well-nourished.   HENT:   Head: Normocephalic.   Eyes: Conjunctivae are normal. Pupils are equal, round, and reactive to light.   Neck: Normal range of motion. Neck supple.   Cardiovascular: Normal rate, regular rhythm and normal heart sounds.   Pulmonary/Chest: Effort normal and breath sounds normal.   Abdominal: Soft. Bowel sounds are normal.   Musculoskeletal:  Normal range of motion.   Neurological: She is alert and oriented to person, place, and time.   Skin: Skin is warm and dry.   Psychiatric: She has a normal mood and affect. Her behavior is normal. Judgment and thought content normal.   Nursing note and vitals reviewed.      Assessment/Plan #1 labs #2 continue to monitor blood pressure at home #3 follow-up 6 months  Camelia was seen today for follow-up.    Diagnoses and all orders for this visit:    Essential hypertension    Pure hypercholesterolemia    Hypothyroidism, unspecified type    Other orders  -     Flucelvax Quad=>4Years ([id number])  -     Pneumococcal Polysaccharide Vaccine 23-Valent Greater Than or Equal To 3yo Subcutaneous / IM  -     alendronate (FOSAMAX) 70 MG tablet; Take 1 tablet by mouth Every 7 (Seven) Days. Sit up one hour do not eat one hour with 8oz water

## 2019-10-25 ENCOUNTER — OFFICE VISIT (OUTPATIENT)
Dept: ONCOLOGY | Facility: CLINIC | Age: 64
End: 2019-10-25

## 2019-10-25 ENCOUNTER — LAB (OUTPATIENT)
Dept: LAB | Facility: HOSPITAL | Age: 64
End: 2019-10-25

## 2019-10-25 VITALS
BODY MASS INDEX: 30.33 KG/M2 | OXYGEN SATURATION: 95 % | HEART RATE: 69 BPM | SYSTOLIC BLOOD PRESSURE: 122 MMHG | HEIGHT: 63 IN | DIASTOLIC BLOOD PRESSURE: 75 MMHG | WEIGHT: 171.2 LBS | TEMPERATURE: 98.3 F | RESPIRATION RATE: 16 BRPM

## 2019-10-25 DIAGNOSIS — M81.0 OSTEOPOROSIS, UNSPECIFIED OSTEOPOROSIS TYPE, UNSPECIFIED PATHOLOGICAL FRACTURE PRESENCE: ICD-10-CM

## 2019-10-25 DIAGNOSIS — N60.91 ATYPICAL DUCTAL HYPERPLASIA OF RIGHT BREAST: Primary | ICD-10-CM

## 2019-10-25 DIAGNOSIS — C50.219 MALIGNANT NEOPLASM OF UPPER-INNER QUADRANT OF FEMALE BREAST, UNSPECIFIED ESTROGEN RECEPTOR STATUS, UNSPECIFIED LATERALITY (HCC): ICD-10-CM

## 2019-10-25 DIAGNOSIS — G44.40 HEADACHE, DRUG INDUCED: ICD-10-CM

## 2019-10-25 LAB
ALBUMIN SERPL-MCNC: 4.3 G/DL (ref 3.5–5.2)
ALBUMIN/GLOB SERPL: 1.4 G/DL (ref 1.1–2.4)
ALP SERPL-CCNC: 74 U/L (ref 38–116)
ALT SERPL W P-5'-P-CCNC: 18 U/L (ref 0–33)
ANION GAP SERPL CALCULATED.3IONS-SCNC: 10.6 MMOL/L (ref 5–15)
AST SERPL-CCNC: 21 U/L (ref 0–32)
BASOPHILS # BLD AUTO: 0 10*3/MM3 (ref 0–0.2)
BASOPHILS NFR BLD AUTO: 0 % (ref 0–1.5)
BILIRUB SERPL-MCNC: 0.4 MG/DL (ref 0.2–1.2)
BUN BLD-MCNC: 18 MG/DL (ref 6–20)
BUN/CREAT SERPL: 19.4 (ref 7.3–30)
CALCIUM SPEC-SCNC: 9.5 MG/DL (ref 8.5–10.2)
CHLORIDE SERPL-SCNC: 104 MMOL/L (ref 98–107)
CO2 SERPL-SCNC: 28.4 MMOL/L (ref 22–29)
CREAT BLD-MCNC: 0.93 MG/DL (ref 0.6–1.1)
DEPRECATED RDW RBC AUTO: 48.4 FL (ref 37–54)
EOSINOPHIL # BLD AUTO: 0 10*3/MM3 (ref 0–0.4)
EOSINOPHIL NFR BLD AUTO: 0 % (ref 0.3–6.2)
ERYTHROCYTE [DISTWIDTH] IN BLOOD BY AUTOMATED COUNT: 13.3 % (ref 12.3–15.4)
GFR SERPL CREATININE-BSD FRML MDRD: 61 ML/MIN/1.73
GLOBULIN UR ELPH-MCNC: 3 GM/DL (ref 1.8–3.5)
GLUCOSE BLD-MCNC: 96 MG/DL (ref 74–124)
HCT VFR BLD AUTO: 44.5 % (ref 34–46.6)
HGB BLD-MCNC: 13.9 G/DL (ref 12–15.9)
IMM GRANULOCYTES # BLD AUTO: 0.03 10*3/MM3 (ref 0–0.05)
IMM GRANULOCYTES NFR BLD AUTO: 0.4 % (ref 0–0.5)
LYMPHOCYTES # BLD AUTO: 1.74 10*3/MM3 (ref 0.7–3.1)
LYMPHOCYTES NFR BLD AUTO: 25.9 % (ref 19.6–45.3)
MCH RBC QN AUTO: 30.5 PG (ref 26.6–33)
MCHC RBC AUTO-ENTMCNC: 31.2 G/DL (ref 31.5–35.7)
MCV RBC AUTO: 97.8 FL (ref 79–97)
MONOCYTES # BLD AUTO: 0.46 10*3/MM3 (ref 0.1–0.9)
MONOCYTES NFR BLD AUTO: 6.8 % (ref 5–12)
NEUTROPHILS # BLD AUTO: 4.49 10*3/MM3 (ref 1.7–7)
NEUTROPHILS NFR BLD AUTO: 66.9 % (ref 42.7–76)
NRBC BLD AUTO-RTO: 0 /100 WBC (ref 0–0.2)
PLATELET # BLD AUTO: 222 10*3/MM3 (ref 140–450)
PMV BLD AUTO: 10.7 FL (ref 6–12)
POTASSIUM BLD-SCNC: 4.3 MMOL/L (ref 3.5–4.7)
PROT SERPL-MCNC: 7.3 G/DL (ref 6.3–8)
RBC # BLD AUTO: 4.55 10*6/MM3 (ref 3.77–5.28)
SODIUM BLD-SCNC: 143 MMOL/L (ref 134–145)
WBC NRBC COR # BLD: 6.72 10*3/MM3 (ref 3.4–10.8)

## 2019-10-25 PROCEDURE — 99214 OFFICE O/P EST MOD 30 MIN: CPT | Performed by: NURSE PRACTITIONER

## 2019-10-25 PROCEDURE — 80053 COMPREHEN METABOLIC PANEL: CPT

## 2019-10-25 PROCEDURE — 36415 COLL VENOUS BLD VENIPUNCTURE: CPT

## 2019-10-25 PROCEDURE — 85025 COMPLETE CBC W/AUTO DIFF WBC: CPT

## 2019-10-25 PROCEDURE — G0463 HOSPITAL OUTPT CLINIC VISIT: HCPCS | Performed by: NURSE PRACTITIONER

## 2019-10-25 RX ORDER — LETROZOLE 2.5 MG/1
2.5 TABLET, FILM COATED ORAL DAILY
Qty: 30 TABLET | Refills: 5 | Status: SHIPPED | OUTPATIENT
Start: 2019-10-25 | End: 2020-04-16 | Stop reason: SDUPTHER

## 2019-10-25 RX ORDER — BENRALIZUMAB 30 MG/ML
INJECTION, SOLUTION SUBCUTANEOUS
Refills: 1 | COMMUNITY
Start: 2019-09-24

## 2019-10-25 NOTE — PROGRESS NOTES
Subjective     REASON FOR CONSULTATION:  1.  Focal atypical ductal hyperplasia of the right breast who was to be scheduled for right breast lumpectomy,    2.  MRI showed a lesion in the left breast which was atypical and patient underwent ultrasound-guided biopsy of the left breast nodule.  Pathology from July 22, 2019 on the right breast biopsy in the office showed atypical duct hyperplasia involving the single gland, 0.22 mm maximally.      RECORDS OBTAINED:  Records of the patients history including those obtained from the referring provider were reviewed and summarized in detail.    HISTORY OF PRESENT ILLNESS:  The patient is a 64 y.o. year old female who is here for an opinion about the above issue.    History of Present Illness the patient is here today for follow-up visit.  She was started on Arimidex at her last office visit.  She took it for about 3 weeks and noticed that it was giving her headaches and making her migraines worse.  We advised her to stop the medication.  Patient reports improvement in her headaches.  She did try taking it one time this week to see if she still had issues, and she did have return of her headaches.  She otherwise felt like she was tolerating it okay without any significant arthralgias.      Oncologic history:     Patient is a 64-year-old female who underwent mammogram at [facility] in September 2018 and was asked to repeat diagnostic mammogram and ultrasound in 6 months    September 10, 2018  Findings  Breast is heterogeneously dense.  There is a small nodule projecting in the left breast centrally.  Small superficial nodule left lateral subareolar location stable.  Small nodule has also developed in the outer hemisphere right breast middle third.  Both nodules are imaged.  Larger nodule in the more central aspect of right breast containing a single coarse calcification appears stable.  Additional bilateral lateral breast imaging is recommended to include true lateral  projections.  Suggested spot compression views and targeted breast ultrasound.    October 18, 2018:  Bilateral digital diagnostic mammogram and diagnostic bilateral ultrasound done  Impression: Probable benign mammogram  1.  Benign-appearing simple cyst in the deep central left breast corresponding to the new mammographic density.  This can be followed up with yearly mammograms.  2.  Solid appearing nodule in the right breast along 8:00 which represented the developing nodular density on mammography and has likely been stable for several years.  This is favored to represent probably a benign fibroadenoma and can be followed up with right breast mammogram and ultrasound in 6 months to ensure stability.    6/27/2019: Diagnostic right digital mammogram and diagnostic right breast ultrasound  Right breast mass 8 to 9 o'clock position, 6.6 cm from the nipple, stable with circumscribed nodular lobular margins measuring 1.1 cm AP x0.7 cm.    Right breast focal asymmetry 10:00 to 11 o'clock position, 5 cm from the nipple contains stable coarse calcification which appears to have become larger on only the right spot since October 2018.    Ultrasound right breast the solid and cystic mass in the right breast at 8 o'clock position at 4 cm from the nipple is stable measuring 0.4 x 0.5 x 1 cm.  On October 11, 2018 ultrasound measured 0.5 x 1 x 0.5 cm and appeared to contain solid components on today's exam the mass consists of solid components and 2 small cystic areas within without thin septations.  The right breast sonographic irregularity mostly hypoechoic area 11:00 at 3 cm from the nipple measured 0.7 x 1.1 x 1.6 cm.  On October 11, 2018 this area measured 0.9 x 1.8 x 1.5 cm.  There is a new solid mural nodule 0.4 x 0.7 cm.  The new nodule is concerning for possible malignancy.    Impression the right breast ultrasound mixed solid and cystic mass at 8 o'clock position, 4 cm from the nipple needs to be biopsied under  ultrasound guidance.    2.  The breast right ultrasound calcified area at 11:00, 3 cm from the nipple needs to be biopsied under ultrasound guidance because of a new solid hypoechoic mural nodule.    July 22, 2019: Pathology  Ultrasound-guided biopsy  Final Diagnosis   1.  Breast, Right 8 o'clock Position, 4 cm FN, Core Biopsy:                 A.  Focal atypical duct hyperplasia involving a single gland, 0.22 mm maximally.               B.  Clustered cysts, sclerosing adenosis, and fibroadenomatoid hyperplasia with associated microcalcifications.       2.  Breast, Right 11 o'clock Position, 3 cm FN, Core Biopsy:                A.  Duct ectasia, apocrine metaplasia, sclerosing adenosis, and usual duct hyperplasia with associated                     microcalcifications.      August 7, 2019: Bilateral breast MRI  Artifact from a biopsy marking clip upper outer quadrant right breast.  No abnormal enhancement adjacent to the clips.  There is a 9 mm diameter nodule lateral retroareolar left breast immediately beneath the skin surface most likely an intramammary lymph node or fibroadenoma.  Further evaluation with ultrasound is recommended.  BI-RADS 0.     Right mammogram same day shows 2 new marking clips upper outer quadrant both are spring shaped.  No underlying mammographic abnormality.    August 12, 2019: Left breast ultrasound, retroareolar left breast there is microlobulated hypoechoic mass that measures 8 mm ultrasound-guided core biopsy recommended.    Patient underwent left breast ultrasound-guided biopsy by Dr. Collier as of August 13, 2019.    Patient was to have surgery on the right breast but because of this new lesion on the left breast that was seen on MRI she had to undergo biopsy first of the right breast lesion prior to surgery.    Patient is to hear from Dr. Collier about the surgery date    Obstetric/Gynecologic History:  Age menstrual periods began: 13  Patient is postmenopausal due to removal of  her uterus and both ovaries in the following year: [id number]   Number of pregnancies:1  Number of live births: 1  Number of abortions or miscarriages: 0  Age of delivery of first child: 21  Patient did not breast feed.  Length of time taking birth control pills: 4 yrs   Patient took hormone replacement during the following dates:  10 yrs  Patient had uterus and ovaries removed.       Past Medical History:   Diagnosis Date   • Asthma    • Breast lump     RIGHT   • Bronchiectasis (CMS/HCC)    • Depression with anxiety 9/21/2016   • Disease of thyroid gland     HYPOTHYROIDISM   • Fibrocystic breast    • Fibromyalgia syndrome 6/6/2017   • GERD (gastroesophageal reflux disease)    • Heart palpitations    • History of thyroid nodule    • HLD (hyperlipidemia) 9/21/2016   • Hypertension    • Hypothyroidism    • IBS (irritable bowel syndrome)    • Moderate persistent asthma without complication 6/6/2017   • PONV (postoperative nausea and vomiting)    • Precordial pain 9/21/2016   • Rheumatoid arthritis (CMS/HCC)    • Scoliosis    • Thrush    • Vitamin D deficiency 9/21/2016        Past Surgical History:   Procedure Laterality Date   • ABDOMINAL HERNIA REPAIR     • APPENDECTOMY     • BREAST BIOPSY      right and left benign 10-15 yrs ago   • BREAST LUMPECTOMY Right 8/29/2019    Procedure: right breast needle localized lumpectomy for atypical duct hyperplasia.;  Surgeon: Giovanna Collier MD;  Location: [facility] OR [facility] [place];  Service: General   • BRONCHOSCOPY N/A 5/11/2018    Procedure: BRONCHOSCOPY;  Surgeon: Emma Blakely MD;  Location: [facility] ENDOSCOPY;  Service: Pulmonary   • CARDIAC CATHETERIZATION     • CHOLECYSTECTOMY     • COLONOSCOPY  2015   • ENDOSCOPY  2015   • EPIDURAL BLOCK     • HERNIA REPAIR     • HYSTERECTOMY     • KNEE SURGERY     • OOPHORECTOMY     • TONSILLECTOMY     • WRIST FUSION Right         Current Outpatient Medications on File Prior to Visit   Medication Sig Dispense Refill   • albuterol (PROVENTIL) (2.5  MG/3ML) 0.083% nebulizer solution Take 2.5 mg by nebulization Every 6 (Six) Hours As Needed for Wheezing (asthma).     • albuterol sulfate  (90 Base) MCG/ACT inhaler Inhale 2 puffs Every 4 (Four) Hours As Needed for Wheezing.     • alendronate (FOSAMAX) 70 MG tablet Take 1 tablet by mouth Every 7 (Seven) Days. Sit up one hour do not eat one hour with 8oz water 4 tablet 3   • atorvastatin (LIPITOR) 20 MG tablet Take 20 mg by mouth Daily.     • baclofen (LIORESAL) 10 MG tablet Take 10 mg by mouth Daily.     • budesonide-formoterol (SYMBICORT) 160-4.5 MCG/ACT inhaler Inhale 2 puffs 2 (Two) Times a Day.     • celecoxib (CeleBREX) 200 MG capsule Take 200 mg by mouth 2 (two) times a day.     • clotrimazole-betamethasone (LOTRISONE) 1-0.05 % cream Apply  topically to the appropriate area as directed 2 (Two) Times a Day As Needed.     • diphenhydrAMINE-acetaminophen (TYLENOL PM)  MG tablet per tablet Take 1 tablet by mouth At Night As Needed for Sleep.     • FASENRA 30 MG/ML solution prefilled syringe INJECT THE CONTENTS OF ONE SYRINGE (30MG) SUBCUTANEOUSLY ON WEEKS 0, 4, AND 8. MUST BE ADMINISTERED BY HEALTHCARE PROFESSIONAL  1   • furosemide (LASIX) 20 MG tablet Take 20 mg by mouth As Needed.     • gabapentin (NEURONTIN) 300 MG capsule Take 300 mg by mouth 2 (Two) Times a Day.     • HYDROcodone-acetaminophen (NORCO)  MG per tablet Take 1 tablet by mouth Daily As Needed.     • hydroxychloroquine (PLAQUENIL) 200 MG tablet Take 200 mg by mouth 2 (Two) Times a Day.     • hyoscyamine (LEVSIN) 0.125 MG SL tablet Dissolve one Tablet Under The Toungue Every 6  Hours as Needed for cramping (Patient taking differently: Take 0.125 mg by mouth Every 6 (Six) Hours As Needed. Dissolve one Tablet Under The Toungue Every 6  Hours as Needed for cramping) 30 tablet 3   • isosorbide mononitrate (IMDUR) 120 MG 24 hr tablet Take 120 mg by mouth Every Morning.     • levothyroxine (SYNTHROID, LEVOTHROID) 75 MCG tablet Take 75  mcg by mouth Daily.     • montelukast (SINGULAIR) 10 MG tablet Take 1 tablet by mouth Every Night. 30 tablet 12   • nebivolol (BYSTOLIC) 5 MG tablet Take 10 mg by mouth 2 (Two) Times a Day.     • nitroglycerin (NITROSTAT) 0.4 MG SL tablet Place 0.4 mg under the tongue every 5 (five) minutes.     • nystatin (MYCOSTATIN) 097830 UNIT/ML suspension Swish and swallow 500,000 Units 2 (Two) Times a Day.     • pantoprazole (PROTONIX) 40 MG EC tablet Take 1 tablet by mouth Every Night. 90 tablet 3   • polyethylene glycol (MIRALAX) packet Take 17 g by mouth Daily.     • ranolazine (RANEXA) 1000 MG 12 hr tablet Take 1,000 mg by mouth Every 12 (Twelve) Hours.     • Simethicone (GAS RELIEF EXTRA STRENGTH PO) As Needed.     • SPIRIVA RESPIMAT 1.25 MCG/ACT aerosol solution inhaler Inhale 1 puff Every Morning.     • vitamin D (ERGOCALCIFEROL) 57415 UNITS capsule capsule Take 50,000 Units by mouth Every 7 (Seven) Days.     • amLODIPine (NORVASC) 2.5 MG tablet Take 2.5 mg by mouth Every Night.     • fluticasone (FLONASE) 50 MCG/ACT nasal spray 1 spray into each nostril 2 (Two) Times a Day. (Patient taking differently: 1 spray into the nostril(s) as directed by provider 2 (Two) Times a Day As Needed.) 16 g 12   • vitamin C (ASCORBIC ACID) 500 MG tablet Take 500 mg by mouth 2 (Two) Times a Day.       No current facility-administered medications on file prior to visit.         ALLERGIES:    Allergies   Allergen Reactions   • Adhesive Tape Swelling     Must use paper tape   • Morphine And Related Hives and Itching   • Metal Rash      Patient is disabled, she is to work at [employer] in the past.  She does not smoke or drink alcohol.    Social History     Socioeconomic History   • Marital status:      Spouse name: Zbigniew   • Number of children: Not on file   • Years of education: High school   • Highest education level: Not on file   Occupational History     Employer: DISABLED   Tobacco Use   • Smoking status: Never Smoker  "  • Smokeless tobacco: Never Used   Substance and Sexual Activity   • Alcohol use: No   • Drug use: No        Family History   Problem Relation Age of Onset   • COPD Mother    • Cancer Mother         heart tumor   • Heart disease Mother    • COPD Father    • Hypertension Father    • No Known Problems Sister    • Cancer Brother         testicular   • Breast cancer Paternal Aunt         unknown age    • Heart disease Maternal Grandmother    • Malig Hyperthermia Neg Hx      History is consistent with chest wall cancer in mother who  of cancer at age 68, unsure what type.  Brother had testicular cancer at age 2 but is now 59 and in good health.  Maternal aunt with history of breast cancer in her 40s and  from it    Review of Systems   Constitutional: Negative for activity change, appetite change, chills, fatigue and fever.   HENT: Negative for mouth sores, nosebleeds and trouble swallowing.    Respiratory: Negative for cough and shortness of breath.    Cardiovascular: Negative for chest pain and leg swelling.   Gastrointestinal: Negative for abdominal pain, constipation, diarrhea, nausea and vomiting.   Genitourinary: Negative for difficulty urinating.   Skin: Negative for rash.   Neurological: Positive for headaches. Negative for dizziness, weakness and numbness.   Hematological: Negative for adenopathy. Does not bruise/bleed easily.   Psychiatric/Behavioral: Negative for sleep disturbance.        Objective     Vitals:    10/25/19 1125   BP: 122/75   Pulse: 69   Resp: 16   Temp: 98.3 °F (36.8 °C)   TempSrc: Oral   SpO2: 95%   Weight: 77.7 kg (171 lb 3.2 oz)   Height: 159.5 cm (62.8\")   PainSc: 0-No pain     Current Status 10/25/2019   ECOG score 0       Physical Exam   Constitutional: She is oriented to person, place, and time. She appears well-developed and well-nourished. No distress.   HENT:   Head: Normocephalic and atraumatic.   Mouth/Throat: Oropharynx is clear and moist and mucous membranes are normal. " No oropharyngeal exudate.   Eyes: Pupils are equal, round, and reactive to light.   Neck: Normal range of motion.   Cardiovascular: Normal rate, regular rhythm and normal heart sounds.   No murmur heard.  Pulmonary/Chest: Effort normal and breath sounds normal. No respiratory distress. She has no wheezes. She has no rhonchi. She has no rales.   Abdominal: Soft. Normal appearance and bowel sounds are normal. She exhibits no distension. There is no hepatosplenomegaly.   Musculoskeletal: Normal range of motion. She exhibits no edema.   Neurological: She is alert and oriented to person, place, and time.   Skin: Skin is warm and dry. No rash noted.   Psychiatric: She has a normal mood and affect.   Vitals reviewed.          RECENT LABS:  Hematology WBC   Date Value Ref Range Status   10/25/2019 6.72 3.40 - 10.80 10*3/mm3 Final   07/18/2019 5.81 4.5 - 11.0 10*3/uL Final     RBC   Date Value Ref Range Status   10/25/2019 4.55 3.77 - 5.28 10*6/mm3 Final   07/18/2019 4.41 4.0 - 5.2 10*6/uL Final     Hemoglobin   Date Value Ref Range Status   10/25/2019 13.9 12.0 - 15.9 g/dL Final   07/18/2019 13.1 12.0 - 16.0 g/dL Final     Hematocrit   Date Value Ref Range Status   10/25/2019 44.5 34.0 - 46.6 % Final   07/18/2019 43.3 36.0 - 46.0 % Final     Platelets   Date Value Ref Range Status   10/25/2019 222 140 - 450 10*3/mm3 Final   07/18/2019 235 140 - 440 10*3/uL Final           BI-RADS CATEGORY 1: Negative.    Tissue Pathology Right Breast 07/22/19  Final Diagnosis   1.  Breast, Right 8 o'clock Position, 4 cm FN, Core Biopsy:                 A.  Focal atypical duct hyperplasia involving a single gland, 0.22 mm maximally.               B.  Clustered cysts, sclerosing adenosis, and fibroadenomatoid hyperplasia with associated microcalcifications.       2.  Breast, Right 11 o'clock Position, 3 cm FN, Core Biopsy:                A.  Duct ectasia, apocrine metaplasia, sclerosing adenosis, and usual duct hyperplasia with associated                      microcalcifications.           Assessment/Plan     1.  Focal atypical ductal hyperplasia of the right breast at 8 o'clock position.   Right breast, 8 o'clock position, 4 cm from the nipple, path returned as focal atypical duct hyperplasia involving the single gland, 0.22 mm.sclerosing adenosis, fibroadenomatoid hyperplasia, associated micro calcifications.  August 29, 2019 patient underwent needle localization lumpectomy on the right breast and there was no evidence of any atypical hyperplasia.  Reviewed the pathology.  Right breast, 11 o'clock position, 3 cm from the nipple 1.6 cm on ultrasound.  Pathology from July 2019 right breast biopsies  duct ectasia, apocrine metaplasia, sclerosing adenosis, usual duct hyperplasia, associated micro calcifications    2.  Left breast, 3 o'clock position, MRI done shows abnormality, 9 mm nodular nodule lateral retroareolar left breast immediately beneath the skin surface most likely intramammary lymph node or fibroadenoma.  Ultrasound recommended.  August 12, 2019 left breast ultrasound: Retroareolar left breast, 3 o'clock position, microlobulated hypoechoic mass which measures 8 mm.  Ultrasound-guided biopsy done on August 13, 2019.  Results pending.    · Patient is now awaiting surgery by Dr. Collier on both breasts.  · Risk assessment done based on family history, reproductive history and recent atypical biopsy.  The 5-year breast cancer risk is 4.2%.  And lifetime breast cancer risk is 17 to 31%.  · Patient has been referred here but given that she is undergoing surgery we would need to await the path on surgery prior to making any final recommendations.  · If only atypical duct hyperplasia present then certainly she could be a candidate for chemoprophylaxis.  But given that she has not yet had surgery, she tells me she is undergoing lumpectomy , and await surgery prior to making any recommendation  · August 29, 2019 underwent right needle localization  lumpectomy and pathology on this is benign    · Patient has been referred here for chemoprophylaxis and discussed in length the side effects of Arimidex.  · Started Arimidex  9/12/19  · Patient reports worsening of headaches after starting Arimidex.  We advised her to stop and headaches resolved.  · We discussed options including Femara and Tamoxifen as well as possible side effects of both medications, including possibility of return headaches, and increased risk of blood clots with Tamoxifen.  We will try Femara 2.5 mg daily.  She will try this and notify us if her headaches return. We would then likely try Tamoxifen.      3.  History of fibromyalgia, followed by Dr. Holm    4.  Atypical angina, cardiac cath negative followed by Dr. Jett    5.  Rheumatoid arthritis followed by Dr. Keller on Celebrex and Plaquenil    PLAN:  1. Stop Arimidex.  2. Start Femara 2.5 mg daily.  3. Call if having return of headaches on Femara, would then consider changing to Tamoxifen.   4. Return for follow up with Dr. Saldivar as scheduled 1/2/2020.      ROSANA Morris

## 2019-10-29 ENCOUNTER — TELEPHONE (OUTPATIENT)
Dept: SURGERY | Facility: CLINIC | Age: 64
End: 2019-10-29

## 2019-10-29 NOTE — TELEPHONE ENCOUNTER
Scheduled Bilateral 3D Screen Mammo at [facility] 11-7-19 at 10:00    Spoke to pt dion v/u with appt      Dr MARINELLI will call with results.  [email or web address]

## 2019-11-07 ENCOUNTER — HOSPITAL ENCOUNTER (OUTPATIENT)
Dept: MAMMOGRAPHY | Facility: HOSPITAL | Age: 64
Discharge: HOME | End: 2019-11-07
Admitting: SURGERY

## 2019-11-07 DIAGNOSIS — Z12.31 ENCOUNTER FOR SCREENING MAMMOGRAM FOR MALIGNANT NEOPLASM OF BREAST: ICD-10-CM

## 2019-11-07 DIAGNOSIS — R92.8 ABNORMAL MRI, BREAST: ICD-10-CM

## 2019-11-07 DIAGNOSIS — N60.91 ATYPICAL DUCTAL HYPERPLASIA OF RIGHT BREAST: ICD-10-CM

## 2019-11-07 PROCEDURE — 77063 BREAST TOMOSYNTHESIS BI: CPT

## 2019-11-07 PROCEDURE — 77067 SCR MAMMO BI INCL CAD: CPT

## 2019-11-08 ENCOUNTER — TELEPHONE (OUTPATIENT)
Dept: SURGERY | Facility: CLINIC | Age: 64
End: 2019-11-08

## 2019-11-08 NOTE — TELEPHONE ENCOUNTER
November 8, 2019 bilateral screening mammogram with 3D.  Scattered fibroglandular densities.  No evidence for malignancy in either breast BI-RADS 1

## 2020-01-02 ENCOUNTER — OFFICE VISIT (OUTPATIENT)
Dept: ONCOLOGY | Facility: CLINIC | Age: 65
End: 2020-01-02

## 2020-01-02 ENCOUNTER — LAB (OUTPATIENT)
Dept: LAB | Facility: HOSPITAL | Age: 65
End: 2020-01-02

## 2020-01-02 VITALS
HEIGHT: 63 IN | TEMPERATURE: 98.2 F | DIASTOLIC BLOOD PRESSURE: 71 MMHG | HEART RATE: 75 BPM | RESPIRATION RATE: 16 BRPM | BODY MASS INDEX: 30.56 KG/M2 | OXYGEN SATURATION: 94 % | SYSTOLIC BLOOD PRESSURE: 112 MMHG | WEIGHT: 172.5 LBS

## 2020-01-02 DIAGNOSIS — M06.9 RHEUMATOID ARTHRITIS, INVOLVING UNSPECIFIED SITE, UNSPECIFIED RHEUMATOID FACTOR PRESENCE: ICD-10-CM

## 2020-01-02 DIAGNOSIS — M81.0 OSTEOPOROSIS, UNSPECIFIED OSTEOPOROSIS TYPE, UNSPECIFIED PATHOLOGICAL FRACTURE PRESENCE: ICD-10-CM

## 2020-01-02 DIAGNOSIS — I20.8 ATYPICAL ANGINA (HCC): ICD-10-CM

## 2020-01-02 DIAGNOSIS — Z87.39 HISTORY OF FIBROMYALGIA: ICD-10-CM

## 2020-01-02 DIAGNOSIS — N60.91 ATYPICAL DUCTAL HYPERPLASIA OF RIGHT BREAST: Primary | ICD-10-CM

## 2020-01-02 DIAGNOSIS — D64.9 ANEMIA, UNSPECIFIED TYPE: ICD-10-CM

## 2020-01-02 DIAGNOSIS — C50.919 MALIGNANT NEOPLASM OF FEMALE BREAST, UNSPECIFIED ESTROGEN RECEPTOR STATUS, UNSPECIFIED LATERALITY, UNSPECIFIED SITE OF BREAST (HCC): ICD-10-CM

## 2020-01-02 PROBLEM — I20.89 ATYPICAL ANGINA: Status: ACTIVE | Noted: 2020-01-02

## 2020-01-02 LAB
ALBUMIN SERPL-MCNC: 4.3 G/DL (ref 3.5–5.2)
ALBUMIN/GLOB SERPL: 1.6 G/DL (ref 1.1–2.4)
ALP SERPL-CCNC: 70 U/L (ref 38–116)
ALT SERPL W P-5'-P-CCNC: 19 U/L (ref 0–33)
ANION GAP SERPL CALCULATED.3IONS-SCNC: 10.3 MMOL/L (ref 5–15)
AST SERPL-CCNC: 20 U/L (ref 0–32)
BASOPHILS # BLD AUTO: 0.01 10*3/MM3 (ref 0–0.2)
BASOPHILS NFR BLD AUTO: 0.1 % (ref 0–1.5)
BILIRUB SERPL-MCNC: 0.4 MG/DL (ref 0.2–1.2)
BUN BLD-MCNC: 16 MG/DL (ref 6–20)
BUN/CREAT SERPL: 16.2 (ref 7.3–30)
CALCIUM SPEC-SCNC: 9.5 MG/DL (ref 8.5–10.2)
CHLORIDE SERPL-SCNC: 101 MMOL/L (ref 98–107)
CO2 SERPL-SCNC: 27.7 MMOL/L (ref 22–29)
CREAT BLD-MCNC: 0.99 MG/DL (ref 0.6–1.1)
DEPRECATED RDW RBC AUTO: 51.8 FL (ref 37–54)
EOSINOPHIL # BLD AUTO: 0 10*3/MM3 (ref 0–0.4)
EOSINOPHIL NFR BLD AUTO: 0 % (ref 0.3–6.2)
ERYTHROCYTE [DISTWIDTH] IN BLOOD BY AUTOMATED COUNT: 14.1 % (ref 12.3–15.4)
GFR SERPL CREATININE-BSD FRML MDRD: 56 ML/MIN/1.73
GLOBULIN UR ELPH-MCNC: 2.7 GM/DL (ref 1.8–3.5)
GLUCOSE BLD-MCNC: 106 MG/DL (ref 74–124)
HCT VFR BLD AUTO: 44.1 % (ref 34–46.6)
HGB BLD-MCNC: 13.7 G/DL (ref 12–15.9)
IMM GRANULOCYTES # BLD AUTO: 0.03 10*3/MM3 (ref 0–0.05)
IMM GRANULOCYTES NFR BLD AUTO: 0.4 % (ref 0–0.5)
LYMPHOCYTES # BLD AUTO: 1.74 10*3/MM3 (ref 0.7–3.1)
LYMPHOCYTES NFR BLD AUTO: 23.3 % (ref 19.6–45.3)
MCH RBC QN AUTO: 30.8 PG (ref 26.6–33)
MCHC RBC AUTO-ENTMCNC: 31.1 G/DL (ref 31.5–35.7)
MCV RBC AUTO: 99.1 FL (ref 79–97)
MONOCYTES # BLD AUTO: 0.59 10*3/MM3 (ref 0.1–0.9)
MONOCYTES NFR BLD AUTO: 7.9 % (ref 5–12)
NEUTROPHILS # BLD AUTO: 5.09 10*3/MM3 (ref 1.7–7)
NEUTROPHILS NFR BLD AUTO: 68.3 % (ref 42.7–76)
NRBC BLD AUTO-RTO: 0 /100 WBC (ref 0–0.2)
PLATELET # BLD AUTO: 234 10*3/MM3 (ref 140–450)
PMV BLD AUTO: 11.1 FL (ref 6–12)
POTASSIUM BLD-SCNC: 4.2 MMOL/L (ref 3.5–4.7)
PROT SERPL-MCNC: 7 G/DL (ref 6.3–8)
RBC # BLD AUTO: 4.45 10*6/MM3 (ref 3.77–5.28)
SODIUM BLD-SCNC: 139 MMOL/L (ref 134–145)
WBC NRBC COR # BLD: 7.46 10*3/MM3 (ref 3.4–10.8)

## 2020-01-02 PROCEDURE — 85025 COMPLETE CBC W/AUTO DIFF WBC: CPT

## 2020-01-02 PROCEDURE — G0463 HOSPITAL OUTPT CLINIC VISIT: HCPCS | Performed by: INTERNAL MEDICINE

## 2020-01-02 PROCEDURE — 36415 COLL VENOUS BLD VENIPUNCTURE: CPT

## 2020-01-02 PROCEDURE — 80053 COMPREHEN METABOLIC PANEL: CPT

## 2020-01-02 PROCEDURE — 99214 OFFICE O/P EST MOD 30 MIN: CPT | Performed by: INTERNAL MEDICINE

## 2020-01-02 NOTE — PROGRESS NOTES
Subjective     REASON FOR FOLLOW UP:  1.  Focal atypical ductal hyperplasia of the right breast who was to be scheduled for right breast lumpectomy,    2.  MRI showed a lesion in the left breast which was atypical and patient underwent ultrasound-guided biopsy of the left breast nodule.  Pathology from July 22, 2019 on the right breast biopsy in the office showed atypical duct hyperplasia involving the single gland, 0.22 mm maximally.    3. Femara started on 10/25/19.      HISTORY OF PRESENT ILLNESS:  The patient is a 64 y.o. year old female who is here for an opinion about the above issue.    Overall, the patient feels really well.  She is tolerating Femara well.  She feels warmer than usual, but it is tolerable.  She has rheumatoid arthritis and her pain has gotten slightly worse, but it is tolerable.    We reviewed with patient the DEXA scan from 10/10/19 which shows osteoporosis.    Patient's labs from 01/02/20 are normal.    ONCOLOGIC HISTORY:  Patient is a 64 year-old female with history of atypical duct hyperplasia diagnosed back in July 2019 and was done on the right breast biopsy.  More recently Dr. Collier took her on August 29, 2019 for right needle localization lumpectomy but pathology is negative and there is no residual atypical hyperplasia present.    Patient was referred here for chemoprophylaxis given history of atypical duct hyperplasia.  Patient also has been arranged for MRI as well as mammogram in 2020.  She has been asked for self breast exam.  In the meantime we will start her on chemoprophylaxis now that we have the lumpectomy specimen, we will start Arimidex 1 mg daily.      Oncologic history:     Patient is a 64-year-old female who underwent mammogram at [facility] in September 2018 and was asked to repeat diagnostic mammogram and ultrasound in 6 months    September 10, 2018  Findings  Breast is heterogeneously dense.  There is a small nodule projecting in the left breast centrally.   Small superficial nodule left lateral subareolar location stable.  Small nodule has also developed in the outer hemisphere right breast middle third.  Both nodules are imaged.  Larger nodule in the more central aspect of right breast containing a single coarse calcification appears stable.  Additional bilateral lateral breast imaging is recommended to include true lateral projections.  Suggested spot compression views and targeted breast ultrasound.    October 18, 2018:  Bilateral digital diagnostic mammogram and diagnostic bilateral ultrasound done  Impression: Probable benign mammogram  1.  Benign-appearing simple cyst in the deep central left breast corresponding to the new mammographic density.  This can be followed up with yearly mammograms.  2.  Solid appearing nodule in the right breast along 8:00 which represented the developing nodular density on mammography and has likely been stable for several years.  This is favored to represent probably a benign fibroadenoma and can be followed up with right breast mammogram and ultrasound in 6 months to ensure stability.    6/27/2019: Diagnostic right digital mammogram and diagnostic right breast ultrasound  Right breast mass 8 to 9 o'clock position, 6.6 cm from the nipple, stable with circumscribed nodular lobular margins measuring 1.1 cm AP x0.7 cm.    Right breast focal asymmetry 10:00 to 11 o'clock position, 5 cm from the nipple contains stable coarse calcification which appears to have become larger on only the right spot since October 2018.    Ultrasound right breast the solid and cystic mass in the right breast at 8 o'clock position at 4 cm from the nipple is stable measuring 0.4 x 0.5 x 1 cm.  On October 11, 2018 ultrasound measured 0.5 x 1 x 0.5 cm and appeared to contain solid components on today's exam the mass consists of solid components and 2 small cystic areas within without thin septations.  The right breast sonographic irregularity mostly hypoechoic  area 11:00 at 3 cm from the nipple measured 0.7 x 1.1 x 1.6 cm.  On October 11, 2018 this area measured 0.9 x 1.8 x 1.5 cm.  There is a new solid mural nodule 0.4 x 0.7 cm.  The new nodule is concerning for possible malignancy.    Impression the right breast ultrasound mixed solid and cystic mass at 8 o'clock position, 4 cm from the nipple needs to be biopsied under ultrasound guidance.    2.  The breast right ultrasound calcified area at 11:00, 3 cm from the nipple needs to be biopsied under ultrasound guidance because of a new solid hypoechoic mural nodule.    July 22, 2019: Pathology  Ultrasound-guided biopsy  Final Diagnosis   1.  Breast, Right 8 o'clock Position, 4 cm FN, Core Biopsy:                 A.  Focal atypical duct hyperplasia involving a single gland, 0.22 mm maximally.               B.  Clustered cysts, sclerosing adenosis, and fibroadenomatoid hyperplasia with associated microcalcifications.       2.  Breast, Right 11 o'clock Position, 3 cm FN, Core Biopsy:                A.  Duct ectasia, apocrine metaplasia, sclerosing adenosis, and usual duct hyperplasia with associated                     microcalcifications.      August 7, 2019: Bilateral breast MRI  Artifact from a biopsy marking clip upper outer quadrant right breast.  No abnormal enhancement adjacent to the clips.  There is a 9 mm diameter nodule lateral retroareolar left breast immediately beneath the skin surface most likely an intramammary lymph node or fibroadenoma.  Further evaluation with ultrasound is recommended.  BI-RADS 0.     Right mammogram same day shows 2 new marking clips upper outer quadrant both are spring shaped.  No underlying mammographic abnormality.    August 12, 2019: Left breast ultrasound, retroareolar left breast there is microlobulated hypoechoic mass that measures 8 mm ultrasound-guided core biopsy recommended.    Patient underwent left breast ultrasound-guided biopsy by Dr. Collier as of August 13,  2019.    Patient was to have surgery on the right breast but because of this new lesion on the left breast that was seen on MRI she had to undergo biopsy first of the right breast lesion prior to surgery.    Patient is to hear from Dr. Collier about the surgery date    Obstetric/Gynecologic History:  Age menstrual periods began: 13  Patient is postmenopausal due to removal of her uterus and both ovaries in the following year: [id number]   Number of pregnancies:1  Number of live births: 1  Number of abortions or miscarriages: 0  Age of delivery of first child: 21  Patient did not breast feed.  Length of time taking birth control pills: 4 yrs   Patient took hormone replacement during the following dates:  10 yrs  Patient had uterus and ovaries removed.     Femara started on 10/25/19.    Past Medical History:   Diagnosis Date   • Asthma    • Breast lump     RIGHT   • Bronchiectasis (CMS/HCC)    • Depression with anxiety 9/21/2016   • Disease of thyroid gland     HYPOTHYROIDISM   • Drug therapy    • Fibrocystic breast    • Fibromyalgia syndrome 6/6/2017   • GERD (gastroesophageal reflux disease)    • Heart palpitations    • History of thyroid nodule    • HLD (hyperlipidemia) 9/21/2016   • Hypertension    • Hypothyroidism    • IBS (irritable bowel syndrome)    • Moderate persistent asthma without complication 6/6/2017   • PONV (postoperative nausea and vomiting)    • Precordial pain 9/21/2016   • Rheumatoid arthritis (CMS/HCC)    • Scoliosis    • Thrush    • Vitamin D deficiency 9/21/2016        Past Surgical History:   Procedure Laterality Date   • ABDOMINAL HERNIA REPAIR     • APPENDECTOMY     • BREAST BIOPSY      right and left benign 10-15 yrs ago   • BREAST LUMPECTOMY Right 8/29/2019    Procedure: right breast needle localized lumpectomy for atypical duct hyperplasia.;  Surgeon: Giovanna Collier MD;  Location: [facility] OR [facility] [place];  Service: General   • BRONCHOSCOPY N/A 5/11/2018    Procedure: BRONCHOSCOPY;  Surgeon:  Emma Blakely MD;  Location: [facility] ENDOSCOPY;  Service: Pulmonary   • CARDIAC CATHETERIZATION     • CHOLECYSTECTOMY     • COLONOSCOPY  2015   • ENDOSCOPY  2015   • EPIDURAL BLOCK     • HERNIA REPAIR     • HYSTERECTOMY     • KNEE SURGERY     • OOPHORECTOMY     • TONSILLECTOMY     • WRIST FUSION Right         Current Outpatient Medications on File Prior to Visit   Medication Sig Dispense Refill   • albuterol (PROVENTIL) (2.5 MG/3ML) 0.083% nebulizer solution Take 2.5 mg by nebulization Every 6 (Six) Hours As Needed for Wheezing (asthma).     • albuterol sulfate  (90 Base) MCG/ACT inhaler Inhale 2 puffs Every 4 (Four) Hours As Needed for Wheezing.     • alendronate (FOSAMAX) 70 MG tablet Take 1 tablet by mouth Every 7 (Seven) Days. Sit up one hour do not eat one hour with 8oz water 4 tablet 3   • atorvastatin (LIPITOR) 20 MG tablet Take 20 mg by mouth Daily.     • baclofen (LIORESAL) 10 MG tablet Take 10 mg by mouth Daily.     • budesonide-formoterol (SYMBICORT) 160-4.5 MCG/ACT inhaler Inhale 2 puffs 2 (Two) Times a Day.     • celecoxib (CeleBREX) 200 MG capsule Take 200 mg by mouth 2 (two) times a day.     • clotrimazole-betamethasone (LOTRISONE) 1-0.05 % cream Apply  topically to the appropriate area as directed 2 (Two) Times a Day As Needed.     • diphenhydrAMINE-acetaminophen (TYLENOL PM)  MG tablet per tablet Take 1 tablet by mouth At Night As Needed for Sleep.     • FASENRA 30 MG/ML solution prefilled syringe INJECT THE CONTENTS OF ONE SYRINGE (30MG) SUBCUTANEOUSLY ON WEEKS 0, 4, AND 8. MUST BE ADMINISTERED BY HEALTHCARE PROFESSIONAL  1   • furosemide (LASIX) 20 MG tablet Take 20 mg by mouth As Needed.     • gabapentin (NEURONTIN) 300 MG capsule Take 300 mg by mouth 2 (Two) Times a Day.     • HYDROcodone-acetaminophen (NORCO)  MG per tablet Take 1 tablet by mouth Daily As Needed.     • hydroxychloroquine (PLAQUENIL) 200 MG tablet Take 200 mg by mouth 2 (Two) Times a Day.     • hyoscyamine  (LEVSIN) 0.125 MG SL tablet Dissolve one Tablet Under The Toungue Every 6  Hours as Needed for cramping (Patient taking differently: Take 0.125 mg by mouth Every 6 (Six) Hours As Needed. Dissolve one Tablet Under The Toungue Every 6  Hours as Needed for cramping) 30 tablet 3   • isosorbide mononitrate (IMDUR) 120 MG 24 hr tablet Take 120 mg by mouth Every Morning.     • letrozole (FEMARA) 2.5 MG tablet Take 1 tablet by mouth Daily. 30 tablet 5   • levothyroxine (SYNTHROID, LEVOTHROID) 75 MCG tablet Take 75 mcg by mouth Daily.     • montelukast (SINGULAIR) 10 MG tablet Take 1 tablet by mouth Every Night. 30 tablet 12   • nebivolol (BYSTOLIC) 5 MG tablet Take 10 mg by mouth 2 (Two) Times a Day.     • nitroglycerin (NITROSTAT) 0.4 MG SL tablet Place 0.4 mg under the tongue every 5 (five) minutes.     • pantoprazole (PROTONIX) 40 MG EC tablet Take 1 tablet by mouth Every Night. 90 tablet 3   • ranolazine (RANEXA) 1000 MG 12 hr tablet Take 1,000 mg by mouth Every 12 (Twelve) Hours.     • SPIRIVA RESPIMAT 1.25 MCG/ACT aerosol solution inhaler Inhale 1 puff Every Morning.     • vitamin D (ERGOCALCIFEROL) 82975 UNITS capsule capsule Take 50,000 Units by mouth Every 7 (Seven) Days.     • amLODIPine (NORVASC) 2.5 MG tablet Take 2.5 mg by mouth Every Night.     • fluticasone (FLONASE) 50 MCG/ACT nasal spray 1 spray into each nostril 2 (Two) Times a Day. (Patient taking differently: 1 spray into the nostril(s) as directed by provider 2 (Two) Times a Day As Needed.) 16 g 12   • nystatin (MYCOSTATIN) 617167 UNIT/ML suspension Swish and swallow 500,000 Units 2 (Two) Times a Day.     • polyethylene glycol (MIRALAX) packet Take 17 g by mouth Daily.     • Simethicone (GAS RELIEF EXTRA STRENGTH PO) As Needed.     • vitamin C (ASCORBIC ACID) 500 MG tablet Take 500 mg by mouth 2 (Two) Times a Day.       No current facility-administered medications on file prior to visit.         ALLERGIES:    Allergies   Allergen Reactions   • Adhesive  Tape Swelling     Must use paper tape   • Morphine And Related Hives and Itching   • Metal Rash      Patient is disabled, she is to work at [employer] in the past.  She does not smoke or drink alcohol.    Social History     Socioeconomic History   • Marital status:      Spouse name: Zbigniew   • Number of children: Not on file   • Years of education: High school   • Highest education level: Not on file   Occupational History     Employer: DISABLED   Tobacco Use   • Smoking status: Never Smoker   • Smokeless tobacco: Never Used   Substance and Sexual Activity   • Alcohol use: No   • Drug use: No        Family History   Problem Relation Age of Onset   • COPD Mother    • Cancer Mother         heart tumor   • Heart disease Mother    • COPD Father    • Hypertension Father    • No Known Problems Sister    • Cancer Brother         testicular   • Breast cancer Paternal Aunt         unknown age    • Heart disease Maternal Grandmother    • Malig Hyperthermia Neg Hx      History is consistent with chest wall cancer in mother who  of cancer at age 68, unsure what type.  Brother had testicular cancer at age 2 but is now 59 and in good health.  Maternal aunt with history of breast cancer in her 40s and  from it    Review of Systems   Constitutional: Negative for appetite change, chills, diaphoresis, fatigue, fever and unexpected weight change.   HENT: Negative for hearing loss, sore throat and trouble swallowing.    Respiratory: Negative for cough, chest tightness, shortness of breath and wheezing.    Cardiovascular: Negative for chest pain, palpitations and leg swelling.   Gastrointestinal: Negative for abdominal distention, abdominal pain, constipation, diarrhea, nausea and vomiting.   Genitourinary: Negative for dysuria, frequency, hematuria and urgency.   Musculoskeletal: Negative for joint swelling.        No muscle weakness.   Skin: Negative for rash and wound.   Neurological: Negative for seizures,  "syncope, speech difficulty, weakness, numbness and headaches.   Hematological: Negative for adenopathy. Does not bruise/bleed easily.   Psychiatric/Behavioral: Negative for behavioral problems, confusion and suicidal ideas.        Objective     Vitals:    01/02/20 1430   BP: 112/71   Pulse: 75   Resp: 16   Temp: 98.2 °F (36.8 °C)   TempSrc: Oral   SpO2: 94%   Weight: 78.2 kg (172 lb 8 oz)   Height: 159.5 cm (62.8\")   PainSc: 0-No pain      Current Status 1/2/2020   ECOG score 0       Physical Exam    GENERAL:  Well-developed, well-nourished in no acute distress.   SKIN:  Warm, dry without rashes, purpura or petechiae.  NECK:  Supple with good range of motion; no thyromegaly or masses, no JVD.  LYMPHATICS:  No cervical, supraclavicular, axillary or inguinal adenopathy.  CHEST:  Lungs clear to auscultation. Good airflow.  CARDIAC:  Regular rate and rhythm without murmurs, rubs or gallops. Normal S1,S2.  ABDOMEN:  Soft, nontender with no hepatosplenomegaly or masses.  EXTREMITIES:  No clubbing, cyanosis or edema.  NEUROLOGICAL:  Cranial Nerves II-XII grossly intact.  No focal neurological deficits.  PSYCHIATRIC:  Normal affect and mood.      RECENT LABS:  Hematology WBC   Date Value Ref Range Status   01/02/2020 7.46 3.40 - 10.80 10*3/mm3 Final   12/30/2019 6.45 4.5 - 11.0 10*3/uL Final     RBC   Date Value Ref Range Status   01/02/2020 4.45 3.77 - 5.28 10*6/mm3 Final   12/30/2019 4.11 4.0 - 5.2 10*6/uL Final     Hemoglobin   Date Value Ref Range Status   01/02/2020 13.7 12.0 - 15.9 g/dL Final   12/30/2019 12.6 12.0 - 16.0 g/dL Final     Hematocrit   Date Value Ref Range Status   01/02/2020 44.1 34.0 - 46.6 % Final   12/30/2019 41.6 36.0 - 46.0 % Final     Platelets   Date Value Ref Range Status   01/02/2020 234 140 - 450 10*3/mm3 Final   12/30/2019 240 140 - 440 10*3/uL Final         Assessment/Plan      1.  Focal atypical ductal hyperplasia of the right breast at 8 o'clock position.   Right breast, 8 o'clock " position, 4 cm from the nipple, path returned as focal atypical duct hyperplasia involving the single gland, 0.22 mm.sclerosing adenosis, fibroadenomatoid hyperplasia, associated micro calcifications.  · August 29, 2019 patient underwent needle localization lumpectomy on the right breast and there was no evidence of any atypical hyperplasia.  Reviewed the pathology.  · Patient has been referred here for chemoprophylaxis and discussed in length the side effects of Arimidex.       Right breast, 11 o'clock position, 3 cm from the nipple 1.6 cm on ultrasound.  Pathology from July 2019 right breast biopsies  duct ectasia, apocrine metaplasia, sclerosing adenosis, usual duct hyperplasia, associated micro calcifications    2.  Left breast, 3 o'clock position, MRI done shows abnormality, 9 mm nodular nodule lateral retroareolar left breast immediately beneath the skin surface most likely intramammary lymph node or fibroadenoma.  Ultrasound recommended.  August 12, 2019 left breast ultrasound: Retroareolar left breast, 3 o'clock position, microlobulated hypoechoic mass which measures 8 mm.  Ultrasound-guided biopsy done on August 13, 2019.  Results pending.    · Patient is now awaiting surgery by Dr. Collier on both breasts.  · Risk assessment done based on family history, reproductive history and recent atypical biopsy.  The 5-year breast cancer risk is 4.2%.  And lifetime breast cancer risk is 17 to 31%.  · Patient has been referred here but given that she is undergoing surgery we would need to await the path on surgery prior to making any final recommendations.  · If only atypical duct hyperplasia present then certainly she could be a candidate for chemoprophylaxis.  But given that she has not yet had surgery, she tells me she is undergoing lumpectomy , and await surgery prior to making any recommendation  · August 29, 2019 underwent right needle localization lumpectomy and pathology on this is benign    3.  History of  fibromyalgia, followed by Dr. Holm    4.  Atypical angina, cardiac cath negative followed by Dr. Jett    5.  Rheumatoid arthritis followed by Dr. Keller on Celebrex and Plaquenil    6. Osteoporosis of the lumbar spine    Plan:  1. Continue Femara  2. We reviewed the DEXA scan from 10/10/19 which shows osteoporosis of the lumbar spine  3. Follow-up with me in 4 months with labs    I, Priscilla Hernandez, acted as scribe for Kayla Saldivar MD  in documenting the service or procedure. To the best of my knowledge, I recorded what was dictated by MD Kayla Engle MD  01/02/20         CC: Emma Medina, MD Terrell Paez, MD Sekou Greenwood, MD Freedom Kumar MD Amir Piracha, MD Citlali Yi, MD Emanuel Keller, MD Reji Romo MD

## 2020-02-05 ENCOUNTER — TELEPHONE (OUTPATIENT)
Dept: SURGERY | Facility: CLINIC | Age: 65
End: 2020-02-05

## 2020-02-05 NOTE — TELEPHONE ENCOUNTER
Pt notified of the following appts:    MRI and Screening [facility] scheduled at [facility] arrival time 9:00    F/U with Dr Collier on 11/17/2020 at 8:30    CMA

## 2020-04-16 ENCOUNTER — TELEMEDICINE (OUTPATIENT)
Dept: ONCOLOGY | Facility: CLINIC | Age: 65
End: 2020-04-16

## 2020-04-16 DIAGNOSIS — M06.9 RHEUMATOID ARTHRITIS, INVOLVING UNSPECIFIED SITE, UNSPECIFIED RHEUMATOID FACTOR PRESENCE: ICD-10-CM

## 2020-04-16 DIAGNOSIS — N60.99 ATYPICAL DUCTAL HYPERPLASIA OF BREAST: Primary | ICD-10-CM

## 2020-04-16 PROCEDURE — 99214 OFFICE O/P EST MOD 30 MIN: CPT | Performed by: INTERNAL MEDICINE

## 2020-04-16 RX ORDER — LETROZOLE 2.5 MG/1
2.5 TABLET, FILM COATED ORAL DAILY
Qty: 90 TABLET | Refills: 2 | Status: SHIPPED | OUTPATIENT
Start: 2020-04-16 | End: 2020-04-16 | Stop reason: SDUPTHER

## 2020-04-16 RX ORDER — LETROZOLE 2.5 MG/1
2.5 TABLET, FILM COATED ORAL DAILY
Qty: 90 TABLET | Refills: 2 | Status: SHIPPED | OUTPATIENT
Start: 2020-04-16 | End: 2021-05-05

## 2020-04-16 NOTE — PROGRESS NOTES
Subjective     REASON FOR FOLLOW UP:  1.  Focal atypical ductal hyperplasia of the right breast who was to be scheduled for right breast lumpectomy,    · Pathology from July 22, 2019 right breast biopsies in the office.  · Right breast 8:00, 4 cm from the nipple returned as focal atypical duct hyperplasia involving a single gland, 0.22 mm maximally.  Ramone cyst, sclerosing adenosis, fibroadenomatoid hyperplasia, associated micro calcifications.  · Right breast 11:00, 3 cm from the nipple, tender to palpation, no exam correlate, 1.6 cm on ultrasound, BI-RADS 4.Pathology from July 22, 2019 right breast biopsies in the office.Right breast 11:00, 3 center meter from the nipple, duct ectasia, apocrine metaplasia, sclerosing adenosis, usual duct hyperplasia, associated micro calcifications      2.  MRI showed a lesion in the left breast which was atypical and patient underwent ultrasound-guided biopsy of the left breast nodule.   LEFT breast 3:00 RA- seen on MRI done for ADH- US correlate 8mm, BR4-  Biopsied 8-2019- Pathology from her left breast in office biopsy August 13, 2019 returned as sclerosing adenosis, fibroadenomatous change, usual hyperplasia.  No atypia.       3.  September 12, 20 19, Arimidex started, patient had severe headaches like migraine headaches with Arimidex and subsequently switched to  ·  Femara started on 10/25/19.  Tolerating well.      HISTORY OF PRESENT ILLNESS:  The patient is a 65 y.o. year old female who is here for an opinion about the above issue.  Patient is a 65-year-old female with history of atypical ductal hyperplasia s/p surgery of the right breast followed by Dr. Collier.  We had placed her on Arimidex on September 12, 2019 but patient had migraine-like headaches and hence switched to Femara and she is tolerating it very well.  She does have history of rheumatoid arthritis and her joint pains are slightly worse but she thinks she is doing reasonably well with Femara and she is  not worried about the slight worsening of her joint pains.  She also has very minimal hot flashes.  She has not gained weight and she is trying to exercise.    She has appointment with Dr. Collier in November following MRI of the breast as well as screening mammogram.  Patient has been doing self breast exam and denies any breast mass, nipple discharge or skin changes or axillary adenopathy.    She has osteoporosis for which she is on Fosamax.  She takes it once a week.  She seems to be tolerating it well.  She does not want to go on Prolia injections and it is reasonable to continue Fosamax.  She also knows to take calcium 600 mg twice a day and vitamin D 1000 international units daily.          ONCOLOGIC HISTORY:  Patient is a 64 year-old female with history of atypical duct hyperplasia diagnosed back in July 2019 and was done on the right breast biopsy.  More recently Dr. Collier took her on August 29, 2019 for right needle localization lumpectomy but pathology is negative and there is no residual atypical hyperplasia present.    Patient was referred here for chemoprophylaxis given history of atypical duct hyperplasia.  Patient also has been arranged for MRI as well as mammogram in 2020.  She has been asked for self breast exam.  In the meantime we will start her on chemoprophylaxis now that we have the lumpectomy specimen, we will start aromatase inhibitor.      Oncologic history:     Patient is a 64-year-old female who underwent mammogram at [facility] in September 2018 and was asked to repeat diagnostic mammogram and ultrasound in 6 months    September 10, 2018  Findings  Breast is heterogeneously dense.  There is a small nodule projecting in the left breast centrally.  Small superficial nodule left lateral subareolar location stable.  Small nodule has also developed in the outer hemisphere right breast middle third.  Both nodules are imaged.  Larger nodule in the more central aspect of right breast  containing a single coarse calcification appears stable.  Additional bilateral lateral breast imaging is recommended to include true lateral projections.  Suggested spot compression views and targeted breast ultrasound.    October 18, 2018:  Bilateral digital diagnostic mammogram and diagnostic bilateral ultrasound done  Impression: Probable benign mammogram  1.  Benign-appearing simple cyst in the deep central left breast corresponding to the new mammographic density.  This can be followed up with yearly mammograms.  2.  Solid appearing nodule in the right breast along 8:00 which represented the developing nodular density on mammography and has likely been stable for several years.  This is favored to represent probably a benign fibroadenoma and can be followed up with right breast mammogram and ultrasound in 6 months to ensure stability.    6/27/2019: Diagnostic right digital mammogram and diagnostic right breast ultrasound  Right breast mass 8 to 9 o'clock position, 6.6 cm from the nipple, stable with circumscribed nodular lobular margins measuring 1.1 cm AP x0.7 cm.    Right breast focal asymmetry 10:00 to 11 o'clock position, 5 cm from the nipple contains stable coarse calcification which appears to have become larger on only the right spot since October 2018.    Ultrasound right breast the solid and cystic mass in the right breast at 8 o'clock position at 4 cm from the nipple is stable measuring 0.4 x 0.5 x 1 cm.  On October 11, 2018 ultrasound measured 0.5 x 1 x 0.5 cm and appeared to contain solid components on today's exam the mass consists of solid components and 2 small cystic areas within without thin septations.  The right breast sonographic irregularity mostly hypoechoic area 11:00 at 3 cm from the nipple measured 0.7 x 1.1 x 1.6 cm.  On October 11, 2018 this area measured 0.9 x 1.8 x 1.5 cm.  There is a new solid mural nodule 0.4 x 0.7 cm.  The new nodule is concerning for possible  malignancy.    Impression the right breast ultrasound mixed solid and cystic mass at 8 o'clock position, 4 cm from the nipple needs to be biopsied under ultrasound guidance.    2.  The breast right ultrasound calcified area at 11:00, 3 cm from the nipple needs to be biopsied under ultrasound guidance because of a new solid hypoechoic mural nodule.    July 22, 2019: Pathology  Ultrasound-guided biopsy  Final Diagnosis   1.  Breast, Right 8 o'clock Position, 4 cm FN, Core Biopsy:                 A.  Focal atypical duct hyperplasia involving a single gland, 0.22 mm maximally.               B.  Clustered cysts, sclerosing adenosis, and fibroadenomatoid hyperplasia with associated microcalcifications.       2.  Breast, Right 11 o'clock Position, 3 cm FN, Core Biopsy:                A.  Duct ectasia, apocrine metaplasia, sclerosing adenosis, and usual duct hyperplasia with associated                     microcalcifications.      August 7, 2019: Bilateral breast MRI  Artifact from a biopsy marking clip upper outer quadrant right breast.  No abnormal enhancement adjacent to the clips.  There is a 9 mm diameter nodule lateral retroareolar left breast immediately beneath the skin surface most likely an intramammary lymph node or fibroadenoma.  Further evaluation with ultrasound is recommended.  BI-RADS 0.     Right mammogram same day shows 2 new marking clips upper outer quadrant both are spring shaped.  No underlying mammographic abnormality.    August 12, 2019: Left breast ultrasound, retroareolar left breast there is microlobulated hypoechoic mass that measures 8 mm ultrasound-guided core biopsy recommended.    Patient underwent left breast ultrasound-guided biopsy by Dr. Collier as of August 13, 2019.    Patient was to have surgery on the right breast but because of this new lesion on the left breast that was seen on MRI she had to undergo biopsy first of the right breast lesion prior to surgery.    Patient is to hear  from Dr. Collier about the surgery date    Obstetric/Gynecologic History:  Age menstrual periods began: 13  Patient is postmenopausal due to removal of her uterus and both ovaries in the following year: [id number]   Number of pregnancies:1  Number of live births: 1  Number of abortions or miscarriages: 0  Age of delivery of first child: 21  Patient did not breast feed.  Length of time taking birth control pills: 4 yrs   Patient took hormone replacement during the following dates:  10 yrs  Patient had uterus and ovaries removed.     September 12, 2019: Started Arimidex but had severe migraine headaches.    Femara started on 10/25/19.    Past Medical History:   Diagnosis Date   • Asthma    • Breast lump     RIGHT   • Bronchiectasis (CMS/HCC)    • Depression with anxiety 9/21/2016   • Disease of thyroid gland     HYPOTHYROIDISM   • Drug therapy    • Fibrocystic breast    • Fibromyalgia syndrome 6/6/2017   • GERD (gastroesophageal reflux disease)    • Heart palpitations    • History of thyroid nodule    • HLD (hyperlipidemia) 9/21/2016   • Hypertension    • Hypothyroidism    • IBS (irritable bowel syndrome)    • Moderate persistent asthma without complication 6/6/2017   • PONV (postoperative nausea and vomiting)    • Precordial pain 9/21/2016   • Rheumatoid arthritis (CMS/HCC)    • Scoliosis    • Thrush    • Vitamin D deficiency 9/21/2016        Past Surgical History:   Procedure Laterality Date   • ABDOMINAL HERNIA REPAIR     • APPENDECTOMY     • BREAST BIOPSY      right and left benign 10-15 yrs ago   • BREAST LUMPECTOMY Right 8/29/2019    Procedure: right breast needle localized lumpectomy for atypical duct hyperplasia.;  Surgeon: Giovanna Collier MD;  [facility] [place] [facility];  Service: General   • BRONCHOSCOPY N/A 5/11/2018    Procedure: BRONCHOSCOPY;  Surgeon: Emma Blakely MD;  Location: [facility] ENDOSCOPY;  Service: Pulmonary   • CARDIAC CATHETERIZATION     • CHOLECYSTECTOMY     • COLONOSCOPY  2015   •  ENDOSCOPY  2015   • EPIDURAL BLOCK     • HERNIA REPAIR     • HYSTERECTOMY     • KNEE SURGERY     • OOPHORECTOMY     • TONSILLECTOMY     • WRIST FUSION Right         Current Outpatient Medications on File Prior to Visit   Medication Sig Dispense Refill   • albuterol (PROVENTIL) (2.5 MG/3ML) 0.083% nebulizer solution Take 2.5 mg by nebulization Every 6 (Six) Hours As Needed for Wheezing (asthma).     • albuterol sulfate  (90 Base) MCG/ACT inhaler Inhale 2 puffs Every 4 (Four) Hours As Needed for Wheezing.     • alendronate (FOSAMAX) 70 MG tablet Take 1 tablet by mouth Every 7 (Seven) Days. Sit up one hour do not eat one hour with 8oz water 4 tablet 3   • amLODIPine (NORVASC) 2.5 MG tablet Take 2.5 mg by mouth Every Night.     • atorvastatin (LIPITOR) 20 MG tablet Take 20 mg by mouth Daily.     • baclofen (LIORESAL) 10 MG tablet Take 10 mg by mouth Daily.     • budesonide-formoterol (SYMBICORT) 160-4.5 MCG/ACT inhaler Inhale 2 puffs 2 (Two) Times a Day.     • celecoxib (CeleBREX) 200 MG capsule Take 200 mg by mouth 2 (two) times a day.     • clotrimazole-betamethasone (LOTRISONE) 1-0.05 % cream Apply  topically to the appropriate area as directed 2 (Two) Times a Day As Needed.     • diphenhydrAMINE-acetaminophen (TYLENOL PM)  MG tablet per tablet Take 1 tablet by mouth At Night As Needed for Sleep.     • FASENRA 30 MG/ML solution prefilled syringe INJECT THE CONTENTS OF ONE SYRINGE (30MG) SUBCUTANEOUSLY ON WEEKS 0, 4, AND 8. MUST BE ADMINISTERED BY HEALTHCARE PROFESSIONAL  1   • fluticasone (FLONASE) 50 MCG/ACT nasal spray 1 spray into each nostril 2 (Two) Times a Day. (Patient taking differently: 1 spray into the nostril(s) as directed by provider 2 (Two) Times a Day As Needed.) 16 g 12   • furosemide (LASIX) 20 MG tablet Take 20 mg by mouth As Needed.     • gabapentin (NEURONTIN) 300 MG capsule Take 300 mg by mouth 2 (Two) Times a Day.     • HYDROcodone-acetaminophen (NORCO)  MG per tablet Take 1  tablet by mouth Daily As Needed.     • hydroxychloroquine (PLAQUENIL) 200 MG tablet Take 200 mg by mouth 2 (Two) Times a Day.     • hyoscyamine (LEVSIN) 0.125 MG SL tablet Dissolve one Tablet Under The Toungue Every 6  Hours as Needed for cramping (Patient taking differently: Take 0.125 mg by mouth Every 6 (Six) Hours As Needed. Dissolve one Tablet Under The Toungue Every 6  Hours as Needed for cramping) 30 tablet 3   • isosorbide mononitrate (IMDUR) 120 MG 24 hr tablet Take 120 mg by mouth Every Morning.     • letrozole (FEMARA) 2.5 MG tablet Take 1 tablet by mouth Daily. 30 tablet 5   • levothyroxine (SYNTHROID, LEVOTHROID) 75 MCG tablet Take 75 mcg by mouth Daily.     • montelukast (SINGULAIR) 10 MG tablet Take 1 tablet by mouth Every Night. 30 tablet 12   • nebivolol (BYSTOLIC) 5 MG tablet Take 10 mg by mouth 2 (Two) Times a Day.     • nitroglycerin (NITROSTAT) 0.4 MG SL tablet Place 0.4 mg under the tongue every 5 (five) minutes.     • nystatin (MYCOSTATIN) 179322 UNIT/ML suspension Swish and swallow 500,000 Units 2 (Two) Times a Day.     • pantoprazole (PROTONIX) 40 MG EC tablet Take 1 tablet by mouth Every Night. 90 tablet 3   • polyethylene glycol (MIRALAX) packet Take 17 g by mouth Daily.     • ranolazine (RANEXA) 1000 MG 12 hr tablet Take 1,000 mg by mouth Every 12 (Twelve) Hours.     • Simethicone (GAS RELIEF EXTRA STRENGTH PO) As Needed.     • SPIRIVA RESPIMAT 1.25 MCG/ACT aerosol solution inhaler Inhale 1 puff Every Morning.     • vitamin C (ASCORBIC ACID) 500 MG tablet Take 500 mg by mouth 2 (Two) Times a Day.     • vitamin D (ERGOCALCIFEROL) 58240 UNITS capsule capsule Take 50,000 Units by mouth Every 7 (Seven) Days.       No current facility-administered medications on file prior to visit.         ALLERGIES:    Allergies   Allergen Reactions   • Adhesive Tape Swelling     Must use paper tape   • Morphine And Related Hives and Itching   • Metal Rash      Patient is disabled, she is to work at  [employer] in the past.  She does not smoke or drink alcohol.    Social History     Socioeconomic History   • Marital status:      Spouse name: Zbigniew   • Number of children: Not on file   • Years of education: High school   • Highest education level: Not on file   Occupational History     Employer: DISABLED   Tobacco Use   • Smoking status: Never Smoker   • Smokeless tobacco: Never Used   Substance and Sexual Activity   • Alcohol use: No   • Drug use: No        Family History   Problem Relation Age of Onset   • COPD Mother    • Cancer Mother         heart tumor   • Heart disease Mother    • COPD Father    • Hypertension Father    • No Known Problems Sister    • Cancer Brother         testicular   • Breast cancer Paternal Aunt         unknown age    • Heart disease Maternal Grandmother    • Malig Hyperthermia Neg Hx      History is consistent with chest wall cancer in mother who  of cancer at age 68, unsure what type.  Brother had testicular cancer at age 2 but is now 59 and in good health.  Maternal aunt with history of breast cancer in her 40s and  from it    Review of Systems   Constitutional: Negative for appetite change, chills, diaphoresis, fatigue, fever and unexpected weight change.   HENT: Negative for hearing loss, sore throat and trouble swallowing.    Respiratory: Negative for cough, chest tightness, shortness of breath and wheezing.    Cardiovascular: Negative for chest pain, palpitations and leg swelling.   Gastrointestinal: Negative for abdominal distention, abdominal pain, constipation, diarrhea, nausea and vomiting.   Genitourinary: Negative for dysuria, frequency, hematuria and urgency.   Musculoskeletal: Negative for joint swelling.        No muscle weakness.   Skin: Negative for rash and wound.   Neurological: Negative for seizures, syncope, speech difficulty, weakness, numbness and headaches.   Hematological: Negative for adenopathy. Does not bruise/bleed easily.    Psychiatric/Behavioral: Negative for behavioral problems, confusion and suicidal ideas.      She has mild increase in her arthritis and joint pains but not significant enough.    Objective     There were no vitals filed for this visit.   Current Status 1/2/2020   ECOG score 0       Physical Exam    This is a video visit and exam not done    RECENT LABS:  Hematology WBC   Date Value Ref Range Status   01/02/2020 7.46 3.40 - 10.80 10*3/mm3 Final   12/30/2019 6.45 4.5 - 11.0 10*3/uL Final     RBC   Date Value Ref Range Status   01/02/2020 4.45 3.77 - 5.28 10*6/mm3 Final   12/30/2019 4.11 4.0 - 5.2 10*6/uL Final     Hemoglobin   Date Value Ref Range Status   01/02/2020 13.7 12.0 - 15.9 g/dL Final   12/30/2019 12.6 12.0 - 16.0 g/dL Final     Hematocrit   Date Value Ref Range Status   01/02/2020 44.1 34.0 - 46.6 % Final   12/30/2019 41.6 36.0 - 46.0 % Final     Platelets   Date Value Ref Range Status   01/02/2020 234 140 - 450 10*3/mm3 Final   12/30/2019 240 140 - 440 10*3/uL Final         Assessment/Plan      1.  Focal atypical ductal hyperplasia of the right breast at 8 o'clock position.   Right breast, 8 o'clock position, 4 cm from the nipple, path returned as focal atypical duct hyperplasia involving the single gland, 0.22 mm.sclerosing adenosis, fibroadenomatoid hyperplasia, associated micro calcifications.  · August 29, 2019 patient underwent needle localization lumpectomy on the right breast and there was no evidence of any atypical hyperplasia.  Reviewed the pathology.  · Patient has been referred here for chemoprophylaxis and discussed in length the side effects of Arimidex.       Right breast, 11 o'clock position, 3 cm from the nipple 1.6 cm on ultrasound.  Pathology from July 2019 right breast biopsies  duct ectasia, apocrine metaplasia, sclerosing adenosis, usual duct hyperplasia, associated micro calcifications    2.  Left breast, 3 o'clock position, MRI done shows abnormality, 9 mm nodular nodule lateral  retroareolar left breast immediately beneath the skin surface most likely intramammary lymph node or fibroadenoma.  Ultrasound recommended.  August 12, 2019 left breast ultrasound: Retroareolar left breast, 3 o'clock position, microlobulated hypoechoic mass which measures 8 mm.  Ultrasound-guided biopsy done on August 13, 2019.  Results pending.    · Patient is now awaiting surgery by Dr. Collier on both breasts.  · Risk assessment done based on family history, reproductive history and recent atypical biopsy.  The 5-year breast cancer risk is 4.2%.  And lifetime breast cancer risk is 17 to 31%.  · Patient has been referred here but given that she is undergoing surgery we would need to await the path on surgery prior to making any final recommendations.  · If only atypical duct hyperplasia present then certainly she could be a candidate for chemoprophylaxis.  But given that she has not yet had surgery, she tells me she is undergoing lumpectomy , and await surgery prior to making any recommendation  · August 29, 2019 underwent right needle localization lumpectomy and pathology on this is benign  · September 12, 2019: Started Arimidex.  Developed severe migraine headaches.  · October 12, 20 19: Started on Femara.  Tolerating well    3.  History of fibromyalgia, followed by Dr. Holm    4.  Atypical angina, cardiac cath negative followed by Dr. Jett    5.  Rheumatoid arthritis followed by Dr. Keller on Celebrex and Plaquenil    6. Osteoporosis of the lumbar spine  · Currently on Fosamax  · Continue calcium and vitamin D supplements    Plan:  1. Continue Femara.  Tolerating well  2. We reviewed the DEXA scan from 10/10/19 which shows osteoporosis of the lumbar spine.  Discussed Prolia injections as opposed to Fosamax but patient tolerating Fosamax and prefers to stay on that.  3. Continue calcium 600 mg twice a day and vitamin D3 1000 international units daily  4. Encouraged exercise and good nutrition.  5. Patient  scheduled for MRI of the breast and mammogram in November 20 20.  6. Follow-up with Dr. Collier November 17, 2020  7. Follow-up with me in 6 months with labs    Unable to complete visit using a video connection to the patient. A phone visit was used to complete this visits. Total time of discussion was 20 minutes.        Kayla Saldivar MD       CC: Giovanna Collier,  Emma Blakely, MD Sekou Diallo MD Gerard Siciliano, MD Mehdi Poorkay, MD Amir Piracha, MD Rinkoo Aggarwal, MD Gary Crump, MD Robert Sasser, MD

## 2020-04-23 ENCOUNTER — APPOINTMENT (OUTPATIENT)
Dept: LAB | Facility: HOSPITAL | Age: 65
End: 2020-04-23

## 2020-08-19 RX ORDER — PANTOPRAZOLE SODIUM 40 MG/1
TABLET, DELAYED RELEASE ORAL
Qty: 30 TABLET | Refills: 0 | Status: SHIPPED | OUTPATIENT
Start: 2020-08-19

## 2020-08-21 RX ORDER — ALENDRONATE SODIUM 70 MG/1
TABLET ORAL
Qty: 4 TABLET | Refills: 0 | Status: SHIPPED | OUTPATIENT
Start: 2020-08-21

## 2020-10-22 ENCOUNTER — APPOINTMENT (OUTPATIENT)
Dept: LAB | Facility: HOSPITAL | Age: 65
End: 2020-10-22

## 2020-11-03 ENCOUNTER — LAB (OUTPATIENT)
Dept: LAB | Facility: HOSPITAL | Age: 65
End: 2020-11-03

## 2020-11-03 ENCOUNTER — OFFICE VISIT (OUTPATIENT)
Dept: ONCOLOGY | Facility: CLINIC | Age: 65
End: 2020-11-03

## 2020-11-03 VITALS
TEMPERATURE: 97.3 F | HEART RATE: 69 BPM | RESPIRATION RATE: 18 BRPM | SYSTOLIC BLOOD PRESSURE: 128 MMHG | BODY MASS INDEX: 31.47 KG/M2 | DIASTOLIC BLOOD PRESSURE: 79 MMHG | WEIGHT: 177.6 LBS | OXYGEN SATURATION: 93 % | HEIGHT: 63 IN

## 2020-11-03 DIAGNOSIS — N60.99 ATYPICAL DUCTAL HYPERPLASIA OF BREAST: Primary | ICD-10-CM

## 2020-11-03 DIAGNOSIS — N60.99 ATYPICAL DUCTAL HYPERPLASIA OF BREAST: ICD-10-CM

## 2020-11-03 DIAGNOSIS — N60.91 ATYPICAL DUCTAL HYPERPLASIA OF RIGHT BREAST: ICD-10-CM

## 2020-11-03 DIAGNOSIS — M06.9 RHEUMATOID ARTHRITIS (HCC): ICD-10-CM

## 2020-11-03 DIAGNOSIS — Z87.39 HISTORY OF FIBROMYALGIA: ICD-10-CM

## 2020-11-03 DIAGNOSIS — M79.7 FIBROMYALGIA SYNDROME: ICD-10-CM

## 2020-11-03 DIAGNOSIS — M06.9 RHEUMATOID ARTHRITIS, INVOLVING UNSPECIFIED SITE, UNSPECIFIED WHETHER RHEUMATOID FACTOR PRESENT (HCC): ICD-10-CM

## 2020-11-03 LAB
ALBUMIN SERPL-MCNC: 4.4 G/DL (ref 3.5–5.2)
ALBUMIN/GLOB SERPL: 1.8 G/DL (ref 1.1–2.4)
ALP SERPL-CCNC: 64 U/L (ref 38–116)
ALT SERPL W P-5'-P-CCNC: 16 U/L (ref 0–33)
ANION GAP SERPL CALCULATED.3IONS-SCNC: 9.7 MMOL/L (ref 5–15)
AST SERPL-CCNC: 20 U/L (ref 0–32)
BASOPHILS # BLD AUTO: 0.01 10*3/MM3 (ref 0–0.2)
BASOPHILS NFR BLD AUTO: 0.2 % (ref 0–1.5)
BILIRUB SERPL-MCNC: 0.5 MG/DL (ref 0.2–1.2)
BUN SERPL-MCNC: 16 MG/DL (ref 6–20)
BUN/CREAT SERPL: 16.7 (ref 7.3–30)
CALCIUM SPEC-SCNC: 9.3 MG/DL (ref 8.5–10.2)
CHLORIDE SERPL-SCNC: 107 MMOL/L (ref 98–107)
CO2 SERPL-SCNC: 27.3 MMOL/L (ref 22–29)
CREAT SERPL-MCNC: 0.96 MG/DL (ref 0.6–1.1)
DEPRECATED RDW RBC AUTO: 49.1 FL (ref 37–54)
EOSINOPHIL # BLD AUTO: 0 10*3/MM3 (ref 0–0.4)
EOSINOPHIL NFR BLD AUTO: 0 % (ref 0.3–6.2)
ERYTHROCYTE [DISTWIDTH] IN BLOOD BY AUTOMATED COUNT: 13.6 % (ref 12.3–15.4)
GFR SERPL CREATININE-BSD FRML MDRD: 58 ML/MIN/1.73
GLOBULIN UR ELPH-MCNC: 2.5 GM/DL (ref 1.8–3.5)
GLUCOSE SERPL-MCNC: 87 MG/DL (ref 74–124)
HCT VFR BLD AUTO: 42.5 % (ref 34–46.6)
HGB BLD-MCNC: 13.3 G/DL (ref 12–15.9)
IMM GRANULOCYTES # BLD AUTO: 0.01 10*3/MM3 (ref 0–0.05)
IMM GRANULOCYTES NFR BLD AUTO: 0.2 % (ref 0–0.5)
LYMPHOCYTES # BLD AUTO: 1.69 10*3/MM3 (ref 0.7–3.1)
LYMPHOCYTES NFR BLD AUTO: 29 % (ref 19.6–45.3)
MCH RBC QN AUTO: 30.3 PG (ref 26.6–33)
MCHC RBC AUTO-ENTMCNC: 31.3 G/DL (ref 31.5–35.7)
MCV RBC AUTO: 96.8 FL (ref 79–97)
MONOCYTES # BLD AUTO: 0.56 10*3/MM3 (ref 0.1–0.9)
MONOCYTES NFR BLD AUTO: 9.6 % (ref 5–12)
NEUTROPHILS NFR BLD AUTO: 3.56 10*3/MM3 (ref 1.7–7)
NEUTROPHILS NFR BLD AUTO: 61 % (ref 42.7–76)
NRBC BLD AUTO-RTO: 0 /100 WBC (ref 0–0.2)
PLATELET # BLD AUTO: 228 10*3/MM3 (ref 140–450)
PMV BLD AUTO: 10.3 FL (ref 6–12)
POTASSIUM SERPL-SCNC: 4.4 MMOL/L (ref 3.5–4.7)
PROT SERPL-MCNC: 6.9 G/DL (ref 6.3–8)
RBC # BLD AUTO: 4.39 10*6/MM3 (ref 3.77–5.28)
SODIUM SERPL-SCNC: 144 MMOL/L (ref 134–145)
WBC # BLD AUTO: 5.83 10*3/MM3 (ref 3.4–10.8)

## 2020-11-03 PROCEDURE — 80053 COMPREHEN METABOLIC PANEL: CPT

## 2020-11-03 PROCEDURE — 36415 COLL VENOUS BLD VENIPUNCTURE: CPT

## 2020-11-03 PROCEDURE — 99214 OFFICE O/P EST MOD 30 MIN: CPT | Performed by: INTERNAL MEDICINE

## 2020-11-03 PROCEDURE — 85025 COMPLETE CBC W/AUTO DIFF WBC: CPT

## 2020-11-03 RX ORDER — ACETAMINOPHEN 500 MG
1000 TABLET ORAL AS NEEDED
COMMUNITY
Start: 2020-07-16

## 2020-11-03 NOTE — PROGRESS NOTES
Subjective     REASON FOR FOLLOW UP:  1.  Focal atypical ductal hyperplasia of the right breast who was to be scheduled for right breast lumpectomy,    · Pathology from July 22, 2019 right breast biopsies in the office.  · Right breast 8:00, 4 cm from the nipple returned as focal atypical duct hyperplasia involving a single gland, 0.22 mm maximally.  Ramone cyst, sclerosing adenosis, fibroadenomatoid hyperplasia, associated micro calcifications.  · Right breast 11:00, 3 cm from the nipple, tender to palpation, no exam correlate, 1.6 cm on ultrasound, BI-RADS 4.Pathology from July 22, 2019 right breast biopsies in the office.Right breast 11:00, 3 center meter from the nipple, duct ectasia, apocrine metaplasia, sclerosing adenosis, usual duct hyperplasia, associated micro calcifications      2.  MRI showed a lesion in the left breast which was atypical and patient underwent ultrasound-guided biopsy of the left breast nodule.   LEFT breast 3:00 RA- seen on MRI done for ADH- US correlate 8mm, BR4-  Biopsied 8-2019- Pathology from her left breast in office biopsy August 13, 2019 returned as sclerosing adenosis, fibroadenomatous change, usual hyperplasia.  No atypia.       3.  September 12, 20 19, Arimidex started, patient had severe headaches like migraine headaches with Arimidex and subsequently switched to  ·  Femara started on 10/25/19.  Tolerating well.      HISTORY OF PRESENT ILLNESS:  The patient is a 65 y.o. year old female who is here for an opinion about the above issue.  Patient is a 65-year-old female with history of atypical ductal hyperplasia s/p surgery of the right breast followed by Dr. Collier.  We had placed her on Arimidex on September 12, 2019 but patient had migraine-like headaches and hence switched to Femara and she is tolerating it very well.  She does have history of rheumatoid arthritis and her joint pains are slightly worse but she thinks she is doing reasonably well with Femara and she is  not worried about the slight worsening of her joint pains.  She also has very minimal hot flashes.  She has not gained weight and she is trying to exercise.    She has appointment with Dr. Collier in November following MRI of the breast as well as screening mammogram.  Patient has been doing self breast exam and denies any breast mass, nipple discharge or skin changes or axillary adenopathy.    She has osteoporosis for which she is on Fosamax.  She takes it once a week.  She seems to be tolerating it well.  She does not want to go on Prolia injections and it is reasonable to continue Fosamax.  She also knows to take calcium 600 mg twice a day and vitamin D 1000 international units daily.    Interval history:  Patient is scheduled for both mammogram and MRI on November 10, 2020 and has follow-up with Dr. Collier on November 17, 2020.  She is currently doing self breast exam and tolerating Femara reasonably.  She follows up with rheumatologist Dr. Emanuel Keller.  Patient had left trochanteric bursitis on July 6, 2020 which has improved now.  She has history of rheumatoid arthritis.  Patient states that her arthralgias may have slightly worsened compared to before but it is bearable.  We did discuss about consideration about tamoxifen if her joint pains are significant but at the present time after listening to side effects of tamoxifen she prefers to stay back on the Femara.        ONCOLOGIC HISTORY:  Patient is a 64 year-old female with history of atypical duct hyperplasia diagnosed back in July 2019 and was done on the right breast biopsy.  More recently Dr. Collier took her on August 29, 2019 for right needle localization lumpectomy but pathology is negative and there is no residual atypical hyperplasia present.    Patient was referred here for chemoprophylaxis given history of atypical duct hyperplasia.  Patient also has been arranged for MRI as well as mammogram in 2020.  She has been asked for self breast exam.   In the meantime we will start her on chemoprophylaxis now that we have the lumpectomy specimen, we will start aromatase inhibitor.      Oncologic history:     Patient is a 64-year-old female who underwent mammogram at [facility] in September 2018 and was asked to repeat diagnostic mammogram and ultrasound in 6 months    September 10, 2018  Findings  Breast is heterogeneously dense.  There is a small nodule projecting in the left breast centrally.  Small superficial nodule left lateral subareolar location stable.  Small nodule has also developed in the outer hemisphere right breast middle third.  Both nodules are imaged.  Larger nodule in the more central aspect of right breast containing a single coarse calcification appears stable.  Additional bilateral lateral breast imaging is recommended to include true lateral projections.  Suggested spot compression views and targeted breast ultrasound.    October 18, 2018:  Bilateral digital diagnostic mammogram and diagnostic bilateral ultrasound done  Impression: Probable benign mammogram  1.  Benign-appearing simple cyst in the deep central left breast corresponding to the new mammographic density.  This can be followed up with yearly mammograms.  2.  Solid appearing nodule in the right breast along 8:00 which represented the developing nodular density on mammography and has likely been stable for several years.  This is favored to represent probably a benign fibroadenoma and can be followed up with right breast mammogram and ultrasound in 6 months to ensure stability.    6/27/2019: Diagnostic right digital mammogram and diagnostic right breast ultrasound  Right breast mass 8 to 9 o'clock position, 6.6 cm from the nipple, stable with circumscribed nodular lobular margins measuring 1.1 cm AP x0.7 cm.    Right breast focal asymmetry 10:00 to 11 o'clock position, 5 cm from the nipple contains stable coarse calcification which appears to have become larger on only the  right spot since October 2018.    Ultrasound right breast the solid and cystic mass in the right breast at 8 o'clock position at 4 cm from the nipple is stable measuring 0.4 x 0.5 x 1 cm.  On October 11, 2018 ultrasound measured 0.5 x 1 x 0.5 cm and appeared to contain solid components on today's exam the mass consists of solid components and 2 small cystic areas within without thin septations.  The right breast sonographic irregularity mostly hypoechoic area 11:00 at 3 cm from the nipple measured 0.7 x 1.1 x 1.6 cm.  On October 11, 2018 this area measured 0.9 x 1.8 x 1.5 cm.  There is a new solid mural nodule 0.4 x 0.7 cm.  The new nodule is concerning for possible malignancy.    Impression the right breast ultrasound mixed solid and cystic mass at 8 o'clock position, 4 cm from the nipple needs to be biopsied under ultrasound guidance.    2.  The breast right ultrasound calcified area at 11:00, 3 cm from the nipple needs to be biopsied under ultrasound guidance because of a new solid hypoechoic mural nodule.    July 22, 2019: Pathology  Ultrasound-guided biopsy  Final Diagnosis   1.  Breast, Right 8 o'clock Position, 4 cm FN, Core Biopsy:                 A.  Focal atypical duct hyperplasia involving a single gland, 0.22 mm maximally.               B.  Clustered cysts, sclerosing adenosis, and fibroadenomatoid hyperplasia with associated microcalcifications.       2.  Breast, Right 11 o'clock Position, 3 cm FN, Core Biopsy:                A.  Duct ectasia, apocrine metaplasia, sclerosing adenosis, and usual duct hyperplasia with associated                     microcalcifications.      August 7, 2019: Bilateral breast MRI  Artifact from a biopsy marking clip upper outer quadrant right breast.  No abnormal enhancement adjacent to the clips.  There is a 9 mm diameter nodule lateral retroareolar left breast immediately beneath the skin surface most likely an intramammary lymph node or fibroadenoma.  Further evaluation  with ultrasound is recommended.  BI-RADS 0.     Right mammogram same day shows 2 new marking clips upper outer quadrant both are spring shaped.  No underlying mammographic abnormality.    August 12, 2019: Left breast ultrasound, retroareolar left breast there is microlobulated hypoechoic mass that measures 8 mm ultrasound-guided core biopsy recommended.    Patient underwent left breast ultrasound-guided biopsy by Dr. Collier as of August 13, 2019.    Patient was to have surgery on the right breast but because of this new lesion on the left breast that was seen on MRI she had to undergo biopsy first of the right breast lesion prior to surgery.    Patient is to hear from Dr. Collier about the surgery date    Obstetric/Gynecologic History:  Age menstrual periods began: 13  Patient is postmenopausal due to removal of her uterus and both ovaries in the following year: 1395-1999   Number of pregnancies:1  Number of live births: 1  Number of abortions or miscarriages: 0  Age of delivery of first child: 21  Patient did not breast feed.  Length of time taking birth control pills: 4 yrs   Patient took hormone replacement during the following dates:  10 yrs  Patient had uterus and ovaries removed.     September 12, 2019: Started Arimidex but had severe migraine headaches.    Femara started on 10/25/19.    Past Medical History:   Diagnosis Date   • Asthma    • Breast lump     RIGHT   • Bronchiectasis (CMS/HCC)    • Depression with anxiety 9/21/2016   • Disease of thyroid gland     HYPOTHYROIDISM   • Drug therapy    • Fibrocystic breast    • Fibromyalgia syndrome 6/6/2017   • GERD (gastroesophageal reflux disease)    • Heart palpitations    • History of thyroid nodule    • HLD (hyperlipidemia) 9/21/2016   • Hypertension    • Hypothyroidism    • IBS (irritable bowel syndrome)    • Moderate persistent asthma without complication 6/6/2017   • PONV (postoperative nausea and vomiting)    • Precordial pain 9/21/2016   • Rheumatoid  arthritis (CMS/HCC)    • Scoliosis    • Thrush    • Vitamin D deficiency 9/21/2016        Past Surgical History:   Procedure Laterality Date   • ABDOMINAL HERNIA REPAIR     • APPENDECTOMY     • BREAST BIOPSY      right and left benign 10-15 yrs ago   • BREAST LUMPECTOMY Right 8/29/2019    Procedure: right breast needle localized lumpectomy for atypical duct hyperplasia.;  Surgeon: Giovanna Collier MD;  [place] [facility] [place] [facility] [place]  Service: General   • BRONCHOSCOPY N/A 5/11/2018    Procedure: BRONCHOSCOPY;  Surgeon: Emma Blakely MD;  Location: [facility] ENDOSCOPY;  Service: Pulmonary   • CARDIAC CATHETERIZATION     • CHOLECYSTECTOMY     • COLONOSCOPY  2015   • ENDOSCOPY  2015   • EPIDURAL BLOCK     • HERNIA REPAIR     • HYSTERECTOMY     • KNEE SURGERY     • OOPHORECTOMY     • TONSILLECTOMY     • WRIST FUSION Right         Current Outpatient Medications on File Prior to Visit   Medication Sig Dispense Refill   • albuterol (PROVENTIL) (2.5 MG/3ML) 0.083% nebulizer solution Take 2.5 mg by nebulization Every 6 (Six) Hours As Needed for Wheezing (asthma).     • albuterol sulfate  (90 Base) MCG/ACT inhaler Inhale 2 puffs Every 4 (Four) Hours As Needed for Wheezing.     • alendronate (FOSAMAX) 70 MG tablet TAKE 1 TABLET BY MOUTH ONCE WEEKLY BEFORE BREAKFAST, ON AN EMPTY STOMACH: REMAIN UPRIGHT FOR ONE HOUR:TAKE WITH 8 OUNCES OF WATER 4 tablet 0   • atorvastatin (LIPITOR) 20 MG tablet Take 20 mg by mouth Daily.     • baclofen (LIORESAL) 10 MG tablet Take 10 mg by mouth Daily.     • budesonide-formoterol (SYMBICORT) 160-4.5 MCG/ACT inhaler Inhale 2 puffs 2 (Two) Times a Day.     • Calcium Carbonate-Vit D-Min (Calcium 1200) 5261-4079 MG-UNIT chewable tablet Chew 1,000 Units Daily.     • celecoxib (CeleBREX) 200 MG capsule Take 200 mg by mouth 2 (two) times a day.     • clotrimazole-betamethasone (LOTRISONE) 1-0.05 % cream Apply  topically to the appropriate area as directed 2 (Two) Times a Day As Needed.     •  diphenhydrAMINE-acetaminophen (TYLENOL PM)  MG tablet per tablet Take 1 tablet by mouth At Night As Needed for Sleep.     • FASENRA 30 MG/ML solution prefilled syringe INJECT THE CONTENTS OF ONE SYRINGE (30MG) SUBCUTANEOUSLY ON WEEKS 0, 4, AND 8. MUST BE ADMINISTERED BY HEALTHCARE PROFESSIONAL  1   • furosemide (LASIX) 20 MG tablet Take 20 mg by mouth As Needed.     • gabapentin (NEURONTIN) 300 MG capsule Take 300 mg by mouth 2 (Two) Times a Day.     • HYDROcodone-acetaminophen (NORCO)  MG per tablet Take 1 tablet by mouth Daily As Needed.     • hydroxychloroquine (PLAQUENIL) 200 MG tablet Take 200 mg by mouth 2 (Two) Times a Day.     • hyoscyamine (LEVSIN) 0.125 MG SL tablet Dissolve one Tablet Under The Toungue Every 6  Hours as Needed for cramping (Patient taking differently: Take 0.125 mg by mouth Every 6 (Six) Hours As Needed. Dissolve one Tablet Under The Toungue Every 6  Hours as Needed for cramping) 30 tablet 3   • isosorbide mononitrate (IMDUR) 120 MG 24 hr tablet Take 120 mg by mouth Every Morning.     • letrozole (Femara) 2.5 MG tablet Take 1 tablet by mouth Daily. 90 tablet 2   • levothyroxine (SYNTHROID, LEVOTHROID) 75 MCG tablet Take 75 mcg by mouth Daily.     • nebivolol (BYSTOLIC) 5 MG tablet Take 10 mg by mouth 2 (Two) Times a Day.     • nitroglycerin (NITROSTAT) 0.4 MG SL tablet Place 0.4 mg under the tongue every 5 (five) minutes.     • nystatin (MYCOSTATIN) 100307 UNIT/ML suspension Swish and swallow 500,000 Units 2 (Two) Times a Day.     • pantoprazole (PROTONIX) 40 MG EC tablet TAKE ONE TABLET BY MOUTH ONCE NIGHTLY 30 tablet 0   • polyethylene glycol (MIRALAX) packet Take 17 g by mouth Daily.     • ranolazine (RANEXA) 1000 MG 12 hr tablet Take 1,000 mg by mouth Every 12 (Twelve) Hours.     • Simethicone (GAS RELIEF EXTRA STRENGTH PO) As Needed.     • SPIRIVA RESPIMAT 1.25 MCG/ACT aerosol solution inhaler Inhale 1 puff Every Morning.     • vitamin D (ERGOCALCIFEROL) 20501 UNITS  capsule capsule Take 50,000 Units by mouth Every 7 (Seven) Days.     • amLODIPine (NORVASC) 2.5 MG tablet Take 2.5 mg by mouth Every Night.     • fluticasone (FLONASE) 50 MCG/ACT nasal spray 1 spray into each nostril 2 (Two) Times a Day. (Patient taking differently: 1 spray into the nostril(s) as directed by provider 2 (Two) Times a Day As Needed.) 16 g 12   • [DISCONTINUED] montelukast (SINGULAIR) 10 MG tablet Take 1 tablet by mouth Every Night. 30 tablet 12     No current facility-administered medications on file prior to visit.         ALLERGIES:    Allergies   Allergen Reactions   • Adhesive Tape Swelling     Must use paper tape   • Morphine And Related Hives and Itching   • Metal Rash      Patient is disabled, she is to work at [employer] in the past.  She does not smoke or drink alcohol.    Social History     Socioeconomic History   • Marital status:      Spouse name: Zbigniew   • Number of children: Not on file   • Years of education: High school   • Highest education level: Not on file   Occupational History     Employer: DISABLED   Tobacco Use   • Smoking status: Never Smoker   • Smokeless tobacco: Never Used   Substance and Sexual Activity   • Alcohol use: No   • Drug use: No        Family History   Problem Relation Age of Onset   • COPD Mother    • Cancer Mother         heart tumor   • Heart disease Mother    • COPD Father    • Hypertension Father    • No Known Problems Sister    • Cancer Brother         testicular   • Breast cancer Paternal Aunt         unknown age    • Heart disease Maternal Grandmother    • Malig Hyperthermia Neg Hx      History is consistent with chest wall cancer in mother who  of cancer at age 68, unsure what type.  Brother had testicular cancer at age 2 but is now 59 and in good health.  Maternal aunt with history of breast cancer in her 40s and  from it    Review of Systems   Constitutional: Negative for appetite change, chills, diaphoresis, fatigue, fever and  "unexpected weight change.   HENT: Negative for hearing loss, sore throat and trouble swallowing.    Respiratory: Negative for cough, chest tightness, shortness of breath and wheezing.    Cardiovascular: Negative for chest pain, palpitations and leg swelling.   Gastrointestinal: Negative for abdominal distention, abdominal pain, constipation, diarrhea, nausea and vomiting.   Genitourinary: Negative for dysuria, frequency, hematuria and urgency.   Musculoskeletal: Negative for joint swelling.        No muscle weakness.   Skin: Negative for rash and wound.   Neurological: Negative for seizures, syncope, speech difficulty, weakness, numbness and headaches.   Hematological: Negative for adenopathy. Does not bruise/bleed easily.   Psychiatric/Behavioral: Negative for behavioral problems, confusion and suicidal ideas.   All other systems reviewed and are negative.     She has mild increase in her arthritis and joint pains but not significant enough.  I have reviewed and confirmed the accuracy of the ROS as documented by the MA/LPN/RN Kayla Salidvar MD      Objective     Vitals:    11/03/20 1204   BP: 128/79   Pulse: 69   Resp: 18   Temp: 97.3 °F (36.3 °C)   TempSrc: Skin   SpO2: 93%   Weight: 80.6 kg (177 lb 9.6 oz)   Height: 159.5 cm (62.8\")   PainSc: 0-No pain      Current Status 11/3/2020   ECOG score 0       Physical Exam    .      This patient's ACP documentation is up to date, and there's nothing further left to document.    CONSTITUTIONAL:  Vital signs reviewed.    No distress, looks comfortable.  EYES:  Conjunctiva and lids unremarkable.  Extraocular eye movements intact.  EARS,NOSE,MOUTH,THROAT:  Ears and nose appear unremarkable.  Lips, teeth, gums appear unremarkable.  RESPIRATORY:  Normal respiratory effort.    CARDIOVASCULAR: Regular rate rhythm, no murmur  BREAST: Right breast: No skin changes, no evidence of breast mass, no nipple discharge, no evidence of any right axillary adenopathy or right " supraclavicular adenopathy  Left breast: No evidence of any skin changes, no evidence of any left breast mass and no evidence of left nipple discharge as well as no left axillary adenopathy or left supraclavicular adenopathy.  No significant lower extremity edema.  SKIN: No wounds.  No rashes.  MUSCULOSKELETAL/EXTREMITIES: No clubbing or cyanosis.  No apparent unilateral weakness.  NEURO: CN 2-12 appear intact. No focal neurological deficits noted.  PSYCHIATRIC:  Normal judgment and insight.  Normal mood and affect.    RECENT LABS:  Hematology WBC   Date Value Ref Range Status   11/03/2020 5.83 3.40 - 10.80 10*3/mm3 Final   07/08/2020 11.48 (H) 4.5 - 11.0 10*3/uL Final     RBC   Date Value Ref Range Status   11/03/2020 4.39 3.77 - 5.28 10*6/mm3 Final   07/08/2020 4.35 4.0 - 5.2 10*6/uL Final     Hemoglobin   Date Value Ref Range Status   11/03/2020 13.3 12.0 - 15.9 g/dL Final   07/08/2020 13.0 12.0 - 16.0 g/dL Final     Hematocrit   Date Value Ref Range Status   11/03/2020 42.5 34.0 - 46.6 % Final   07/08/2020 41.6 36.0 - 46.0 % Final     Platelets   Date Value Ref Range Status   11/03/2020 228 140 - 450 10*3/mm3 Final   07/08/2020 279 140 - 440 10*3/uL Final         Assessment/Plan      1.  Focal atypical ductal hyperplasia of the right breast at 8 o'clock position.   Right breast, 8 o'clock position, 4 cm from the nipple, path returned as focal atypical duct hyperplasia involving the single gland, 0.22 mm.sclerosing adenosis, fibroadenomatoid hyperplasia, associated micro calcifications.  · August 29, 2019 patient underwent needle localization lumpectomy on the right breast and there was no evidence of any atypical hyperplasia.  Reviewed the pathology.  · Patient has been referred here for chemoprophylaxis and discussed in length the side effects of Arimidex.       Right breast, 11 o'clock position, 3 cm from the nipple 1.6 cm on ultrasound.  Pathology from July 2019 right breast biopsies  duct ectasia, apocrine  metaplasia, sclerosing adenosis, usual duct hyperplasia, associated micro calcifications    2.  Left breast, 3 o'clock position, MRI done shows abnormality, 9 mm nodular nodule lateral retroareolar left breast immediately beneath the skin surface most likely intramammary lymph node or fibroadenoma.  Ultrasound recommended.  August 12, 2019 left breast ultrasound: Retroareolar left breast, 3 o'clock position, microlobulated hypoechoic mass which measures 8 mm.  Ultrasound-guided biopsy done on August 13, 2019.  Results pending.    · Patient is now awaiting surgery by Dr. Collier on both breasts.  · Risk assessment done based on family history, reproductive history and recent atypical biopsy.  The 5-year breast cancer risk is 4.2%.  And lifetime breast cancer risk is 17 to 31%.  · Patient has been referred here but given that she is undergoing surgery we would need to await the path on surgery prior to making any final recommendations.  · If only atypical duct hyperplasia present then certainly she could be a candidate for chemoprophylaxis.  But given that she has not yet had surgery, she tells me she is undergoing lumpectomy , and await surgery prior to making any recommendation  · August 29, 2019 underwent right needle localization lumpectomy and pathology on this is benign  · September 12, 2019: Started Arimidex.  Developed severe migraine headaches.  · October 12, 20 19: Started on Femara.  Tolerating well, except mild arthralgias.    3.  History of fibromyalgia, followed by kristopher Allen     4.  Atypical angina, cardiac cath negative followed by Dr. Jett    5.  Rheumatoid arthritis followed by Dr. Keller on Celebrex and Plaquenil, stable    6. Osteoporosis of the lumbar spine    · Currently on Fosamax.  Discussed Prolia as well but patient prefers oral bisphosphonates.  · Continue calcium and vitamin D supplements    Plan:  1. Continue Femara.  Tolerating reasonably well though has arthralgias but not  significant  2. Discussed consideration of tamoxifen as she has rheumatoid arthritis and arthralgias but patient currently refuses and prefers to continue Femara as she can tolerate it reasonably well  3. MRI and mammogram scheduled as of November 10, 2020  4. Patient has follow-up with Dr. Collier November 17, 2020  5. Reviewed records from Dr. Emanuel Keller and patient has trochanteric bursitis which now is resolved  6. Follow-up with me in April 2021      Kayla Saldivar MD  November 3, 2020     CC: Emma Medina MD David Sun, MD Reid Brown, MD Gerard Siciliano, MD Mehdi Poorkay, MD Amir Piracha, MD Rinkoo Aggarwal, MD Gary Crump, MD Robert Sasser, MD

## 2020-11-10 ENCOUNTER — HOSPITAL ENCOUNTER (OUTPATIENT)
Dept: MRI IMAGING | Facility: HOSPITAL | Age: 65
Discharge: HOME | End: 2020-11-10

## 2020-11-10 ENCOUNTER — HOSPITAL ENCOUNTER (OUTPATIENT)
Dept: MAMMOGRAPHY | Facility: HOSPITAL | Age: 65
Discharge: HOME | End: 2020-11-10

## 2020-11-10 DIAGNOSIS — R92.8 ABNORMAL MRI, BREAST: ICD-10-CM

## 2020-11-10 DIAGNOSIS — N60.91 ATYPICAL DUCTAL HYPERPLASIA OF RIGHT BREAST: ICD-10-CM

## 2020-11-10 DIAGNOSIS — Z12.31 ENCOUNTER FOR SCREENING MAMMOGRAM FOR MALIGNANT NEOPLASM OF BREAST: ICD-10-CM

## 2020-11-10 PROCEDURE — 77063 BREAST TOMOSYNTHESIS BI: CPT

## 2020-11-10 PROCEDURE — 77067 SCR MAMMO BI INCL CAD: CPT

## 2020-11-10 PROCEDURE — A9577 INJ MULTIHANCE: HCPCS | Performed by: SURGERY

## 2020-11-10 PROCEDURE — C8908 MRI W/O FOL W/CONT, BREAST,: HCPCS

## 2020-11-10 PROCEDURE — C8937 CAD BREAST MRI: HCPCS

## 2020-11-10 PROCEDURE — 0 GADOBENATE DIMEGLUMINE 529 MG/ML SOLUTION: Performed by: SURGERY

## 2020-11-10 RX ADMIN — GADOBENATE DIMEGLUMINE 16 ML: 529 INJECTION, SOLUTION INTRAVENOUS at 10:20

## 2020-11-11 ENCOUNTER — TELEPHONE (OUTPATIENT)
Dept: SURGERY | Facility: CLINIC | Age: 65
End: 2020-11-11

## 2020-11-11 NOTE — TELEPHONE ENCOUNTER
[facility] bilateral breast MRI November 10, 2020: Scattered fibroglandular tissue.  Mild postsurgical architectural distortion right breast.  BI-RADS 2  Bilateral screening mammogram with tomosynthesis [facility] November 10, 2020: No evidence for malignancy in either breast.  Scattered fibroglandular densities.  BI-RADS 1

## 2020-12-19 NOTE — TELEPHONE ENCOUNTER
[facility] called stating pt could have room of previous pt that changed mind about being transferred.   Pt is now going to room 419 with LS ETA of approx [phone]     Jairo BELL Reser  12/19/20 [id number] Scheduled Rt Diagnostic Mammo @ [facility]  Scheduled Bilateral Breast Mri w wo contrast @ [facility]  8-7-19 arrive 7:15      Scheduled Surgery [facility] 8-15-19  Right Breast NL Lumpectomy for Atypical Hyperplasia      Arrive          5:45am  NL               7:30am  Surgery       9:00am      [facility] 8-5-19- @ 9:30      Return to see Dr MARINELLI 8-27-19 arrive 12:15    Spoke to pt she v/u with appts  vivek

## 2021-01-12 ENCOUNTER — TRANSCRIBE ORDERS (OUTPATIENT)
Dept: ADMINISTRATIVE | Facility: HOSPITAL | Age: 66
End: 2021-01-12

## 2021-01-12 DIAGNOSIS — M53.3 CHRONIC LEFT SI JOINT PAIN: Primary | ICD-10-CM

## 2021-01-12 DIAGNOSIS — G89.29 CHRONIC LEFT SI JOINT PAIN: Primary | ICD-10-CM

## 2021-01-12 DIAGNOSIS — M54.50 LOW BACK PAIN, UNSPECIFIED BACK PAIN LATERALITY, UNSPECIFIED CHRONICITY, UNSPECIFIED WHETHER SCIATICA PRESENT: ICD-10-CM

## 2021-01-22 ENCOUNTER — TELEPHONE (OUTPATIENT)
Dept: SURGERY | Facility: CLINIC | Age: 66
End: 2021-01-22

## 2021-01-22 ENCOUNTER — OFFICE VISIT (OUTPATIENT)
Dept: SURGERY | Facility: CLINIC | Age: 66
End: 2021-01-22

## 2021-01-22 VITALS
OXYGEN SATURATION: 95 % | HEART RATE: 73 BPM | WEIGHT: 176 LBS | HEIGHT: 64 IN | SYSTOLIC BLOOD PRESSURE: 136 MMHG | DIASTOLIC BLOOD PRESSURE: 79 MMHG | BODY MASS INDEX: 30.05 KG/M2 | TEMPERATURE: 97.1 F

## 2021-01-22 DIAGNOSIS — R63.5 WEIGHT GAIN: ICD-10-CM

## 2021-01-22 DIAGNOSIS — Z12.31 ENCOUNTER FOR SCREENING MAMMOGRAM FOR MALIGNANT NEOPLASM OF BREAST: ICD-10-CM

## 2021-01-22 DIAGNOSIS — R92.8 ABNORMAL MRI, BREAST: ICD-10-CM

## 2021-01-22 DIAGNOSIS — N60.91 ATYPICAL DUCTAL HYPERPLASIA OF RIGHT BREAST: ICD-10-CM

## 2021-01-22 DIAGNOSIS — R92.2 INCONCLUSIVE MAMMOGRAM: ICD-10-CM

## 2021-01-22 DIAGNOSIS — N64.4 MASTODYNIA: ICD-10-CM

## 2021-01-22 DIAGNOSIS — M06.9 RHEUMATOID ARTHRITIS, INVOLVING UNSPECIFIED SITE, UNSPECIFIED WHETHER RHEUMATOID FACTOR PRESENT (HCC): ICD-10-CM

## 2021-01-22 DIAGNOSIS — G89.29 OTHER CHRONIC PAIN: ICD-10-CM

## 2021-01-22 DIAGNOSIS — I20.8 ANGINA OF EFFORT (HCC): ICD-10-CM

## 2021-01-22 DIAGNOSIS — N60.11 FIBROCYSTIC DISEASE OF RIGHT BREAST: Primary | ICD-10-CM

## 2021-01-22 PROCEDURE — 99213 OFFICE O/P EST LOW 20 MIN: CPT | Performed by: SURGERY

## 2021-01-22 RX ORDER — HYDROCODONE BITARTRATE AND ACETAMINOPHEN 10; 325 MG/1; MG/1
1 TABLET ORAL 2 TIMES DAILY
COMMUNITY

## 2021-01-22 NOTE — PROGRESS NOTES
Chief Complaint: Camelia Quintanilla is a 65 y.o. female who was seen in consultation at the request of   Reji Romo MD   for abnormal breast imaging.  Right Breast, Follow up.     History of Present Illness:  Patient presents with abnormal breast imaging and breast pain.  She reports breast pain UOQ at the site of the 11:00 lesion on her imaging.   She noted no new palpable masses, skin changes, nipple discharge, nipple changes prior to her most recent imaging.    Her most recent imaging includes the followin/10/2018   [facility] Quintanilla  BILATERAL SCREENING MAMMOGRAM  Heterogeneously dense. Interval occurring a small nodule projecting as central left breast. Small superficial nodule left lateral subareolar location is stable. Small nodule also developed outer hemisphere right breast middle third. Larger nodule central aspect of the right breast containing a single coarse calcification appears stable.  BIRADS Category 0    10/11/2018   Peter Quintanilla  BILATERAL DIAGNOSTIC MAMMOGRAM  BILATERAL BREAST ULTRASOUND  Heterogeneously dense. The developing nodular density deep central left breast along the nipple line does not efface. This will be further evaluated with left breast ultrasound today. Stable benign-appearing nodule in the anterior lateral periareolar left breast. The developing nodular density in the lateral hemisphere of the right breast also dos note efface.  ULTRASOUND  Left breast:  Nodule in the deep central left breast appears to correspond to a simple appearing cyst along the 12:00 retroareolar left 6 x 8 x 7 mm. no suspicious solid masses in the deep central left breast.  Right breast:  10:00 axis, 4 cm from the nipple, demonstrates an area of dense breast tissue measuring 1.5 x 1.8 x 0.9 cm. This corresponds to an island of benign dense breast tissue and coarse calcification that has been stable for several years. 8:00 axis, 4 cm from the nipple, demonstrates an  ovoid, solid-appearing structure measuring 1 x 0.5 x 0.5 cm. may correspond to the developing nodular density on mammography. This may represent a proabably benign fibroadenoma. Upon review 2011, this may have been present since that time. Six-month follow-up right breast mammogram and ultrasound will be recommended. There is also an incidental simple appearing cyst 9:00 axis, 3 cm from the nipple, measuring 5 x 3 x 6 mm.  BIRADS Category 3    06/27/2019   [facility]  DIAGNOSTIC RIGHT DIGITAL MAMMOGRAM  RIGHT BREAST ULTRASOUND  Heterogeneously dense.  The right breast mass 8:00-:00 at 6.6 cm from the nipple appears stable. Measuring 1.1 cm AP x 0.7 cm TRV.  Right breast focal asymmetry at 10:00-11:00 at mid depth at about 5 cm from the nipple, that contains a stable course calcifications, appears to have become larger since 10/11/2018.  ULTRASOUND: Right breast 8:00 at 4 cm from the nipple and 11:00 at 3 cm from the nipple.  Right breast mixed solid and cystic mass in the right breast at 8:00 at 4 cm from the nipple is stable in size, 0.4 x 0.5 x 1.0 cm. Since the margins of this sonographic mass have become partially irregular and angulated, the mass is considered to be suspicious for possible malignancy.  Right breast 11:00 at 3 cm from the nipple measures 0.7 x 1.1 x 1.6 cm. Today, a new solid mural nodule that measures 0.4 x 0.7 cm and is mostly hypoechoic with a punctate hyperechoic focus and no internal vascular flow was seen by the radiologist. This new nodule is concerning for possible malignancy.  IMPRESSION:  1. Right breast at 8:00 at 4 cm from the nipple needs to be biopsied. BIRADS Category 4.  2. The right breast sonographic calcified hypoechoic area at 11:00 at 3 cm from the nipple needs to be biopsied under ultrasound guidance. BIRADS Category 4.    On the right breast she has had 2 excisional biopsies for cyst 10 or 15 years ago.  In the left breast she has had 2 excisional  biopsies for cyst.  She has had her uterus and ovaries removed in 1978, is postmenopausal, and takes nor hormones.  Her family history includes the following: She has 1 daughter, 1 son, 2 sisters, one maternal aunt, 4 paternal aunts.  She has a paternal aunt who had breast cancer at uncertain age.  She has chronic pain from the fibromyalgia and takes baclofen, gabapentin, Norco one per day with Dr. Holm.  She has angina and sees Dr. Trimble for this and is not on any blood thinner.  She has rheumatoid arthritis and sees Dr. Keller for this and takes Celebrex and Plaquenil.    Pathology from July 22, 2019 right breast biopsies in the office.  Right breast 8:00, 4 cm from the nipple returned as focal atypical duct hyperplasia involving a single gland, 0.22 mm maximally.  Ramone cyst, sclerosing adenosis, fibroadenomatoid hyperplasia, associated micro calcifications.  Right breast 11:00, 3 center meter from the nipple, duct ectasia, apocrine metaplasia, sclerosing adenosis, usual duct hyperplasia, associated micro calcifications.    Reports some bruising. LOQ. Denies redness, warmth or drainage from the incision.      In the interim, Mrs. Quintanilla had her MRI and mammogram, as follows:  Bilateral breast MRI August 7, 2019 [facility]: Artifact from a biopsy marking clip upper outer quadrant right breast.  No abnormal enhancement adjacent to the clips.  There is a 9 mm diameter nodule lateral retroareolar left breast immediately beneath the skin surface most likely an intramammary lymph node or fibroadenoma.  Further evaluation with ultrasound is recommended.  BI-RADS 0.     Right mammogram same day shows 2 new marking clips upper outer quadrant both are spring shaped.  No underlying mammographic abnormality.    I then arranged for a LEFT breast ultrasound, as below:    [facility] August 12, 2019 left ultrasound.  Retroareolar left breast 3:00 there is a microlobulated hypoechoic mass that measures 8  mm.  Correlation with ultrasound-guided biopsy is recommended BI-RADS 4.    8-29-19 RIGHT NL lumpectomy returned as  No residual atypical hyperplasia.    She denies redness warmth or drainage from her incision.  Her breast is still a little bit tender and she continues to wear her sports bra.      Interval History:    In the interim,  Camelia Quintanilla  has done well.  She has noted no changes in her breast exam. No new masses, skin changes, nipple changes, nipple discharge either breast.   She denies headache, bone pain, belly pain, cough, changes in vision or gait.    Her most recent imaging includes the following:  [facility] bilateral breast MRI November 10, 2020: Scattered fibroglandular tissue.  Mild postsurgical architectural distortion right breast.  BI-RADS 2  Bilateral screening mammogram with tomosynthesis [facility] November 10, 2020: No evidence for malignancy in either breast.  Scattered fibroglandular densities.  BI-RADS 1    She has gained 8 # in the interim.    She reports bilateral upper outer quadrant diffuse breast pain.  Worsened over the past 12 months.  Achy.  Not alleviated by anything obvious.    She has seen Dr Saldivar and started 9-2019 arimidex then switched to femara due to intolerance in . She now reports muscle aches on the femara.    [facility] is here for review.    Review of Systems:  Review of Systems   Constitutional: Positive for unexpected weight change (8 lb wt gain ).   HENT:   Positive for tinnitus.    Respiratory: Positive for wheezing.    Cardiovascular: Positive for palpitations.   Gastrointestinal: Positive for constipation.   Endocrine: Positive for hot flashes.   Musculoskeletal: Positive for arthralgias, back pain and myalgias.   Psychiatric/Behavioral: Positive for sleep disturbance. The patient is nervous/anxious.    All other systems reviewed and are negative.       Past Medical and Surgical History:  Breast Biopsy History:  Patient has had  the following breast biopsies:Bilateral Breast Biopsies 10-15 yrs ago; benign   Breast Cancer HIstory:  Patient does not have a past medical history of breast cancer.  Breast Operations, and year:  None   Obstetric/Gynecologic History:  Age menstrual periods began: 13  Patient is postmenopausal due to removal of her uterus and both ovaries in the following year: [id number]   Number of pregnancies:1  Number of live births: 1  Number of abortions or miscarriages: 0  Age of delivery of first child: 21  Patient did not breast feed.  Length of time taking birth control pills: 4 yrs   Patient took hormone replacement during the following dates:  10 yrs  Patient had uterus and ovaries removed.     Past Surgical History:   Procedure Laterality Date   • ABDOMINAL HERNIA REPAIR     • APPENDECTOMY     • BREAST BIOPSY      right and left benign 10-15 yrs ago   • BREAST LUMPECTOMY Right 8/29/2019    Procedure: right breast needle localized lumpectomy for atypical duct hyperplasia.;  Surgeon: Giovanna Collier MD;  Location: [facility];  Service: General   • BRONCHOSCOPY N/A 5/11/2018    Procedure: BRONCHOSCOPY;  Surgeon: Emma Blakely MD;  Location: [facility] ENDOSCOPY;  Service: Pulmonary   • CARDIAC CATHETERIZATION     • CHOLECYSTECTOMY     • COLONOSCOPY  2015   • ENDOSCOPY  2015   • EPIDURAL BLOCK     • HERNIA REPAIR     • HYSTERECTOMY     • KNEE SURGERY     • OOPHORECTOMY     • TONSILLECTOMY     • WRIST FUSION Right      Past Medical History:   Diagnosis Date   • Asthma    • Breast lump     RIGHT   • Bronchiectasis (CMS/HCC)    • Depression with anxiety 9/21/2016   • Disease of thyroid gland     HYPOTHYROIDISM   • Drug therapy    • Fibrocystic breast    • Fibromyalgia syndrome 6/6/2017   • GERD (gastroesophageal reflux disease)    • Heart palpitations    • History of thyroid nodule    • HLD (hyperlipidemia) 9/21/2016   • Hypertension    • Hypothyroidism    • IBS (irritable bowel syndrome)    • Moderate persistent asthma  "without complication 6/6/2017   • PONV (postoperative nausea and vomiting)    • Precordial pain 9/21/2016   • Rheumatoid arthritis (CMS/HCC)    • Scoliosis    • Thrush    • Vitamin D deficiency 9/21/2016       Prior Hospitalizations, other than for surgery or childbirth, and year:  Heart Cath    Social History     Socioeconomic History   • Marital status:      Spouse name: Zbigniew   • Number of children: Not on file   • Years of education: High school   • Highest education level: Not on file   Occupational History     Employer: DISABLED   Tobacco Use   • Smoking status: Never Smoker   • Smokeless tobacco: Never Used   Substance and Sexual Activity   • Alcohol use: No   • Drug use: No     Patient is .  Patient is retired.  Patient does not drink caffeine.    Family History:  Family History   Problem Relation Age of Onset   • COPD Mother    • Cancer Mother         heart tumor   • Heart disease Mother    • COPD Father    • Hypertension Father    • No Known Problems Sister    • Cancer Brother         testicular   • Breast cancer Paternal Aunt         unknown age    • Heart disease Maternal Grandmother    • Malig Hyperthermia Neg Hx        Vital Signs:  /79   Pulse 73   Temp 97.1 °F (36.2 °C)   Ht 162.6 cm (64\")   Wt 79.8 kg (176 lb)   LMP  (LMP Unknown)   SpO2 95%   Breastfeeding No   BMI 30.21 kg/m²      Medications:    Current Outpatient Medications:   •  albuterol (PROVENTIL) (2.5 MG/3ML) 0.083% nebulizer solution, Take 2.5 mg by nebulization Every 6 (Six) Hours As Needed for Wheezing (asthma)., Disp: , Rfl:   •  albuterol sulfate  (90 Base) MCG/ACT inhaler, Inhale 2 puffs Every 4 (Four) Hours As Needed for Wheezing., Disp: , Rfl:   •  alendronate (FOSAMAX) 70 MG tablet, TAKE 1 TABLET BY MOUTH ONCE WEEKLY BEFORE BREAKFAST, ON AN EMPTY STOMACH: REMAIN UPRIGHT FOR ONE HOUR:TAKE WITH 8 OUNCES OF WATER, Disp: 4 tablet, Rfl: 0  •  atorvastatin (LIPITOR) 20 MG tablet, Take 20 mg by mouth " Daily., Disp: , Rfl:   •  baclofen (LIORESAL) 10 MG tablet, Take 10 mg by mouth Daily., Disp: , Rfl:   •  budesonide-formoterol (SYMBICORT) 160-4.5 MCG/ACT inhaler, Inhale 2 puffs 2 (Two) Times a Day., Disp: , Rfl:   •  Calcium Carbonate-Vit D-Min (Calcium 1200) 1895-1897 MG-UNIT chewable tablet, Chew 1,000 Units Daily., Disp: , Rfl:   •  celecoxib (CeleBREX) 200 MG capsule, Take 200 mg by mouth 2 (two) times a day., Disp: , Rfl:   •  clotrimazole-betamethasone (LOTRISONE) 1-0.05 % cream, Apply  topically to the appropriate area as directed 2 (Two) Times a Day As Needed., Disp: , Rfl:   •  diphenhydrAMINE-acetaminophen (TYLENOL PM)  MG tablet per tablet, Take 1 tablet by mouth At Night As Needed for Sleep., Disp: , Rfl:   •  FASENRA 30 MG/ML solution prefilled syringe, INJECT THE CONTENTS OF ONE SYRINGE (30MG) SUBCUTANEOUSLY ON WEEKS 0, 4, AND 8. MUST BE ADMINISTERED BY HEALTHCARE PROFESSIONAL, Disp: , Rfl: 1  •  furosemide (LASIX) 20 MG tablet, Take 20 mg by mouth As Needed., Disp: , Rfl:   •  gabapentin (NEURONTIN) 300 MG capsule, Take 300 mg by mouth 2 (Two) Times a Day., Disp: , Rfl:   •  HYDROcodone-acetaminophen (NORCO)  MG per tablet, Take 1 tablet by mouth Every 6 (Six) Hours As Needed for Moderate Pain ., Disp: , Rfl:   •  hydroxychloroquine (PLAQUENIL) 200 MG tablet, Take 200 mg by mouth 2 (Two) Times a Day., Disp: , Rfl:   •  hyoscyamine (LEVSIN) 0.125 MG SL tablet, Dissolve one Tablet Under The Toungue Every 6  Hours as Needed for cramping (Patient taking differently: Take 0.125 mg by mouth Every 6 (Six) Hours As Needed. Dissolve one Tablet Under The Toungue Every 6  Hours as Needed for cramping), Disp: 30 tablet, Rfl: 3  •  isosorbide mononitrate (IMDUR) 120 MG 24 hr tablet, Take 120 mg by mouth Every Morning., Disp: , Rfl:   •  letrozole (Femara) 2.5 MG tablet, Take 1 tablet by mouth Daily., Disp: 90 tablet, Rfl: 2  •  levothyroxine (SYNTHROID, LEVOTHROID) 75 MCG tablet, Take 75 mcg by  "mouth Daily., Disp: , Rfl:   •  nebivolol (BYSTOLIC) 5 MG tablet, Take 10 mg by mouth 2 (Two) Times a Day., Disp: , Rfl:   •  nitroglycerin (NITROSTAT) 0.4 MG SL tablet, Place 0.4 mg under the tongue every 5 (five) minutes., Disp: , Rfl:   •  nystatin (MYCOSTATIN) 799675 UNIT/ML suspension, Swish and swallow 500,000 Units 2 (Two) Times a Day., Disp: , Rfl:   •  pantoprazole (PROTONIX) 40 MG EC tablet, TAKE ONE TABLET BY MOUTH ONCE NIGHTLY, Disp: 30 tablet, Rfl: 0  •  polyethylene glycol (MIRALAX) packet, Take 17 g by mouth Daily., Disp: , Rfl:   •  ranolazine (RANEXA) 1000 MG 12 hr tablet, Take 1,000 mg by mouth Every 12 (Twelve) Hours., Disp: , Rfl:   •  Simethicone (GAS RELIEF EXTRA STRENGTH PO), As Needed., Disp: , Rfl:   •  SPIRIVA RESPIMAT 1.25 MCG/ACT aerosol solution inhaler, Inhale 1 puff Every Morning., Disp: , Rfl:   •  vitamin D (ERGOCALCIFEROL) 70207 UNITS capsule capsule, Take 50,000 Units by mouth Every 7 (Seven) Days., Disp: , Rfl:      Allergies:  Allergies   Allergen Reactions   • Adhesive Tape Swelling     Must use paper tape   • Morphine Hives and Itching   • Morphine And Related Hives and Itching   • Metal Rash       Physical Examination:  /79   Pulse 73   Temp 97.1 °F (36.2 °C)   Ht 162.6 cm (64\")   Wt 79.8 kg (176 lb)   LMP  (LMP Unknown)   SpO2 95%   Breastfeeding No   BMI 30.21 kg/m²     /79   Pulse 73   Temp 97.1 °F (36.2 °C)   Ht 162.6 cm (64\")   Wt 79.8 kg (176 lb)   LMP  (LMP Unknown)   SpO2 95%   Breastfeeding No   BMI 30.21 kg/m²   General Appearance:  Patient is in no distress.  She is well kept and has an overweight build.   Psychiatric:  Patient with appropriate mood and affect. Alert and oriented to self, time, and place.    Breast, RIGHT:  medium sized, 38B,  symmetric with the contralateral side.  Breast skin is without erythema, edema, rashes.  There are no visible abnormalities upon inspection during the arm-raising maneuver or with hands on hips in " the sitting position. There is no nipple retraction, discharge or nipple/areolar skin changes.There are no masses palpable in the sitting or supine positions.  There is a well-healed radial incision right lateral breast  from her 2019 excision of atypical hyperplasia.    Breast, LEFT:  medium sized, 38B, symmetric with the contralateral side.  Breast skin is without erythema, edema, rashes.  There are no visible abnormalities upon inspection during the arm-raising maneuver or with hands on hips in the sitting position. There is no nipple  discharge or nipple/areolar skin changes.there is nipple clefting on the left that is asymmetric from the right that has been there for many many years she tells me.  There are no masses palpable in the sitting or supine positions.  Radial incision lower inner quadrant from remote excisional biopsy of the cyst.  Irregular and somewhat hypertrophic excision lower outer quadrant left breast from remote excision of cyst.    Lymphatic:  There is no axillary, cervical, infraclavicular, or supraclavicular adenopathy bilaterally.  Eyes:  Pupils are round and reactive to light.  Cardiovascular:  Heart rate and rhythm are regular.  Respiratory:  Lungs are clear bilaterally with no crackles or wheezes in any lung field.  Gastrointestinal:  Abdomen is soft, nondistended, and nontender.     Musculoskeletal:  Good strength in all 4 extremities.   There is good range of motion in both shoulders.    Skin:  No new skin lesions or rashes on the skin excluding the breast (see breast exam above).        [facility]   [facility]  BILATERAL SCREENING MAMMOGRAM  Heterogeneously dense. Interval occurring a small nodule projecting as central left breast. Small superficial nodule left lateral subareolar location is stable. Small nodule also developed outer hemisphere right breast middle third. Larger nodule central aspect of the right breast containing a single coarse  calcification appears stable.  BIRADS Category 0    [facility]11/2018   [facility]   [facility]  [facility]  BILATERAL DIAGNOSTIC MAMMOGRAM  BILATERAL BREAST ULTRASOUND  Heterogeneously dense. The developing nodular density deep central left breast along the nipple line does not efface. This will be further evaluated with left breast ultrasound today. Stable benign-appearing nodule in the anterior lateral periareolar left breast. The developing nodular density in the lateral hemisphere of the right breast also dos note efface.  ULTRASOUND  Left breast:  Nodule in the deep central left breast appears to correspond to a simple appearing cyst along the 12:00 retroareolar left 6 x 8 x 7 mm. no suspicious solid masses in the deep central left breast.  Right breast:  10:00 axis, 4 cm from the nipple, demonstrates an area of dense breast tissue measuring 1.5 x 1.8 x 0.9 cm. This corresponds to an island of benign dense breast tissue and coarse calcification that has been stable for several years. 8:00 axis, 4 cm from the nipple, demonstrates an ovoid, solid-appearing structure measuring 1 x 0.5 x 0.5 cm. may correspond to the developing nodular density on mammography. This may represent a proabably benign fibroadenoma. Upon review 2011, this may have been present since that time. Six-month follow-up right breast mammogram and ultrasound will be recommended. There is also an incidental simple appearing cyst 9:00 axis, 3 cm from the nipple, measuring 5 x 3 x 6 mm.  BIRADS Category 3    [facility]/27/2019   [facility]   Radiology  [facility]  DIAGNOSTIC RIGHT DIGITAL MAMMOGRAM  RIGHT BREAST ULTRASOUND  Heterogeneously dense.  The right breast mass 8:00-:00 at 6.6 cm from the nipple appears stable. Measuring 1.1 cm AP x 0.7 cm TRV.  Right breast focal asymmetry at 10:00-11:00 at mid depth at about 5 cm from the nipple, that contains a stable course calcifications, appears to have become larger since 10/11/2018.  ULTRASOUND: Right breast 8:00 at  4 cm from the nipple and 11:00 at 3 cm from the nipple.  Right breast mixed solid and cystic mass in the right breast at 8:00 at 4 cm from the nipple is stable in size, 0.4 x 0.5 x 1.0 cm. Since the margins of this sonographic mass have become partially irregular and angulated, the mass is considered to be suspicious for possible malignancy.  Right breast 11:00 at 3 cm from the nipple measures 0.7 x 1.1 x 1.6 cm. Today, a new solid mural nodule that measures 0.4 x 0.7 cm and is mostly hypoechoic with a punctate hyperechoic focus and no internal vascular flow was seen by the radiologist. This new nodule is concerning for possible malignancy.  IMPRESSION:  3. Right breast at 8:00 at 4 cm from the nipple needs to be biopsied. BIRADS Category 4.  4. The right breast sonographic calcified hypoechoic area at 11:00 at 3 cm from the nipple needs to be biopsied under ultrasound guidance. BIRADS Category 4.    Bilateral breast MRI August 7, 2019 [facility]: Artifact from a biopsy marking clip upper outer quadrant right breast.  No abnormal enhancement adjacent to the clips.  There is a 9 mm diameter nodule lateral retroareolar left breast immediately beneath the skin surface most likely an intramammary lymph node or fibroadenoma.  Further evaluation with ultrasound is recommended.  BI-RADS 0.     Right mammogram same day shows 2 new marking clips upper outer quadrant both are spring shaped.  No underlying mammographic abnormality.    [facility] August 12, 2019 left ultrasound.  Retroareolar left breast 3:00 there is a microlobulated hypoechoic mass that measures 8 mm.  Correlation with ultrasound-guided biopsy is recommended BI-RADS 4.    November 8, 2019 bilateral screening mammogram with 3D.  Scattered fibroglandular densities.  No evidence for malignancy in either breast BI-RADS 1    [facility] bilateral breast MRI November 10, 2020: Scattered fibroglandular tissue.  Mild postsurgical  architectural distortion right breast.  BI-RADS 2  Bilateral screening mammogram with tomosynthesis [facility] November 10, 2020: No evidence for malignancy in either breast.  Scattered fibroglandular densities.  BI-RADS 1        Pathology:   Pathology from July 22, 2019 right breast biopsies in the office.  Right breast 8:00, 4 cm from the nipple returned as focal atypical duct hyperplasia involving a single gland, 0.22 mm maximally.  Ramone cyst, sclerosing adenosis, fibroadenomatoid hyperplasia, associated micro calcifications.  Right breast 11:00, 3 center meter from the nipple, duct ectasia, apocrine metaplasia, sclerosing adenosis, usual duct hyperplasia, associated micro calcifications.           Final Diagnosis   1.  Breast, Right 8 o'clock Position, 4 cm FN, Core Biopsy:                 A.  Focal atypical duct hyperplasia involving a single gland, 0.22 mm maximally.               B.  Clustered cysts, sclerosing adenosis, and fibroadenomatoid hyperplasia with associated microcalcifications.       2.  Breast, Right 11 o'clock Position, 3 cm FN, Core Biopsy:                A.  Duct ectasia, apocrine metaplasia, sclerosing adenosis, and usual duct hyperplasia with associated                     microcalcifications.      [facility]/brb    Electronically signed by Rita Ramos MD on 7/23/2019 at 1332   Comment     This case is shared at internal consensus conference with DrsRao Farnsworth and  who concur.  This case is discussed with Dr. Collier.     [facility]/brb         Pathology from her left breast in office biopsy August 13, 2019 returned as sclerosing adenosis, fibroadenomatous change, usual hyperplasia.  No atypia.  This is concordant and we will let her know benign.     Final Diagnosis   1. Core Biopsies, Left Breast, 3 O'clock:               A. Sclerosing adenosis.               B. Background of fibroadenomatous change.               C. Florid intraductal hyperplasia, usual  type.               D. No significant cytologic atypia is identified.     toni/rylan          8-29-19 RIGHT NL lumpectomy returned as  No residual atypical hyperplasia.  We will let her know     Final Diagnosis   1.  Right Breast, Needle-Localized Lumpectomy (17 grams):                A.  Benign breast parenchyma with calcifications associated with sclerosing adenosis and columnar cell change.               B.  No residual atypical hyperplasia.                C.  Background breast with fibroadenoma and usual ductal hyperplasia.                D.  Clip and biopsy site changes present.      jab/brb                Procedures:  Percutaneous ultrasound-guided vacuum-assisted excisional breast biopsy x 2 separate sites, with 2 separate incisions 7-22-19  Indication:  ultrasound-visible breast mass x 2 separate sites , BR4 at each site  Location: RIGHT 11:0, 3 CFN and RIGHT breast 8:00, 4 CFN  Consent:  The risks, benefits, and alternatives to the procedure were discussed with the patient, who understood and wished to proceed.  The risks described included, but were not limited to, bleeding, infection, pneumothorax, and inadequate sampling requiring either repeat percutaneous or open excisional biopsy.  Description of Procedure:   After the patient was positioned supine on the procedure table, I located each  lesion using ultrasound.  At the right breast 11:00 3 cm from the nipple location there is a hypoechoic irregularly marginated wider than tall mass. It measures in the antiradial dimension 1.39 x 1.0 cm and in the radial dimension 1.38 x 0.79 cm.  The second lesion is at the right breast 8:00, 4 cm from the nipple location is a 1.1 x 0.6 cm hypoechoic lesion in the antiradial dimension, that is wider than tall with good through-transmission.  It measures in the radial dimension 0.68 x 0.56 cm.each of the 2 separate biopsy sites, I performed the following: I prepped and draped the breast skin in sterile fashion.  I  anesthetized the breast skin at each separate site of anticipated mammotomy with 1% lidocaine with epinephrine.  I then anesthetized the underlying subcutaneous tissue and breast parenchyma surrounding each separate lesion with 1% lidocaine with epinephrine under ultrasound visualization and guidance. I then made a nicking incision each separate site with an 11blade and inserted the 10G encore biopsy device from inferolateral to superomedial  under each separate lesion under ultrasound guidance.   I then took 15 sequential core samples from the first site and 5 core samples from the second site, using the automated sampling of the encore device.  There will be a remnant from the first biopsy site lesion.  The second biopsy site lesion appeared to be removed in its entirety.   I removed the probe, then placed a hydromark marker, using a stiff introducer, into each separate biopsy site. We held manual compression for 10 minutes, placed steri-strips at the mammotomy site, and wrapped the patient in a 6 inch super ace wrap with an ice-pack.  Marker placed: hydromark x2  Tolerance: The patient tolerated the procedure well.  Disposition: We will see her back within a week to review her pathology.      Percutaneous ultrasound-guided vacuum-assisted excisional breast biopsy 8-13-19  Indication:  ultrasound-visible breast mass  Location: LEFT 3:00 RA  Consent:  The risks, benefits, and alternatives to the procedure were discussed with the patient, who understood and wished to proceed.  The risks described included, but were not limited to, bleeding, infection, pneumothorax, and inadequate sampling requiring either repeat percutaneous or open excisional biopsy.  Description of Procedure:   After the patient was positioned supine on the procedure table, I located the lesion using ultrasound.  I prepped and draped the breast skin in sterile fashion.  I anesthetized the breast skin at the site of anticipated mammotomy with 1%  lidocaine with epinephrine.  I then anesthetized the underlying subcutaneous tissue and breast parenchyma surrounding the lesion with 1% lidocaine with epinephrine under ultrasound visualization and guidance. I then made a nicking incision with an 11blade and inserted the 10G encore biopsy device from inferolateral to superomedial under the lesion under ultrasound guidance.   I then took 11 sequential core samples using the automated sampling of the encore device, until the lesion disappeared on ultrasound. There was no residual lesion visible at the conclusion of the procedure.  I removed the probe, then placed a hydromark marker, using a stiff introducer, into the biopsy site. We held manual compression for 10 minutes, placed steri-strips at the mammotomy site, and wrapped the patient in a 6 inch super ace wrap with an ice-pack.  Marker placed: hydromark  Tolerance: The patient tolerated the procedure well.  Disposition: We will see her back within a week to review her pathology.    Assessment:   Diagnosis Plan   1. Fibrocystic disease of right breast     2. Abnormal MRI, breast     3. Atypical ductal hyperplasia of right breast     4. Rheumatoid arthritis, involving unspecified site, unspecified whether rheumatoid factor present (CMS/HCC)     5. Other chronic pain     6. Angina of effort (CMS/HCC)     7. Mastodynia     8. Weight gain       1-2  LEFT breast 3:00 RA- seen on MRI done for ADH- US correlate 8mm, BR4-  Biopsied 8-2019- Pathology from her left breast in office biopsy August 13, 2019 returned as sclerosing adenosis, fibroadenomatous change, usual hyperplasia.  No atypia.    Right breast 11:00, 3 cm from the nipple, tender to palpation, no exam correlate, 1.6 cm on ultrasound, BI-RADS 4.    Pathology from July 22, 2019 right breast biopsies in the office.  Right breast 11:00, 3 center meter from the nipple, duct ectasia, apocrine metaplasia, sclerosing adenosis, usual duct hyperplasia, associated micro  calcifications.    3-  Right breast 8:00, 4 cm from the nipple, no exam correlate, 1 cm on ultrasound, BI-RADS 4.  Pathology from July 22, 2019 right breast biopsies in the office.  Right breast 8:00, 4 cm from the nipple returned as focal atypical duct hyperplasia involving a single gland, 0.22 mm maximally.  Ramone cyst, sclerosing adenosis, fibroadenomatoid hyperplasia, associated micro calcifications.    8-29-19 RIGHT NL lumpectomy returned as  No residual atypical hyperplasia.    4-  Dr. Keller sees and she is taking Celebrex and Plaquenil.    5-  For fibromyalgia, baclofen, gabapentin, Norco 10/325, 1/day,- managed by Dr Rae    6-  Angina, managed by her cardiologist Dr Teran- takes Imdur, mononitrate, Bystolic, Nitrostat, Ranexa, Lasix, amlodipine    7-  Bilateral upper outer quadrant diffuse    8-  8 pound weight gain in the past year.      Plan:  We reviewed her interval history, imaging, imaging reports, consultations, treatment and examination together today.    There is no evidence of disease on her imaging or examination.    She continues the risk reducing hormonal blockade with Dr. Saldivar and sees her routinely for follow-up.  She tells me that she is going to discuss with her the worsening joint pain on the Femara.    We discussed her breast pain and that 90% or more of all breast pain is hormone mediated.  Between her 8 pound weight gain and the transitioning between Arimidex and Femara I suspect that her bilateral upper outer quadrant breast pain is mediated by hormones.     She drinks no caffeine.  I did give her a handout on fibrocystic condition today and recommended that she try 400 units of vitamin E once or twice daily.    I also counseled her about the adverse effects of weight gain on breast cancer risk.  She was understanding.    We will keep her in increased imaging screening for high risk due to her atypical hyperplasia with her next mammogram and MRI being due [facility]  [place] November 11, 2021.    I will arrange for this imaging and she will see Elen Salas our nurse practitioner thereafter.  She was able to meet Elen today during her office visit.  I asked her to continue her self breast exam and to call us in the interim with concerns otherwise we will see her back after her next imaging.        Previously, I performed a CRA risk assessment based on her family history, reproductive history and recent atypical biopsy.  This returned her lifetime breast cancer risk as 17 to 31%  Return to her 5-year breast cancer risk as 4.2%      Giovanna Collier MD    Today I spent 20 minutes doing the following: Reviewing records, labs, outside imaging and reports in preparation for the patient visit; obtaining medical history; performing the physical exam; counseling and educating the patient and any available family or caregivers; ordering medications, tests or procedures; coordinating care with any other physicians on her care team as needed, and documenting all of the above in the medical record as well as sending communications with her other healthcare professionals.      Next Appointment:  Return for Next scheduled follow up, after imaging, with Elen Salas.      EMR Dragon/transcription disclaimer:    Much of this encounter note is an electronic transcription/translocation of spoken language to printed text.  The electronic translation of spoken language may permit erroneous, or at times, nonsensical words or phrases to be inadvertently transcribed.  Although I have reviewed the note from such areas, some may still exist.

## 2021-01-22 NOTE — TELEPHONE ENCOUNTER
Scheduled Bilateral 3D Screen Mammo at [facility] 11-11-21 @ 11:00  Scheduled Bilateral Breast MRI w wo contrast [facility] 11-11-21 11:45    Return to see NP Elen Salas  11-23-21 arrive 12:15      Mara

## 2021-02-17 ENCOUNTER — HOSPITAL ENCOUNTER (OUTPATIENT)
Dept: GENERAL RADIOLOGY | Facility: HOSPITAL | Age: 66
Discharge: HOME | End: 2021-02-17

## 2021-02-17 ENCOUNTER — ANESTHESIA EVENT (OUTPATIENT)
Dept: PAIN MEDICINE | Facility: HOSPITAL | Age: 66
End: 2021-02-17

## 2021-02-17 ENCOUNTER — HOSPITAL ENCOUNTER (OUTPATIENT)
Dept: PAIN MEDICINE | Facility: HOSPITAL | Age: 66
Discharge: HOME | End: 2021-02-17

## 2021-02-17 ENCOUNTER — ANESTHESIA (OUTPATIENT)
Dept: PAIN MEDICINE | Facility: HOSPITAL | Age: 66
End: 2021-02-17

## 2021-02-17 VITALS
WEIGHT: 170 LBS | TEMPERATURE: 97.1 F | RESPIRATION RATE: 14 BRPM | OXYGEN SATURATION: 94 % | HEART RATE: 61 BPM | BODY MASS INDEX: 28.32 KG/M2 | HEIGHT: 65 IN | DIASTOLIC BLOOD PRESSURE: 61 MMHG | SYSTOLIC BLOOD PRESSURE: 106 MMHG

## 2021-02-17 DIAGNOSIS — R52 PAIN: ICD-10-CM

## 2021-02-17 DIAGNOSIS — G89.29 CHRONIC LEFT SI JOINT PAIN: ICD-10-CM

## 2021-02-17 DIAGNOSIS — M54.50 LOW BACK PAIN, UNSPECIFIED BACK PAIN LATERALITY, UNSPECIFIED CHRONICITY, UNSPECIFIED WHETHER SCIATICA PRESENT: ICD-10-CM

## 2021-02-17 DIAGNOSIS — M53.3 CHRONIC LEFT SI JOINT PAIN: ICD-10-CM

## 2021-02-17 PROCEDURE — 0 IOPAMIDOL 41 % SOLUTION: Performed by: ANESTHESIOLOGY

## 2021-02-17 PROCEDURE — 25010000002 MIDAZOLAM PER 1 MG: Performed by: ANESTHESIOLOGY

## 2021-02-17 PROCEDURE — 77003 FLUOROGUIDE FOR SPINE INJECT: CPT

## 2021-02-17 PROCEDURE — 25010000002 METHYLPREDNISOLONE PER 80 MG: Performed by: ANESTHESIOLOGY

## 2021-02-17 RX ORDER — SODIUM CHLORIDE 0.9 % (FLUSH) 0.9 %
1-10 SYRINGE (ML) INJECTION AS NEEDED
Status: DISCONTINUED | OUTPATIENT
Start: 2021-02-17 | End: 2021-02-18 | Stop reason: HOSPADM

## 2021-02-17 RX ORDER — METHYLPREDNISOLONE ACETATE 80 MG/ML
80 INJECTION, SUSPENSION INTRA-ARTICULAR; INTRALESIONAL; INTRAMUSCULAR; SOFT TISSUE ONCE
Status: COMPLETED | OUTPATIENT
Start: 2021-02-17 | End: 2021-02-17

## 2021-02-17 RX ORDER — BUPIVACAINE HYDROCHLORIDE 5 MG/ML
10 INJECTION, SOLUTION EPIDURAL; INTRACAUDAL ONCE
Status: COMPLETED | OUTPATIENT
Start: 2021-02-17 | End: 2021-02-17

## 2021-02-17 RX ORDER — SODIUM CHLORIDE 0.9 % (FLUSH) 0.9 %
3-10 SYRINGE (ML) INJECTION AS NEEDED
Status: DISCONTINUED | OUTPATIENT
Start: 2021-02-17 | End: 2021-02-18 | Stop reason: HOSPADM

## 2021-02-17 RX ORDER — FENTANYL CITRATE 50 UG/ML
50 INJECTION, SOLUTION INTRAMUSCULAR; INTRAVENOUS AS NEEDED
Status: DISCONTINUED | OUTPATIENT
Start: 2021-02-17 | End: 2021-02-18 | Stop reason: HOSPADM

## 2021-02-17 RX ORDER — LIDOCAINE HYDROCHLORIDE 10 MG/ML
1 INJECTION, SOLUTION INFILTRATION; PERINEURAL ONCE AS NEEDED
Status: DISCONTINUED | OUTPATIENT
Start: 2021-02-17 | End: 2021-02-18 | Stop reason: HOSPADM

## 2021-02-17 RX ORDER — SODIUM CHLORIDE 0.9 % (FLUSH) 0.9 %
3 SYRINGE (ML) INJECTION EVERY 12 HOURS SCHEDULED
Status: DISCONTINUED | OUTPATIENT
Start: 2021-02-17 | End: 2021-02-18 | Stop reason: HOSPADM

## 2021-02-17 RX ORDER — MIDAZOLAM HYDROCHLORIDE 1 MG/ML
1 INJECTION INTRAMUSCULAR; INTRAVENOUS AS NEEDED
Status: DISCONTINUED | OUTPATIENT
Start: 2021-02-17 | End: 2021-02-18 | Stop reason: HOSPADM

## 2021-02-17 RX ADMIN — BUPIVACAINE HYDROCHLORIDE 10 ML: 5 INJECTION, SOLUTION EPIDURAL; INTRACAUDAL at 08:47

## 2021-02-17 RX ADMIN — MIDAZOLAM 1 MG: 1 INJECTION INTRAMUSCULAR; INTRAVENOUS at 08:45

## 2021-02-17 RX ADMIN — METHYLPREDNISOLONE ACETATE 80 MG: 80 INJECTION, SUSPENSION INTRA-ARTICULAR; INTRALESIONAL; INTRAMUSCULAR; SOFT TISSUE at 08:47

## 2021-02-17 RX ADMIN — IOPAMIDOL 10 ML: 408 INJECTION, SOLUTION INTRATHECAL at 08:47

## 2021-02-17 RX ADMIN — MIDAZOLAM 1 MG: 1 INJECTION INTRAMUSCULAR; INTRAVENOUS at 08:41

## 2021-02-17 NOTE — ANESTHESIA PROCEDURE NOTES
PAIN SI joint injection      Patient reassessed immediately prior to procedure    Patient location during procedure: Pain Clinic  Start time: 2/17/2021 8:36 AM  Stop time: 2/17/2021 8:53 AM    Reason for block: procedure for pain  Performed by  Anesthesiologist: Mian Britton MD  Preanesthetic Checklist  Completed: patient identified and surgical consent  Prep:  Patient position: prone  Sterile barriers:cap, gloves, mask and sterile barrier  Prep: ChloraPrep  Patient monitoring: blood pressure monitoring, continuous pulse oximetry and EKG  Procedure:  Sedation:yes  Guidance:fluoroscopy  Contrast Medium:no  Location:Left SIJ  Needle gauge: 20 G.  Medications:  Depomedrol:80 mg  Comment:Bupivacaine 0.5% 10cc    Post Assessment  Complications:no  Additional Notes  Diagnosis: Sacroiliitis  Sedation Versed 2 mg  Under fluoroscopic guidance and after ChloraPrep, the SI joint was accessed and injected with the above medications.

## 2021-02-17 NOTE — H&P
CHIEF COMPLAINT: Left hip pain      HISTORY OF PRESENT ILLNESS:  She complains of low back and bilateral hip pain.  She receives hip injections from her rheumatologist for bursitis which give her good relief but do not relieve the low back pain.  She complains of pain in her left side very low back over her SI joint.  Between a 6 and 8 out of 10 on the pain scale.  Aching sharp spasm in nature.  Intermittent timing.  Aggravated by walking twisting sitting.  Relieved by heat rest pain medications.  It affects her sleep and her exercise.  For over 6 weeks she is tried rest heat baclofen Celebrex Neurontin without relief    PAST MEDICAL HISTORY:  Current Outpatient Medications on File Prior to Encounter   Medication Sig Dispense Refill   • albuterol (PROVENTIL) (2.5 MG/3ML) 0.083% nebulizer solution Take 2.5 mg by nebulization Every 6 (Six) Hours As Needed for Wheezing (asthma).     • albuterol sulfate  (90 Base) MCG/ACT inhaler Inhale 2 puffs Every 4 (Four) Hours As Needed for Wheezing.     • alendronate (FOSAMAX) 70 MG tablet TAKE 1 TABLET BY MOUTH ONCE WEEKLY BEFORE BREAKFAST, ON AN EMPTY STOMACH: REMAIN UPRIGHT FOR ONE HOUR:TAKE WITH 8 OUNCES OF WATER 4 tablet 0   • atorvastatin (LIPITOR) 20 MG tablet Take 20 mg by mouth Daily.     • baclofen (LIORESAL) 10 MG tablet Take 10 mg by mouth Daily.     • budesonide-formoterol (SYMBICORT) 160-4.5 MCG/ACT inhaler Inhale 2 puffs 2 (Two) Times a Day.     • Calcium Carbonate-Vit D-Min (Calcium 1200) 4776-1292 MG-UNIT chewable tablet Chew 1,000 Units Daily.     • celecoxib (CeleBREX) 200 MG capsule Take 200 mg by mouth 2 (two) times a day.     • clotrimazole-betamethasone (LOTRISONE) 1-0.05 % cream Apply  topically to the appropriate area as directed 2 (Two) Times a Day As Needed.     • diphenhydrAMINE-acetaminophen (TYLENOL PM)  MG tablet per tablet Take 1 tablet by mouth At Night As Needed for Sleep.     • FASENRA 30 MG/ML solution prefilled syringe INJECT THE  CONTENTS OF ONE SYRINGE (30MG) SUBCUTANEOUSLY ON WEEKS 0, 4, AND 8. MUST BE ADMINISTERED BY HEALTHCARE PROFESSIONAL  1   • furosemide (LASIX) 20 MG tablet Take 20 mg by mouth As Needed.     • gabapentin (NEURONTIN) 300 MG capsule Take 300 mg by mouth 2 (Two) Times a Day.     • HYDROcodone-acetaminophen (NORCO)  MG per tablet Take 1 tablet by mouth Every 6 (Six) Hours As Needed for Moderate Pain .     • hydroxychloroquine (PLAQUENIL) 200 MG tablet Take 200 mg by mouth 2 (Two) Times a Day.     • hyoscyamine (LEVSIN) 0.125 MG SL tablet Dissolve one Tablet Under The Toungue Every 6  Hours as Needed for cramping (Patient taking differently: Take 0.125 mg by mouth Every 6 (Six) Hours As Needed. Dissolve one Tablet Under The Toungue Every 6  Hours as Needed for cramping) 30 tablet 3   • isosorbide mononitrate (IMDUR) 120 MG 24 hr tablet Take 120 mg by mouth Every Morning.     • letrozole (Femara) 2.5 MG tablet Take 1 tablet by mouth Daily. 90 tablet 2   • levothyroxine (SYNTHROID, LEVOTHROID) 75 MCG tablet Take 75 mcg by mouth Daily.     • nitroglycerin (NITROSTAT) 0.4 MG SL tablet Place 0.4 mg under the tongue every 5 (five) minutes.     • nystatin (MYCOSTATIN) 326702 UNIT/ML suspension Swish and swallow 500,000 Units 2 (Two) Times a Day.     • pantoprazole (PROTONIX) 40 MG EC tablet TAKE ONE TABLET BY MOUTH ONCE NIGHTLY 30 tablet 0   • polyethylene glycol (MIRALAX) packet Take 17 g by mouth Daily.     • ranolazine (RANEXA) 1000 MG 12 hr tablet Take 1,000 mg by mouth Every 12 (Twelve) Hours.     • Simethicone (GAS RELIEF EXTRA STRENGTH PO) As Needed.     • SPIRIVA RESPIMAT 1.25 MCG/ACT aerosol solution inhaler Inhale 1 puff Every Morning.     • vitamin D (ERGOCALCIFEROL) 62088 UNITS capsule capsule Take 50,000 Units by mouth Every 7 (Seven) Days.     • nebivolol (BYSTOLIC) 5 MG tablet Take 10 mg by mouth 2 (Two) Times a Day.       No current facility-administered medications on file prior to encounter.        Past  "Medical History:   Diagnosis Date   • Asthma    • Breast lump     RIGHT   • Bronchiectasis (CMS/HCC)    • Depression with anxiety 9/21/2016   • Disease of thyroid gland     HYPOTHYROIDISM   • Drug therapy    • Fibrocystic breast    • Fibromyalgia syndrome 6/6/2017   • GERD (gastroesophageal reflux disease)    • Heart palpitations    • History of thyroid nodule    • HLD (hyperlipidemia) 9/21/2016   • Hypertension    • Hypothyroidism    • IBS (irritable bowel syndrome)    • Low back pain    • Moderate persistent asthma without complication 6/6/2017   • PONV (postoperative nausea and vomiting)    • Precordial pain 9/21/2016   • Rheumatoid arthritis (CMS/HCC)    • Scoliosis    • Thrush    • Vitamin D deficiency 9/21/2016         SOCIAL HISTORY:  No tobacco    REVIEW OF SYSTEMS:  No hematologic infectious or constitutional symptoms  Other review of systems non-contributory  Negative screen for MARGARITA      PHYSICAL EXAM:  /84 (BP Location: Left arm, Patient Position: Sitting)   Pulse 71   Temp 36.2 °C (97.1 °F) (Infrared)   Resp 16   Ht 165.1 cm (65\")   Wt 77.1 kg (170 lb)   LMP  (LMP Unknown)   SpO2 97%   BMI 28.29 kg/m²   Well-developed well-nourished no acute distress  Extra ocular movements intact  Mallampati class 2 airway  Alert and oriented ×3  Deep tendon reflexes normal in the bilateral patella  LAURA test elicits SI joint pain  Tender over left SI joint    DIAGNOSIS:  Post-Op Diagnosis Codes:     * Sacroiliitis (CMS/HCC) [M46.1]    PLAN:  1.  Left-sided sacroiliac joint steroid injections, up to 3, spaced 1-2 weeks apart.  If pain control is acceptable after 1 or 2 injections, it was discussed with the patient that they may return for the subsequent injections if and when their pain returns.  The risks were discussed with the patient including failure of relief, worsening pain, Headache (post dural puncture headache), bleeding (epidural hematoma) and infection (epidural abscess or skin infection).  2. "  Physical therapy exercises at home as prescribed by physical therapy or from the pain clinic handout (given to the patient).  Continuation of these exercises every day, or multiple times per week, even when the patient has good pain relief, was stressed to the patient as a preventative measure to decrease the frequency and severity of future pain episodes.  3.  Continue pain medicines as already prescribed.  If patient not currently taking any, it is recommended to begin Acetaminophen 1000 mg po q 8 hours.  If other medicines containing Acetaminophen are currently prescribed, maintain daily dose at 3000 mg.    4.  If they can tolerate NSAIDS, it is recommended to take Ibuprofen 600 mg po q 6 hours for 7 days during pain exacerbations.  Alternatively, they may substitute an NSAID of their choice (e.g. Aleve).  This may be taken at the same time as Acetaminophen.  5.  Heat and ice to the affected area as tolerated for pain control.  It was discussed that heating pads can cause burns.  6.  Daily low impact exercise such as walking or water exercise was recommended to maintain overall health and aid in weight control.   7.  Follow up as needed for subsequent injections.  8.  Patient was counseled to abstain from tobacco products.

## 2021-02-19 ENCOUNTER — TELEPHONE (OUTPATIENT)
Dept: ONCOLOGY | Facility: CLINIC | Age: 66
End: 2021-02-19

## 2021-02-19 NOTE — TELEPHONE ENCOUNTER
Called Ms. Quintanilla and told her there shouldn't be any problem with her coming to see Dr. Saldivar, if she wants her to treat her, as she already has a history with her.  Patient stated that she was going to talk it over more, as she is in [facility] at present, and she will call us back to let us know about making appointment with Dr. Saldivar to be earlier than April as she is presently scheduled for follow up at end of April.

## 2021-02-19 NOTE — TELEPHONE ENCOUNTER
Pt is in [facility] and they found something in her right lung and is getting a biopsy done.  She wants to know if Dr. Saldivar will treat this also or does she have to go the [facility] [place].  If, Dr. CRAWFORD will then she will need to have her appointment moved up.

## 2021-02-22 ENCOUNTER — TELEPHONE (OUTPATIENT)
Dept: ONCOLOGY | Facility: CLINIC | Age: 66
End: 2021-02-22

## 2021-02-22 NOTE — TELEPHONE ENCOUNTER
Returned call to MsFlor ,  She stated the plan has changed and she will not be having a biopsy done, as the radiologist stated after looking at CT scan more closely thought area would need watching.   Discussed with patient that we would review with Dr. Saldivar to see if she needs to be moved up on schedule.  Patient verbalized understanding.

## 2021-02-22 NOTE — TELEPHONE ENCOUNTER
----- Message from Yoselin Macario RN sent at 2/22/2021 12:43 PM EST -----  Please change appointment with Dr. Saldivar, scheduled for 4/27/21 to 3/18/21 at 8:40 AM per Dr. Saldivar, as she has been in [facility] With new CT finding, therefore would like her to be moved up on schedule.      Thank you,  Yoselin

## 2021-03-11 ENCOUNTER — TRANSCRIBE ORDERS (OUTPATIENT)
Dept: ADMINISTRATIVE | Facility: HOSPITAL | Age: 66
End: 2021-03-11

## 2021-03-11 DIAGNOSIS — R91.8 LUNG NODULES: Primary | ICD-10-CM

## 2021-03-17 ENCOUNTER — HOSPITAL ENCOUNTER (OUTPATIENT)
Dept: PET IMAGING | Facility: HOSPITAL | Age: 66
Discharge: HOME | End: 2021-03-17

## 2021-03-17 ENCOUNTER — APPOINTMENT (OUTPATIENT)
Dept: OTHER | Facility: HOSPITAL | Age: 66
End: 2021-03-17

## 2021-03-17 DIAGNOSIS — R91.8 LUNG NODULES: ICD-10-CM

## 2021-03-17 DIAGNOSIS — Z09 FOLLOW UP: ICD-10-CM

## 2021-03-17 LAB — GLUCOSE BLDC GLUCOMTR-MCNC: 82 MG/DL (ref 70–130)

## 2021-03-17 PROCEDURE — 0 FLUDEOXYGLUCOSE F18 SOLUTION: Performed by: INTERNAL MEDICINE

## 2021-03-17 PROCEDURE — A9552 F18 FDG: HCPCS | Performed by: INTERNAL MEDICINE

## 2021-03-17 PROCEDURE — 78815 PET IMAGE W/CT SKULL-THIGH: CPT

## 2021-03-17 PROCEDURE — 82962 GLUCOSE BLOOD TEST: CPT

## 2021-03-17 RX ADMIN — FLUDEOXYGLUCOSE F18 1 DOSE: 300 INJECTION INTRAVENOUS at 13:31

## 2021-03-18 ENCOUNTER — APPOINTMENT (OUTPATIENT)
Dept: LAB | Facility: HOSPITAL | Age: 66
End: 2021-03-18

## 2021-03-19 ENCOUNTER — LAB (OUTPATIENT)
Dept: LAB | Facility: HOSPITAL | Age: 66
End: 2021-03-19

## 2021-03-19 ENCOUNTER — BULK ORDERING (OUTPATIENT)
Dept: CASE MANAGEMENT | Facility: OTHER | Age: 66
End: 2021-03-19

## 2021-03-19 ENCOUNTER — OFFICE VISIT (OUTPATIENT)
Dept: ONCOLOGY | Facility: CLINIC | Age: 66
End: 2021-03-19

## 2021-03-19 VITALS
BODY MASS INDEX: 28.69 KG/M2 | DIASTOLIC BLOOD PRESSURE: 57 MMHG | OXYGEN SATURATION: 96 % | HEART RATE: 75 BPM | TEMPERATURE: 96.4 F | SYSTOLIC BLOOD PRESSURE: 111 MMHG | RESPIRATION RATE: 18 BRPM | WEIGHT: 172.2 LBS | HEIGHT: 65 IN

## 2021-03-19 DIAGNOSIS — N60.99 ATYPICAL DUCTAL HYPERPLASIA OF BREAST: ICD-10-CM

## 2021-03-19 DIAGNOSIS — N60.99 ATYPICAL DUCTAL HYPERPLASIA OF BREAST: Primary | ICD-10-CM

## 2021-03-19 DIAGNOSIS — Z87.39 HISTORY OF FIBROMYALGIA: ICD-10-CM

## 2021-03-19 DIAGNOSIS — N60.91 ATYPICAL DUCTAL HYPERPLASIA OF RIGHT BREAST: ICD-10-CM

## 2021-03-19 DIAGNOSIS — Z23 IMMUNIZATION DUE: ICD-10-CM

## 2021-03-19 LAB
ALBUMIN SERPL-MCNC: 4 G/DL (ref 3.5–5.2)
ALBUMIN/GLOB SERPL: 1.3 G/DL (ref 1.1–2.4)
ALP SERPL-CCNC: 86 U/L (ref 38–116)
ALT SERPL W P-5'-P-CCNC: 74 U/L (ref 0–33)
ANION GAP SERPL CALCULATED.3IONS-SCNC: 8 MMOL/L (ref 5–15)
AST SERPL-CCNC: 81 U/L (ref 0–32)
BASOPHILS # BLD AUTO: 0.01 10*3/MM3 (ref 0–0.2)
BASOPHILS NFR BLD AUTO: 0.1 % (ref 0–1.5)
BILIRUB SERPL-MCNC: 0.3 MG/DL (ref 0.2–1.2)
BUN SERPL-MCNC: 28 MG/DL (ref 6–20)
BUN/CREAT SERPL: 31.1 (ref 7.3–30)
CALCIUM SPEC-SCNC: 9.4 MG/DL (ref 8.5–10.2)
CHLORIDE SERPL-SCNC: 107 MMOL/L (ref 98–107)
CO2 SERPL-SCNC: 29 MMOL/L (ref 22–29)
CREAT SERPL-MCNC: 0.9 MG/DL (ref 0.6–1.1)
DEPRECATED RDW RBC AUTO: 54.4 FL (ref 37–54)
EOSINOPHIL # BLD AUTO: 0 10*3/MM3 (ref 0–0.4)
EOSINOPHIL NFR BLD AUTO: 0 % (ref 0.3–6.2)
ERYTHROCYTE [DISTWIDTH] IN BLOOD BY AUTOMATED COUNT: 14.9 % (ref 12.3–15.4)
GFR SERPL CREATININE-BSD FRML MDRD: 63 ML/MIN/1.73
GLOBULIN UR ELPH-MCNC: 3 GM/DL (ref 1.8–3.5)
GLUCOSE SERPL-MCNC: 80 MG/DL (ref 74–124)
HCT VFR BLD AUTO: 43.7 % (ref 34–46.6)
HGB BLD-MCNC: 13.5 G/DL (ref 12–15.9)
IMM GRANULOCYTES # BLD AUTO: 0.06 10*3/MM3 (ref 0–0.05)
IMM GRANULOCYTES NFR BLD AUTO: 0.7 % (ref 0–0.5)
LYMPHOCYTES # BLD AUTO: 1.99 10*3/MM3 (ref 0.7–3.1)
LYMPHOCYTES NFR BLD AUTO: 23.6 % (ref 19.6–45.3)
MCH RBC QN AUTO: 30.8 PG (ref 26.6–33)
MCHC RBC AUTO-ENTMCNC: 30.9 G/DL (ref 31.5–35.7)
MCV RBC AUTO: 99.5 FL (ref 79–97)
MONOCYTES # BLD AUTO: 0.74 10*3/MM3 (ref 0.1–0.9)
MONOCYTES NFR BLD AUTO: 8.8 % (ref 5–12)
NEUTROPHILS NFR BLD AUTO: 5.65 10*3/MM3 (ref 1.7–7)
NEUTROPHILS NFR BLD AUTO: 66.8 % (ref 42.7–76)
NRBC BLD AUTO-RTO: 0 /100 WBC (ref 0–0.2)
PLATELET # BLD AUTO: 227 10*3/MM3 (ref 140–450)
PMV BLD AUTO: 9.7 FL (ref 6–12)
POTASSIUM SERPL-SCNC: 4.3 MMOL/L (ref 3.5–4.7)
PROT SERPL-MCNC: 7 G/DL (ref 6.3–8)
RBC # BLD AUTO: 4.39 10*6/MM3 (ref 3.77–5.28)
SODIUM SERPL-SCNC: 144 MMOL/L (ref 134–145)
WBC # BLD AUTO: 8.45 10*3/MM3 (ref 3.4–10.8)

## 2021-03-19 PROCEDURE — 36415 COLL VENOUS BLD VENIPUNCTURE: CPT

## 2021-03-19 PROCEDURE — 85025 COMPLETE CBC W/AUTO DIFF WBC: CPT

## 2021-03-19 PROCEDURE — 80053 COMPREHEN METABOLIC PANEL: CPT

## 2021-03-19 PROCEDURE — 99214 OFFICE O/P EST MOD 30 MIN: CPT | Performed by: INTERNAL MEDICINE

## 2021-03-19 RX ORDER — METOPROLOL SUCCINATE 25 MG/1
25 TABLET, EXTENDED RELEASE ORAL NIGHTLY
COMMUNITY
Start: 2021-02-15

## 2021-03-19 RX ORDER — AMLODIPINE BESYLATE 5 MG/1
1 TABLET ORAL NIGHTLY
COMMUNITY
Start: 2021-02-22

## 2021-03-19 NOTE — PROGRESS NOTES
Subjective     REASON FOR FOLLOW UP:  1.  Focal atypical ductal hyperplasia of the right breast who was to be scheduled for right breast lumpectomy,    · Pathology from July 22, 2019 right breast biopsies in the office.  · Right breast 8:00, 4 cm from the nipple returned as focal atypical duct hyperplasia involving a single gland, 0.22 mm maximally.  Ramone cyst, sclerosing adenosis, fibroadenomatoid hyperplasia, associated micro calcifications.  · Right breast 11:00, 3 cm from the nipple, tender to palpation, no exam correlate, 1.6 cm on ultrasound, BI-RADS 4.Pathology from July 22, 2019 right breast biopsies in the office.Right breast 11:00, 3 center meter from the nipple, duct ectasia, apocrine metaplasia, sclerosing adenosis, usual duct hyperplasia, associated micro calcifications      2.  MRI showed a lesion in the left breast which was atypical and patient underwent ultrasound-guided biopsy of the left breast nodule.   LEFT breast 3:00 RA- seen on MRI done for ADH- US correlate 8mm, BR4-  Biopsied 8-2019- Pathology from her left breast in office biopsy August 13, 2019 returned as sclerosing adenosis, fibroadenomatous change, usual hyperplasia.  No atypia.       3.  September 12, 20 19, Arimidex started, patient had severe headaches like migraine headaches with Arimidex and subsequently switched to  ·  Femara started on 10/25/19.  Tolerating well.      HISTORY OF PRESENT ILLNESS:  The patient is a 66 y.o. year old female who is here for an opinion about the above issue.  Patient is a 65-year-old female with history of atypical ductal hyperplasia s/p surgery of the right breast followed by Dr. Collier.  We had placed her on Arimidex on September 12, 2019 but patient had migraine-like headaches and hence switched to Femara and she is tolerating it very well.  She does have history of rheumatoid arthritis and her joint pains are slightly worse but she thinks she is doing reasonably well with Femara and she is  not worried about the slight worsening of her joint pains.  She also has very minimal hot flashes.  She has not gained weight and she is trying to exercise.    She has appointment with Dr. Collier in November following MRI of the breast as well as screening mammogram.  Patient has been doing self breast exam and denies any breast mass, nipple discharge or skin changes or axillary adenopathy.    She has osteoporosis for which she is on Fosamax.  She takes it once a week.  She seems to be tolerating it well.  She does not want to go on Prolia injections and it is reasonable to continue Fosamax.  She also knows to take calcium 600 mg twice a day and vitamin D 1000 international units daily.    Interval history:  Patient states that she has got more arthralgias with Femara and she is feeling the pain as high as 7 or 8 on a 1-10 scale.Patient states that more recently she had a CT scan which showed evidence of small lung nodules.  There was basilar areas of atelectasis along with a right lower lobe 1.2 x 0.9 cm nodule and multiple pulmonary nodules in the right lower lobe.  PET/CT was suggested .  Patient saw pulmonary Dr. Chandler.    PET scan was obtained which showed low-level activity along the peripheral portion of the bands of atelectasis with activity level of 4.4.  There is a 8 mm nodular opacity on the left which is photopenic.  There is a 7 mm pleural-based nodular density in the left lower lobe with an activity of 4.7.  There is a subcentimeter focus of hypermetabolic activity in the right submandibular gland with a maximal SUV of 5.5.    Radiologist suggests 3 months follow-up CT scan.  Also there is a focal the focus of hypermetabolic activity in the right submandibular gland is of uncertain etiology.  Clinically does not palpable.  Patient is not symptomatic.  Abdomen pelvis is negative.  She is not a smoker.    We will have ENT evaluate this patient for the submandibular gland, otherwise we will repeat a CT  scan of the neck and chest in 3 months.    I reviewed the mammogram as well as MRI of the breast from November 2020 and both are normal.    Patient seen by Dr. Collier in January 2021 at which time she gave her vitamin D for breast pain.  Patient underwent a breast exam which was negative by her.      ONCOLOGIC HISTORY:  Patient is a 64 year-old female with history of atypical duct hyperplasia diagnosed back in July 2019 and was done on the right breast biopsy.  More recently Dr. Collier took her on August 29, 2019 for right needle localization lumpectomy but pathology is negative and there is no residual atypical hyperplasia present.    Patient was referred here for chemoprophylaxis given history of atypical duct hyperplasia.  Patient also has been arranged for MRI as well as mammogram in 2020.  She has been asked for self breast exam.  In the meantime we will start her on chemoprophylaxis now that we have the lumpectomy specimen, we will start aromatase inhibitor.      Oncologic history:     Patient is a 64-year-old female who underwent mammogram at [facility] in September 2018 and was asked to repeat diagnostic mammogram and ultrasound in 6 months    September 10, 2018  Findings  Breast is heterogeneously dense.  There is a small nodule projecting in the left breast centrally.  Small superficial nodule left lateral subareolar location stable.  Small nodule has also developed in the outer hemisphere right breast middle third.  Both nodules are imaged.  Larger nodule in the more central aspect of right breast containing a single coarse calcification appears stable.  Additional bilateral lateral breast imaging is recommended to include true lateral projections.  Suggested spot compression views and targeted breast ultrasound.    October 18, 2018:  Bilateral digital diagnostic mammogram and diagnostic bilateral ultrasound done  Impression: Probable benign mammogram  1.  Benign-appearing simple cyst in the deep  central left breast corresponding to the new mammographic density.  This can be followed up with yearly mammograms.  2.  Solid appearing nodule in the right breast along 8:00 which represented the developing nodular density on mammography and has likely been stable for several years.  This is favored to represent probably a benign fibroadenoma and can be followed up with right breast mammogram and ultrasound in 6 months to ensure stability.    6/27/2019: Diagnostic right digital mammogram and diagnostic right breast ultrasound  Right breast mass 8 to 9 o'clock position, 6.6 cm from the nipple, stable with circumscribed nodular lobular margins measuring 1.1 cm AP x0.7 cm.    Right breast focal asymmetry 10:00 to 11 o'clock position, 5 cm from the nipple contains stable coarse calcification which appears to have become larger on only the right spot since October 2018.    Ultrasound right breast the solid and cystic mass in the right breast at 8 o'clock position at 4 cm from the nipple is stable measuring 0.4 x 0.5 x 1 cm.  On October 11, 2018 ultrasound measured 0.5 x 1 x 0.5 cm and appeared to contain solid components on today's exam the mass consists of solid components and 2 small cystic areas within without thin septations.  The right breast sonographic irregularity mostly hypoechoic area 11:00 at 3 cm from the nipple measured 0.7 x 1.1 x 1.6 cm.  On October 11, 2018 this area measured 0.9 x 1.8 x 1.5 cm.  There is a new solid mural nodule 0.4 x 0.7 cm.  The new nodule is concerning for possible malignancy.    Impression the right breast ultrasound mixed solid and cystic mass at 8 o'clock position, 4 cm from the nipple needs to be biopsied under ultrasound guidance.    2.  The breast right ultrasound calcified area at 11:00, 3 cm from the nipple needs to be biopsied under ultrasound guidance because of a new solid hypoechoic mural nodule.    July 22, 2019: Pathology  Ultrasound-guided biopsy  Final Diagnosis   1.   Breast, Right 8 o'clock Position, 4 cm FN, Core Biopsy:                 A.  Focal atypical duct hyperplasia involving a single gland, 0.22 mm maximally.               B.  Clustered cysts, sclerosing adenosis, and fibroadenomatoid hyperplasia with associated microcalcifications.       2.  Breast, Right 11 o'clock Position, 3 cm FN, Core Biopsy:                A.  Duct ectasia, apocrine metaplasia, sclerosing adenosis, and usual duct hyperplasia with associated                     microcalcifications.      August 7, 2019: Bilateral breast MRI  Artifact from a biopsy marking clip upper outer quadrant right breast.  No abnormal enhancement adjacent to the clips.  There is a 9 mm diameter nodule lateral retroareolar left breast immediately beneath the skin surface most likely an intramammary lymph node or fibroadenoma.  Further evaluation with ultrasound is recommended.  BI-RADS 0.     Right mammogram same day shows 2 new marking clips upper outer quadrant both are spring shaped.  No underlying mammographic abnormality.    August 12, 2019: Left breast ultrasound, retroareolar left breast there is microlobulated hypoechoic mass that measures 8 mm ultrasound-guided core biopsy recommended.    Patient underwent left breast ultrasound-guided biopsy by Dr. Collier as of August 13, 2019.    Patient was to have surgery on the right breast but because of this new lesion on the left breast that was seen on MRI she had to undergo biopsy first of the right breast lesion prior to surgery.    Patient is to hear from Dr. Collier about the surgery date    Obstetric/Gynecologic History:  Age menstrual periods began: 13  Patient is postmenopausal due to removal of her uterus and both ovaries in the following year: 6580-2062   Number of pregnancies:1  Number of live births: 1  Number of abortions or miscarriages: 0  Age of delivery of first child: 21  Patient did not breast feed.  Length of time taking birth control pills: 4 yrs    Patient took hormone replacement during the following dates:  10 yrs  Patient had uterus and ovaries removed.     September 12, 2019: Started Arimidex but had severe migraine headaches.    Femara started on 10/25/19.    Past Medical History:   Diagnosis Date   • Asthma    • Atypical ductal hyperplasia of right breast 07/2019   • Breast lump     RIGHT   • Bronchiectasis (CMS/HCC)    • CAD (coronary artery disease)    • Depression with anxiety 9/21/2016   • Diabetes mellitus (CMS/HCC)    • Disease of thyroid gland     HYPOTHYROIDISM   • Drug therapy    • Fibrocystic breast    • Fibromyalgia syndrome 6/6/2017   • GERD (gastroesophageal reflux disease)    • H/O Lung nodules    • H/O Third nerve palsy of right eye 2012   • Heart palpitations    • History of thyroid nodule    • HLD (hyperlipidemia) 9/21/2016   • Hypertension    • Hypothyroidism    • IBS (irritable bowel syndrome)    • Low back pain    • Moderate persistent asthma without complication 6/6/2017   • Osteoporosis    • PONV (postoperative nausea and vomiting)    • Precordial pain 9/21/2016   • Rheumatoid arthritis (CMS/HCC)    • Scoliosis    • Thrush    • Vitamin D deficiency 9/21/2016        Past Surgical History:   Procedure Laterality Date   • ABDOMINAL HERNIA REPAIR     • APPENDECTOMY     • BREAST BIOPSY      right and left benign 10-15 yrs ago   • BREAST LUMPECTOMY Right 8/29/2019    Procedure: right breast needle localized lumpectomy for atypical duct hyperplasia.;  Surgeon: Giovanna Collier MD;  [place] [facility] [place] [facility] [place]  Service: General   • BRONCHOSCOPY N/A 5/11/2018    Procedure: BRONCHOSCOPY;  Surgeon: Emma Blakely MD;  Location: [facility] ENDOSCOPY;  Service: Pulmonary   • CARDIAC CATHETERIZATION     • CHOLECYSTECTOMY     • COLONOSCOPY  2015   • COLONOSCOPY  2020   • ENDOSCOPY  2015   • EPIDURAL BLOCK     • HERNIA REPAIR     • HYSTERECTOMY     • KNEE SURGERY     • OOPHORECTOMY     • TONSILLECTOMY     • WRIST FUSION Right         Current  Outpatient Medications on File Prior to Visit   Medication Sig Dispense Refill   • albuterol (PROVENTIL) (2.5 MG/3ML) 0.083% nebulizer solution Take 2.5 mg by nebulization Every 6 (Six) Hours As Needed for Wheezing (asthma).     • albuterol sulfate  (90 Base) MCG/ACT inhaler Inhale 2 puffs Every 4 (Four) Hours As Needed for Wheezing.     • alendronate (FOSAMAX) 70 MG tablet TAKE 1 TABLET BY MOUTH ONCE WEEKLY BEFORE BREAKFAST, ON AN EMPTY STOMACH: REMAIN UPRIGHT FOR ONE HOUR:TAKE WITH 8 OUNCES OF WATER 4 tablet 0   • amLODIPine (NORVASC) 5 MG tablet Take 1 tablet by mouth.     • atorvastatin (LIPITOR) 20 MG tablet Take 20 mg by mouth Daily.     • baclofen (LIORESAL) 10 MG tablet Take 10 mg by mouth Daily.     • budesonide-formoterol (SYMBICORT) 160-4.5 MCG/ACT inhaler Inhale 2 puffs 2 (Two) Times a Day.     • Calcium Carbonate-Vit D-Min (Calcium 1200) 8350-0499 MG-UNIT chewable tablet Chew 1,000 Units Daily.     • celecoxib (CeleBREX) 200 MG capsule Take 200 mg by mouth 2 (two) times a day.     • clotrimazole-betamethasone (LOTRISONE) 1-0.05 % cream Apply  topically to the appropriate area as directed 2 (Two) Times a Day As Needed.     • FASENRA 30 MG/ML solution prefilled syringe INJECT THE CONTENTS OF ONE SYRINGE (30MG) SUBCUTANEOUSLY ON WEEKS 0, 4, AND 8. MUST BE ADMINISTERED BY HEALTHCARE PROFESSIONAL  1   • furosemide (LASIX) 20 MG tablet Take 20 mg by mouth As Needed.     • gabapentin (NEURONTIN) 300 MG capsule Take 300 mg by mouth 2 (Two) Times a Day.     • HYDROcodone-acetaminophen (NORCO)  MG per tablet Take 1 tablet by mouth Every 6 (Six) Hours As Needed for Moderate Pain .     • hydroxychloroquine (PLAQUENIL) 200 MG tablet Take 200 mg by mouth 2 (Two) Times a Day.     • hyoscyamine (LEVSIN) 0.125 MG SL tablet Dissolve one Tablet Under The Toungue Every 6  Hours as Needed for cramping (Patient taking differently: Take 0.125 mg by mouth Every 6 (Six) Hours As Needed. Dissolve one Tablet Under  The Toungue Every 6  Hours as Needed for cramping) 30 tablet 3   • isosorbide mononitrate (IMDUR) 120 MG 24 hr tablet Take 120 mg by mouth Every Morning.     • letrozole (Femara) 2.5 MG tablet Take 1 tablet by mouth Daily. 90 tablet 2   • levothyroxine (SYNTHROID, LEVOTHROID) 75 MCG tablet Take 75 mcg by mouth Daily.     • metoprolol succinate XL (TOPROL-XL) 25 MG 24 hr tablet Take 1 tablet by mouth.     • nitroglycerin (NITROSTAT) 0.4 MG SL tablet Place 0.4 mg under the tongue every 5 (five) minutes.     • pantoprazole (PROTONIX) 40 MG EC tablet TAKE ONE TABLET BY MOUTH ONCE NIGHTLY 30 tablet 0   • polyethylene glycol (MIRALAX) packet Take 17 g by mouth Daily.     • ranolazine (RANEXA) 1000 MG 12 hr tablet Take 1,000 mg by mouth Every 12 (Twelve) Hours.     • SPIRIVA RESPIMAT 1.25 MCG/ACT aerosol solution inhaler Inhale 1 puff Every Morning.     • vitamin D (ERGOCALCIFEROL) 37828 UNITS capsule capsule Take 50,000 Units by mouth Every 7 (Seven) Days.     • diphenhydrAMINE-acetaminophen (TYLENOL PM)  MG tablet per tablet Take 1 tablet by mouth At Night As Needed for Sleep.     • nebivolol (BYSTOLIC) 5 MG tablet Take 10 mg by mouth 2 (Two) Times a Day.     • nystatin (MYCOSTATIN) 712568 UNIT/ML suspension Swish and swallow 500,000 Units 2 (Two) Times a Day.     • Simethicone (GAS RELIEF EXTRA STRENGTH PO) As Needed.       No current facility-administered medications on file prior to visit.        ALLERGIES:    Allergies   Allergen Reactions   • Adhesive Tape Swelling     Must use paper tape   • Morphine Hives and Itching   • Morphine And Related Hives and Itching   • Metal Rash      Patient is disabled, she is to work at [employer] in the past.  She does not smoke or drink alcohol.    Social History     Socioeconomic History   • Marital status:      Spouse name: Zbigniew   • Number of children: Not on file   • Years of education: High school   • Highest education level: Not on file   Tobacco Use   •  Smoking status: Never Smoker   • Smokeless tobacco: Never Used   Substance and Sexual Activity   • Alcohol use: No   • Drug use: No        Family History   Problem Relation Age of Onset   • COPD Mother    • Cancer Mother         heart tumor   • Heart disease Mother    • COPD Father    • Hypertension Father    • No Known Problems Sister    • Cancer Brother         testicular   • Breast cancer Paternal Aunt         unknown age    • Heart disease Maternal Grandmother    • Malig Hyperthermia Neg Hx      History is consistent with chest wall cancer in mother who  of cancer at age 68, unsure what type.  Brother had testicular cancer at age 2 but is now 59 and in good health.  Maternal aunt with history of breast cancer in her 40s and  from it    Review of Systems   Constitutional: Negative for appetite change, chills, diaphoresis, fatigue, fever and unexpected weight change.   HENT: Negative for hearing loss, sore throat and trouble swallowing.    Respiratory: Negative for cough, chest tightness, shortness of breath and wheezing.    Cardiovascular: Negative for chest pain, palpitations and leg swelling.   Gastrointestinal: Negative for abdominal distention, abdominal pain, constipation, diarrhea, nausea and vomiting.   Genitourinary: Negative for dysuria, frequency, hematuria and urgency.   Musculoskeletal: Negative for joint swelling.        No muscle weakness.   Skin: Negative for rash and wound.   Neurological: Negative for seizures, syncope, speech difficulty, weakness, numbness and headaches.   Hematological: Negative for adenopathy. Does not bruise/bleed easily.   Psychiatric/Behavioral: Negative for behavioral problems, confusion and suicidal ideas.   All other systems reviewed and are negative.     Patient's joint pains have worsened with Femara  I have reviewed and confirmed the accuracy of the ROS as documented by the MA/LPN/RN Kayla Saldivar MD  I have reviewed and confirmed the accuracy of the ROS as  "documented by the MA/LPN/RN Kayla Saldivar MD      Objective     Vitals:    03/19/21 0748   BP: 111/57   Pulse: 75   Resp: 18   Temp: 96.4 °F (35.8 °C)   TempSrc: Temporal   SpO2: 96%   Weight: 78.1 kg (172 lb 3.2 oz)   Height: 165.1 cm (65\")   PainSc: 0-No pain      Current Status 3/19/2021   ECOG score 0       Physical Exam    .      This patient's ACP documentation is up to date, and there's nothing further left to document.    CONSTITUTIONAL:  Vital signs reviewed.    No distress, looks comfortable.  EYES:  Conjunctiva and lids unremarkable.  Extraocular eye movements intact.  EARS,NOSE,MOUTH,THROAT:  Ears and nose appear unremarkable.  Lips, teeth, gums appear unremarkable.  RESPIRATORY:  Normal respiratory effort.    CARDIOVASCULAR: Regular rate rhythm, no murmur  BREAST: Right breast: No skin changes, no evidence of breast mass, no nipple discharge, no evidence of any right axillary adenopathy or right supraclavicular adenopathy  Left breast: No evidence of any skin changes, no evidence of any left breast mass and no evidence of left nipple discharge as well as no left axillary adenopathy or left supraclavicular adenopathy.  No significant lower extremity edema.  SKIN: No wounds.  No rashes.  MUSCULOSKELETAL/EXTREMITIES: No clubbing or cyanosis.  No apparent unilateral weakness.  NEURO: CN 2-12 appear intact. No focal neurological deficits noted.  PSYCHIATRIC:  Normal judgment and insight.  Normal mood and affect.  I have reexamined the patient and the results are consistent with the previously documented exam. Kayla Saldivar MD       RECENT LABS:  Hematology WBC   Date Value Ref Range Status   03/19/2021 8.45 3.40 - 10.80 10*3/mm3 Final   01/07/2021 4.81 4.5 - 11.0 10*3/uL Final     RBC   Date Value Ref Range Status   03/19/2021 4.39 3.77 - 5.28 10*6/mm3 Final   01/07/2021 4.29 4.0 - 5.2 10*6/uL Final     Hemoglobin   Date Value Ref Range Status   03/19/2021 13.5 12.0 - 15.9 g/dL Final   01/07/2021 12.7 " 12.0 - 16.0 g/dL Final     Hematocrit   Date Value Ref Range Status   03/19/2021 43.7 34.0 - 46.6 % Final   01/07/2021 41.6 36.0 - 46.0 % Final     Platelets   Date Value Ref Range Status   03/19/2021 227 140 - 450 10*3/mm3 Final   01/07/2021 228 140 - 440 10*3/uL Final         Assessment/Plan      1.  Focal atypical ductal hyperplasia of the right breast at 8 o'clock position.   Right breast, 8 o'clock position, 4 cm from the nipple, path returned as focal atypical duct hyperplasia involving the single gland, 0.22 mm.sclerosing adenosis, fibroadenomatoid hyperplasia, associated micro calcifications.  · August 29, 2019 patient underwent needle localization lumpectomy on the right breast and there was no evidence of any atypical hyperplasia.  Reviewed the pathology.  · Patient has been referred here for chemoprophylaxis and discussed in length the side effects of Arimidex.       Right breast, 11 o'clock position, 3 cm from the nipple 1.6 cm on ultrasound.  Pathology from July 2019 right breast biopsies  duct ectasia, apocrine metaplasia, sclerosing adenosis, usual duct hyperplasia, associated micro calcifications    2.  Left breast, 3 o'clock position, MRI done shows abnormality, 9 mm nodular nodule lateral retroareolar left breast immediately beneath the skin surface most likely intramammary lymph node or fibroadenoma.  Ultrasound recommended.  August 12, 2019 left breast ultrasound: Retroareolar left breast, 3 o'clock position, microlobulated hypoechoic mass which measures 8 mm.  Ultrasound-guided biopsy done on August 13, 2019.  Results pending.    · Patient is now awaiting surgery by Dr. Collier on both breasts.  · Risk assessment done based on family history, reproductive history and recent atypical biopsy.  The 5-year breast cancer risk is 4.2%.  And lifetime breast cancer risk is 17 to 31%.  · Patient has been referred here but given that she is undergoing surgery we would need to await the path on surgery  prior to making any final recommendations.  · If only atypical duct hyperplasia present then certainly she could be a candidate for chemoprophylaxis.  But given that she has not yet had surgery, she tells me she is undergoing lumpectomy , and await surgery prior to making any recommendation  · August 29, 2019 underwent right needle localization lumpectomy and pathology on this is benign  · September 12, 2019: Started Arimidex.  Developed severe migraine headaches.  · October 12, 20 19: Started on Femara.  Tolerating well, except mild arthralgias.  · March 19, 2021: Patient's arthralgias worsened.  We will need to discontinue Femara.  I have reviewed the results of both MRI of the breast and mammogram from November 2020 and it is negative.  · I explained to the patient that given a very small focal atypical ductal hyperplasia really surveillance with MRI and mammogram would be reasonable and we will discontinue Femara    3.  History of fibromyalgia, followed by kristopher Allen     4.  Atypical angina, cardiac cath negative followed by Dr. Jett    5.  Rheumatoid arthritis followed by Dr. Keller on Celebrex and Plaquenilkristopher    6. Osteoporosis of the lumbar spine    · Currently on Fosamax.  Discussed Prolia as well but patient prefers oral bisphosphonates.  · Continue calcium and vitamin D supplements    7.  Lung nodule: Patient states that she had a CT angiogram of the chest done which showed atelectasis at the bases and right lower lobe lung nodules and there was lesion 1.2 x 0.8 cm in the right lower lobe.  · Patient was seen by pulmonary who ordered PET scan  · I have reviewed both CT scan and PET scan with the patient in length.  There is evidence of small submandibular gland activity of 5.5 but clinically it is not palpable.  The nodules in the lung are very small and 3-month follow-up CT scan suggested  · Patient has follow-up with pulmonary next week  · We will obtain CT abdomen pelvis in 1  week.  · Refer to ENT for evaluation of the submandibular gland uptake by PET scan    Plan:  · We will discontinue Femara  · I have reviewed the mammogram as well as MRI of the breast from November 2021 and they are negative  · Recent CT scan as well as PET scan has been reviewed by me and discussed with patient.  · The lung nodules are small and difficult to biopsy and a follow-up CT is suggested in 3 months  · CT abdomen pelvis to evaluate the chronic abdominal discomfort and cause for elevated liver function tests, rule out hepatic steatosis.  Follow-up in 6 weeks with nurse review with labs  · Referral to ENT to evaluate the submandibular gland uptake seen on PET scan  · Patient will follow up with pulmonary in 1 week  · Patient has follow-up with Elen Lau   · I discussed with patient on the submandibular gland, she has no pain but suggested an ENT follow-up given the activity level was 5.5.  We will also follow-up with CT scan in 3 months  · We will see her back in 2 months to ensure that her arthralgias are completely resolved after stopping Femara.  I believe we can just follow with observation and surveillance mammogram and MRI as she had a very small atypical ductal hyperplasia and her arthralgias have actually worsened on Femara.      Kayla Saldivar MD       CC: Emma Medina MD David Sun, MD Reid Brown, MD Gerard Siciliano, MD Mehdi Poorkay, MD Amir Piracha, MD Rinkoo Aggarwal, MD Gary Crump, MD Robert Sasser, MD

## 2021-03-22 ENCOUNTER — TELEPHONE (OUTPATIENT)
Dept: ONCOLOGY | Facility: CLINIC | Age: 66
End: 2021-03-22

## 2021-03-22 NOTE — TELEPHONE ENCOUNTER
----- Message from Kayla Saldivar MD sent at 3/19/2021  6:58 PM EDT -----  Can you please call the patient and refer her to Dr. Cramer ENT for evaluation of the neck node.  Please call the patient with appointment.  Patient can be seen in 2 to 3 weeks.  Thanks patient knows to expect a call  Kayla Saldivar MD

## 2021-03-24 ENCOUNTER — TRANSCRIBE ORDERS (OUTPATIENT)
Dept: SLEEP MEDICINE | Facility: HOSPITAL | Age: 66
End: 2021-03-24

## 2021-03-24 ENCOUNTER — APPOINTMENT (OUTPATIENT)
Dept: PAIN MEDICINE | Facility: HOSPITAL | Age: 66
End: 2021-03-24

## 2021-03-24 ENCOUNTER — TRANSCRIBE ORDERS (OUTPATIENT)
Dept: PULMONOLOGY | Facility: HOSPITAL | Age: 66
End: 2021-03-24

## 2021-03-24 DIAGNOSIS — R91.1 PULMONARY NODULE: Primary | ICD-10-CM

## 2021-03-25 ENCOUNTER — HOSPITAL ENCOUNTER (OUTPATIENT)
Dept: PET IMAGING | Facility: HOSPITAL | Age: 66
Discharge: HOME | End: 2021-03-25
Admitting: INTERNAL MEDICINE

## 2021-03-25 DIAGNOSIS — N60.99 ATYPICAL DUCTAL HYPERPLASIA OF BREAST: ICD-10-CM

## 2021-03-25 LAB — CREAT BLDA-MCNC: 0.8 MG/DL (ref 0.6–1.3)

## 2021-03-25 PROCEDURE — 0 DIATRIZOATE MEGLUMINE & SODIUM PER 1 ML: Performed by: INTERNAL MEDICINE

## 2021-03-25 PROCEDURE — 74177 CT ABD & PELVIS W/CONTRAST: CPT

## 2021-03-25 PROCEDURE — 25010000002 IOPAMIDOL 61 % SOLUTION: Performed by: INTERNAL MEDICINE

## 2021-03-25 PROCEDURE — 82565 ASSAY OF CREATININE: CPT

## 2021-03-25 RX ADMIN — DIATRIZOATE MEGLUMINE AND DIATRIZOATE SODIUM 30 ML: 660; 100 LIQUID ORAL; RECTAL at 08:49

## 2021-03-25 RX ADMIN — IOPAMIDOL 85 ML: 612 INJECTION, SOLUTION INTRAVENOUS at 09:47

## 2021-03-26 ENCOUNTER — TELEMEDICINE (OUTPATIENT)
Dept: ONCOLOGY | Facility: CLINIC | Age: 66
End: 2021-03-26

## 2021-03-26 DIAGNOSIS — N60.99 ATYPICAL DUCTAL HYPERPLASIA OF BREAST: Primary | ICD-10-CM

## 2021-03-26 PROCEDURE — 99213 OFFICE O/P EST LOW 20 MIN: CPT | Performed by: INTERNAL MEDICINE

## 2021-03-26 NOTE — PROGRESS NOTES
Subjective   We tried to connect by video visit but could not and had to subsequently do a telephone visit.    REASON FOR FOLLOW UP:  1.  Focal atypical ductal hyperplasia of the right breast who was to be scheduled for right breast lumpectomy,    · Pathology from July 22, 2019 right breast biopsies in the office.  · Right breast 8:00, 4 cm from the nipple returned as focal atypical duct hyperplasia involving a single gland, 0.22 mm maximally.  Ramone cyst, sclerosing adenosis, fibroadenomatoid hyperplasia, associated micro calcifications.  · Right breast 11:00, 3 cm from the nipple, tender to palpation, no exam correlate, 1.6 cm on ultrasound, BI-RADS 4.Pathology from July 22, 2019 right breast biopsies in the office.Right breast 11:00, 3 center meter from the nipple, duct ectasia, apocrine metaplasia, sclerosing adenosis, usual duct hyperplasia, associated micro calcifications      2.  MRI showed a lesion in the left breast which was atypical and patient underwent ultrasound-guided biopsy of the left breast nodule.   LEFT breast 3:00 RA- seen on MRI done for ADH- US correlate 8mm, BR4-  Biopsied 8-2019- Pathology from her left breast in office biopsy August 13, 2019 returned as sclerosing adenosis, fibroadenomatous change, usual hyperplasia.  No atypia.       3.  September 12, 20 19, Arimidex started, patient had severe headaches like migraine headaches with Arimidex and subsequently switched to  ·  Femara started on 10/25/19.  Tolerating well.      HISTORY OF PRESENT ILLNESS:  The patient is a 66 y.o. year old female who is here for an opinion about the above issue.  Patient is a 65-year-old female with history of atypical ductal hyperplasia s/p surgery of the right breast followed by Dr. Collier.  We had placed her on Arimidex on September 12, 2019 but patient had migraine-like headaches and hence switched to Femara and she is tolerating it very well.  She does have history of rheumatoid arthritis and her  joint pains are slightly worse but she thinks she is doing reasonably well with Femara and she is not worried about the slight worsening of her joint pains.  She also has very minimal hot flashes.  She has not gained weight and she is trying to exercise.    She has appointment with Dr. Collier in November following MRI of the breast as well as screening mammogram.  Patient has been doing self breast exam and denies any breast mass, nipple discharge or skin changes or axillary adenopathy.    She has osteoporosis for which she is on Fosamax.  She takes it once a week.  She seems to be tolerating it well.  She does not want to go on Prolia injections and it is reasonable to continue Fosamax.  She also knows to take calcium 600 mg twice a day and vitamin D 1000 international units daily.    Interval history:  Patient states that she has got more arthralgias with Femara and she is feeling the pain as high as 7 or 8 on a 1-10 scale.Patient states that more recently she had a CT scan which showed evidence of small lung nodules.  There was basilar areas of atelectasis along with a right lower lobe 1.2 x 0.9 cm nodule and multiple pulmonary nodules in the right lower lobe.  PET/CT was suggested .  Patient saw pulmonary Dr. Chandler.    PET scan was obtained which showed low-level activity along the peripheral portion of the bands of atelectasis with activity level of 4.4.  There is a 8 mm nodular opacity on the left which is photopenic.  There is a 7 mm pleural-based nodular density in the left lower lobe with an activity of 4.7.  There is a subcentimeter focus of hypermetabolic activity in the right submandibular gland with a maximal SUV of 5.5.    Radiologist suggests 3 months follow-up CT scan.  Also there is a focal the focus of hypermetabolic activity in the right submandibular gland is of uncertain etiology.  Clinically does not palpable.  Patient is not symptomatic.  Abdomen pelvis is negative.  She is not a  smoker.    We will have ENT evaluate this patient for the submandibular gland, otherwise we will repeat a CT scan of the neck and chest in 3 months.    I reviewed the mammogram as well as MRI of the breast from November 2020 and both are normal.    Patient seen by Dr. Collier in January 2021 at which time she gave her vitamin E for breast pain.  Patient underwent a breast exam which was negative by her.      ONCOLOGIC HISTORY:  Patient is a 64 year-old female with history of atypical duct hyperplasia diagnosed back in July 2019 and was done on the right breast biopsy.  More recently Dr. Collier took her on August 29, 2019 for right needle localization lumpectomy but pathology is negative and there is no residual atypical hyperplasia present.    Patient was referred here for chemoprophylaxis given history of atypical duct hyperplasia.  Patient also has been arranged for MRI as well as mammogram in 2020.  She has been asked for self breast exam.  In the meantime we will start her on chemoprophylaxis now that we have the lumpectomy specimen, we will start aromatase inhibitor.      Oncologic history:     Patient is a 64-year-old female who underwent mammogram at [facility] in September 2018 and was asked to repeat diagnostic mammogram and ultrasound in 6 months    September 10, 2018  Findings  Breast is heterogeneously dense.  There is a small nodule projecting in the left breast centrally.  Small superficial nodule left lateral subareolar location stable.  Small nodule has also developed in the outer hemisphere right breast middle third.  Both nodules are imaged.  Larger nodule in the more central aspect of right breast containing a single coarse calcification appears stable.  Additional bilateral lateral breast imaging is recommended to include true lateral projections.  Suggested spot compression views and targeted breast ultrasound.    October 18, 2018:  Bilateral digital diagnostic mammogram and diagnostic  bilateral ultrasound done  Impression: Probable benign mammogram  1.  Benign-appearing simple cyst in the deep central left breast corresponding to the new mammographic density.  This can be followed up with yearly mammograms.  2.  Solid appearing nodule in the right breast along 8:00 which represented the developing nodular density on mammography and has likely been stable for several years.  This is favored to represent probably a benign fibroadenoma and can be followed up with right breast mammogram and ultrasound in 6 months to ensure stability.    6/27/2019: Diagnostic right digital mammogram and diagnostic right breast ultrasound  Right breast mass 8 to 9 o'clock position, 6.6 cm from the nipple, stable with circumscribed nodular lobular margins measuring 1.1 cm AP x0.7 cm.    Right breast focal asymmetry 10:00 to 11 o'clock position, 5 cm from the nipple contains stable coarse calcification which appears to have become larger on only the right spot since October 2018.    Ultrasound right breast the solid and cystic mass in the right breast at 8 o'clock position at 4 cm from the nipple is stable measuring 0.4 x 0.5 x 1 cm.  On October 11, 2018 ultrasound measured 0.5 x 1 x 0.5 cm and appeared to contain solid components on today's exam the mass consists of solid components and 2 small cystic areas within without thin septations.  The right breast sonographic irregularity mostly hypoechoic area 11:00 at 3 cm from the nipple measured 0.7 x 1.1 x 1.6 cm.  On October 11, 2018 this area measured 0.9 x 1.8 x 1.5 cm.  There is a new solid mural nodule 0.4 x 0.7 cm.  The new nodule is concerning for possible malignancy.    Impression the right breast ultrasound mixed solid and cystic mass at 8 o'clock position, 4 cm from the nipple needs to be biopsied under ultrasound guidance.    2.  The breast right ultrasound calcified area at 11:00, 3 cm from the nipple needs to be biopsied under ultrasound guidance because of a  new solid hypoechoic mural nodule.    July 22, 2019: Pathology  Ultrasound-guided biopsy  Final Diagnosis   1.  Breast, Right 8 o'clock Position, 4 cm FN, Core Biopsy:                 A.  Focal atypical duct hyperplasia involving a single gland, 0.22 mm maximally.               B.  Clustered cysts, sclerosing adenosis, and fibroadenomatoid hyperplasia with associated microcalcifications.       2.  Breast, Right 11 o'clock Position, 3 cm FN, Core Biopsy:                A.  Duct ectasia, apocrine metaplasia, sclerosing adenosis, and usual duct hyperplasia with associated                     microcalcifications.      August 7, 2019: Bilateral breast MRI  Artifact from a biopsy marking clip upper outer quadrant right breast.  No abnormal enhancement adjacent to the clips.  There is a 9 mm diameter nodule lateral retroareolar left breast immediately beneath the skin surface most likely an intramammary lymph node or fibroadenoma.  Further evaluation with ultrasound is recommended.  BI-RADS 0.     Right mammogram same day shows 2 new marking clips upper outer quadrant both are spring shaped.  No underlying mammographic abnormality.    August 12, 2019: Left breast ultrasound, retroareolar left breast there is microlobulated hypoechoic mass that measures 8 mm ultrasound-guided core biopsy recommended.    Patient underwent left breast ultrasound-guided biopsy by Dr. Collier as of August 13, 2019.    Patient was to have surgery on the right breast but because of this new lesion on the left breast that was seen on MRI she had to undergo biopsy first of the right breast lesion prior to surgery.    Patient is to hear from Dr. Collier about the surgery date    Obstetric/Gynecologic History:  Age menstrual periods began: 13  Patient is postmenopausal due to removal of her uterus and both ovaries in the following year: [id number]   Number of pregnancies:1  Number of live births: 1  Number of abortions or miscarriages: 0  Age of  delivery of first child: 21  Patient did not breast feed.  Length of time taking birth control pills: 4 yrs   Patient took hormone replacement during the following dates:  10 yrs  Patient had uterus and ovaries removed.     September 12, 2019: Started Arimidex but had severe migraine headaches.    Femara started on 10/25/19.    Past Medical History:   Diagnosis Date   • Asthma    • Atypical ductal hyperplasia of right breast 07/2019   • Breast lump     RIGHT   • Bronchiectasis (CMS/HCC)    • CAD (coronary artery disease)    • Depression with anxiety 9/21/2016   • Diabetes mellitus (CMS/HCC)    • Disease of thyroid gland     HYPOTHYROIDISM   • Drug therapy    • Fibrocystic breast    • Fibromyalgia syndrome 6/6/2017   • GERD (gastroesophageal reflux disease)    • H/O Lung nodules    • H/O Third nerve palsy of right eye 2012   • Heart palpitations    • History of thyroid nodule    • HLD (hyperlipidemia) 9/21/2016   • Hypertension    • Hypothyroidism    • IBS (irritable bowel syndrome)    • Low back pain    • Moderate persistent asthma without complication 6/6/2017   • Osteoporosis    • PONV (postoperative nausea and vomiting)    • Precordial pain 9/21/2016   • Rheumatoid arthritis (CMS/HCC)    • Scoliosis    • Thrush    • Vitamin D deficiency 9/21/2016        Past Surgical History:   Procedure Laterality Date   • ABDOMINAL HERNIA REPAIR     • APPENDECTOMY     • BREAST BIOPSY      right and left benign 10-15 yrs ago   • BREAST LUMPECTOMY Right 8/29/2019    Procedure: right breast needle localized lumpectomy for atypical duct hyperplasia.;  Surgeon: Giovanna Collier MD;  Location: [facility] OR [facility] [place];  Service: General   • BRONCHOSCOPY N/A 5/11/2018    Procedure: BRONCHOSCOPY;  Surgeon: Emma Blakely MD;  Location: [facility] ENDOSCOPY;  Service: Pulmonary   • CARDIAC CATHETERIZATION     • CHOLECYSTECTOMY     • COLONOSCOPY  2015   • COLONOSCOPY  2020   • ENDOSCOPY  2015   • EPIDURAL BLOCK     • HERNIA REPAIR     • HYSTERECTOMY      • KNEE SURGERY     • OOPHORECTOMY     • TONSILLECTOMY     • WRIST FUSION Right         Current Outpatient Medications on File Prior to Visit   Medication Sig Dispense Refill   • albuterol (PROVENTIL) (2.5 MG/3ML) 0.083% nebulizer solution Take 2.5 mg by nebulization Every 6 (Six) Hours As Needed for Wheezing (asthma).     • albuterol sulfate  (90 Base) MCG/ACT inhaler Inhale 2 puffs Every 4 (Four) Hours As Needed for Wheezing.     • alendronate (FOSAMAX) 70 MG tablet TAKE 1 TABLET BY MOUTH ONCE WEEKLY BEFORE BREAKFAST, ON AN EMPTY STOMACH: REMAIN UPRIGHT FOR ONE HOUR:TAKE WITH 8 OUNCES OF WATER 4 tablet 0   • amLODIPine (NORVASC) 5 MG tablet Take 1 tablet by mouth.     • atorvastatin (LIPITOR) 20 MG tablet Take 20 mg by mouth Daily.     • baclofen (LIORESAL) 10 MG tablet Take 10 mg by mouth Daily.     • budesonide-formoterol (SYMBICORT) 160-4.5 MCG/ACT inhaler Inhale 2 puffs 2 (Two) Times a Day.     • Calcium Carbonate-Vit D-Min (Calcium 1200) 1479-0312 MG-UNIT chewable tablet Chew 1,000 Units Daily.     • celecoxib (CeleBREX) 200 MG capsule Take 200 mg by mouth 2 (two) times a day.     • clotrimazole-betamethasone (LOTRISONE) 1-0.05 % cream Apply  topically to the appropriate area as directed 2 (Two) Times a Day As Needed.     • FASENRA 30 MG/ML solution prefilled syringe INJECT THE CONTENTS OF ONE SYRINGE (30MG) SUBCUTANEOUSLY ON WEEKS 0, 4, AND 8. MUST BE ADMINISTERED BY HEALTHCARE PROFESSIONAL  1   • furosemide (LASIX) 20 MG tablet Take 20 mg by mouth As Needed.     • gabapentin (NEURONTIN) 300 MG capsule Take 300 mg by mouth 2 (Two) Times a Day.     • HYDROcodone-acetaminophen (NORCO)  MG per tablet Take 1 tablet by mouth Every 6 (Six) Hours As Needed for Moderate Pain .     • hydroxychloroquine (PLAQUENIL) 200 MG tablet Take 200 mg by mouth 2 (Two) Times a Day.     • hyoscyamine (LEVSIN) 0.125 MG SL tablet Dissolve one Tablet Under The Toungue Every 6  Hours as Needed for cramping (Patient  taking differently: Take 0.125 mg by mouth Every 6 (Six) Hours As Needed. Dissolve one Tablet Under The Toungue Every 6  Hours as Needed for cramping) 30 tablet 3   • isosorbide mononitrate (IMDUR) 120 MG 24 hr tablet Take 120 mg by mouth Every Morning.     • letrozole (Femara) 2.5 MG tablet Take 1 tablet by mouth Daily. 90 tablet 2   • levothyroxine (SYNTHROID, LEVOTHROID) 75 MCG tablet Take 75 mcg by mouth Daily.     • metoprolol succinate XL (TOPROL-XL) 25 MG 24 hr tablet Take 1 tablet by mouth.     • nitroglycerin (NITROSTAT) 0.4 MG SL tablet Place 0.4 mg under the tongue every 5 (five) minutes.     • pantoprazole (PROTONIX) 40 MG EC tablet TAKE ONE TABLET BY MOUTH ONCE NIGHTLY 30 tablet 0   • polyethylene glycol (MIRALAX) packet Take 17 g by mouth Daily.     • ranolazine (RANEXA) 1000 MG 12 hr tablet Take 1,000 mg by mouth Every 12 (Twelve) Hours.     • SPIRIVA RESPIMAT 1.25 MCG/ACT aerosol solution inhaler Inhale 1 puff Every Morning.     • vitamin D (ERGOCALCIFEROL) 36903 UNITS capsule capsule Take 50,000 Units by mouth Every 7 (Seven) Days.       Current Facility-Administered Medications on File Prior to Visit   Medication Dose Route Frequency Provider Last Rate Last Admin   • [COMPLETED] iopamidol (ISOVUE-300) 61 % injection 100 mL  100 [facility]   85 mL at 03/25/21 0947        ALLERGIES:    Allergies   Allergen Reactions   • Adhesive Tape Swelling     Must use paper tape   • Morphine Hives and Itching   • Morphine And Related Hives and Itching   • Metal Rash      Patient is disabled, she is to work at [employer] in the past.  She does not smoke or drink alcohol.    Social History     Socioeconomic History   • Marital status:      Spouse name: Zbigniew   • Number of children: Not on file   • Years of education: High school   • Highest education level: Not on file   Tobacco Use   • Smoking status: Never Smoker   • Smokeless tobacco: Never Used   Substance  and Sexual Activity   • Alcohol use: No   • Drug use: No        Family History   Problem Relation Age of Onset   • COPD Mother    • Cancer Mother         heart tumor   • Heart disease Mother    • COPD Father    • Hypertension Father    • No Known Problems Sister    • Cancer Brother         testicular   • Breast cancer Paternal Aunt         unknown age    • Heart disease Maternal Grandmother    • Malig Hyperthermia Neg Hx      History is consistent with chest wall cancer in mother who  of cancer at age 68, unsure what type.  Brother had testicular cancer at age 2 but is now 59 and in good health.  Maternal aunt with history of breast cancer in her 40s and  from it    Review of Systems   Constitutional: Negative for appetite change, chills, diaphoresis, fatigue, fever and unexpected weight change.   HENT: Negative for hearing loss, sore throat and trouble swallowing.    Respiratory: Negative for cough, chest tightness, shortness of breath and wheezing.    Cardiovascular: Negative for chest pain, palpitations and leg swelling.   Gastrointestinal: Negative for abdominal distention, abdominal pain, constipation, diarrhea, nausea and vomiting.   Genitourinary: Negative for dysuria, frequency, hematuria and urgency.   Musculoskeletal: Negative for joint swelling.        No muscle weakness.   Skin: Negative for rash and wound.   Neurological: Negative for seizures, syncope, speech difficulty, weakness, numbness and headaches.   Hematological: Negative for adenopathy. Does not bruise/bleed easily.   Psychiatric/Behavioral: Negative for behavioral problems, confusion and suicidal ideas.   All other systems reviewed and are negative.     I have reviewed and confirmed the accuracy of the ROS as documented by the MA/LPN/RN Kayla Saldivar MD      Objective     There were no vitals filed for this visit.   Current Status 3/19/2021   ECOG score 0       Physical Exam    .  Physical exam not done as this is a telephone  visit       RECENT LABS:  Hematology WBC   Date Value Ref Range Status   03/19/2021 8.45 3.40 - 10.80 10*3/mm3 Final   01/07/2021 4.81 4.5 - 11.0 10*3/uL Final     RBC   Date Value Ref Range Status   03/19/2021 4.39 3.77 - 5.28 10*6/mm3 Final   01/07/2021 4.29 4.0 - 5.2 10*6/uL Final     Hemoglobin   Date Value Ref Range Status   03/19/2021 13.5 12.0 - 15.9 g/dL Final   01/07/2021 12.7 12.0 - 16.0 g/dL Final     Hematocrit   Date Value Ref Range Status   03/19/2021 43.7 34.0 - 46.6 % Final   01/07/2021 41.6 36.0 - 46.0 % Final     Platelets   Date Value Ref Range Status   03/19/2021 227 140 - 450 10*3/mm3 Final   01/07/2021 228 140 - 440 10*3/uL Final         Assessment/Plan      1.  Focal atypical ductal hyperplasia of the right breast at 8 o'clock position.   Right breast, 8 o'clock position, 4 cm from the nipple, path returned as focal atypical duct hyperplasia involving the single gland, 0.22 mm.sclerosing adenosis, fibroadenomatoid hyperplasia, associated micro calcifications.  · August 29, 2019 patient underwent needle localization lumpectomy on the right breast and there was no evidence of any atypical hyperplasia.  Reviewed the pathology.  · Patient has been referred here for chemoprophylaxis and discussed in length the side effects of Arimidex.       Right breast, 11 o'clock position, 3 cm from the nipple 1.6 cm on ultrasound.  Pathology from July 2019 right breast biopsies  duct ectasia, apocrine metaplasia, sclerosing adenosis, usual duct hyperplasia, associated micro calcifications    2.  Left breast, 3 o'clock position, MRI done shows abnormality, 9 mm nodular nodule lateral retroareolar left breast immediately beneath the skin surface most likely intramammary lymph node or fibroadenoma.  Ultrasound recommended.  August 12, 2019 left breast ultrasound: Retroareolar left breast, 3 o'clock position, microlobulated hypoechoic mass which measures 8 mm.  Ultrasound-guided biopsy done on August 13, 2019.   Results pending.    · Patient is now awaiting surgery by Dr. Collier on both breasts.  · Risk assessment done based on family history, reproductive history and recent atypical biopsy.  The 5-year breast cancer risk is 4.2%.  And lifetime breast cancer risk is 17 to 31%.  · Patient has been referred here but given that she is undergoing surgery we would need to await the path on surgery prior to making any final recommendations.  · If only atypical duct hyperplasia present then certainly she could be a candidate for chemoprophylaxis.  But given that she has not yet had surgery, she tells me she is undergoing lumpectomy , and await surgery prior to making any recommendation  · August 29, 2019 underwent right needle localization lumpectomy and pathology on this is benign  · September 12, 2019: Started Arimidex.  Developed severe migraine headaches.  · October 12, 20 19: Started on Femara.  Tolerating well, except mild arthralgias.  · March 19, 2021: Patient's arthralgias worsened.  We will need to discontinue Femara.  I have reviewed the results of both MRI of the breast and mammogram from November 2020 and it is negative.  · I explained to the patient that given a very small focal atypical ductal hyperplasia really surveillance with MRI and mammogram would be reasonable and we will discontinue Femara      3.  History of fibromyalgia, followed by kristopher Allen     4.  Atypical angina, cardiac cath negative followed by Dr. Jett    5.  Rheumatoid arthritis followed by Dr. Keller on Celebrex and Plaquenil, stable    6. Osteoporosis of the lumbar spine    · Currently on Fosamax.  Discussed Prolia as well but patient prefers oral bisphosphonates.  · Continue calcium and vitamin D supplements    7.  Lung nodule: Patient states that she had a CT angiogram of the chest done which showed atelectasis at the bases and right lower lobe lung nodules and there was lesion 1.2 x 0.8 cm in the right lower lobe.  · Patient was  seen by pulmonary who ordered PET scan  · I have reviewed both CT scan and PET scan with the patient in length.  There is evidence of small submandibular gland activity of 5.5 but clinically it is not palpable.  The nodules in the lung are very small and 3-month follow-up CT scan suggested  · Patient has follow-up with pulmonary next week  · We will obtain CT abdomen pelvis in 1 week.  · Refer to ENT for evaluation of the submandibular gland uptake by PET scan  · March 26, 2021: Reviewed the results of PET scan, the lesion in the lung has increased from 7 mm to 1.1 cm and has an uptake of 4.7 by PET scan.  · I had a lengthy discussion with patient's pulmonary physician  who agreed that we could refer to thoracic surgery for evaluation if she agrees and if not to repeat CT scan in 3 months.    Plan:  · Reviewed CT abdomen pelvis which shows the lung nodule has increased from 7mm to 1.1 cm and shows activity on PET scan with SUV of 4.7  · Discussed with pulmonary who agrees to refer to thoracic surgery or follow-up with CT scan in 3 months if patient refuses  · Patient is agreeable see thoracic surgery and will make a referral there to evaluate the lung nodule  · Follow-up with me in 3 months with labs  · Femara has been discontinued because of intolerance secondary to severe arthralgias.   · Patient has follow-up with Elen Lau   · I discussed with patient on the submandibular gland, she has no pain but suggested an ENT follow-up given the activity level was 5.5.  Patient has follow-up with ENT next week.  We will also follow-up with CT scan in 3 months  · We will see her back in 3 months to ensure that her arthralgias are completely resolved after stopping Femara.  I believe we can just follow with observation and surveillance mammogram and MRI as she had a very small atypical ductal hyperplasia and her arthralgias have actually worsened on Femara.    Spent 25 minutes with greater than 50% of the time in  coordination of care and dictation    Kayla Saldivar MD       CC: Giovanna Collier,  Emma Blakely, MD Terrell Paez, MD Sekou Greenwood, MD Jose Alfredo Rodrigues, MD Deedee Lin MD Rinkoo Aggarwal, MD Emanuel Keller, MD Reji Romo MD

## 2021-03-30 ENCOUNTER — TELEPHONE (OUTPATIENT)
Dept: ONCOLOGY | Facility: CLINIC | Age: 66
End: 2021-03-30

## 2021-03-30 NOTE — TELEPHONE ENCOUNTER
Call to Ms. Quintanilla in response to message triage had received.  Ms. Quintanilla stated she has not been called by thoracic surgeon's office yet for an appointment.  Let her know we would check with that office and get back with her.  Understanding verbalized.

## 2021-03-30 NOTE — TELEPHONE ENCOUNTER
PT JOANNE IS CALLING TO SEE ABOUT THE SURGEON THAT YOU WERE REFERRING HER TO, AS SHE HAS NOT HEARD FROM ANYONE.    PT PHONE #: [phone]    PER THE NOTES IN THE REFERRAL THAT YOU SENT IT WAS CANCELED DUE TO THE NODULE BEING TO SMALL.     PT IS ASKING FOR A CALL BACK PLEASE.

## 2021-03-31 ENCOUNTER — TELEPHONE (OUTPATIENT)
Dept: ONCOLOGY | Facility: CLINIC | Age: 66
End: 2021-03-31

## 2021-03-31 NOTE — TELEPHONE ENCOUNTER
Call to Dr. Ch's office to check on referral for Ms. Quintanilla.  The office did get patient on schedule to see Dr. Ch.  Call back to patient to let her know about appointment being made for her to see Dr. Ch next week.  Patient confirmed that she already has appointment.

## 2021-04-06 ENCOUNTER — TRANSCRIBE ORDERS (OUTPATIENT)
Dept: ADMINISTRATIVE | Facility: HOSPITAL | Age: 66
End: 2021-04-06

## 2021-04-06 DIAGNOSIS — R22.0 LOCALIZED SWELLING, MASS, AND LUMP OF HEAD: Primary | ICD-10-CM

## 2021-04-07 ENCOUNTER — OFFICE VISIT (OUTPATIENT)
Dept: OTHER | Facility: HOSPITAL | Age: 66
End: 2021-04-07

## 2021-04-07 VITALS
DIASTOLIC BLOOD PRESSURE: 80 MMHG | WEIGHT: 173.1 LBS | RESPIRATION RATE: 18 BRPM | HEART RATE: 95 BPM | OXYGEN SATURATION: 97 % | HEIGHT: 65 IN | SYSTOLIC BLOOD PRESSURE: 129 MMHG | TEMPERATURE: 97.5 F | BODY MASS INDEX: 28.84 KG/M2

## 2021-04-07 DIAGNOSIS — R91.1 LUNG NODULE: Primary | ICD-10-CM

## 2021-04-07 PROCEDURE — 99204 OFFICE O/P NEW MOD 45 MIN: CPT | Performed by: THORACIC SURGERY (CARDIOTHORACIC VASCULAR SURGERY)

## 2021-04-09 ENCOUNTER — HOSPITAL ENCOUNTER (OUTPATIENT)
Dept: ULTRASOUND IMAGING | Facility: HOSPITAL | Age: 66
Discharge: HOME | End: 2021-04-09
Admitting: OTOLARYNGOLOGY

## 2021-04-09 DIAGNOSIS — R22.0 LOCALIZED SWELLING, MASS, AND LUMP OF HEAD: ICD-10-CM

## 2021-04-09 DIAGNOSIS — R91.1 LUNG NODULE: Primary | ICD-10-CM

## 2021-04-09 DIAGNOSIS — R06.02 SHORTNESS OF BREATH ON EXERTION: ICD-10-CM

## 2021-04-09 PROCEDURE — 76536 US EXAM OF HEAD AND NECK: CPT

## 2021-04-14 ENCOUNTER — TRANSCRIBE ORDERS (OUTPATIENT)
Dept: ADMINISTRATIVE | Facility: HOSPITAL | Age: 66
End: 2021-04-14

## 2021-04-14 DIAGNOSIS — R22.1 LOCALIZED SWELLING, MASS AND LUMP, NECK: Primary | ICD-10-CM

## 2021-04-17 NOTE — PROGRESS NOTES
"Chief Complaint  Lung nodule  Subjective          Camelia Quintanilla presents to [facility] in consultation for a lung nodule.  History of Present Illness  Ms. Quintanilla is a very pleasant 66-year-old lady who has a history of breast AAH diagnosed in 2019 status post lumpectomy.  Last year, she was seen and evaluated for dyspnea and chest pain and was found to have bilateral lung nodules.  She was recently seen in follow-up and a CT of the chest and PET CT scan were obtained.  Ms. Quintanilla is a never smoker.  She has been exposed to some secondhand smoke.  She has a family history of cancer and a brother with testicular cancer and a paternal aunt with breast cancer as well as her mother who had an unknown type of cancer.  She denies significant pulmonary symptoms although does have some mild dyspnea on exertion.   Objective   Vital Signs:   /80   Pulse 95   Temp 97.5 °F (36.4 °C) (Oral)   Resp 18   Ht 165.1 cm (65\")   Wt 78.5 kg (173 lb 1.6 oz)   SpO2 97% Comment: room air  BMI 28.81 kg/m²     Physical Exam  Vitals and nursing note reviewed.   Constitutional:       Appearance: She is well-developed.   HENT:      Head: Normocephalic and atraumatic.      Nose: Nose normal.   Eyes:      Conjunctiva/sclera: Conjunctivae normal.      Pupils: Pupils are equal, round, and reactive to light.   Cardiovascular:      Rate and Rhythm: Normal rate and regular rhythm.      Heart sounds: Normal heart sounds.   Pulmonary:      Effort: Pulmonary effort is normal.      Breath sounds: Normal breath sounds.   Abdominal:      General: Bowel sounds are normal.      Palpations: Abdomen is soft.   Musculoskeletal:         General: Normal range of motion.      Cervical back: Normal range of motion and neck supple.   Skin:     General: Skin is warm and dry.      Capillary Refill: Capillary refill takes less than 2 seconds.   Neurological:      Mental Status: She is alert and oriented to person, place, " and time.   Psychiatric:         Behavior: Behavior normal.         Thought Content: Thought content normal.         Judgment: Judgment normal.        Result Review :       Data reviewed: Radiologic studies :     I have independently reviewed the PET CT scan performed on 3/17/2021 which demonstrates a hypermetabolic 7 mm left lower lobe lung nodule.  There is bandlike consolidation throughout both lower lobes that is mildly PET avid.  There is a focus of hypermetabolism in the submandibular gland.  There is no significant mediastinal or hilar lymphadenopathy.     Assessment and Plan      Ms. Quintanilla is a pleasant 66-year-old lady with a hypermetabolic left lower lobe lung nodule that could possibly represent malignancy, although, given the appearance of her lungs this is likely consistent with an inflammatory process.  We have discussed possible treatment options including conservative surveillance versus surgical resection.  Given her history of AAA age, she is leaning towards surgical resection.  She will need to be better risk stratified prior to surgical resection, I will plan to see her back with pulmonary function studies and a stress echo to further discuss.  Diagnoses and all orders for this visit:    1. Lung nodule (Primary)  -     Pulmonary Function Test; Future        Follow Up   No follow-ups on file.  Patient was given instructions and counseling regarding her condition or for health maintenance advice. Please see specific information pulled into the AVS if appropriate.

## 2021-04-21 ENCOUNTER — HOSPITAL ENCOUNTER (OUTPATIENT)
Dept: CARDIOLOGY | Facility: HOSPITAL | Age: 66
Discharge: HOME | End: 2021-04-21

## 2021-04-21 ENCOUNTER — HOSPITAL ENCOUNTER (OUTPATIENT)
Dept: NUCLEAR MEDICINE | Facility: HOSPITAL | Age: 66
Discharge: HOME | End: 2021-04-21

## 2021-04-21 DIAGNOSIS — R91.1 LUNG NODULE: ICD-10-CM

## 2021-04-21 DIAGNOSIS — R06.02 SHORTNESS OF BREATH ON EXERTION: ICD-10-CM

## 2021-04-21 LAB
BH CV NUCLEAR PRIOR STUDY: 3
BH CV REST NUCLEAR ISOTOPE DOSE: 11.2 MCI
BH CV STRESS BP STAGE 1: NORMAL
BH CV STRESS COMMENTS STAGE 1: NORMAL
BH CV STRESS DOSE REGADENOSON STAGE 1: 0.4
BH CV STRESS DURATION MIN STAGE 1: 0
BH CV STRESS DURATION SEC STAGE 1: 30
BH CV STRESS HR STAGE 1: 65
BH CV STRESS NUCLEAR ISOTOPE DOSE: 33.4 MCI
BH CV STRESS O2 STAGE 1: 98
BH CV STRESS PROTOCOL 1: NORMAL
BH CV STRESS RECOVERY BP: NORMAL MMHG
BH CV STRESS RECOVERY HR: 84 BPM
BH CV STRESS RECOVERY O2: 100 %
BH CV STRESS STAGE 1: 1
LV EF NUC BP: 84 %
MAXIMAL PREDICTED HEART RATE: 154 BPM
PERCENT MAX PREDICTED HR: 57.14 %
STRESS BASELINE BP: NORMAL MMHG
STRESS BASELINE HR: 65 BPM
STRESS O2 SAT REST: 97 %
STRESS PERCENT HR: 67 %
STRESS POST ESTIMATED WORKLOAD: 1 METS
STRESS POST EXERCISE DUR SEC: 30 SEC
STRESS POST O2 SAT PEAK: 100 %
STRESS POST PEAK BP: NORMAL MMHG
STRESS POST PEAK HR: 88 BPM
STRESS TARGET HR: 131 BPM

## 2021-04-21 PROCEDURE — 93018 CV STRESS TEST I&R ONLY: CPT | Performed by: INTERNAL MEDICINE

## 2021-04-21 PROCEDURE — 78452 HT MUSCLE IMAGE SPECT MULT: CPT | Performed by: INTERNAL MEDICINE

## 2021-04-21 PROCEDURE — 93017 CV STRESS TEST TRACING ONLY: CPT

## 2021-04-21 PROCEDURE — 25010000002 REGADENOSON 0.4 MG/5ML SOLUTION: Performed by: THORACIC SURGERY (CARDIOTHORACIC VASCULAR SURGERY)

## 2021-04-21 PROCEDURE — 93016 CV STRESS TEST SUPVJ ONLY: CPT | Performed by: INTERNAL MEDICINE

## 2021-04-21 PROCEDURE — A9500 TC99M SESTAMIBI: HCPCS | Performed by: THORACIC SURGERY (CARDIOTHORACIC VASCULAR SURGERY)

## 2021-04-21 PROCEDURE — 0 TECHNETIUM SESTAMIBI: Performed by: THORACIC SURGERY (CARDIOTHORACIC VASCULAR SURGERY)

## 2021-04-21 PROCEDURE — 78452 HT MUSCLE IMAGE SPECT MULT: CPT

## 2021-04-21 RX ADMIN — TECHNETIUM TC 99M SESTAMIBI 1 DOSE: 1 INJECTION INTRAVENOUS at 08:49

## 2021-04-21 RX ADMIN — REGADENOSON 0.4 MG: 0.08 INJECTION, SOLUTION INTRAVENOUS at 08:49

## 2021-04-21 RX ADMIN — TECHNETIUM TC 99M SESTAMIBI 1 DOSE: 1 INJECTION INTRAVENOUS at 07:32

## 2021-04-28 ENCOUNTER — PREP FOR SURGERY (OUTPATIENT)
Dept: OTHER | Facility: HOSPITAL | Age: 66
End: 2021-04-28

## 2021-04-28 ENCOUNTER — OFFICE VISIT (OUTPATIENT)
Dept: OTHER | Facility: HOSPITAL | Age: 66
End: 2021-04-28

## 2021-04-28 VITALS
HEART RATE: 78 BPM | OXYGEN SATURATION: 97 % | TEMPERATURE: 97.7 F | DIASTOLIC BLOOD PRESSURE: 73 MMHG | SYSTOLIC BLOOD PRESSURE: 124 MMHG | HEIGHT: 65 IN | RESPIRATION RATE: 16 BRPM | WEIGHT: 174.7 LBS | BODY MASS INDEX: 29.11 KG/M2

## 2021-04-28 DIAGNOSIS — R79.1 ABNORMAL COAGULATION PROFILE: ICD-10-CM

## 2021-04-28 DIAGNOSIS — R91.1 LUNG NODULE: Primary | ICD-10-CM

## 2021-04-28 PROCEDURE — 99215 OFFICE O/P EST HI 40 MIN: CPT | Performed by: THORACIC SURGERY (CARDIOTHORACIC VASCULAR SURGERY)

## 2021-04-28 RX ORDER — ACETAMINOPHEN 500 MG
1000 TABLET ORAL ONCE
Status: CANCELLED | OUTPATIENT
Start: 2021-05-07 | End: 2021-04-28

## 2021-04-28 RX ORDER — SODIUM CHLORIDE 0.9 % (FLUSH) 0.9 %
3-10 SYRINGE (ML) INJECTION AS NEEDED
Status: CANCELLED | OUTPATIENT
Start: 2021-05-07

## 2021-04-28 RX ORDER — CEFAZOLIN SODIUM 2 G/100ML
2 INJECTION, SOLUTION INTRAVENOUS ONCE
Status: CANCELLED | OUTPATIENT
Start: 2021-05-07 | End: 2021-04-28

## 2021-04-28 RX ORDER — SODIUM CHLORIDE 0.9 % (FLUSH) 0.9 %
3 SYRINGE (ML) INJECTION EVERY 12 HOURS SCHEDULED
Status: CANCELLED | OUTPATIENT
Start: 2021-05-07

## 2021-04-28 RX ORDER — GABAPENTIN 300 MG/1
300 CAPSULE ORAL ONCE
Status: CANCELLED | OUTPATIENT
Start: 2021-05-07 | End: 2021-04-28

## 2021-04-28 RX ORDER — HEPARIN SODIUM 5000 [USP'U]/ML
5000 INJECTION, SOLUTION INTRAVENOUS; SUBCUTANEOUS ONCE
Status: CANCELLED | OUTPATIENT
Start: 2021-05-07 | End: 2021-04-28

## 2021-04-29 ENCOUNTER — ANESTHESIA (OUTPATIENT)
Dept: PAIN MEDICINE | Facility: HOSPITAL | Age: 66
End: 2021-04-29

## 2021-04-29 ENCOUNTER — HOSPITAL ENCOUNTER (OUTPATIENT)
Dept: GENERAL RADIOLOGY | Facility: HOSPITAL | Age: 66
Discharge: HOME | End: 2021-04-29

## 2021-04-29 ENCOUNTER — HOSPITAL ENCOUNTER (OUTPATIENT)
Dept: PAIN MEDICINE | Facility: HOSPITAL | Age: 66
Discharge: HOME | End: 2021-04-29

## 2021-04-29 ENCOUNTER — ANESTHESIA EVENT (OUTPATIENT)
Dept: PAIN MEDICINE | Facility: HOSPITAL | Age: 66
End: 2021-04-29

## 2021-04-29 VITALS
HEART RATE: 67 BPM | SYSTOLIC BLOOD PRESSURE: 118 MMHG | DIASTOLIC BLOOD PRESSURE: 70 MMHG | OXYGEN SATURATION: 98 % | RESPIRATION RATE: 16 BRPM | TEMPERATURE: 97.5 F

## 2021-04-29 DIAGNOSIS — G89.29 CHRONIC SI JOINT PAIN: ICD-10-CM

## 2021-04-29 DIAGNOSIS — M53.3 CHRONIC SI JOINT PAIN: ICD-10-CM

## 2021-04-29 DIAGNOSIS — G89.29 CHRONIC LEFT SI JOINT PAIN: ICD-10-CM

## 2021-04-29 DIAGNOSIS — M53.3 CHRONIC LEFT SI JOINT PAIN: ICD-10-CM

## 2021-04-29 PROCEDURE — 25010000002 MIDAZOLAM PER 1 MG: Performed by: ANESTHESIOLOGY

## 2021-04-29 PROCEDURE — 25010000002 METHYLPREDNISOLONE PER 80 MG: Performed by: ANESTHESIOLOGY

## 2021-04-29 RX ORDER — SODIUM CHLORIDE 0.9 % (FLUSH) 0.9 %
3-10 SYRINGE (ML) INJECTION AS NEEDED
Status: DISCONTINUED | OUTPATIENT
Start: 2021-04-29 | End: 2021-04-30 | Stop reason: HOSPADM

## 2021-04-29 RX ORDER — SODIUM CHLORIDE 0.9 % (FLUSH) 0.9 %
1-10 SYRINGE (ML) INJECTION AS NEEDED
Status: DISCONTINUED | OUTPATIENT
Start: 2021-04-29 | End: 2021-04-30 | Stop reason: HOSPADM

## 2021-04-29 RX ORDER — METHYLPREDNISOLONE ACETATE 80 MG/ML
80 INJECTION, SUSPENSION INTRA-ARTICULAR; INTRALESIONAL; INTRAMUSCULAR; SOFT TISSUE ONCE
Status: COMPLETED | OUTPATIENT
Start: 2021-04-29 | End: 2021-04-29

## 2021-04-29 RX ORDER — SODIUM CHLORIDE 0.9 % (FLUSH) 0.9 %
3 SYRINGE (ML) INJECTION EVERY 12 HOURS SCHEDULED
Status: DISCONTINUED | OUTPATIENT
Start: 2021-04-29 | End: 2021-04-30 | Stop reason: HOSPADM

## 2021-04-29 RX ORDER — FENTANYL CITRATE 50 UG/ML
50 INJECTION, SOLUTION INTRAMUSCULAR; INTRAVENOUS AS NEEDED
Status: DISCONTINUED | OUTPATIENT
Start: 2021-04-29 | End: 2021-04-30 | Stop reason: HOSPADM

## 2021-04-29 RX ORDER — LIDOCAINE HYDROCHLORIDE 10 MG/ML
1 INJECTION, SOLUTION INFILTRATION; PERINEURAL ONCE AS NEEDED
Status: DISCONTINUED | OUTPATIENT
Start: 2021-04-29 | End: 2021-04-30 | Stop reason: HOSPADM

## 2021-04-29 RX ORDER — MIDAZOLAM HYDROCHLORIDE 1 MG/ML
1 INJECTION INTRAMUSCULAR; INTRAVENOUS AS NEEDED
Status: DISCONTINUED | OUTPATIENT
Start: 2021-04-29 | End: 2021-04-30 | Stop reason: HOSPADM

## 2021-04-29 RX ORDER — BUPIVACAINE HYDROCHLORIDE 2.5 MG/ML
30 INJECTION, SOLUTION EPIDURAL; INFILTRATION; INTRACAUDAL ONCE
Status: COMPLETED | OUTPATIENT
Start: 2021-04-29 | End: 2021-04-29

## 2021-04-29 RX ADMIN — BUPIVACAINE HYDROCHLORIDE 10 ML: 2.5 INJECTION, SOLUTION EPIDURAL; INFILTRATION; INTRACAUDAL at 11:13

## 2021-04-29 RX ADMIN — MIDAZOLAM 2 MG: 1 INJECTION INTRAMUSCULAR; INTRAVENOUS at 11:08

## 2021-04-29 RX ADMIN — METHYLPREDNISOLONE ACETATE 80 MG: 80 INJECTION, SUSPENSION INTRA-ARTICULAR; INTRALESIONAL; INTRAMUSCULAR; SOFT TISSUE at 11:13

## 2021-04-29 NOTE — ANESTHESIA PROCEDURE NOTES
PAIN SI joint injection    Pre-sedation assessment completed: 4/29/2021 11:07 AM    Patient reassessed immediately prior to procedure    Patient location during procedure: Pain Clinic  Start time: 4/29/2021 11:17 AM  Stop time: 4/29/2021 11:15 AM    Performed by  Anesthesiologist: Malik Cox MD  Preanesthetic Checklist  Completed: patient identified, IV checked, risks and benefits discussed, surgical consent, monitors and equipment checked, pre-op evaluation and timeout performed  Prep:  Patient position: prone  Sterile barriers:cap, gloves, mask and sterile barrier  Prep: ChloraPrep  Patient monitoring: continuous pulse oximetry, blood pressure monitoring and EKG  Procedure:  Sedation:no  Guidance:fluoroscopy  Contrast Medium:no  Location:Bilateral  Medications:  Depomedrol:80 mg  Comment:Bilateral sacroiliac joint injections were performed under fluoroscopic guidance.  80 mg of Depo-Medrol was diluted to a total of 4 mL with 0.25% bupivacaine.  2 mL of this solution was injected into each sacroiliac joint.  She tolerated the procedure well.

## 2021-04-29 NOTE — H&P
[facility]    History and Physical    Patient Name: Camelia Quintanilla  :  1955  MRN:  [id number]  Date of Admission: 2021    Subjective     Patient is a 66 y.o. female presents with chief complaint of chronic low back pain.  Onset of symptoms was gradual starting several months ago.  Symptoms are associated/aggravated by nothing in particular. Symptoms improve with injection    The following portions of the patients history were reviewed and updated as appropriate: current medications, allergies, past medical history, past surgical history, past family history, past social history and problem list    She has low back pain that sometimes radiates down both of her legs.  The left tends to be worse than the right.  In February of this year she had sacroiliac joint injection on the left.  She says it did help her significantly, she still is improved but is having a little bit more pain.  She does have bilateral pain that is tender to palpation with the left being worse than the right per her report.          Objective     Past Medical History:   Past Medical History:   Diagnosis Date   • Asthma    • Atypical ductal hyperplasia of right breast 2019   • Breast lump     RIGHT   • Bronchiectasis (CMS/[facility])    • CAD (coronary artery disease)    • Depression with anxiety 2016   • Diabetes mellitus (CMS/HCC)    • Disease of thyroid gland     HYPOTHYROIDISM   • Drug therapy    • Fibrocystic breast    • Fibromyalgia syndrome 2017   • GERD (gastroesophageal reflux disease)    • H/O Lung nodules    • H/O Third nerve palsy of right eye    • Heart palpitations    • History of thyroid nodule    • HLD (hyperlipidemia) 2016   • Hypertension    • Hypothyroidism    • IBS (irritable bowel syndrome)    • Low back pain    • Moderate persistent asthma without complication 2017   • Osteoporosis    • PONV (postoperative nausea and vomiting)    • Precordial pain 2016   • Rheumatoid arthritis (CMS/HCC)     • Scoliosis    • Thrush    • Vitamin D deficiency 9/21/2016     Past Surgical History:   Past Surgical History:   Procedure Laterality Date   • ABDOMINAL HERNIA REPAIR     • APPENDECTOMY     • BREAST BIOPSY      right and left benign 10-15 yrs ago   • BREAST LUMPECTOMY Right 8/29/2019    Procedure: right breast needle localized lumpectomy for atypical duct hyperplasia.;  Surgeon: Giovanna Collier MD;  [place] [facility] [place] [facility] [place]  Service: General   • BRONCHOSCOPY N/A 5/11/2018    Procedure: BRONCHOSCOPY;  Surgeon: Emma Blakely MD;  Location: [facility] ENDOSCOPY;  Service: Pulmonary   • CARDIAC CATHETERIZATION     • CHOLECYSTECTOMY     • COLONOSCOPY  2015   • COLONOSCOPY  2020   • ENDOSCOPY  2015   • EPIDURAL BLOCK     • HERNIA REPAIR     • HYSTERECTOMY     • KNEE SURGERY     • OOPHORECTOMY     • TONSILLECTOMY     • WRIST FUSION Right      Family History:   Family History   Problem Relation Age of Onset   • COPD Mother    • Cancer Mother         heart tumor   • Heart disease Mother    • COPD Father    • Hypertension Father    • No Known Problems Sister    • Cancer Brother         testicular   • Breast cancer Paternal Aunt         unknown age    • Heart disease Maternal Grandmother    • Malig Hyperthermia Neg Hx      Social History:   Social History     Socioeconomic History   • Marital status:      Spouse name: Zbigniew   • Number of children: Not on file   • Years of education: High school   • Highest education level: Not on file   Tobacco Use   • Smoking status: Never Smoker   • Smokeless tobacco: Never Used   Substance and Sexual Activity   • Alcohol use: No   • Drug use: No       Vital Signs Range for the last 24 hours  Temperature:     Temp Source:     BP:     Pulse:     Respirations:     SPO2:     O2 Amount (l/min):     O2 Devices     Weight:           --------------------------------------------------------------------------------    Current Outpatient Medications   Medication Sig Dispense Refill   •  albuterol (PROVENTIL) (2.5 MG/3ML) 0.083% nebulizer solution Take 2.5 mg by nebulization Every 6 (Six) Hours As Needed for Wheezing (asthma).     • albuterol sulfate  (90 Base) MCG/ACT inhaler Inhale 2 puffs Every 4 (Four) Hours As Needed for Wheezing.     • alendronate (FOSAMAX) 70 MG tablet TAKE 1 TABLET BY MOUTH ONCE WEEKLY BEFORE BREAKFAST, ON AN EMPTY STOMACH: REMAIN UPRIGHT FOR ONE HOUR:TAKE WITH 8 OUNCES OF WATER 4 tablet 0   • amLODIPine (NORVASC) 5 MG tablet Take 1 tablet by mouth.     • atorvastatin (LIPITOR) 20 MG tablet Take 20 mg by mouth Daily.     • baclofen (LIORESAL) 10 MG tablet Take 10 mg by mouth Daily.     • budesonide-formoterol (SYMBICORT) 160-4.5 MCG/ACT inhaler Inhale 2 puffs 2 (Two) Times a Day.     • Calcium Carbonate-Vit D-Min (Calcium 1200) 7380-4448 MG-UNIT chewable tablet Chew 1,000 Units Daily.     • celecoxib (CeleBREX) 200 MG capsule Take 200 mg by mouth 2 (two) times a day.     • clotrimazole-betamethasone (LOTRISONE) 1-0.05 % cream Apply  topically to the appropriate area as directed 2 (Two) Times a Day As Needed.     • FASENRA 30 MG/ML solution prefilled syringe INJECT THE CONTENTS OF ONE SYRINGE (30MG) SUBCUTANEOUSLY ON WEEKS 0, 4, AND 8. MUST BE ADMINISTERED BY HEALTHCARE PROFESSIONAL  1   • furosemide (LASIX) 20 MG tablet Take 20 mg by mouth As Needed.     • gabapentin (NEURONTIN) 300 MG capsule Take 300 mg by mouth 2 (Two) Times a Day.     • HYDROcodone-acetaminophen (NORCO)  MG per tablet Take 1 tablet by mouth Every 6 (Six) Hours As Needed for Moderate Pain .     • hydroxychloroquine (PLAQUENIL) 200 MG tablet Take 200 mg by mouth 2 (Two) Times a Day.     • hyoscyamine (LEVSIN) 0.125 MG SL tablet Dissolve one Tablet Under The Toungue Every 6  Hours as Needed for cramping (Patient taking differently: Take 0.125 mg by mouth Every 6 (Six) Hours As Needed. Dissolve one Tablet Under The Toungue Every 6  Hours as Needed for cramping) 30 tablet 3   • isosorbide  mononitrate (IMDUR) 120 MG 24 hr tablet Take 120 mg by mouth Every Morning.     • letrozole (Femara) 2.5 MG tablet Take 1 tablet by mouth Daily. 90 tablet 2   • levothyroxine (SYNTHROID, LEVOTHROID) 75 MCG tablet Take 75 mcg by mouth Daily.     • metoprolol succinate XL (TOPROL-XL) 25 MG 24 hr tablet Take 1 tablet by mouth.     • nitroglycerin (NITROSTAT) 0.4 MG SL tablet Place 0.4 mg under the tongue every 5 (five) minutes.     • pantoprazole (PROTONIX) 40 MG EC tablet TAKE ONE TABLET BY MOUTH ONCE NIGHTLY 30 tablet 0   • polyethylene glycol (MIRALAX) packet Take 17 g by mouth Daily.     • ranolazine (RANEXA) 1000 MG 12 hr tablet Take 1,000 mg by mouth Every 12 (Twelve) Hours.     • SPIRIVA RESPIMAT 1.25 MCG/ACT aerosol solution inhaler Inhale 1 puff Every Morning.     • vitamin D (ERGOCALCIFEROL) 15297 UNITS capsule capsule Take 50,000 Units by mouth Every 7 (Seven) Days.       Current Facility-Administered Medications   Medication Dose Route Frequency Provider Last Rate Last Admin   • bupivacaine (PF) (MARCAINE) 0.25 % injection 30 mL  30 mL Infiltration Once Render, Malik Erwin MD       • fentaNYL citrate (PF) (SUBLIMAZE) injection 50 mcg  50 mcg Intravenous PRN Render, Malik Erwin MD       • iopamidol (ISOVUE-M 200) injection 41%  12 mL Epidural Once in imaging Kenny, Mailk Erwin MD       • lidocaine (XYLOCAINE) 1 % injection 1 mL  1 mL Intradermal Once PRN Malik Cox MD       • methylPREDNISolone acetate (DEPO-medrol) injection 80 mg  80 mg Intra-articular Once RenderMailk MD       • midazolam (VERSED) injection 1 mg  1 mg Intravenous PRN Malik Cox MD       • sodium chloride 0.9 % flush 1-10 mL  1-10 mL Intravenous PRN Malik Cox MD           --------------------------------------------------------------------------------  Assessment/Plan      Anesthesia Evaluation     history of anesthetic complications: PONV        Pain impairs ability to perform ADLs: Exercise/Activity        Airway   Mallampati: II  TM distance: >3 FB  Neck ROM: full  Dental - normal exam     Pulmonary - normal exam   (+) asthma,  Cardiovascular - normal exam    (+) hypertension, CAD, angina, PVD, hyperlipidemia,       Neuro/Psych- neuro exam normal  GI/Hepatic/Renal/Endo    (+)  GERD,  diabetes mellitus,     Musculoskeletal         PE comment: She has point tenderness to palpation in an area compatible with sacroiliac joint pain  Abdominal    Substance History      OB/GYN          Other                   Diagnosis and Plan    Treatment Plan  ASA 3   Patient has had previous injection/procedure with 50-75% improvement.   Procedures: Sacroiliac joint injection, With fluoroscopy,               Diagnosis     * Sacroiliac joint disease [M53.3]

## 2021-04-30 ENCOUNTER — LAB (OUTPATIENT)
Dept: LAB | Facility: HOSPITAL | Age: 66
End: 2021-04-30

## 2021-04-30 ENCOUNTER — TELEPHONE (OUTPATIENT)
Dept: ONCOLOGY | Facility: CLINIC | Age: 66
End: 2021-04-30

## 2021-04-30 ENCOUNTER — CLINICAL SUPPORT (OUTPATIENT)
Dept: ONCOLOGY | Facility: HOSPITAL | Age: 66
End: 2021-04-30

## 2021-04-30 DIAGNOSIS — N60.99 ATYPICAL DUCTAL HYPERPLASIA OF BREAST: ICD-10-CM

## 2021-04-30 LAB
ALBUMIN SERPL-MCNC: 4.3 G/DL (ref 3.5–5.2)
ALBUMIN/GLOB SERPL: 1.5 G/DL (ref 1.1–2.4)
ALP SERPL-CCNC: 71 U/L (ref 38–116)
ALT SERPL W P-5'-P-CCNC: 18 U/L (ref 0–33)
ANION GAP SERPL CALCULATED.3IONS-SCNC: 9.5 MMOL/L (ref 5–15)
AST SERPL-CCNC: 20 U/L (ref 0–32)
BILIRUB SERPL-MCNC: 0.3 MG/DL (ref 0.2–1.2)
BUN SERPL-MCNC: 12 MG/DL (ref 6–20)
BUN/CREAT SERPL: 14.5 (ref 7.3–30)
CALCIUM SPEC-SCNC: 9.9 MG/DL (ref 8.5–10.2)
CHLORIDE SERPL-SCNC: 106 MMOL/L (ref 98–107)
CO2 SERPL-SCNC: 28.5 MMOL/L (ref 22–29)
CREAT SERPL-MCNC: 0.83 MG/DL (ref 0.6–1.1)
GFR SERPL CREATININE-BSD FRML MDRD: 69 ML/MIN/1.73
GLOBULIN UR ELPH-MCNC: 2.9 GM/DL (ref 1.8–3.5)
GLUCOSE SERPL-MCNC: 107 MG/DL (ref 74–124)
POTASSIUM SERPL-SCNC: 4.4 MMOL/L (ref 3.5–4.7)
PROT SERPL-MCNC: 7.2 G/DL (ref 6.3–8)
SODIUM SERPL-SCNC: 144 MMOL/L (ref 134–145)

## 2021-04-30 PROCEDURE — 36415 COLL VENOUS BLD VENIPUNCTURE: CPT

## 2021-04-30 PROCEDURE — 80053 COMPREHEN METABOLIC PANEL: CPT

## 2021-04-30 NOTE — NURSING NOTE
Pt here for RN review. Called pt to review CMP. Pt was able to answer the phone but she could not hear me on the other end. I attempted to call her x3 and her spouse, Zbigniew, who did not answer. Liver enzymes have decreased and are now back in normal range. I will attempt to call again later today but it seems to be an issue with her phone.       1419= s/w patient and she was notified of her normal lab values today.

## 2021-04-30 NOTE — TELEPHONE ENCOUNTER
Caller: JOANNE     Relationship to patient: SELF     Best call back number: [phone]    PATIENT STATED THAT SOMEONE CALLED 3 X AND SHE WAS NOT ABLE TO GET TO THE PHONE.  I SEE NOTHING IN CHART.    PLEASE CALL AND ADVISE IF THIS WAS THE OFFICE   THANK YOU

## 2021-05-05 ENCOUNTER — PRE-ADMISSION TESTING (OUTPATIENT)
Dept: PREADMISSION TESTING | Facility: HOSPITAL | Age: 66
End: 2021-05-05

## 2021-05-05 ENCOUNTER — HOSPITAL ENCOUNTER (OUTPATIENT)
Dept: CT IMAGING | Facility: HOSPITAL | Age: 66
Discharge: HOME | End: 2021-05-05

## 2021-05-05 VITALS
SYSTOLIC BLOOD PRESSURE: 136 MMHG | HEIGHT: 64 IN | RESPIRATION RATE: 20 BRPM | TEMPERATURE: 98.2 F | OXYGEN SATURATION: 94 % | WEIGHT: 173.9 LBS | HEART RATE: 75 BPM | DIASTOLIC BLOOD PRESSURE: 74 MMHG | BODY MASS INDEX: 29.69 KG/M2

## 2021-05-05 DIAGNOSIS — R79.1 ABNORMAL COAGULATION PROFILE: ICD-10-CM

## 2021-05-05 DIAGNOSIS — R91.1 LUNG NODULE: ICD-10-CM

## 2021-05-05 DIAGNOSIS — R22.1 LOCALIZED SWELLING, MASS AND LUMP, NECK: ICD-10-CM

## 2021-05-05 LAB
ABO GROUP BLD: NORMAL
ANION GAP SERPL CALCULATED.3IONS-SCNC: 11.4 MMOL/L (ref 5–15)
APTT PPP: 26.3 SECONDS (ref 22.7–35.4)
BASOPHILS # BLD AUTO: 0.02 10*3/MM3 (ref 0–0.2)
BASOPHILS NFR BLD AUTO: 0.2 % (ref 0–1.5)
BLD GP AB SCN SERPL QL: NEGATIVE
BUN SERPL-MCNC: 21 MG/DL (ref 8–23)
BUN/CREAT SERPL: 27.6 (ref 7–25)
CALCIUM SPEC-SCNC: 9 MG/DL (ref 8.6–10.5)
CHLORIDE SERPL-SCNC: 106 MMOL/L (ref 98–107)
CO2 SERPL-SCNC: 25.6 MMOL/L (ref 22–29)
CREAT SERPL-MCNC: 0.76 MG/DL (ref 0.57–1)
DEPRECATED RDW RBC AUTO: 48.8 FL (ref 37–54)
EOSINOPHIL # BLD AUTO: 0 10*3/MM3 (ref 0–0.4)
EOSINOPHIL NFR BLD AUTO: 0 % (ref 0.3–6.2)
ERYTHROCYTE [DISTWIDTH] IN BLOOD BY AUTOMATED COUNT: 13.9 % (ref 12.3–15.4)
GFR SERPL CREATININE-BSD FRML MDRD: 76 ML/MIN/1.73
GLUCOSE SERPL-MCNC: 78 MG/DL (ref 65–99)
HCT VFR BLD AUTO: 41.2 % (ref 34–46.6)
HGB BLD-MCNC: 13.5 G/DL (ref 12–15.9)
IMM GRANULOCYTES # BLD AUTO: 0.05 10*3/MM3 (ref 0–0.05)
IMM GRANULOCYTES NFR BLD AUTO: 0.6 % (ref 0–0.5)
INR PPP: 0.94 (ref 0.9–1.1)
LYMPHOCYTES # BLD AUTO: 1.8 10*3/MM3 (ref 0.7–3.1)
LYMPHOCYTES NFR BLD AUTO: 20.4 % (ref 19.6–45.3)
MCH RBC QN AUTO: 31.2 PG (ref 26.6–33)
MCHC RBC AUTO-ENTMCNC: 32.8 G/DL (ref 31.5–35.7)
MCV RBC AUTO: 95.2 FL (ref 79–97)
MONOCYTES # BLD AUTO: 0.87 10*3/MM3 (ref 0.1–0.9)
MONOCYTES NFR BLD AUTO: 9.8 % (ref 5–12)
NEUTROPHILS NFR BLD AUTO: 6.1 10*3/MM3 (ref 1.7–7)
NEUTROPHILS NFR BLD AUTO: 69 % (ref 42.7–76)
NRBC BLD AUTO-RTO: 0 /100 WBC (ref 0–0.2)
PLATELET # BLD AUTO: 263 10*3/MM3 (ref 140–450)
PMV BLD AUTO: 10.3 FL (ref 6–12)
POTASSIUM SERPL-SCNC: 4.6 MMOL/L (ref 3.5–5.2)
PROTHROMBIN TIME: 12.3 SECONDS (ref 11.7–14.2)
RBC # BLD AUTO: 4.33 10*6/MM3 (ref 3.77–5.28)
RH BLD: POSITIVE
SARS-COV-2 ORF1AB RESP QL NAA+PROBE: NOT DETECTED
SODIUM SERPL-SCNC: 143 MMOL/L (ref 136–145)
T&S EXPIRATION DATE: NORMAL
WBC # BLD AUTO: 8.84 10*3/MM3 (ref 3.4–10.8)

## 2021-05-05 PROCEDURE — 36415 COLL VENOUS BLD VENIPUNCTURE: CPT

## 2021-05-05 PROCEDURE — 82565 ASSAY OF CREATININE: CPT

## 2021-05-05 PROCEDURE — 85730 THROMBOPLASTIN TIME PARTIAL: CPT

## 2021-05-05 PROCEDURE — 86901 BLOOD TYPING SEROLOGIC RH(D): CPT

## 2021-05-05 PROCEDURE — 70491 CT SOFT TISSUE NECK W/DYE: CPT

## 2021-05-05 PROCEDURE — U0004 COV-19 TEST NON-CDC HGH THRU: HCPCS | Performed by: NURSE PRACTITIONER

## 2021-05-05 PROCEDURE — 25010000002 IOPAMIDOL 61 % SOLUTION: Performed by: OTOLARYNGOLOGY

## 2021-05-05 PROCEDURE — 86850 RBC ANTIBODY SCREEN: CPT

## 2021-05-05 PROCEDURE — 86900 BLOOD TYPING SEROLOGIC ABO: CPT

## 2021-05-05 PROCEDURE — 80048 BASIC METABOLIC PNL TOTAL CA: CPT

## 2021-05-05 PROCEDURE — U0005 INFEC AGEN DETEC AMPLI PROBE: HCPCS | Performed by: NURSE PRACTITIONER

## 2021-05-05 PROCEDURE — C9803 HOPD COVID-19 SPEC COLLECT: HCPCS | Performed by: NURSE PRACTITIONER

## 2021-05-05 PROCEDURE — 85025 COMPLETE CBC W/AUTO DIFF WBC: CPT

## 2021-05-05 PROCEDURE — 85610 PROTHROMBIN TIME: CPT

## 2021-05-05 RX ORDER — LEVOTHYROXINE SODIUM 88 UG/1
88 TABLET ORAL DAILY
COMMUNITY

## 2021-05-05 RX ADMIN — IOPAMIDOL 100 ML: 612 INJECTION, SOLUTION INTRAVENOUS at 15:14

## 2021-05-06 LAB — CREAT BLDA-MCNC: 0.8 MG/DL (ref 0.6–1.3)

## 2021-05-07 ENCOUNTER — ANESTHESIA (OUTPATIENT)
Dept: PERIOP | Facility: HOSPITAL | Age: 66
End: 2021-05-07

## 2021-05-07 ENCOUNTER — APPOINTMENT (OUTPATIENT)
Dept: GENERAL RADIOLOGY | Facility: HOSPITAL | Age: 66
End: 2021-05-07

## 2021-05-07 ENCOUNTER — HOSPITAL ENCOUNTER (INPATIENT)
Facility: HOSPITAL | Age: 66
LOS: 3 days | Discharge: HOME | End: 2021-05-10
Attending: THORACIC SURGERY (CARDIOTHORACIC VASCULAR SURGERY) | Admitting: THORACIC SURGERY (CARDIOTHORACIC VASCULAR SURGERY)

## 2021-05-07 ENCOUNTER — ANESTHESIA EVENT (OUTPATIENT)
Dept: PERIOP | Facility: HOSPITAL | Age: 66
End: 2021-05-07

## 2021-05-07 DIAGNOSIS — R91.1 LUNG NODULE: ICD-10-CM

## 2021-05-07 DIAGNOSIS — R09.02 HYPOXIA: Primary | ICD-10-CM

## 2021-05-07 PROCEDURE — 94799 UNLISTED PULMONARY SVC/PX: CPT

## 2021-05-07 PROCEDURE — 25010000002 ROPIVACAINE PER 1 MG: Performed by: ANESTHESIOLOGY

## 2021-05-07 PROCEDURE — 94640 AIRWAY INHALATION TREATMENT: CPT

## 2021-05-07 PROCEDURE — 25010000002 FENTANYL CITRATE (PF) 100 MCG/2ML SOLUTION: Performed by: NURSE ANESTHETIST, CERTIFIED REGISTERED

## 2021-05-07 PROCEDURE — 88331 PATH CONSLTJ SURG 1 BLK 1SPC: CPT | Performed by: THORACIC SURGERY (CARDIOTHORACIC VASCULAR SURGERY)

## 2021-05-07 PROCEDURE — 25010000002 FENTANYL CITRATE (PF) 100 MCG/2ML SOLUTION: Performed by: ANESTHESIOLOGY

## 2021-05-07 PROCEDURE — C1889 IMPLANT/INSERT DEVICE, NOC: HCPCS | Performed by: THORACIC SURGERY (CARDIOTHORACIC VASCULAR SURGERY)

## 2021-05-07 PROCEDURE — 25010000002 PROPOFOL 10 MG/ML EMULSION: Performed by: NURSE ANESTHETIST, CERTIFIED REGISTERED

## 2021-05-07 PROCEDURE — 25010000003 CEFAZOLIN IN DEXTROSE 2-4 GM/100ML-% SOLUTION: Performed by: THORACIC SURGERY (CARDIOTHORACIC VASCULAR SURGERY)

## 2021-05-07 PROCEDURE — 88312 SPECIAL STAINS GROUP 1: CPT | Performed by: THORACIC SURGERY (CARDIOTHORACIC VASCULAR SURGERY)

## 2021-05-07 PROCEDURE — C1729 CATH, DRAINAGE: HCPCS | Performed by: THORACIC SURGERY (CARDIOTHORACIC VASCULAR SURGERY)

## 2021-05-07 PROCEDURE — 25010000002 DIPHENHYDRAMINE PER 50 MG: Performed by: THORACIC SURGERY (CARDIOTHORACIC VASCULAR SURGERY)

## 2021-05-07 PROCEDURE — 25010000002 MAGNESIUM SULFATE PER 500 MG OF MAGNESIUM: Performed by: NURSE ANESTHETIST, CERTIFIED REGISTERED

## 2021-05-07 PROCEDURE — 88342 IMHCHEM/IMCYTCHM 1ST ANTB: CPT | Performed by: THORACIC SURGERY (CARDIOTHORACIC VASCULAR SURGERY)

## 2021-05-07 PROCEDURE — 32666 THORACOSCOPY W/WEDGE RESECT: CPT | Performed by: THORACIC SURGERY (CARDIOTHORACIC VASCULAR SURGERY)

## 2021-05-07 PROCEDURE — 25010000002 HEPARIN (PORCINE) PER 1000 UNITS: Performed by: THORACIC SURGERY (CARDIOTHORACIC VASCULAR SURGERY)

## 2021-05-07 PROCEDURE — 25010000002 ONDANSETRON PER 1 MG: Performed by: THORACIC SURGERY (CARDIOTHORACIC VASCULAR SURGERY)

## 2021-05-07 PROCEDURE — 94664 DEMO&/EVAL PT USE INHALER: CPT

## 2021-05-07 PROCEDURE — 25010000002 MIDAZOLAM PER 1 MG: Performed by: ANESTHESIOLOGY

## 2021-05-07 PROCEDURE — 88341 IMHCHEM/IMCYTCHM EA ADD ANTB: CPT | Performed by: THORACIC SURGERY (CARDIOTHORACIC VASCULAR SURGERY)

## 2021-05-07 PROCEDURE — 25010000002 HYDROMORPHONE PER 4 MG: Performed by: THORACIC SURGERY (CARDIOTHORACIC VASCULAR SURGERY)

## 2021-05-07 PROCEDURE — 71045 X-RAY EXAM CHEST 1 VIEW: CPT

## 2021-05-07 PROCEDURE — 25010000002 ONDANSETRON PER 1 MG: Performed by: NURSE ANESTHETIST, CERTIFIED REGISTERED

## 2021-05-07 PROCEDURE — 25010000003 BUPIVACAINE LIPOSOME 1.3 % SUSPENSION 20 ML VIAL: Performed by: THORACIC SURGERY (CARDIOTHORACIC VASCULAR SURGERY)

## 2021-05-07 PROCEDURE — 0BBJ4ZZ EXCISION OF LEFT LOWER LUNG LOBE, PERCUTANEOUS ENDOSCOPIC APPROACH: ICD-10-PCS | Performed by: THORACIC SURGERY (CARDIOTHORACIC VASCULAR SURGERY)

## 2021-05-07 PROCEDURE — 25010000002 HYDROMORPHONE PER 4 MG: Performed by: NURSE ANESTHETIST, CERTIFIED REGISTERED

## 2021-05-07 PROCEDURE — C9290 INJ, BUPIVACAINE LIPOSOME: HCPCS | Performed by: THORACIC SURGERY (CARDIOTHORACIC VASCULAR SURGERY)

## 2021-05-07 PROCEDURE — 88307 TISSUE EXAM BY PATHOLOGIST: CPT | Performed by: THORACIC SURGERY (CARDIOTHORACIC VASCULAR SURGERY)

## 2021-05-07 PROCEDURE — 3E0T3BZ INTRODUCTION OF ANESTHETIC AGENT INTO PERIPHERAL NERVES AND PLEXI, PERCUTANEOUS APPROACH: ICD-10-PCS | Performed by: THORACIC SURGERY (CARDIOTHORACIC VASCULAR SURGERY)

## 2021-05-07 PROCEDURE — 76942 ECHO GUIDE FOR BIOPSY: CPT | Performed by: THORACIC SURGERY (CARDIOTHORACIC VASCULAR SURGERY)

## 2021-05-07 PROCEDURE — 25010000002 DEXAMETHASONE PER 1 MG: Performed by: NURSE ANESTHETIST, CERTIFIED REGISTERED

## 2021-05-07 DEVICE — ARTICULATION RELOAD WITH TRI-STAPLE TECHNOLOGY
Type: IMPLANTABLE DEVICE | Site: LUNG | Status: FUNCTIONAL
Brand: ENDO GIA

## 2021-05-07 RX ORDER — LABETALOL HYDROCHLORIDE 5 MG/ML
5 INJECTION, SOLUTION INTRAVENOUS
Status: DISCONTINUED | OUTPATIENT
Start: 2021-05-07 | End: 2021-05-10 | Stop reason: HOSPADM

## 2021-05-07 RX ORDER — CHLORHEXIDINE GLUCONATE 500 MG/1
1 CLOTH TOPICAL 2 TIMES DAILY
COMMUNITY
Start: 2021-05-06 | End: 2021-05-10 | Stop reason: HOSPADM

## 2021-05-07 RX ORDER — ONDANSETRON 2 MG/ML
4 INJECTION INTRAMUSCULAR; INTRAVENOUS EVERY 6 HOURS PRN
Status: DISCONTINUED | OUTPATIENT
Start: 2021-05-07 | End: 2021-05-10 | Stop reason: HOSPADM

## 2021-05-07 RX ORDER — PANTOPRAZOLE SODIUM 40 MG/1
40 TABLET, DELAYED RELEASE ORAL NIGHTLY
Status: DISCONTINUED | OUTPATIENT
Start: 2021-05-07 | End: 2021-05-10 | Stop reason: HOSPADM

## 2021-05-07 RX ORDER — AMOXICILLIN 250 MG
2 CAPSULE ORAL NIGHTLY
Status: DISCONTINUED | OUTPATIENT
Start: 2021-05-07 | End: 2021-05-10 | Stop reason: HOSPADM

## 2021-05-07 RX ORDER — GABAPENTIN 300 MG/1
600 CAPSULE ORAL NIGHTLY
Status: DISCONTINUED | OUTPATIENT
Start: 2021-05-07 | End: 2021-05-10 | Stop reason: HOSPADM

## 2021-05-07 RX ORDER — METOPROLOL SUCCINATE 25 MG/1
25 TABLET, EXTENDED RELEASE ORAL NIGHTLY
Status: DISCONTINUED | OUTPATIENT
Start: 2021-05-07 | End: 2021-05-10 | Stop reason: HOSPADM

## 2021-05-07 RX ORDER — PROMETHAZINE HYDROCHLORIDE 25 MG/1
25 SUPPOSITORY RECTAL ONCE AS NEEDED
Status: DISCONTINUED | OUTPATIENT
Start: 2021-05-07 | End: 2021-05-10 | Stop reason: HOSPADM

## 2021-05-07 RX ORDER — SODIUM CHLORIDE 0.9 % (FLUSH) 0.9 %
3 SYRINGE (ML) INJECTION EVERY 12 HOURS SCHEDULED
Status: DISCONTINUED | OUTPATIENT
Start: 2021-05-07 | End: 2021-05-07 | Stop reason: HOSPADM

## 2021-05-07 RX ORDER — RANOLAZINE 500 MG/1
1000 TABLET, EXTENDED RELEASE ORAL EVERY 12 HOURS SCHEDULED
Status: DISCONTINUED | OUTPATIENT
Start: 2021-05-07 | End: 2021-05-10 | Stop reason: HOSPADM

## 2021-05-07 RX ORDER — MAGNESIUM SULFATE HEPTAHYDRATE 500 MG/ML
INJECTION, SOLUTION INTRAMUSCULAR; INTRAVENOUS AS NEEDED
Status: DISCONTINUED | OUTPATIENT
Start: 2021-05-07 | End: 2021-05-07 | Stop reason: SURG

## 2021-05-07 RX ORDER — MAGNESIUM HYDROXIDE 1200 MG/15ML
LIQUID ORAL AS NEEDED
Status: DISCONTINUED | OUTPATIENT
Start: 2021-05-07 | End: 2021-05-07 | Stop reason: HOSPADM

## 2021-05-07 RX ORDER — ONDANSETRON 2 MG/ML
4 INJECTION INTRAMUSCULAR; INTRAVENOUS ONCE AS NEEDED
Status: COMPLETED | OUTPATIENT
Start: 2021-05-07 | End: 2021-05-07

## 2021-05-07 RX ORDER — SODIUM CHLORIDE 0.9 % (FLUSH) 0.9 %
3-10 SYRINGE (ML) INJECTION AS NEEDED
Status: DISCONTINUED | OUTPATIENT
Start: 2021-05-07 | End: 2021-05-07 | Stop reason: HOSPADM

## 2021-05-07 RX ORDER — ATORVASTATIN CALCIUM 20 MG/1
20 TABLET, FILM COATED ORAL NIGHTLY
Status: DISCONTINUED | OUTPATIENT
Start: 2021-05-07 | End: 2021-05-10 | Stop reason: HOSPADM

## 2021-05-07 RX ORDER — DIPHENHYDRAMINE HYDROCHLORIDE 50 MG/ML
12.5 INJECTION INTRAMUSCULAR; INTRAVENOUS
Status: DISCONTINUED | OUTPATIENT
Start: 2021-05-07 | End: 2021-05-10 | Stop reason: HOSPADM

## 2021-05-07 RX ORDER — NITROGLYCERIN 0.4 MG/1
0.4 TABLET SUBLINGUAL
Status: DISCONTINUED | OUTPATIENT
Start: 2021-05-07 | End: 2021-05-10 | Stop reason: HOSPADM

## 2021-05-07 RX ORDER — FAMOTIDINE 10 MG/ML
20 INJECTION, SOLUTION INTRAVENOUS ONCE
Status: COMPLETED | OUTPATIENT
Start: 2021-05-07 | End: 2021-05-07

## 2021-05-07 RX ORDER — FENTANYL CITRATE 50 UG/ML
50 INJECTION, SOLUTION INTRAMUSCULAR; INTRAVENOUS
Status: DISCONTINUED | OUTPATIENT
Start: 2021-05-07 | End: 2021-05-10 | Stop reason: HOSPADM

## 2021-05-07 RX ORDER — ONDANSETRON 4 MG/1
4 TABLET, FILM COATED ORAL EVERY 6 HOURS PRN
Status: DISCONTINUED | OUTPATIENT
Start: 2021-05-07 | End: 2021-05-10 | Stop reason: HOSPADM

## 2021-05-07 RX ORDER — ALBUTEROL SULFATE 2.5 MG/3ML
2.5 SOLUTION RESPIRATORY (INHALATION) EVERY 6 HOURS PRN
Status: DISCONTINUED | OUTPATIENT
Start: 2021-05-07 | End: 2021-05-10 | Stop reason: HOSPADM

## 2021-05-07 RX ORDER — BUDESONIDE AND FORMOTEROL FUMARATE DIHYDRATE 160; 4.5 UG/1; UG/1
2 AEROSOL RESPIRATORY (INHALATION) DAILY
Status: DISCONTINUED | OUTPATIENT
Start: 2021-05-07 | End: 2021-05-10 | Stop reason: HOSPADM

## 2021-05-07 RX ORDER — BACLOFEN 10 MG/1
10 TABLET ORAL AS NEEDED
Status: DISCONTINUED | OUTPATIENT
Start: 2021-05-07 | End: 2021-05-10 | Stop reason: HOSPADM

## 2021-05-07 RX ORDER — BISACODYL 10 MG
10 SUPPOSITORY, RECTAL RECTAL DAILY PRN
Status: DISCONTINUED | OUTPATIENT
Start: 2021-05-07 | End: 2021-05-10 | Stop reason: HOSPADM

## 2021-05-07 RX ORDER — OXYCODONE AND ACETAMINOPHEN 7.5; 325 MG/1; MG/1
1 TABLET ORAL ONCE AS NEEDED
Status: DISCONTINUED | OUTPATIENT
Start: 2021-05-07 | End: 2021-05-10 | Stop reason: HOSPADM

## 2021-05-07 RX ORDER — ROCURONIUM BROMIDE 10 MG/ML
INJECTION, SOLUTION INTRAVENOUS AS NEEDED
Status: DISCONTINUED | OUTPATIENT
Start: 2021-05-07 | End: 2021-05-07 | Stop reason: SURG

## 2021-05-07 RX ORDER — CEFAZOLIN SODIUM 2 G/100ML
2 INJECTION, SOLUTION INTRAVENOUS ONCE
Status: COMPLETED | OUTPATIENT
Start: 2021-05-07 | End: 2021-05-07

## 2021-05-07 RX ORDER — NALOXONE HCL 0.4 MG/ML
0.2 VIAL (ML) INJECTION AS NEEDED
Status: DISCONTINUED | OUTPATIENT
Start: 2021-05-07 | End: 2021-05-10 | Stop reason: HOSPADM

## 2021-05-07 RX ORDER — GLYCOPYRROLATE 0.2 MG/ML
INJECTION INTRAMUSCULAR; INTRAVENOUS AS NEEDED
Status: DISCONTINUED | OUTPATIENT
Start: 2021-05-07 | End: 2021-05-07 | Stop reason: SURG

## 2021-05-07 RX ORDER — HYDROXYCHLOROQUINE SULFATE 200 MG/1
200 TABLET, FILM COATED ORAL EVERY 12 HOURS SCHEDULED
Status: DISCONTINUED | OUTPATIENT
Start: 2021-05-07 | End: 2021-05-10 | Stop reason: HOSPADM

## 2021-05-07 RX ORDER — PROMETHAZINE HYDROCHLORIDE 25 MG/1
25 TABLET ORAL ONCE AS NEEDED
Status: DISCONTINUED | OUTPATIENT
Start: 2021-05-07 | End: 2021-05-10 | Stop reason: HOSPADM

## 2021-05-07 RX ORDER — MIDAZOLAM HYDROCHLORIDE 1 MG/ML
INJECTION INTRAMUSCULAR; INTRAVENOUS
Status: COMPLETED | OUTPATIENT
Start: 2021-05-07 | End: 2021-05-07

## 2021-05-07 RX ORDER — ONDANSETRON 2 MG/ML
INJECTION INTRAMUSCULAR; INTRAVENOUS AS NEEDED
Status: DISCONTINUED | OUTPATIENT
Start: 2021-05-07 | End: 2021-05-07 | Stop reason: SURG

## 2021-05-07 RX ORDER — HYDRALAZINE HYDROCHLORIDE 20 MG/ML
5 INJECTION INTRAMUSCULAR; INTRAVENOUS
Status: DISCONTINUED | OUTPATIENT
Start: 2021-05-07 | End: 2021-05-10 | Stop reason: HOSPADM

## 2021-05-07 RX ORDER — FUROSEMIDE 20 MG/1
20 TABLET ORAL DAILY
Status: DISCONTINUED | OUTPATIENT
Start: 2021-05-07 | End: 2021-05-10 | Stop reason: HOSPADM

## 2021-05-07 RX ORDER — EPHEDRINE SULFATE 50 MG/ML
5 INJECTION, SOLUTION INTRAVENOUS ONCE AS NEEDED
Status: DISCONTINUED | OUTPATIENT
Start: 2021-05-07 | End: 2021-05-10 | Stop reason: HOSPADM

## 2021-05-07 RX ORDER — FLUMAZENIL 0.1 MG/ML
0.2 INJECTION INTRAVENOUS AS NEEDED
Status: DISCONTINUED | OUTPATIENT
Start: 2021-05-07 | End: 2021-05-10 | Stop reason: HOSPADM

## 2021-05-07 RX ORDER — HYDROMORPHONE HYDROCHLORIDE 1 MG/ML
0.5 INJECTION, SOLUTION INTRAMUSCULAR; INTRAVENOUS; SUBCUTANEOUS
Status: DISCONTINUED | OUTPATIENT
Start: 2021-05-07 | End: 2021-05-10 | Stop reason: HOSPADM

## 2021-05-07 RX ORDER — FENTANYL CITRATE 50 UG/ML
50 INJECTION, SOLUTION INTRAMUSCULAR; INTRAVENOUS
Status: DISCONTINUED | OUTPATIENT
Start: 2021-05-07 | End: 2021-05-07 | Stop reason: HOSPADM

## 2021-05-07 RX ORDER — KETAMINE HYDROCHLORIDE 10 MG/ML
INJECTION INTRAMUSCULAR; INTRAVENOUS AS NEEDED
Status: DISCONTINUED | OUTPATIENT
Start: 2021-05-07 | End: 2021-05-07 | Stop reason: SURG

## 2021-05-07 RX ORDER — HYDROCODONE BITARTRATE AND ACETAMINOPHEN 7.5; 325 MG/1; MG/1
1 TABLET ORAL ONCE AS NEEDED
Status: COMPLETED | OUTPATIENT
Start: 2021-05-07 | End: 2021-05-08

## 2021-05-07 RX ORDER — DEXAMETHASONE SODIUM PHOSPHATE 10 MG/ML
INJECTION INTRAMUSCULAR; INTRAVENOUS AS NEEDED
Status: DISCONTINUED | OUTPATIENT
Start: 2021-05-07 | End: 2021-05-07 | Stop reason: SURG

## 2021-05-07 RX ORDER — MIDAZOLAM HYDROCHLORIDE 1 MG/ML
0.5 INJECTION INTRAMUSCULAR; INTRAVENOUS
Status: DISCONTINUED | OUTPATIENT
Start: 2021-05-07 | End: 2021-05-07 | Stop reason: HOSPADM

## 2021-05-07 RX ORDER — DEXTROSE, SODIUM CHLORIDE, AND POTASSIUM CHLORIDE 5; .45; .15 G/100ML; G/100ML; G/100ML
50 INJECTION INTRAVENOUS CONTINUOUS
Status: DISPENSED | OUTPATIENT
Start: 2021-05-07 | End: 2021-05-08

## 2021-05-07 RX ORDER — PROPOFOL 10 MG/ML
VIAL (ML) INTRAVENOUS AS NEEDED
Status: DISCONTINUED | OUTPATIENT
Start: 2021-05-07 | End: 2021-05-07 | Stop reason: SURG

## 2021-05-07 RX ORDER — SODIUM CHLORIDE, SODIUM LACTATE, POTASSIUM CHLORIDE, CALCIUM CHLORIDE 600; 310; 30; 20 MG/100ML; MG/100ML; MG/100ML; MG/100ML
9 INJECTION, SOLUTION INTRAVENOUS CONTINUOUS
Status: DISCONTINUED | OUTPATIENT
Start: 2021-05-07 | End: 2021-05-10 | Stop reason: HOSPADM

## 2021-05-07 RX ORDER — ACETAMINOPHEN 500 MG
1000 TABLET ORAL ONCE
Status: COMPLETED | OUTPATIENT
Start: 2021-05-07 | End: 2021-05-07

## 2021-05-07 RX ORDER — ACETAMINOPHEN 500 MG
1000 TABLET ORAL 3 TIMES DAILY
Status: DISCONTINUED | OUTPATIENT
Start: 2021-05-07 | End: 2021-05-10 | Stop reason: HOSPADM

## 2021-05-07 RX ORDER — CELECOXIB 200 MG/1
200 CAPSULE ORAL DAILY
Status: DISCONTINUED | OUTPATIENT
Start: 2021-05-07 | End: 2021-05-10 | Stop reason: HOSPADM

## 2021-05-07 RX ORDER — OXYCODONE HYDROCHLORIDE 5 MG/1
5 TABLET ORAL EVERY 4 HOURS PRN
Status: DISCONTINUED | OUTPATIENT
Start: 2021-05-07 | End: 2021-05-10 | Stop reason: HOSPADM

## 2021-05-07 RX ORDER — HEPARIN SODIUM 5000 [USP'U]/ML
5000 INJECTION, SOLUTION INTRAVENOUS; SUBCUTANEOUS ONCE
Status: COMPLETED | OUTPATIENT
Start: 2021-05-07 | End: 2021-05-07

## 2021-05-07 RX ORDER — ISOSORBIDE MONONITRATE 60 MG/1
120 TABLET, EXTENDED RELEASE ORAL DAILY
Status: DISCONTINUED | OUTPATIENT
Start: 2021-05-07 | End: 2021-05-10 | Stop reason: HOSPADM

## 2021-05-07 RX ORDER — EPHEDRINE SULFATE 50 MG/ML
INJECTION, SOLUTION INTRAVENOUS AS NEEDED
Status: DISCONTINUED | OUTPATIENT
Start: 2021-05-07 | End: 2021-05-07 | Stop reason: SURG

## 2021-05-07 RX ORDER — FENTANYL CITRATE 50 UG/ML
INJECTION, SOLUTION INTRAMUSCULAR; INTRAVENOUS
Status: COMPLETED | OUTPATIENT
Start: 2021-05-07 | End: 2021-05-07

## 2021-05-07 RX ORDER — GABAPENTIN 300 MG/1
300 CAPSULE ORAL ONCE
Status: COMPLETED | OUTPATIENT
Start: 2021-05-07 | End: 2021-05-07

## 2021-05-07 RX ORDER — FENTANYL CITRATE 50 UG/ML
INJECTION, SOLUTION INTRAMUSCULAR; INTRAVENOUS AS NEEDED
Status: DISCONTINUED | OUTPATIENT
Start: 2021-05-07 | End: 2021-05-07 | Stop reason: SURG

## 2021-05-07 RX ORDER — LIDOCAINE HYDROCHLORIDE 20 MG/ML
INJECTION, SOLUTION INFILTRATION; PERINEURAL AS NEEDED
Status: DISCONTINUED | OUTPATIENT
Start: 2021-05-07 | End: 2021-05-07 | Stop reason: SURG

## 2021-05-07 RX ORDER — HEPARIN SODIUM 5000 [USP'U]/ML
5000 INJECTION, SOLUTION INTRAVENOUS; SUBCUTANEOUS EVERY 8 HOURS SCHEDULED
Status: DISCONTINUED | OUTPATIENT
Start: 2021-05-08 | End: 2021-05-10 | Stop reason: HOSPADM

## 2021-05-07 RX ORDER — DIPHENHYDRAMINE HCL 25 MG
25 CAPSULE ORAL
Status: DISCONTINUED | OUTPATIENT
Start: 2021-05-07 | End: 2021-05-10 | Stop reason: HOSPADM

## 2021-05-07 RX ORDER — LIDOCAINE HYDROCHLORIDE 10 MG/ML
0.5 INJECTION, SOLUTION EPIDURAL; INFILTRATION; INTRACAUDAL; PERINEURAL ONCE AS NEEDED
Status: DISCONTINUED | OUTPATIENT
Start: 2021-05-07 | End: 2021-05-07 | Stop reason: HOSPADM

## 2021-05-07 RX ORDER — ROPIVACAINE HYDROCHLORIDE 5 MG/ML
INJECTION, SOLUTION EPIDURAL; INFILTRATION; PERINEURAL
Status: COMPLETED | OUTPATIENT
Start: 2021-05-07 | End: 2021-05-07

## 2021-05-07 RX ORDER — LEVOTHYROXINE SODIUM 88 UG/1
88 TABLET ORAL
Status: DISCONTINUED | OUTPATIENT
Start: 2021-05-08 | End: 2021-05-10 | Stop reason: HOSPADM

## 2021-05-07 RX ADMIN — ROPIVACAINE HYDROCHLORIDE 15 ML: 5 INJECTION, SOLUTION EPIDURAL; INFILTRATION; PERINEURAL at 13:06

## 2021-05-07 RX ADMIN — KETAMINE HYDROCHLORIDE 10 MG: 10 INJECTION INTRAMUSCULAR; INTRAVENOUS at 14:50

## 2021-05-07 RX ADMIN — DEXAMETHASONE SODIUM PHOSPHATE 8 MG: 10 INJECTION INTRAMUSCULAR; INTRAVENOUS at 13:40

## 2021-05-07 RX ADMIN — FAMOTIDINE 20 MG: 10 INJECTION INTRAVENOUS at 12:50

## 2021-05-07 RX ADMIN — FUROSEMIDE 20 MG: 20 TABLET ORAL at 20:12

## 2021-05-07 RX ADMIN — HEPARIN SODIUM 5000 UNITS: 5000 INJECTION INTRAVENOUS; SUBCUTANEOUS at 13:09

## 2021-05-07 RX ADMIN — METOPROLOL SUCCINATE 25 MG: 25 TABLET, EXTENDED RELEASE ORAL at 20:12

## 2021-05-07 RX ADMIN — RANOLAZINE 1000 MG: 500 TABLET, FILM COATED, EXTENDED RELEASE ORAL at 20:12

## 2021-05-07 RX ADMIN — POTASSIUM CHLORIDE, DEXTROSE MONOHYDRATE AND SODIUM CHLORIDE 50 ML/HR: 150; 5; 450 INJECTION, SOLUTION INTRAVENOUS at 18:12

## 2021-05-07 RX ADMIN — ACETAMINOPHEN 1000 MG: 500 TABLET, FILM COATED ORAL at 18:11

## 2021-05-07 RX ADMIN — FENTANYL CITRATE 50 MCG: 50 INJECTION, SOLUTION INTRAMUSCULAR; INTRAVENOUS at 16:35

## 2021-05-07 RX ADMIN — FENTANYL CITRATE 25 MCG: 50 INJECTION INTRAMUSCULAR; INTRAVENOUS at 15:08

## 2021-05-07 RX ADMIN — FENTANYL CITRATE 50 MCG: 50 INJECTION INTRAMUSCULAR; INTRAVENOUS at 12:55

## 2021-05-07 RX ADMIN — ONDANSETRON 4 MG: 2 INJECTION INTRAMUSCULAR; INTRAVENOUS at 15:04

## 2021-05-07 RX ADMIN — FENTANYL CITRATE 50 MCG: 50 INJECTION INTRAMUSCULAR; INTRAVENOUS at 15:28

## 2021-05-07 RX ADMIN — HYDROMORPHONE HYDROCHLORIDE 0.5 MG: 1 INJECTION, SOLUTION INTRAMUSCULAR; INTRAVENOUS; SUBCUTANEOUS at 20:14

## 2021-05-07 RX ADMIN — ATORVASTATIN CALCIUM 20 MG: 20 TABLET, FILM COATED ORAL at 20:12

## 2021-05-07 RX ADMIN — LIDOCAINE HYDROCHLORIDE 100 MG: 20 INJECTION, SOLUTION INFILTRATION; PERINEURAL at 13:25

## 2021-05-07 RX ADMIN — OXYCODONE HYDROCHLORIDE 5 MG: 5 TABLET ORAL at 22:09

## 2021-05-07 RX ADMIN — FENTANYL CITRATE 50 MCG: 50 INJECTION INTRAMUSCULAR; INTRAVENOUS at 15:25

## 2021-05-07 RX ADMIN — FENTANYL CITRATE 50 MCG: 50 INJECTION, SOLUTION INTRAMUSCULAR; INTRAVENOUS at 15:55

## 2021-05-07 RX ADMIN — CEFAZOLIN SODIUM 2 G: 2 INJECTION, SOLUTION INTRAVENOUS at 13:09

## 2021-05-07 RX ADMIN — DOCUSATE SODIUM 50MG AND SENNOSIDES 8.6MG 2 TABLET: 8.6; 5 TABLET, FILM COATED ORAL at 20:12

## 2021-05-07 RX ADMIN — FENTANYL CITRATE 50 MCG: 50 INJECTION, SOLUTION INTRAMUSCULAR; INTRAVENOUS at 15:46

## 2021-05-07 RX ADMIN — GABAPENTIN 300 MG: 300 CAPSULE ORAL at 12:32

## 2021-05-07 RX ADMIN — GABAPENTIN 600 MG: 300 CAPSULE ORAL at 20:11

## 2021-05-07 RX ADMIN — MAGNESIUM SULFATE HEPTAHYDRATE 2 G: 500 INJECTION, SOLUTION INTRAMUSCULAR; INTRAVENOUS at 13:48

## 2021-05-07 RX ADMIN — ONDANSETRON 4 MG: 2 INJECTION INTRAMUSCULAR; INTRAVENOUS at 17:31

## 2021-05-07 RX ADMIN — OXYCODONE HYDROCHLORIDE 5 MG: 5 TABLET ORAL at 18:11

## 2021-05-07 RX ADMIN — HYDROMORPHONE HYDROCHLORIDE 0.5 MG: 1 INJECTION, SOLUTION INTRAMUSCULAR; INTRAVENOUS; SUBCUTANEOUS at 16:19

## 2021-05-07 RX ADMIN — FENTANYL CITRATE 100 MCG: 50 INJECTION INTRAMUSCULAR; INTRAVENOUS at 13:25

## 2021-05-07 RX ADMIN — ACETAMINOPHEN 1000 MG: 500 TABLET, FILM COATED ORAL at 12:32

## 2021-05-07 RX ADMIN — MIDAZOLAM 1 MG: 1 INJECTION INTRAMUSCULAR; INTRAVENOUS at 13:00

## 2021-05-07 RX ADMIN — FENTANYL CITRATE 25 MCG: 50 INJECTION INTRAMUSCULAR; INTRAVENOUS at 15:11

## 2021-05-07 RX ADMIN — DIPHENHYDRAMINE HYDROCHLORIDE 12.5 MG: 50 INJECTION, SOLUTION INTRAMUSCULAR; INTRAVENOUS at 18:18

## 2021-05-07 RX ADMIN — PANTOPRAZOLE SODIUM 40 MG: 40 TABLET, DELAYED RELEASE ORAL at 20:11

## 2021-05-07 RX ADMIN — MIDAZOLAM 1 MG: 1 INJECTION INTRAMUSCULAR; INTRAVENOUS at 12:50

## 2021-05-07 RX ADMIN — KETAMINE HYDROCHLORIDE 25 MG: 10 INJECTION INTRAMUSCULAR; INTRAVENOUS at 13:49

## 2021-05-07 RX ADMIN — ROCURONIUM BROMIDE 40 MG: 50 INJECTION INTRAVENOUS at 13:27

## 2021-05-07 RX ADMIN — CELECOXIB 200 MG: 200 CAPSULE ORAL at 20:12

## 2021-05-07 RX ADMIN — MIDAZOLAM 1 MG: 1 INJECTION INTRAMUSCULAR; INTRAVENOUS at 12:53

## 2021-05-07 RX ADMIN — HYDROXYCHLOROQUINE SULFATE 200 MG: 200 TABLET ORAL at 20:12

## 2021-05-07 RX ADMIN — EPHEDRINE SULFATE 10 MG: 50 INJECTION INTRAVENOUS at 14:38

## 2021-05-07 RX ADMIN — BUDESONIDE AND FORMOTEROL FUMARATE DIHYDRATE 2 PUFF: 160; 4.5 AEROSOL RESPIRATORY (INHALATION) at 20:43

## 2021-05-07 RX ADMIN — FENTANYL CITRATE 50 MCG: 50 INJECTION, SOLUTION INTRAMUSCULAR; INTRAVENOUS at 12:52

## 2021-05-07 RX ADMIN — ISOSORBIDE MONONITRATE 120 MG: 60 TABLET ORAL at 20:12

## 2021-05-07 RX ADMIN — EPHEDRINE SULFATE 10 MG: 50 INJECTION INTRAVENOUS at 14:19

## 2021-05-07 RX ADMIN — ROCURONIUM BROMIDE 10 MG: 50 INJECTION INTRAVENOUS at 13:55

## 2021-05-07 RX ADMIN — SODIUM CHLORIDE, POTASSIUM CHLORIDE, SODIUM LACTATE AND CALCIUM CHLORIDE 9 ML/HR: 600; 310; 30; 20 INJECTION, SOLUTION INTRAVENOUS at 12:30

## 2021-05-07 RX ADMIN — GLYCOPYRROLATE 0.3 MG: 0.2 INJECTION INTRAMUSCULAR; INTRAVENOUS at 14:06

## 2021-05-07 RX ADMIN — EPHEDRINE SULFATE 5 MG: 50 INJECTION INTRAVENOUS at 14:41

## 2021-05-07 RX ADMIN — HYDROMORPHONE HYDROCHLORIDE 0.5 MG: 1 INJECTION, SOLUTION INTRAMUSCULAR; INTRAVENOUS; SUBCUTANEOUS at 15:47

## 2021-05-07 RX ADMIN — HYDROMORPHONE HYDROCHLORIDE 0.5 MG: 1 INJECTION, SOLUTION INTRAMUSCULAR; INTRAVENOUS; SUBCUTANEOUS at 16:07

## 2021-05-07 RX ADMIN — PROPOFOL 150 MG: 10 INJECTION, EMULSION INTRAVENOUS at 13:27

## 2021-05-07 RX ADMIN — FENTANYL CITRATE 50 MCG: 50 INJECTION, SOLUTION INTRAMUSCULAR; INTRAVENOUS at 16:19

## 2021-05-07 RX ADMIN — HYDROMORPHONE HYDROCHLORIDE 0.5 MG: 1 INJECTION, SOLUTION INTRAMUSCULAR; INTRAVENOUS; SUBCUTANEOUS at 17:30

## 2021-05-07 NOTE — ANESTHESIA PROCEDURE NOTES
Peripheral Block    Pre-sedation assessment completed: 5/7/2021 12:48 PM    Patient reassessed immediately prior to procedure    Patient location during procedure: holding area  Start time: 5/7/2021 12:49 PM  Stop time: 5/7/2021 12:57 PM  Reason for block: at surgeon's request and post-op pain management  Performed by  Anesthesiologist: Susie Huang MD  Preanesthetic Checklist  Completed: patient identified, IV checked, site marked, risks and benefits discussed, surgical consent, monitors and equipment checked, pre-op evaluation and timeout performed  Prep:  Pt Position: sitting  Sterile barriers:cap, gloves, mask and sterile barriers  Prep: ChloraPrep  Patient monitoring: blood pressure monitoring, continuous pulse oximetry and EKG  Procedure  Sedation:yes  Performed under: PNB  Guidance:ultrasound guided  ULTRASOUND INTERPRETATION. Using ultrasound guidance a 22 G gauge needle was placed in close proximity to the nerve, at which point, under ultrasound guidance anesthetic was injected in the area of the nerve and spread of the anesthesia was seen on ultrasound in close proximity thereto.  There were no abnormalities seen on ultrasound; a digital image was taken; and the patient tolerated the procedure with no complications. Images:still images obtained    Laterality:leftInjection Technique:single-shot  Needle Type:echogenic  Needle Gauge:22 G      Medications Used: ropivacaine (NAROPIN) 0.5 % injection, 15 mL      Post Assessment  Injection Assessment: negative aspiration for heme, no paresthesia on injection and incremental injection  Patient Tolerance:comfortable throughout block  Complications:no

## 2021-05-07 NOTE — ANESTHESIA PREPROCEDURE EVALUATION
Anesthesia Evaluation     Patient summary reviewed and Nursing notes reviewed   history of anesthetic complications: PONV  NPO Solid Status: > 8 hours  NPO Liquid Status: > 2 hours           Airway   Mallampati: II  TM distance: >3 FB  Neck ROM: full  Dental - normal exam     Pulmonary    (+) asthma,shortness of breath,   Cardiovascular   Exercise tolerance: good (4-7 METS)    ECG reviewed  Rhythm: regular    (+) hypertension, CAD, angina, PVD, hyperlipidemia,     ROS comment: · Findings consistent with a normal ECG stress test.  · Left ventricular ejection fraction is hyperdynamic (Calculated EF > 70%). .  · Myocardial perfusion imaging indicates a normal myocardial perfusion study with no evidence of ischemia.    Neuro/Psych  (+) headaches, numbness, psychiatric history,     GI/Hepatic/Renal/Endo    (+)  GERD,  diabetes mellitus type 2, thyroid problem hypothyroidism    ROS Comment: IBS      Musculoskeletal     (+) back pain,   Abdominal    Substance History - negative use     OB/GYN negative ob/gyn ROS         Other   arthritis,                      Anesthesia Plan    ASA 3     general with block   (/87 (BP Location: Left arm, Patient Position: Lying)   Pulse 91   Temp 36.6 °C (97.9 °F) (Oral)   Resp 18   LMP  (LMP Unknown)   SpO2 95%     Arterial line.      I have reviewed the patient's history with the patient and the chart, including all pertinent laboratory results and imaging. I have explained the risks of anesthesia including but not limited to dental damage, corneal abrasion, nerve injury, MI, stroke, and death.    )  intravenous induction     Anesthetic plan, all risks, benefits, and alternatives have been provided, discussed and informed consent has been obtained with: patient.

## 2021-05-07 NOTE — ANESTHESIA PROCEDURE NOTES
Arterial Line    Pre-sedation assessment completed: 5/7/2021 1:00 PM    Patient reassessed immediately prior to procedure    Patient location during procedure: holding area  Start time: 5/7/2021 1:00 PM  Stop Time:5/7/2021 1:02 PM       Line placed for hemodynamic monitoring.  Performed By   Anesthesiologist: Susie Huang MD  Preanesthetic Checklist  Completed: patient identified, IV checked, site marked, risks and benefits discussed, surgical consent, monitors and equipment checked, pre-op evaluation and timeout performed  Arterial Line Prep   Sterile Tech: gloves  Prep: ChloraPrep  Patient monitoring: blood pressure monitoring, continuous pulse oximetry and EKG  Arterial Line Procedure   Laterality:right  Location:  radial artery  Catheter size: 20 G   Guidance: ultrasound guided  PROCEDURE NOTE/ULTRASOUND INTERPRETATION.  Using ultrasound guidance the potential vascular sites for insertion of the catheter were visualized to determine the patency of the vessel to be used for vascular access.  After selecting the appropriate site for insertion, the needle was visualized under ultrasound being inserted into the radial artery, followed by ultrasound confirmation of wire and catheter placement. There were no abnormalities seen on ultrasound; an image was taken; and the patient tolerated the procedure with no complications.   Number of attempts: 1  Successful placement: yes  Post Assessment   Dressing Type: secured with tape, wrist guard applied and occlusive dressing applied.   Complications no  Circ/Move/Sens Assessment: unchanged.   Patient Tolerance: patient tolerated the procedure well with no apparent complications

## 2021-05-07 NOTE — ANESTHESIA POSTPROCEDURE EVALUATION
Patient: Camelia Quintanilla    Procedure Summary     Date: 05/07/21 Room / Location: [facility] OR [address] [place] [address] MAIN OR    Anesthesia Start: 1315 Anesthesia Stop: 1536    Procedure: LEFT VIDEO ASSISTED THORACOSCOPYwith wedge resection, INTERCOSTAL NERVE BLOCK (Left Chest) Diagnosis:       Lung nodule      (Lung nodule [R91.1])    Surgeons: Ingrid Ch MD Provider: Kosta Floyd MD    Anesthesia Type: general with block ASA Status: 3          Anesthesia Type: general with block    Vitals  Vitals Value Taken Time   /61 05/07/21 1630   Temp 36.5 °C (97.7 °F) 05/07/21 1535   Pulse 70 05/07/21 1644   Resp 14 05/07/21 1630   SpO2 96 % 05/07/21 1644   Vitals shown include unvalidated device data.        Post Anesthesia Care and Evaluation    Patient location during evaluation: bedside  Patient participation: complete - patient participated  Level of consciousness: awake  Pain management: adequate  Airway patency: patent  Anesthetic complications: No anesthetic complications    Cardiovascular status: acceptable  Respiratory status: acceptable  Hydration status: acceptable    Comments: */61 (BP Location: Left arm, Patient Position: Lying)   Pulse 73   Temp 36.5 °C (97.7 °F) (Oral)   Resp 14   LMP  (LMP Unknown)   SpO2 95%

## 2021-05-07 NOTE — ANESTHESIA PROCEDURE NOTES
Airway  Urgency: elective    Date/Time: 5/7/2021 1:34 PM  Airway not difficult    General Information and Staff    Patient location during procedure: OR  Anesthesiologist: Kosta Floyd MD  CRNA: Zbigniew Palma CRNA    Indications and Patient Condition  Indications for airway management: airway protection    Preoxygenated: yes  Mask difficulty assessment: 1 - vent by mask    Final Airway Details  Final airway type: endotracheal airway      Successful airway: EBT - double lumen left  Cuffed: yes   Successful intubation technique: direct laryngoscopy  Facilitating devices/methods: intubating stylet  Endotracheal tube insertion site: oral  Blade: CMAC  Blade size: D  EBT DL size (fr): 37  Cormack-Lehane Classification: grade I - full view of glottis  Placement verified by: chest auscultation and capnometry   Measured from: lips  Number of attempts at approach: 1  Assessment: lips, teeth, and gum same as pre-op and atraumatic intubation    Additional Comments  Pre 02 100%, SIVI, DL x1, atraumatic intubation, BLBS, Positive ETC02.

## 2021-05-08 ENCOUNTER — APPOINTMENT (OUTPATIENT)
Dept: GENERAL RADIOLOGY | Facility: HOSPITAL | Age: 66
End: 2021-05-08

## 2021-05-08 LAB
ANION GAP SERPL CALCULATED.3IONS-SCNC: 11.3 MMOL/L (ref 5–15)
ANION GAP SERPL CALCULATED.3IONS-SCNC: 14.7 MMOL/L (ref 5–15)
BASOPHILS # BLD AUTO: 0.03 10*3/MM3 (ref 0–0.2)
BASOPHILS NFR BLD AUTO: 0.2 % (ref 0–1.5)
BUN SERPL-MCNC: 19 MG/DL (ref 8–23)
BUN SERPL-MCNC: 22 MG/DL (ref 8–23)
BUN/CREAT SERPL: 21 (ref 7–25)
BUN/CREAT SERPL: 21.6 (ref 7–25)
CALCIUM SPEC-SCNC: 8.2 MG/DL (ref 8.6–10.5)
CALCIUM SPEC-SCNC: 8.4 MG/DL (ref 8.6–10.5)
CHLORIDE SERPL-SCNC: 103 MMOL/L (ref 98–107)
CHLORIDE SERPL-SCNC: 105 MMOL/L (ref 98–107)
CO2 SERPL-SCNC: 20.3 MMOL/L (ref 22–29)
CO2 SERPL-SCNC: 21.7 MMOL/L (ref 22–29)
CREAT SERPL-MCNC: 0.88 MG/DL (ref 0.57–1)
CREAT SERPL-MCNC: 1.05 MG/DL (ref 0.57–1)
CYTO UR: NORMAL
DEPRECATED RDW RBC AUTO: 43.2 FL (ref 37–54)
EOSINOPHIL # BLD AUTO: 0 10*3/MM3 (ref 0–0.4)
EOSINOPHIL NFR BLD AUTO: 0 % (ref 0.3–6.2)
ERYTHROCYTE [DISTWIDTH] IN BLOOD BY AUTOMATED COUNT: 13.3 % (ref 12.3–15.4)
GFR SERPL CREATININE-BSD FRML MDRD: 52 ML/MIN/1.73
GFR SERPL CREATININE-BSD FRML MDRD: 64 ML/MIN/1.73
GLUCOSE SERPL-MCNC: 104 MG/DL (ref 65–99)
GLUCOSE SERPL-MCNC: 138 MG/DL (ref 65–99)
HCT VFR BLD AUTO: 35.4 % (ref 34–46.6)
HGB BLD-MCNC: 12 G/DL (ref 12–15.9)
IMM GRANULOCYTES # BLD AUTO: 0.08 10*3/MM3 (ref 0–0.05)
IMM GRANULOCYTES NFR BLD AUTO: 0.5 % (ref 0–0.5)
LAB AP CASE REPORT: NORMAL
LAB AP SPECIAL STAINS: NORMAL
LYMPHOCYTES # BLD AUTO: 1.08 10*3/MM3 (ref 0.7–3.1)
LYMPHOCYTES NFR BLD AUTO: 7.1 % (ref 19.6–45.3)
Lab: NORMAL
MCH RBC QN AUTO: 30.7 PG (ref 26.6–33)
MCHC RBC AUTO-ENTMCNC: 33.9 G/DL (ref 31.5–35.7)
MCV RBC AUTO: 90.5 FL (ref 79–97)
MONOCYTES # BLD AUTO: 0.89 10*3/MM3 (ref 0.1–0.9)
MONOCYTES NFR BLD AUTO: 5.9 % (ref 5–12)
NEUTROPHILS NFR BLD AUTO: 13.13 10*3/MM3 (ref 1.7–7)
NEUTROPHILS NFR BLD AUTO: 86.3 % (ref 42.7–76)
NRBC BLD AUTO-RTO: 0 /100 WBC (ref 0–0.2)
PATH REPORT.FINAL DX SPEC: NORMAL
PATH REPORT.GROSS SPEC: NORMAL
PLATELET # BLD AUTO: 263 10*3/MM3 (ref 140–450)
PMV BLD AUTO: 10.4 FL (ref 6–12)
POTASSIUM SERPL-SCNC: 4.3 MMOL/L (ref 3.5–5.2)
POTASSIUM SERPL-SCNC: 5.1 MMOL/L (ref 3.5–5.2)
RBC # BLD AUTO: 3.91 10*6/MM3 (ref 3.77–5.28)
SODIUM SERPL-SCNC: 138 MMOL/L (ref 136–145)
SODIUM SERPL-SCNC: 138 MMOL/L (ref 136–145)
WBC # BLD AUTO: 15.21 10*3/MM3 (ref 3.4–10.8)

## 2021-05-08 PROCEDURE — 71045 X-RAY EXAM CHEST 1 VIEW: CPT

## 2021-05-08 PROCEDURE — 25010000002 HEPARIN (PORCINE) PER 1000 UNITS: Performed by: THORACIC SURGERY (CARDIOTHORACIC VASCULAR SURGERY)

## 2021-05-08 PROCEDURE — 94799 UNLISTED PULMONARY SVC/PX: CPT

## 2021-05-08 PROCEDURE — 80048 BASIC METABOLIC PNL TOTAL CA: CPT | Performed by: NURSE PRACTITIONER

## 2021-05-08 PROCEDURE — 85025 COMPLETE CBC W/AUTO DIFF WBC: CPT | Performed by: THORACIC SURGERY (CARDIOTHORACIC VASCULAR SURGERY)

## 2021-05-08 PROCEDURE — 25010000002 HYDROMORPHONE PER 4 MG: Performed by: THORACIC SURGERY (CARDIOTHORACIC VASCULAR SURGERY)

## 2021-05-08 PROCEDURE — 99024 POSTOP FOLLOW-UP VISIT: CPT | Performed by: NURSE PRACTITIONER

## 2021-05-08 PROCEDURE — 94760 N-INVAS EAR/PLS OXIMETRY 1: CPT

## 2021-05-08 PROCEDURE — 80048 BASIC METABOLIC PNL TOTAL CA: CPT | Performed by: THORACIC SURGERY (CARDIOTHORACIC VASCULAR SURGERY)

## 2021-05-08 PROCEDURE — 25010000002 ONDANSETRON PER 1 MG: Performed by: THORACIC SURGERY (CARDIOTHORACIC VASCULAR SURGERY)

## 2021-05-08 RX ORDER — SODIUM POLYSTYRENE SULFONATE 15 G/60ML
15 SUSPENSION ORAL; RECTAL ONCE
Status: COMPLETED | OUTPATIENT
Start: 2021-05-08 | End: 2021-05-08

## 2021-05-08 RX ADMIN — LEVOTHYROXINE SODIUM 88 MCG: 0.09 TABLET ORAL at 05:00

## 2021-05-08 RX ADMIN — HYDROXYCHLOROQUINE SULFATE 200 MG: 200 TABLET ORAL at 20:35

## 2021-05-08 RX ADMIN — ONDANSETRON 4 MG: 2 INJECTION INTRAMUSCULAR; INTRAVENOUS at 17:27

## 2021-05-08 RX ADMIN — HYDROMORPHONE HYDROCHLORIDE 0.5 MG: 1 INJECTION, SOLUTION INTRAMUSCULAR; INTRAVENOUS; SUBCUTANEOUS at 00:14

## 2021-05-08 RX ADMIN — ACETAMINOPHEN 1000 MG: 500 TABLET, FILM COATED ORAL at 20:35

## 2021-05-08 RX ADMIN — OXYCODONE HYDROCHLORIDE 5 MG: 5 TABLET ORAL at 13:17

## 2021-05-08 RX ADMIN — BUDESONIDE AND FORMOTEROL FUMARATE DIHYDRATE 2 PUFF: 160; 4.5 AEROSOL RESPIRATORY (INHALATION) at 08:29

## 2021-05-08 RX ADMIN — HEPARIN SODIUM 5000 UNITS: 5000 INJECTION INTRAVENOUS; SUBCUTANEOUS at 20:36

## 2021-05-08 RX ADMIN — OXYCODONE HYDROCHLORIDE 5 MG: 5 TABLET ORAL at 17:27

## 2021-05-08 RX ADMIN — HYDROMORPHONE HYDROCHLORIDE 0.5 MG: 1 INJECTION, SOLUTION INTRAMUSCULAR; INTRAVENOUS; SUBCUTANEOUS at 06:08

## 2021-05-08 RX ADMIN — HEPARIN SODIUM 5000 UNITS: 5000 INJECTION INTRAVENOUS; SUBCUTANEOUS at 13:17

## 2021-05-08 RX ADMIN — PANTOPRAZOLE SODIUM 40 MG: 40 TABLET, DELAYED RELEASE ORAL at 20:35

## 2021-05-08 RX ADMIN — HYDROXYCHLOROQUINE SULFATE 200 MG: 200 TABLET ORAL at 08:26

## 2021-05-08 RX ADMIN — METOPROLOL SUCCINATE 25 MG: 25 TABLET, EXTENDED RELEASE ORAL at 20:35

## 2021-05-08 RX ADMIN — GABAPENTIN 600 MG: 300 CAPSULE ORAL at 20:35

## 2021-05-08 RX ADMIN — HYDROMORPHONE HYDROCHLORIDE 0.5 MG: 1 INJECTION, SOLUTION INTRAMUSCULAR; INTRAVENOUS; SUBCUTANEOUS at 03:04

## 2021-05-08 RX ADMIN — RANOLAZINE 1000 MG: 500 TABLET, FILM COATED, EXTENDED RELEASE ORAL at 20:35

## 2021-05-08 RX ADMIN — OXYCODONE HYDROCHLORIDE 5 MG: 5 TABLET ORAL at 05:00

## 2021-05-08 RX ADMIN — SODIUM POLYSTYRENE SULFONATE 15 G: 15 SUSPENSION ORAL; RECTAL at 10:34

## 2021-05-08 RX ADMIN — ATORVASTATIN CALCIUM 20 MG: 20 TABLET, FILM COATED ORAL at 20:35

## 2021-05-08 RX ADMIN — FUROSEMIDE 20 MG: 20 TABLET ORAL at 08:26

## 2021-05-08 RX ADMIN — RANOLAZINE 1000 MG: 500 TABLET, FILM COATED, EXTENDED RELEASE ORAL at 08:25

## 2021-05-08 RX ADMIN — ONDANSETRON 4 MG: 2 INJECTION INTRAMUSCULAR; INTRAVENOUS at 09:38

## 2021-05-08 RX ADMIN — ISOSORBIDE MONONITRATE 120 MG: 60 TABLET ORAL at 08:25

## 2021-05-08 RX ADMIN — CELECOXIB 200 MG: 200 CAPSULE ORAL at 08:26

## 2021-05-08 RX ADMIN — ACETAMINOPHEN 1000 MG: 500 TABLET, FILM COATED ORAL at 08:25

## 2021-05-08 RX ADMIN — OXYCODONE HYDROCHLORIDE 5 MG: 5 TABLET ORAL at 21:53

## 2021-05-08 RX ADMIN — DOCUSATE SODIUM 50MG AND SENNOSIDES 8.6MG 2 TABLET: 8.6; 5 TABLET, FILM COATED ORAL at 20:35

## 2021-05-08 RX ADMIN — HYDROCODONE BITARTRATE AND ACETAMINOPHEN 1 TABLET: 7.5; 325 TABLET ORAL at 09:34

## 2021-05-08 RX ADMIN — ACETAMINOPHEN 1000 MG: 500 TABLET, FILM COATED ORAL at 15:09

## 2021-05-08 RX ADMIN — HEPARIN SODIUM 5000 UNITS: 5000 INJECTION INTRAVENOUS; SUBCUTANEOUS at 05:00

## 2021-05-08 RX ADMIN — SODIUM CHLORIDE 500 ML: 9 INJECTION, SOLUTION INTRAVENOUS at 05:23

## 2021-05-09 ENCOUNTER — APPOINTMENT (OUTPATIENT)
Dept: GENERAL RADIOLOGY | Facility: HOSPITAL | Age: 66
End: 2021-05-09

## 2021-05-09 LAB
GLUCOSE BLDC GLUCOMTR-MCNC: 108 MG/DL (ref 70–130)
GLUCOSE BLDC GLUCOMTR-MCNC: 109 MG/DL (ref 70–130)
QT INTERVAL: 347 MS

## 2021-05-09 PROCEDURE — 93005 ELECTROCARDIOGRAM TRACING: CPT | Performed by: THORACIC SURGERY (CARDIOTHORACIC VASCULAR SURGERY)

## 2021-05-09 PROCEDURE — 25010000002 AMIODARONE IN DEXTROSE 5% 360-4.14 MG/200ML-% SOLUTION: Performed by: NURSE PRACTITIONER

## 2021-05-09 PROCEDURE — 94799 UNLISTED PULMONARY SVC/PX: CPT

## 2021-05-09 PROCEDURE — 93010 ELECTROCARDIOGRAM REPORT: CPT | Performed by: INTERNAL MEDICINE

## 2021-05-09 PROCEDURE — 82962 GLUCOSE BLOOD TEST: CPT

## 2021-05-09 PROCEDURE — 25010000002 AMIODARONE IN DEXTROSE 5% 360-4.14 MG/200ML-% SOLUTION: Performed by: THORACIC SURGERY (CARDIOTHORACIC VASCULAR SURGERY)

## 2021-05-09 PROCEDURE — 99024 POSTOP FOLLOW-UP VISIT: CPT | Performed by: NURSE PRACTITIONER

## 2021-05-09 PROCEDURE — 25010000002 HEPARIN (PORCINE) PER 1000 UNITS: Performed by: THORACIC SURGERY (CARDIOTHORACIC VASCULAR SURGERY)

## 2021-05-09 PROCEDURE — 71045 X-RAY EXAM CHEST 1 VIEW: CPT

## 2021-05-09 PROCEDURE — 99222 1ST HOSP IP/OBS MODERATE 55: CPT | Performed by: INTERNAL MEDICINE

## 2021-05-09 RX ORDER — OXYCODONE HYDROCHLORIDE 5 MG/1
5 TABLET ORAL EVERY 4 HOURS PRN
Qty: 30 TABLET | Refills: 0 | Status: SHIPPED | OUTPATIENT
Start: 2021-05-09 | End: 2022-03-01

## 2021-05-09 RX ORDER — SODIUM CHLORIDE 9 MG/ML
200 INJECTION, SOLUTION INTRAVENOUS ONCE
Status: COMPLETED | OUTPATIENT
Start: 2021-05-09 | End: 2021-05-09

## 2021-05-09 RX ORDER — CELECOXIB 200 MG/1
200 CAPSULE ORAL DAILY
Qty: 60 CAPSULE | Refills: 0 | Status: CANCELLED | OUTPATIENT
Start: 2021-05-09

## 2021-05-09 RX ADMIN — HEPARIN SODIUM 5000 UNITS: 5000 INJECTION INTRAVENOUS; SUBCUTANEOUS at 14:43

## 2021-05-09 RX ADMIN — RANOLAZINE 1000 MG: 500 TABLET, FILM COATED, EXTENDED RELEASE ORAL at 08:54

## 2021-05-09 RX ADMIN — CELECOXIB 200 MG: 200 CAPSULE ORAL at 08:54

## 2021-05-09 RX ADMIN — ISOSORBIDE MONONITRATE 120 MG: 60 TABLET ORAL at 08:55

## 2021-05-09 RX ADMIN — BUDESONIDE AND FORMOTEROL FUMARATE DIHYDRATE 2 PUFF: 160; 4.5 AEROSOL RESPIRATORY (INHALATION) at 08:48

## 2021-05-09 RX ADMIN — FUROSEMIDE 20 MG: 20 TABLET ORAL at 08:54

## 2021-05-09 RX ADMIN — OXYCODONE HYDROCHLORIDE 5 MG: 5 TABLET ORAL at 23:04

## 2021-05-09 RX ADMIN — SODIUM CHLORIDE 200 ML/HR: 9 INJECTION, SOLUTION INTRAVENOUS at 12:54

## 2021-05-09 RX ADMIN — AMIODARONE HYDROCHLORIDE 1 MG/MIN: 1.8 INJECTION, SOLUTION INTRAVENOUS at 12:41

## 2021-05-09 RX ADMIN — PANTOPRAZOLE SODIUM 40 MG: 40 TABLET, DELAYED RELEASE ORAL at 20:19

## 2021-05-09 RX ADMIN — HYDROXYCHLOROQUINE SULFATE 200 MG: 200 TABLET ORAL at 08:55

## 2021-05-09 RX ADMIN — ACETAMINOPHEN 1000 MG: 500 TABLET, FILM COATED ORAL at 08:55

## 2021-05-09 RX ADMIN — ATORVASTATIN CALCIUM 20 MG: 20 TABLET, FILM COATED ORAL at 20:19

## 2021-05-09 RX ADMIN — AMIODARONE HYDROCHLORIDE 0.5 MG/MIN: 1.8 INJECTION, SOLUTION INTRAVENOUS at 21:24

## 2021-05-09 RX ADMIN — HEPARIN SODIUM 5000 UNITS: 5000 INJECTION INTRAVENOUS; SUBCUTANEOUS at 06:02

## 2021-05-09 RX ADMIN — ACETAMINOPHEN 1000 MG: 500 TABLET, FILM COATED ORAL at 20:19

## 2021-05-09 RX ADMIN — RANOLAZINE 1000 MG: 500 TABLET, FILM COATED, EXTENDED RELEASE ORAL at 20:19

## 2021-05-09 RX ADMIN — DOCUSATE SODIUM 50MG AND SENNOSIDES 8.6MG 2 TABLET: 8.6; 5 TABLET, FILM COATED ORAL at 20:19

## 2021-05-09 RX ADMIN — HEPARIN SODIUM 5000 UNITS: 5000 INJECTION INTRAVENOUS; SUBCUTANEOUS at 21:24

## 2021-05-09 RX ADMIN — GABAPENTIN 600 MG: 300 CAPSULE ORAL at 20:19

## 2021-05-09 RX ADMIN — OXYCODONE HYDROCHLORIDE 5 MG: 5 TABLET ORAL at 16:46

## 2021-05-09 RX ADMIN — HYDROXYCHLOROQUINE SULFATE 200 MG: 200 TABLET ORAL at 20:19

## 2021-05-09 RX ADMIN — LEVOTHYROXINE SODIUM 88 MCG: 0.09 TABLET ORAL at 06:02

## 2021-05-09 RX ADMIN — METOPROLOL SUCCINATE 25 MG: 25 TABLET, EXTENDED RELEASE ORAL at 20:21

## 2021-05-09 RX ADMIN — ACETAMINOPHEN 1000 MG: 500 TABLET, FILM COATED ORAL at 16:44

## 2021-05-09 RX ADMIN — METOROPROLOL TARTRATE 5 MG: 5 INJECTION, SOLUTION INTRAVENOUS at 12:30

## 2021-05-10 ENCOUNTER — APPOINTMENT (OUTPATIENT)
Dept: GENERAL RADIOLOGY | Facility: HOSPITAL | Age: 66
End: 2021-05-10

## 2021-05-10 VITALS
TEMPERATURE: 97.2 F | SYSTOLIC BLOOD PRESSURE: 112 MMHG | BODY MASS INDEX: 29.73 KG/M2 | HEIGHT: 64 IN | DIASTOLIC BLOOD PRESSURE: 57 MMHG | HEART RATE: 76 BPM | RESPIRATION RATE: 16 BRPM | WEIGHT: 174.16 LBS | OXYGEN SATURATION: 94 %

## 2021-05-10 PROCEDURE — 99024 POSTOP FOLLOW-UP VISIT: CPT | Performed by: NURSE PRACTITIONER

## 2021-05-10 PROCEDURE — 94760 N-INVAS EAR/PLS OXIMETRY 1: CPT

## 2021-05-10 PROCEDURE — 25010000002 HEPARIN (PORCINE) PER 1000 UNITS: Performed by: THORACIC SURGERY (CARDIOTHORACIC VASCULAR SURGERY)

## 2021-05-10 PROCEDURE — 94799 UNLISTED PULMONARY SVC/PX: CPT

## 2021-05-10 PROCEDURE — 99232 SBSQ HOSP IP/OBS MODERATE 35: CPT | Performed by: NURSE PRACTITIONER

## 2021-05-10 PROCEDURE — 71045 X-RAY EXAM CHEST 1 VIEW: CPT

## 2021-05-10 RX ORDER — AMIODARONE HYDROCHLORIDE 200 MG/1
400 TABLET ORAL EVERY 12 HOURS SCHEDULED
Status: DISCONTINUED | OUTPATIENT
Start: 2021-05-10 | End: 2021-05-10 | Stop reason: HOSPADM

## 2021-05-10 RX ORDER — AMIODARONE HYDROCHLORIDE 200 MG/1
TABLET ORAL
Qty: 49 TABLET | Refills: 0 | Status: SHIPPED | OUTPATIENT
Start: 2021-05-10 | End: 2021-06-07

## 2021-05-10 RX ORDER — ACETAMINOPHEN 500 MG
1000 TABLET ORAL 3 TIMES DAILY
Qty: 90 TABLET | Refills: 0 | Status: SHIPPED | OUTPATIENT
Start: 2021-05-10 | End: 2021-05-25

## 2021-05-10 RX ADMIN — RANOLAZINE 1000 MG: 500 TABLET, FILM COATED, EXTENDED RELEASE ORAL at 08:15

## 2021-05-10 RX ADMIN — HEPARIN SODIUM 5000 UNITS: 5000 INJECTION INTRAVENOUS; SUBCUTANEOUS at 05:48

## 2021-05-10 RX ADMIN — ISOSORBIDE MONONITRATE 120 MG: 60 TABLET ORAL at 08:15

## 2021-05-10 RX ADMIN — BUDESONIDE AND FORMOTEROL FUMARATE DIHYDRATE 2 PUFF: 160; 4.5 AEROSOL RESPIRATORY (INHALATION) at 07:03

## 2021-05-10 RX ADMIN — HYDROXYCHLOROQUINE SULFATE 200 MG: 200 TABLET ORAL at 08:15

## 2021-05-10 RX ADMIN — AMIODARONE HYDROCHLORIDE 400 MG: 200 TABLET ORAL at 09:53

## 2021-05-10 RX ADMIN — CELECOXIB 200 MG: 200 CAPSULE ORAL at 08:15

## 2021-05-10 RX ADMIN — MAGNESIUM HYDROXIDE 10 ML: 2400 SUSPENSION ORAL at 08:21

## 2021-05-10 RX ADMIN — FUROSEMIDE 20 MG: 20 TABLET ORAL at 08:15

## 2021-05-10 RX ADMIN — LEVOTHYROXINE SODIUM 88 MCG: 0.09 TABLET ORAL at 05:48

## 2021-05-10 RX ADMIN — ACETAMINOPHEN 1000 MG: 500 TABLET, FILM COATED ORAL at 08:15

## 2021-05-11 ENCOUNTER — READMISSION MANAGEMENT (OUTPATIENT)
Dept: CALL CENTER | Facility: HOSPITAL | Age: 66
End: 2021-05-11

## 2021-05-11 NOTE — OUTREACH NOTE
Prep Survey      Responses   [facility] facility patient discharged from?  [place]   Is LACE score < 7 ?  No   Emergency Room discharge w/ pulse ox?  No   Eligibility  Readm Mgmt   Discharge diagnosis  Lung noduleleft video-assisted thoracoscopy with wedge resection and intercostal nerve block.   Does the patient have one of the following disease processes/diagnoses(primary or secondary)?  Cardiothoracic surgery   Does the patient have Home health ordered?  No   Is there a DME ordered?  No   Prep survey completed?  Yes          Yaneli Hernandez RN

## 2021-05-18 ENCOUNTER — READMISSION MANAGEMENT (OUTPATIENT)
Dept: CALL CENTER | Facility: HOSPITAL | Age: 66
End: 2021-05-18

## 2021-05-18 NOTE — OUTREACH NOTE
CT Surgery Week 2 Survey      Responses   [facility] patient discharged from?  [place]   Does the patient have one of the following disease processes/diagnoses(primary or secondary)?  Cardiothoracic surgery   Week 2 attempt successful?  No   Unsuccessful attempts  Attempt 1          Krysten Tipton RN

## 2021-05-21 ENCOUNTER — READMISSION MANAGEMENT (OUTPATIENT)
Dept: CALL CENTER | Facility: HOSPITAL | Age: 66
End: 2021-05-21

## 2021-05-21 NOTE — OUTREACH NOTE
CT Surgery Week 2 Survey      Responses   [facility] patient discharged from?  [place]   Does the patient have one of the following disease processes/diagnoses(primary or secondary)?  Cardiothoracic surgery   Week 2 attempt successful?  No   Unsuccessful attempts  Attempt 2          Katelyn Wang RN

## 2021-05-24 ENCOUNTER — OFFICE VISIT (OUTPATIENT)
Dept: SURGERY | Facility: CLINIC | Age: 66
End: 2021-05-24

## 2021-05-24 ENCOUNTER — HOSPITAL ENCOUNTER (OUTPATIENT)
Dept: GENERAL RADIOLOGY | Facility: HOSPITAL | Age: 66
Discharge: HOME | End: 2021-05-24
Admitting: NURSE PRACTITIONER

## 2021-05-24 VITALS
WEIGHT: 174 LBS | DIASTOLIC BLOOD PRESSURE: 62 MMHG | HEART RATE: 72 BPM | HEIGHT: 64 IN | BODY MASS INDEX: 29.71 KG/M2 | SYSTOLIC BLOOD PRESSURE: 122 MMHG | OXYGEN SATURATION: 94 %

## 2021-05-24 DIAGNOSIS — R91.1 LUNG NODULE: Primary | ICD-10-CM

## 2021-05-24 DIAGNOSIS — R91.1 LUNG NODULE: ICD-10-CM

## 2021-05-24 DIAGNOSIS — R06.02 SHORTNESS OF BREATH ON EXERTION: ICD-10-CM

## 2021-05-24 PROCEDURE — 99024 POSTOP FOLLOW-UP VISIT: CPT | Performed by: THORACIC SURGERY (CARDIOTHORACIC VASCULAR SURGERY)

## 2021-05-24 PROCEDURE — 71046 X-RAY EXAM CHEST 2 VIEWS: CPT

## 2021-06-03 NOTE — PROGRESS NOTES
"Chief Complaint  Lung Nodule    Subjective          Camelia Quintanilla presents to [facility] and follow-up for a left lower lobe lung nodule.  History of Present Illness  Ms. Quintanilla is a very pleasant 66-year-old lady who has a history of breast AAH diagnosed in 2019 status post lumpectomy.  Last year, she was seen and evaluated for dyspnea and chest pain and was found to have bilateral lung nodules.  She was recently seen in follow-up and a CT of the chest and PET CT scan were obtained.  Ms. Quintanilla is a never smoker.  She has been exposed to some secondhand smoke.  She has a family history of cancer and a brother with testicular cancer and a paternal aunt with breast cancer as well as her mother who had an unknown type of cancer.  She denies significant pulmonary symptoms although does have some mild dyspnea on exertion.    Ms. Chinchilla presents after undergoing a stress test and pulmonary function studies.  She has no new complaints today.  Objective   Vital Signs:   /73   Pulse 78   Temp 97.7 °F (36.5 °C) (Oral)   Resp 16   Ht 165.1 cm (65\")   Wt 79.2 kg (174 lb 11.2 oz)   SpO2 97% Comment: room air  BMI 29.07 kg/m²     Physical Exam  Vitals and nursing note reviewed.   Constitutional:       Appearance: She is well-developed.   HENT:      Head: Normocephalic and atraumatic.      Nose: Nose normal.   Eyes:      Conjunctiva/sclera: Conjunctivae normal.   Cardiovascular:      Rate and Rhythm: Normal rate.   Pulmonary:      Effort: Pulmonary effort is normal.   Abdominal:      Palpations: Abdomen is soft.   Musculoskeletal:      Cervical back: Neck supple.   Skin:     General: Skin is warm and dry.   Neurological:      Mental Status: She is alert and oriented to person, place, and time.   Psychiatric:         Behavior: Behavior normal.         Thought Content: Thought content normal.         Judgment: Judgment normal.        Result Review :       Data reviewed: Radiologic " studies :       I have independently reviewed the PET CT scan performed on 3/17/2021 which demonstrates a hypermetabolic 7 mm left lower lobe lung nodule.  There is bandlike consolidation throughout both lower lobes that is mildly PET avid.  There is a focus of hypermetabolism in the submandibular gland.  There is no significant mediastinal or hilar lymphadenopathy.  PFT: 1.43 or 63% of predicted for FEV1    Stress echo: LVEF 70%, normal myocardial perfusion     Assessment and Plan      Ms. Quintanilla is a pleasant 66-year-old lady with a hypermetabolic left lower lobe lung nodule that is concerning for bronchogenic malignancy in the setting of interstitial lung disease.  She would like to proceed with a left VATS wedge resection for diagnosis and possible completion lobectomy if this is indeed a bronchogenic malignancy. Risks and benefits of this procedure were discussed with the patient today including pneumonia, prolonged air leak and atrial fibrillaiton.    There are no diagnoses linked to this encounter.  I spent 42 minutes caring for Camelia on this date of service. This time includes time spent by me in the following activities:preparing for the visit, reviewing tests, obtaining and/or reviewing a separately obtained history, performing a medically appropriate examination and/or evaluation , counseling and educating the patient/family/caregiver, ordering medications, tests, or procedures and independently interpreting results and communicating that information with the patient/family/caregiver  Follow Up   No follow-ups on file.  Patient was given instructions and counseling regarding her condition or for health maintenance advice. Please see specific information pulled into the AVS if appropriate.

## 2021-06-15 ENCOUNTER — OFFICE VISIT (OUTPATIENT)
Dept: ONCOLOGY | Facility: CLINIC | Age: 66
End: 2021-06-15

## 2021-06-15 ENCOUNTER — LAB (OUTPATIENT)
Dept: LAB | Facility: HOSPITAL | Age: 66
End: 2021-06-15

## 2021-06-15 VITALS
OXYGEN SATURATION: 94 % | HEIGHT: 64 IN | WEIGHT: 176.3 LBS | RESPIRATION RATE: 16 BRPM | HEART RATE: 72 BPM | DIASTOLIC BLOOD PRESSURE: 48 MMHG | TEMPERATURE: 97.8 F | BODY MASS INDEX: 30.1 KG/M2 | SYSTOLIC BLOOD PRESSURE: 108 MMHG

## 2021-06-15 DIAGNOSIS — D50.0 IRON DEFICIENCY ANEMIA DUE TO CHRONIC BLOOD LOSS: ICD-10-CM

## 2021-06-15 DIAGNOSIS — N60.99 ATYPICAL DUCTAL HYPERPLASIA OF BREAST: Primary | ICD-10-CM

## 2021-06-15 DIAGNOSIS — N60.99 ATYPICAL DUCTAL HYPERPLASIA OF BREAST: ICD-10-CM

## 2021-06-15 DIAGNOSIS — D64.9 ANEMIA, UNSPECIFIED TYPE: ICD-10-CM

## 2021-06-15 LAB
ALBUMIN SERPL-MCNC: 4.2 G/DL (ref 3.5–5.2)
ALBUMIN/GLOB SERPL: 1.7 G/DL (ref 1.1–2.4)
ALP SERPL-CCNC: 65 U/L (ref 38–116)
ALT SERPL W P-5'-P-CCNC: 15 U/L (ref 0–33)
ANION GAP SERPL CALCULATED.3IONS-SCNC: 9.5 MMOL/L (ref 5–15)
AST SERPL-CCNC: 18 U/L (ref 0–32)
BASOPHILS # BLD AUTO: 0.01 10*3/MM3 (ref 0–0.2)
BASOPHILS NFR BLD AUTO: 0.2 % (ref 0–1.5)
BILIRUB SERPL-MCNC: 0.4 MG/DL (ref 0.2–1.2)
BUN SERPL-MCNC: 18 MG/DL (ref 6–20)
BUN/CREAT SERPL: 20 (ref 7.3–30)
CALCIUM SPEC-SCNC: 9.1 MG/DL (ref 8.5–10.2)
CHLORIDE SERPL-SCNC: 109 MMOL/L (ref 98–107)
CO2 SERPL-SCNC: 27.5 MMOL/L (ref 22–29)
CREAT SERPL-MCNC: 0.9 MG/DL (ref 0.6–1.1)
DEPRECATED RDW RBC AUTO: 50.5 FL (ref 37–54)
EOSINOPHIL # BLD AUTO: 0 10*3/MM3 (ref 0–0.4)
EOSINOPHIL NFR BLD AUTO: 0 % (ref 0.3–6.2)
ERYTHROCYTE [DISTWIDTH] IN BLOOD BY AUTOMATED COUNT: 13.5 % (ref 12.3–15.4)
GFR SERPL CREATININE-BSD FRML MDRD: 63 ML/MIN/1.73
GLOBULIN UR ELPH-MCNC: 2.5 GM/DL (ref 1.8–3.5)
GLUCOSE SERPL-MCNC: 102 MG/DL (ref 74–124)
HCT VFR BLD AUTO: 41.5 % (ref 34–46.6)
HGB BLD-MCNC: 13.2 G/DL (ref 12–15.9)
IMM GRANULOCYTES # BLD AUTO: 0.01 10*3/MM3 (ref 0–0.05)
IMM GRANULOCYTES NFR BLD AUTO: 0.2 % (ref 0–0.5)
LYMPHOCYTES # BLD AUTO: 1.33 10*3/MM3 (ref 0.7–3.1)
LYMPHOCYTES NFR BLD AUTO: 26.8 % (ref 19.6–45.3)
MCH RBC QN AUTO: 32.4 PG (ref 26.6–33)
MCHC RBC AUTO-ENTMCNC: 31.8 G/DL (ref 31.5–35.7)
MCV RBC AUTO: 101.7 FL (ref 79–97)
MONOCYTES # BLD AUTO: 0.42 10*3/MM3 (ref 0.1–0.9)
MONOCYTES NFR BLD AUTO: 8.5 % (ref 5–12)
NEUTROPHILS NFR BLD AUTO: 3.2 10*3/MM3 (ref 1.7–7)
NEUTROPHILS NFR BLD AUTO: 64.3 % (ref 42.7–76)
NRBC BLD AUTO-RTO: 0 /100 WBC (ref 0–0.2)
PLATELET # BLD AUTO: 238 10*3/MM3 (ref 140–450)
PMV BLD AUTO: 9.9 FL (ref 6–12)
POTASSIUM SERPL-SCNC: 4.2 MMOL/L (ref 3.5–4.7)
PROT SERPL-MCNC: 6.7 G/DL (ref 6.3–8)
RBC # BLD AUTO: 4.08 10*6/MM3 (ref 3.77–5.28)
SODIUM SERPL-SCNC: 146 MMOL/L (ref 134–145)
WBC # BLD AUTO: 4.97 10*3/MM3 (ref 3.4–10.8)

## 2021-06-15 PROCEDURE — 85025 COMPLETE CBC W/AUTO DIFF WBC: CPT

## 2021-06-15 PROCEDURE — 99215 OFFICE O/P EST HI 40 MIN: CPT | Performed by: INTERNAL MEDICINE

## 2021-06-15 PROCEDURE — 36415 COLL VENOUS BLD VENIPUNCTURE: CPT

## 2021-06-15 PROCEDURE — 80053 COMPREHEN METABOLIC PANEL: CPT

## 2021-06-15 RX ORDER — ATORVASTATIN CALCIUM 40 MG/1
40 TABLET, FILM COATED ORAL DAILY
COMMUNITY
Start: 2021-06-09

## 2021-06-15 RX ORDER — LETROZOLE 2.5 MG/1
2.5 TABLET, FILM COATED ORAL DAILY
COMMUNITY
End: 2022-03-01

## 2021-06-15 RX ORDER — CELECOXIB 200 MG/1
200 CAPSULE ORAL DAILY
COMMUNITY

## 2021-06-15 RX ORDER — RALOXIFENE HYDROCHLORIDE 60 MG/1
60 TABLET, FILM COATED ORAL DAILY
Qty: 30 TABLET | Refills: 4 | Status: SHIPPED | OUTPATIENT
Start: 2021-06-15 | End: 2021-11-22

## 2021-06-15 RX ORDER — AMIODARONE HYDROCHLORIDE 200 MG/1
200 TABLET ORAL DAILY
COMMUNITY
Start: 2021-06-02

## 2021-06-15 NOTE — PROGRESS NOTES
Subjective     REASON FOR FOLLOW UP:  1.  Focal atypical ductal hyperplasia of the right breast who was to be scheduled for right breast lumpectomy,    · Pathology from July 22, 2019 right breast biopsies in the office.  · Right breast 8:00, 4 cm from the nipple returned as focal atypical duct hyperplasia involving a single gland, 0.22 mm maximally.  Ramone cyst, sclerosing adenosis, fibroadenomatoid hyperplasia, associated micro calcifications.  · Right breast 11:00, 3 cm from the nipple, tender to palpation, no exam correlate, 1.6 cm on ultrasound, BI-RADS 4.Pathology from July 22, 2019 right breast biopsies in the office.Right breast 11:00, 3 center meter from the nipple, duct ectasia, apocrine metaplasia, sclerosing adenosis, usual duct hyperplasia, associated micro calcifications      2.  MRI showed a lesion in the left breast which was atypical and patient underwent ultrasound-guided biopsy of the left breast nodule.   LEFT breast 3:00 RA- seen on MRI done for ADH- US correlate 8mm, BR4-  Biopsied 8-2019- Pathology from her left breast in office biopsy August 13, 2019 returned as sclerosing adenosis, fibroadenomatous change, usual hyperplasia.  No atypia.       3.  September 12, 20 19, Arimidex started, patient had severe headaches like migraine headaches with Arimidex and subsequently switched to  ·  Femara started on 10/25/19.  Tolerating well.  · 6/15/2021: Patient with stiffness and arthralgias secondary to Femara.  · We will switch patient to Evista as patient does not want to consider tamoxifen because of side effects    4.  CT scan showed evidence of bilateral lung nodule CT done 3/26/2021 showed bandlike opacities in bilateral lower lobes which are pleural-based.  Medial to the biopsy-like opacity in the left lower lobe is a 1 cm noncalcified pulmonary nodule previously measured 0.7 cm on the prior examination on 3/7/2021.  This was associated with hypermetabolic activity on the PET CT study.  The  nodule was enlarged since prior exam.  PET/CT was performed on 3/17/2021.  Low-level activity was seen in the peripheral portion of the bandlike atelectasis in both lower lobes.  There is hypermetabolic 7 mm pleural-based nodule in the medial aspect of the left lower lobe.  There is a focus of hypermetabolic activity in the right submandibular gland of uncertain etiology.  This could be sialoadenitis.  Repeat CT suggested in 3 months.  · ENT Dr. Cramer evaluated 5/26/2021 and felt no concern on the CT neck and his neck exam  · Patient was seen by thoracic surgery and underwent VATS procedure.  May 2021 and the left lower lobe lung wedge resection was benign.  It was showing acute bronchitis but no malignancy      HISTORY OF PRESENT ILLNESS:  The patient is a 66 y.o. year old female who is here for an opinion about the above issue.  Patient is a 65-year-old female with history of atypical ductal hyperplasia s/p surgery of the right breast followed by Dr. Collier.  We had placed her on Arimidex on September 12, 2019 but patient had migraine-like headaches and hence switched to Femara and she is tolerating it very well.  She does have history of rheumatoid arthritis and her joint pains are slightly worse but she thinks she is doing reasonably well with Femara and she is not worried about the slight worsening of her joint pains.  She also has very minimal hot flashes.  She has not gained weight and she is trying to exercise.    She has appointment with Dr. Collier in November following MRI of the breast as well as screening mammogram.  Patient has been doing self breast exam and denies any breast mass, nipple discharge or skin changes or axillary adenopathy.    She has osteoporosis for which she is on Fosamax.  She takes it once a week.  She seems to be tolerating it well.  She does not want to go on Prolia injections and it is reasonable to continue Fosamax.  She also knows to take calcium 600 mg twice a day and  vitamin D 1000 international units daily.    Interval history:  Patient underwent resection of the left lower lobe lung nodule by wedge resection by Dr. Ingrid Nguyen and this was on 5/8/2021 and pathology is benign.  Also evaluated by ENT for the neck nodule which on ENT exam was negative and follow with observation.    Shunt is currently on Evista and tolerating.    ONCOLOGIC HISTORY:  Patient is a 64 year-old female with history of atypical duct hyperplasia diagnosed back in July 2019 and was done on the right breast biopsy.  More recently Dr. Collier took her on August 29, 2019 for right needle localization lumpectomy but pathology is negative and there is no residual atypical hyperplasia present.    Patient was referred here for chemoprophylaxis given history of atypical duct hyperplasia.  Patient also has been arranged for MRI as well as mammogram in 2020.  She has been asked for self breast exam.  In the meantime we will start her on chemoprophylaxis now that we have the lumpectomy specimen, we will start aromatase inhibitor.      Oncologic history:     Patient is a 64-year-old female who underwent mammogram at [facility] in September 2018 and was asked to repeat diagnostic mammogram and ultrasound in 6 months    September 10, 2018  Findings  Breast is heterogeneously dense.  There is a small nodule projecting in the left breast centrally.  Small superficial nodule left lateral subareolar location stable.  Small nodule has also developed in the outer hemisphere right breast middle third.  Both nodules are imaged.  Larger nodule in the more central aspect of right breast containing a single coarse calcification appears stable.  Additional bilateral lateral breast imaging is recommended to include true lateral projections.  Suggested spot compression views and targeted breast ultrasound.    October 18, 2018:  Bilateral digital diagnostic mammogram and diagnostic bilateral ultrasound done  Impression:  Probable benign mammogram  1.  Benign-appearing simple cyst in the deep central left breast corresponding to the new mammographic density.  This can be followed up with yearly mammograms.  2.  Solid appearing nodule in the right breast along 8:00 which represented the developing nodular density on mammography and has likely been stable for several years.  This is favored to represent probably a benign fibroadenoma and can be followed up with right breast mammogram and ultrasound in 6 months to ensure stability.    6/27/2019: Diagnostic right digital mammogram and diagnostic right breast ultrasound  Right breast mass 8 to 9 o'clock position, 6.6 cm from the nipple, stable with circumscribed nodular lobular margins measuring 1.1 cm AP x0.7 cm.    Right breast focal asymmetry 10:00 to 11 o'clock position, 5 cm from the nipple contains stable coarse calcification which appears to have become larger on only the right spot since October 2018.    Ultrasound right breast the solid and cystic mass in the right breast at 8 o'clock position at 4 cm from the nipple is stable measuring 0.4 x 0.5 x 1 cm.  On October 11, 2018 ultrasound measured 0.5 x 1 x 0.5 cm and appeared to contain solid components on today's exam the mass consists of solid components and 2 small cystic areas within without thin septations.  The right breast sonographic irregularity mostly hypoechoic area 11:00 at 3 cm from the nipple measured 0.7 x 1.1 x 1.6 cm.  On October 11, 2018 this area measured 0.9 x 1.8 x 1.5 cm.  There is a new solid mural nodule 0.4 x 0.7 cm.  The new nodule is concerning for possible malignancy.    Impression the right breast ultrasound mixed solid and cystic mass at 8 o'clock position, 4 cm from the nipple needs to be biopsied under ultrasound guidance.    2.  The breast right ultrasound calcified area at 11:00, 3 cm from the nipple needs to be biopsied under ultrasound guidance because of a new solid hypoechoic mural  nodule.    July 22, 2019: Pathology  Ultrasound-guided biopsy  Final Diagnosis   1.  Breast, Right 8 o'clock Position, 4 cm FN, Core Biopsy:                 A.  Focal atypical duct hyperplasia involving a single gland, 0.22 mm maximally.               B.  Clustered cysts, sclerosing adenosis, and fibroadenomatoid hyperplasia with associated microcalcifications.       2.  Breast, Right 11 o'clock Position, 3 cm FN, Core Biopsy:                A.  Duct ectasia, apocrine metaplasia, sclerosing adenosis, and usual duct hyperplasia with associated                     microcalcifications.      August 7, 2019: Bilateral breast MRI  Artifact from a biopsy marking clip upper outer quadrant right breast.  No abnormal enhancement adjacent to the clips.  There is a 9 mm diameter nodule lateral retroareolar left breast immediately beneath the skin surface most likely an intramammary lymph node or fibroadenoma.  Further evaluation with ultrasound is recommended.  BI-RADS 0.     Right mammogram same day shows 2 new marking clips upper outer quadrant both are spring shaped.  No underlying mammographic abnormality.    August 12, 2019: Left breast ultrasound, retroareolar left breast there is microlobulated hypoechoic mass that measures 8 mm ultrasound-guided core biopsy recommended.    Patient underwent left breast ultrasound-guided biopsy by Dr. Collier as of August 13, 2019.    Patient was to have surgery on the right breast but because of this new lesion on the left breast that was seen on MRI she had to undergo biopsy first of the right breast lesion prior to surgery.    Patient is to hear from Dr. Collier about the surgery date    Obstetric/Gynecologic History:  Age menstrual periods began: 13  Patient is postmenopausal due to removal of her uterus and both ovaries in the following year: 2538-3075   Number of pregnancies:1  Number of live births: 1  Number of abortions or miscarriages: 0  Age of delivery of first child:  21  Patient did not breast feed.  Length of time taking birth control pills: 4 yrs   Patient took hormone replacement during the following dates:  10 yrs  Patient had uterus and ovaries removed.     September 12, 2019: Started Arimidex but had severe migraine headaches.    Femara started on 10/25/19.    CT scan which showed evidence of small lung nodules.  There was basilar areas of atelectasis along with a right lower lobe 1.2 x 0.9 cm nodule and multiple pulmonary nodules in the right lower lobe.  PET/CT was suggested .  Patient saw pulmonary Dr. Chandler.    PET scan was obtained which showed low-level activity along the peripheral portion of the bands of atelectasis with activity level of 4.4.  There is a 8 mm nodular opacity on the left which is photopenic.  There is a 7 mm pleural-based nodular density in the left lower lobe with an activity of 4.7.  There is a subcentimeter focus of hypermetabolic activity in the right submandibular gland with a maximal SUV of 5.5.    Radiologist suggests 3 months follow-up CT scan.  Also there is a focal the focus of hypermetabolic activity in the right submandibular gland is of uncertain etiology.  Clinically does not palpable.  Patient is not symptomatic.  Abdomen pelvis is negative.  She is not a smoker.    We will have ENT evaluate this patient for the submandibular gland, otherwise we will repeat a CT scan of the neck and chest in 3 months.    Past Medical History:   Diagnosis Date   • Asthma    • Atypical ductal hyperplasia of right breast 07/2019   • Bronchiectasis (CMS/HCC)    • CAD (coronary artery disease)    • Depression with anxiety 9/21/2016   • Disease of thyroid gland     HYPOTHYROIDISM   • Elevated cholesterol    • Fibrocystic breast    • Fibromyalgia syndrome 6/6/2017   • GERD (gastroesophageal reflux disease)    • H/O Third nerve palsy of right eye 2012   • Heart palpitations    • HLD (hyperlipidemia) 9/21/2016   • Hypertension    • Hypothyroidism    • IBS  (irritable bowel syndrome)    • Low back pain    • Lung nodule     RIGHT LOWER LOBE   • Moderate persistent asthma without complication 6/6/2017   • Osteoporosis    • PONV (postoperative nausea and vomiting)    • Rheumatoid arthritis (CMS/HCC)    • Salivary gland swelling     RIGHT-CT SCAN 5/5/21   • Scoliosis    • SOB (shortness of breath) on exertion    • Vitamin D deficiency 9/21/2016        Past Surgical History:   Procedure Laterality Date   • ABDOMINAL HERNIA REPAIR     • APPENDECTOMY     • BREAST BIOPSY      right and left benign 10-15 yrs ago   • BREAST LUMPECTOMY Right 8/29/2019    Procedure: right breast needle localized lumpectomy for atypical duct hyperplasia.;  Surgeon: Giovanna Collier MD;  Location: [facility] OR [facility];  Service: General   • BRONCHOSCOPY N/A 5/11/2018    Procedure: BRONCHOSCOPY;  Surgeon: Emma Blakely MD;  Location: [facility] ENDOSCOPY;  Service: Pulmonary   • CARDIAC CATHETERIZATION     • CATARACT EXTRACTION WITH INTRAOCULAR LENS IMPLANT Bilateral    • CHOLECYSTECTOMY     • COLONOSCOPY  2015   • COLONOSCOPY  2020   • ENDOSCOPY  2015   • EPIDURAL BLOCK      LUMBAR   • HERNIA REPAIR     • HYSTERECTOMY     • KNEE ARTHROSCOPY Bilateral    • OOPHORECTOMY     • THORACOSCOPY VIDEO ASSISTED WITH LOBECTOMY Left 5/7/2021    Procedure: LEFT VIDEO ASSISTED THORACOSCOPYwith wedge resection, INTERCOSTAL NERVE BLOCK;  Surgeon: Ingrid Ch MD;  Location: [facility] MAIN OR;  Service: Thoracic;  Laterality: Left;   • TONSILLECTOMY     • WRIST FUSION Right         Current Outpatient Medications on File Prior to Visit   Medication Sig Dispense Refill   • albuterol sulfate  (90 Base) MCG/ACT inhaler Inhale 2 puffs Every 4 (Four) Hours As Needed for Wheezing.     • alendronate (FOSAMAX) 70 MG tablet TAKE 1 TABLET BY MOUTH ONCE WEEKLY BEFORE BREAKFAST, ON AN EMPTY STOMACH: REMAIN UPRIGHT FOR ONE HOUR:TAKE WITH 8 OUNCES OF WATER (Patient taking differently: Take 70 mg by mouth Every 7 (Seven) Days.  SUNDAY) 4 tablet 0   • amiodarone (PACERONE) 200 MG tablet Take 200 mg by mouth Daily.     • amLODIPine (NORVASC) 5 MG tablet Take 1 tablet by mouth Every Night.     • apixaban (ELIQUIS) 5 MG tablet tablet Take 1 tablet by mouth Every 12 (Twelve) Hours. 60 tablet 0   • atorvastatin (LIPITOR) 40 MG tablet Take 40 mg by mouth Daily.     • baclofen (LIORESAL) 10 MG tablet Take 10 mg by mouth As Needed.     • budesonide-formoterol (SYMBICORT) 160-4.5 MCG/ACT inhaler Inhale 2 puffs Daily.     • Calcium Carbonate-Vit D-Min (Calcium 1200) 0382-0657 MG-UNIT chewable tablet Chew 1,000 mg As Needed.     • celecoxib (CeleBREX) 200 MG capsule Take 200 mg by mouth Daily.     • clotrimazole-betamethasone (LOTRISONE) 1-0.05 % cream Apply 1 application topically to the appropriate area as directed 2 (Two) Times a Day As Needed.     • Diclofenac Sodium (VOLTAREN) 1 % gel gel Apply 1 g topically to the appropriate area as directed 4 (Four) Times a Day. UP TO 4 TIMES DAILY     • FASENRA 30 MG/ML solution prefilled syringe Every 2 (Two) Months. EVERY 2 MONTHS  ASTHMA  1   • furosemide (LASIX) 20 MG tablet Take 20 mg by mouth As Needed.     • gabapentin (NEURONTIN) 300 MG capsule Take 600 mg by mouth Every Night.     • HYDROcodone-acetaminophen (NORCO)  MG per tablet Take 1 tablet by mouth 2 (Two) Times a Day.     • hydroxychloroquine (PLAQUENIL) 200 MG tablet Take 200 mg by mouth 2 (Two) Times a Day. RA     • hyoscyamine (LEVSIN) 0.125 MG SL tablet Dissolve one Tablet Under The Toungue Every 6  Hours as Needed for cramping (Patient taking differently: Take 0.125 mg by mouth Every 6 (Six) Hours As Needed. Dissolve one Tablet Under The Toungue Every 6  Hours as Needed for cramping) 30 tablet 3   • isosorbide mononitrate (IMDUR) 120 MG 24 hr tablet Take 120 mg by mouth Daily.     • letrozole (FEMARA) 2.5 MG tablet Take 2.5 mg by mouth Daily.     • levothyroxine (SYNTHROID, LEVOTHROID) 88 MCG tablet Take 88 mcg by mouth Daily.     •  metoprolol succinate XL (TOPROL-XL) 25 MG 24 hr tablet Take 25 mg by mouth Every Night.     • nitroglycerin (NITROSTAT) 0.4 MG SL tablet Place 0.4 mg under the tongue every 5 (five) minutes.     • nystatin (MYCOSTATIN) 049297 UNIT/ML suspension Swish and swallow 500,000 Units As Needed. INHALERS     • oxyCODONE (Roxicodone) 5 MG immediate release tablet Take 1 tablet by mouth Every 4 (Four) Hours As Needed for Moderate Pain . 30 tablet 0   • pantoprazole (PROTONIX) 40 MG EC tablet TAKE ONE TABLET BY MOUTH ONCE NIGHTLY (Patient taking differently: Take 40 mg by mouth Every Night.) 30 tablet 0   • polyethylene glycol (MIRALAX) packet Take 17 g by mouth Every Night.     • ranolazine (RANEXA) 1000 MG 12 hr tablet Take 1,000 mg by mouth Every 12 (Twelve) Hours.     • SPIRIVA RESPIMAT 1.25 MCG/ACT aerosol solution inhaler Inhale 1 puff Every Morning.     • vitamin D (ERGOCALCIFEROL) 06152 UNITS capsule capsule Take 50,000 Units by mouth Every 7 (Seven) Days. SUNDAY     • albuterol (PROVENTIL) (2.5 MG/3ML) 0.083% nebulizer solution Take 2.5 mg by nebulization Every 6 (Six) Hours As Needed for Wheezing (asthma).     • [DISCONTINUED] atorvastatin (LIPITOR) 20 MG tablet Take 20 mg by mouth Every Night.       No current facility-administered medications on file prior to visit.        ALLERGIES:    Allergies   Allergen Reactions   • Adhesive Tape Swelling     Must use paper tape   • Morphine And Related Hives and Itching   • Nickel Rash      Patient is disabled, she is to work at [employer] in the past.  She does not smoke or drink alcohol.    Social History     Socioeconomic History   • Marital status:      Spouse name: Zbigniew   • Number of children: Not on file   • Years of education: High school   • Highest education level: Not on file   Tobacco Use   • Smoking status: Never Smoker   • Smokeless tobacco: Never Used   Vaping Use   • Vaping Use: Never used   Substance and Sexual Activity   • Alcohol use: No   •  Drug use: No   • Sexual activity: Defer        Family History   Problem Relation Age of Onset   • COPD Mother    • Cancer Mother         heart tumor   • Heart disease Mother    • COPD Father    • Hypertension Father    • No Known Problems Sister    • Cancer Brother         testicular   • Breast cancer Paternal Aunt         unknown age    • Heart disease Maternal Grandmother    • Malig Hyperthermia Neg Hx      History is consistent with chest wall cancer in mother who  of cancer at age 68, unsure what type.  Brother had testicular cancer at age 2 but is now 59 and in good health.  Maternal aunt with history of breast cancer in her 40s and  from it    Review of Systems   Constitutional: Negative for appetite change, chills, diaphoresis, fatigue, fever and unexpected weight change.   HENT: Negative for hearing loss, sore throat and trouble swallowing.    Respiratory: Negative for cough, chest tightness, shortness of breath and wheezing.    Cardiovascular: Negative for chest pain, palpitations and leg swelling.   Gastrointestinal: Negative for abdominal distention, abdominal pain, constipation, diarrhea, nausea and vomiting.   Genitourinary: Negative for dysuria, frequency, hematuria and urgency.   Musculoskeletal: Negative for joint swelling.        No muscle weakness.   Skin: Negative for rash and wound.   Neurological: Negative for seizures, syncope, speech difficulty, weakness, numbness and headaches.   Hematological: Negative for adenopathy. Does not bruise/bleed easily.   Psychiatric/Behavioral: Negative for behavioral problems, confusion and suicidal ideas.   All other systems reviewed and are negative.     Patient's joint pains have worsened with Femara  I have reviewed and confirmed the accuracy of the ROS as documented by the MA/YOANA/BEN Saldivar MD  I have reviewed and confirmed the accuracy of the ROS as documented by the MA/YOANA/BEN Saldiavr MD      Objective     Vitals:    06/15/21 0914  "  BP: 108/48   Pulse: 72   Resp: 16   Temp: 97.8 °F (36.6 °C)   TempSrc: Temporal   SpO2: 94%   Weight: 80 kg (176 lb 4.8 oz)   Height: 162.6 cm (64.02\")   PainSc: 0-No pain      Current Status 6/15/2021   ECOG score 0       Physical Exam    .      This patient's ACP documentation is up to date, and there's nothing further left to document.    CONSTITUTIONAL:  Vital signs reviewed.    No distress, looks comfortable.  EYES:  Conjunctiva and lids unremarkable.  Extraocular eye movements intact.  EARS,NOSE,MOUTH,THROAT:  Ears and nose appear unremarkable.  Lips, teeth, gums appear unremarkable.  RESPIRATORY:  Normal respiratory effort.    CARDIOVASCULAR: Regular rate rhythm, no murmur  BREAST: Right breast: No skin changes, no evidence of breast mass, no nipple discharge, no evidence of any right axillary adenopathy or right supraclavicular adenopathy  Left breast: No evidence of any skin changes, no evidence of any left breast mass and no evidence of left nipple discharge as well as no left axillary adenopathy or left supraclavicular adenopathy.  No significant lower extremity edema.  SKIN: No wounds.  No rashes.  MUSCULOSKELETAL/EXTREMITIES: No clubbing or cyanosis.  No apparent unilateral weakness.  NEURO: CN 2-12 appear intact. No focal neurological deficits noted.  PSYCHIATRIC:  Normal judgment and insight.  Normal mood and affect.  I have reexamined the patient and the results are consistent with the previously documented exam. Kayla Saldivar MD       RECENT LABS:  Hematology WBC   Date Value Ref Range Status   06/15/2021 4.97 3.40 - 10.80 10*3/mm3 Final   01/07/2021 4.81 4.5 - 11.0 10*3/uL Final     RBC   Date Value Ref Range Status   06/15/2021 4.08 3.77 - 5.28 10*6/mm3 Final   01/07/2021 4.29 4.0 - 5.2 10*6/uL Final     Hemoglobin   Date Value Ref Range Status   06/15/2021 13.2 12.0 - 15.9 g/dL Final   01/07/2021 12.7 12.0 - 16.0 g/dL Final     Hematocrit   Date Value Ref Range Status   06/15/2021 41.5 34.0 - " 46.6 % Final   01/07/2021 41.6 36.0 - 46.0 % Final     Platelets   Date Value Ref Range Status   06/15/2021 238 140 - 450 10*3/mm3 Final   01/07/2021 228 140 - 440 10*3/uL Final         Assessment/Plan      1.  Focal atypical ductal hyperplasia of the right breast at 8 o'clock position.   Right breast, 8 o'clock position, 4 cm from the nipple, path returned as focal atypical duct hyperplasia involving the single gland, 0.22 mm.sclerosing adenosis, fibroadenomatoid hyperplasia, associated micro calcifications.  · August 29, 2019 patient underwent needle localization lumpectomy on the right breast and there was no evidence of any atypical hyperplasia.  Reviewed the pathology.  · Patient has been referred here for chemoprophylaxis and discussed in length the side effects of Arimidex.  · This was switched to Evista because of arthralgias and significant stiffness       Right breast, 11 o'clock position, 3 cm from the nipple 1.6 cm on ultrasound.  Pathology from July 2019 right breast biopsies  duct ectasia, apocrine metaplasia, sclerosing adenosis, usual duct hyperplasia, associated micro calcifications    2.  Left breast, 3 o'clock position, MRI done shows abnormality, 9 mm nodular nodule lateral retroareolar left breast immediately beneath the skin surface most likely intramammary lymph node or fibroadenoma.  Ultrasound recommended.  August 12, 2019 left breast ultrasound: Retroareolar left breast, 3 o'clock position, microlobulated hypoechoic mass which measures 8 mm.  Ultrasound-guided biopsy done on August 13, 2019.  Results pending.    · Patient is now awaiting surgery by Dr. Collier on both breasts.  · Risk assessment done based on family history, reproductive history and recent atypical biopsy.  The 5-year breast cancer risk is 4.2%.  And lifetime breast cancer risk is 17 to 31%.  · Patient has been referred here but given that she is undergoing surgery we would need to await the path on surgery prior to making  any final recommendations.  · If only atypical duct hyperplasia present then certainly she could be a candidate for chemoprophylaxis.  But given that she has not yet had surgery, she tells me she is undergoing lumpectomy , and await surgery prior to making any recommendation  · August 29, 2019 underwent right needle localization lumpectomy and pathology on this is benign  · September 12, 2019: Started Arimidex.  Developed severe migraine headaches.  · October 12, 20 19: Started on Femara.  Tolerating well, except mild arthralgias.  · March 19, 2021: Patient's arthralgias worsened.  We will need to discontinue Femara.  I have reviewed the results of both MRI of the breast and mammogram from November 2020 and it is negative.  · I explained to the patient that given a very small focal atypical ductal hyperplasia really surveillance with MRI and mammogram would be reasonable and we will discontinue Femara  · Started Evista    3.  History of fibromyalgia, followed by kristopher Allen     4.  Atypical angina, cardiac cath negative followed by Dr. Jett    5.  Rheumatoid arthritis followed by Dr. Keller on Celebrex and Plaquenilkristopher    6. Osteoporosis of the lumbar spine    · Currently on Fosamax.  Discussed Prolia as well but patient prefers oral bisphosphonates.  · Continue calcium and vitamin D supplements    7.  Lung nodule: Patient states that she had a CT angiogram of the chest done which showed atelectasis at the bases and right lower lobe lung nodules and there was lesion 1.2 x 0.8 cm in the right lower lobe.  · Patient was seen by pulmonary who ordered PET scan  · I have reviewed both CT scan and PET scan with the patient in length.  There is evidence of small submandibular gland activity of 5.5 but clinically it is not palpable.  The nodules in the lung are very small and 3-month follow-up CT scan suggested  · Patient has follow-up with pulmonary next week  · We will obtain CT abdomen pelvis in 1  week.  · Refer to ENT for evaluation of the submandibular gland uptake by PET scan  · S/p wedge resection by Dr. Ingrid Nguyen 5/8/2021 and pathology is benign  · ENT exam of the neck was benign    Plan:  · Reviewed the surgical pathology from her thoracic wedge resection and pathology is benign  · Reviewed records from Dr. Cramer's office ENT and there is no concern of neck node  · Continue Evista.  Arimidex as well as Femara were discontinued because of arthralgias  · Patient is scheduled both for the mammogram and MRI of the breast as of November 2021 with follow-up with ROSANA Adames in November 2021  · Follow-up with me in 6 months with labs    40 min spent reviewing the records, discussing with patient and 50% of the time in coordination of care and dictation    Kayla Saldivar MD       CC: Emma Medina MD David Sun, MD Reid Brown, MD Gerard Siciliano, MD Mehdi Poorkay, MD Amir Piracha, MD Rinkoo Aggarwal, MD Gary Crump, MD Robert Sasser, MD

## 2021-07-06 ENCOUNTER — ANESTHESIA (OUTPATIENT)
Dept: PAIN MEDICINE | Facility: HOSPITAL | Age: 66
End: 2021-07-06

## 2021-07-06 ENCOUNTER — ANESTHESIA EVENT (OUTPATIENT)
Dept: PAIN MEDICINE | Facility: HOSPITAL | Age: 66
End: 2021-07-06

## 2021-08-04 ENCOUNTER — TELEPHONE (OUTPATIENT)
Dept: ONCOLOGY | Facility: CLINIC | Age: 66
End: 2021-08-04

## 2021-08-04 NOTE — TELEPHONE ENCOUNTER
Caller: JOANNE    Relationship to patient: PATIENT    Best call back number: [phone]     Type of visit: LAB AND FOLLOW UP     Requested date: WEEK OF 08/30 OR THE WEEK OF 09/13, PREFERS AFTER 10AM    If rescheduling, when is the original appointment: 09/07

## 2021-08-25 ENCOUNTER — HOSPITAL ENCOUNTER (OUTPATIENT)
Dept: GENERAL RADIOLOGY | Facility: HOSPITAL | Age: 66
Discharge: HOME | End: 2021-08-25

## 2021-08-25 ENCOUNTER — HOSPITAL ENCOUNTER (OUTPATIENT)
Dept: PAIN MEDICINE | Facility: HOSPITAL | Age: 66
Discharge: HOME | End: 2021-08-25

## 2021-08-25 DIAGNOSIS — R52 PAIN: ICD-10-CM

## 2021-08-25 RX ORDER — LIDOCAINE HYDROCHLORIDE 10 MG/ML
1 INJECTION, SOLUTION INFILTRATION; PERINEURAL ONCE
Status: DISCONTINUED | OUTPATIENT
Start: 2021-08-25 | End: 2021-08-26 | Stop reason: HOSPADM

## 2021-08-25 RX ORDER — BUPIVACAINE HYDROCHLORIDE 5 MG/ML
30 INJECTION, SOLUTION EPIDURAL; INTRACAUDAL ONCE
Status: DISCONTINUED | OUTPATIENT
Start: 2021-08-25 | End: 2021-08-26 | Stop reason: HOSPADM

## 2021-08-25 RX ORDER — MIDAZOLAM HYDROCHLORIDE 1 MG/ML
1 INJECTION INTRAMUSCULAR; INTRAVENOUS
Status: DISCONTINUED | OUTPATIENT
Start: 2021-08-25 | End: 2021-08-26 | Stop reason: HOSPADM

## 2021-08-25 RX ORDER — FENTANYL CITRATE 50 UG/ML
50 INJECTION, SOLUTION INTRAMUSCULAR; INTRAVENOUS AS NEEDED
Status: DISCONTINUED | OUTPATIENT
Start: 2021-08-25 | End: 2021-08-26 | Stop reason: HOSPADM

## 2021-08-25 RX ORDER — METHYLPREDNISOLONE ACETATE 80 MG/ML
80 INJECTION, SUSPENSION INTRA-ARTICULAR; INTRALESIONAL; INTRAMUSCULAR; SOFT TISSUE ONCE
Status: DISCONTINUED | OUTPATIENT
Start: 2021-08-25 | End: 2021-08-26 | Stop reason: HOSPADM

## 2021-08-30 ENCOUNTER — OFFICE VISIT (OUTPATIENT)
Dept: SURGERY | Facility: CLINIC | Age: 66
End: 2021-08-30

## 2021-08-30 ENCOUNTER — TELEPHONE (OUTPATIENT)
Dept: ONCOLOGY | Facility: CLINIC | Age: 66
End: 2021-08-30

## 2021-08-30 VITALS
OXYGEN SATURATION: 95 % | WEIGHT: 175 LBS | DIASTOLIC BLOOD PRESSURE: 68 MMHG | BODY MASS INDEX: 29.88 KG/M2 | HEART RATE: 79 BPM | SYSTOLIC BLOOD PRESSURE: 114 MMHG | HEIGHT: 64 IN

## 2021-08-30 DIAGNOSIS — R91.1 LUNG NODULE: Primary | ICD-10-CM

## 2021-08-30 PROCEDURE — 99213 OFFICE O/P EST LOW 20 MIN: CPT | Performed by: THORACIC SURGERY (CARDIOTHORACIC VASCULAR SURGERY)

## 2021-08-30 RX ORDER — LEVOTHYROXINE SODIUM 0.07 MG/1
TABLET ORAL
COMMUNITY
Start: 2021-06-21

## 2021-08-30 NOTE — PROGRESS NOTES
"Chief Complaint  Lung nodule  Subjective          Camelia Quintanilla presents to [facility] THORAC[facility]ERY in follow-up.  History of Present Illness  Ms. 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  She has recovered well from her procedure except for some continued atrial fibrillation.  She does have complaints of fatigue and is generally not feeling well secondary to atrial fibrillation.  She is going to have a cardioversion in the near future.  Objective   Vital Signs:   /68 (BP Location: Right arm, Patient Position: Sitting, Cuff Size: Adult)   Pulse 79   Ht 162.6 cm (64.02\")   Wt 79.4 kg (175 lb)   SpO2 95%   BMI 30.02 kg/m²     Physical Exam  Vitals and nursing note reviewed.   Constitutional:       Appearance: She is well-developed.   HENT:      Head: Normocephalic and atraumatic.      Nose: Nose normal.   Eyes:      Conjunctiva/sclera: Conjunctivae normal.   Cardiovascular:      Rate and Rhythm: Normal rate.   Pulmonary:      Effort: Pulmonary effort is normal.   Abdominal:      Palpations: Abdomen is soft.   Musculoskeletal:      Cervical back: Neck supple.   Skin:     General: Skin is warm and dry.   Neurological:      Mental Status: She is alert and oriented to person, place, and time.   Psychiatric:         Behavior: Behavior normal.         Thought Content: Thought content normal.         Judgment: Judgment normal.        Result Review :                 Assessment and Plan      Ms. 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  Unfortunately, she did not have a CT scan prior to her visit.  We will plan to obtain a CT of the chest in the near future and I will plan to call her with the results of the study.    She is going to undergo cardioversion for her continued atrial " fibrillation.  Diagnoses and all orders for this visit:    1. Lung nodule (Primary)      I spent 20 minutes caring for Camelia on this date of service. This time includes time spent by me in the following activities:preparing for the visit, reviewing tests, obtaining and/or reviewing a separately obtained history, performing a medically appropriate examination and/or evaluation , counseling and educating the patient/family/caregiver, ordering medications, tests, or procedures and documenting information in the medical record  Follow Up   No follow-ups on file.  Patient was given instructions and counseling regarding her condition or for health maintenance advice. Please see specific information pulled into the AVS if appropriate.

## 2021-08-30 NOTE — TELEPHONE ENCOUNTER
Provider: NARINDER    Caller:JOANNE    Relationship to Patient: SELF      Phone Number: [phone]    Reason for Call: PATIENT HAS CT ON 8/31 AT 9AM. PT ALSO HAS APPOINTMENT 8/31 1:30AM. PATIENT REQUEST 1:30AM MOVED UP TO 10 OR 10:30. PLEASE CALL PATIENT BACK TO ADVISE

## 2021-08-31 ENCOUNTER — TELEPHONE (OUTPATIENT)
Dept: ONCOLOGY | Facility: CLINIC | Age: 66
End: 2021-08-31

## 2021-08-31 ENCOUNTER — LAB (OUTPATIENT)
Dept: LAB | Facility: HOSPITAL | Age: 66
End: 2021-08-31

## 2021-08-31 ENCOUNTER — APPOINTMENT (OUTPATIENT)
Dept: LAB | Facility: HOSPITAL | Age: 66
End: 2021-08-31

## 2021-08-31 ENCOUNTER — HOSPITAL ENCOUNTER (OUTPATIENT)
Dept: CT IMAGING | Facility: HOSPITAL | Age: 66
Discharge: HOME | End: 2021-08-31
Admitting: THORACIC SURGERY (CARDIOTHORACIC VASCULAR SURGERY)

## 2021-08-31 ENCOUNTER — OFFICE VISIT (OUTPATIENT)
Dept: ONCOLOGY | Facility: CLINIC | Age: 66
End: 2021-08-31

## 2021-08-31 VITALS
DIASTOLIC BLOOD PRESSURE: 74 MMHG | RESPIRATION RATE: 18 BRPM | SYSTOLIC BLOOD PRESSURE: 115 MMHG | OXYGEN SATURATION: 93 % | TEMPERATURE: 97.3 F | HEART RATE: 77 BPM | WEIGHT: 175.1 LBS | HEIGHT: 64 IN | BODY MASS INDEX: 29.89 KG/M2

## 2021-08-31 DIAGNOSIS — R91.1 LUNG NODULE: Primary | ICD-10-CM

## 2021-08-31 DIAGNOSIS — Z79.810 USE OF RALOXIFENE (EVISTA): ICD-10-CM

## 2021-08-31 DIAGNOSIS — R91.1 LUNG NODULE: ICD-10-CM

## 2021-08-31 DIAGNOSIS — N60.99 ATYPICAL DUCTAL HYPERPLASIA OF BREAST: Primary | ICD-10-CM

## 2021-08-31 LAB
ALBUMIN SERPL-MCNC: 4.1 G/DL (ref 3.5–5.2)
ALBUMIN/GLOB SERPL: 1.7 G/DL (ref 1.1–2.4)
ALP SERPL-CCNC: 63 U/L (ref 38–116)
ALT SERPL W P-5'-P-CCNC: 14 U/L (ref 0–33)
ANION GAP SERPL CALCULATED.3IONS-SCNC: 9.5 MMOL/L (ref 5–15)
AST SERPL-CCNC: 20 U/L (ref 0–32)
BASOPHILS # BLD AUTO: 0.01 10*3/MM3 (ref 0–0.2)
BASOPHILS NFR BLD AUTO: 0.2 % (ref 0–1.5)
BILIRUB SERPL-MCNC: 0.6 MG/DL (ref 0.2–1.2)
BUN SERPL-MCNC: 19 MG/DL (ref 6–20)
BUN/CREAT SERPL: 18.6 (ref 7.3–30)
CALCIUM SPEC-SCNC: 9.1 MG/DL (ref 8.5–10.2)
CHLORIDE SERPL-SCNC: 108 MMOL/L (ref 98–107)
CO2 SERPL-SCNC: 26.5 MMOL/L (ref 22–29)
CREAT SERPL-MCNC: 1.02 MG/DL (ref 0.6–1.1)
DEPRECATED RDW RBC AUTO: 49.8 FL (ref 37–54)
EOSINOPHIL # BLD AUTO: 0 10*3/MM3 (ref 0–0.4)
EOSINOPHIL NFR BLD AUTO: 0 % (ref 0.3–6.2)
ERYTHROCYTE [DISTWIDTH] IN BLOOD BY AUTOMATED COUNT: 13.8 % (ref 12.3–15.4)
GFR SERPL CREATININE-BSD FRML MDRD: 54 ML/MIN/1.73
GLOBULIN UR ELPH-MCNC: 2.4 GM/DL (ref 1.8–3.5)
GLUCOSE SERPL-MCNC: 86 MG/DL (ref 74–124)
HCT VFR BLD AUTO: 42.2 % (ref 34–46.6)
HGB BLD-MCNC: 13 G/DL (ref 12–15.9)
IMM GRANULOCYTES # BLD AUTO: 0.03 10*3/MM3 (ref 0–0.05)
IMM GRANULOCYTES NFR BLD AUTO: 0.6 % (ref 0–0.5)
LYMPHOCYTES # BLD AUTO: 1.29 10*3/MM3 (ref 0.7–3.1)
LYMPHOCYTES NFR BLD AUTO: 23.8 % (ref 19.6–45.3)
MCH RBC QN AUTO: 29.9 PG (ref 26.6–33)
MCHC RBC AUTO-ENTMCNC: 30.8 G/DL (ref 31.5–35.7)
MCV RBC AUTO: 97 FL (ref 79–97)
MONOCYTES # BLD AUTO: 0.47 10*3/MM3 (ref 0.1–0.9)
MONOCYTES NFR BLD AUTO: 8.7 % (ref 5–12)
NEUTROPHILS NFR BLD AUTO: 3.61 10*3/MM3 (ref 1.7–7)
NEUTROPHILS NFR BLD AUTO: 66.7 % (ref 42.7–76)
NRBC BLD AUTO-RTO: 0 /100 WBC (ref 0–0.2)
PLATELET # BLD AUTO: 216 10*3/MM3 (ref 140–450)
PMV BLD AUTO: 10.7 FL (ref 6–12)
POTASSIUM SERPL-SCNC: 4.3 MMOL/L (ref 3.5–4.7)
PROT SERPL-MCNC: 6.5 G/DL (ref 6.3–8)
RBC # BLD AUTO: 4.35 10*6/MM3 (ref 3.77–5.28)
SODIUM SERPL-SCNC: 144 MMOL/L (ref 134–145)
WBC # BLD AUTO: 5.41 10*3/MM3 (ref 3.4–10.8)

## 2021-08-31 PROCEDURE — 71250 CT THORAX DX C-: CPT

## 2021-08-31 PROCEDURE — 85025 COMPLETE CBC W/AUTO DIFF WBC: CPT

## 2021-08-31 PROCEDURE — 36415 COLL VENOUS BLD VENIPUNCTURE: CPT

## 2021-08-31 PROCEDURE — 80053 COMPREHEN METABOLIC PANEL: CPT

## 2021-08-31 PROCEDURE — 99213 OFFICE O/P EST LOW 20 MIN: CPT | Performed by: NURSE PRACTITIONER

## 2021-08-31 NOTE — TELEPHONE ENCOUNTER
----- Message from ROSANA Patel sent at 8/31/2021 12:46 PM EDT -----  I would like to push this patient's follow-up back to 6 months instead of 4 months.  I already called and discussed with the patient, can someone please change her schedule for the new dates?Thanks,Sakina

## 2021-08-31 NOTE — PROGRESS NOTES
Subjective     REASON FOR FOLLOW UP:  1.  Focal atypical ductal hyperplasia of the right breast who was to be scheduled for right breast lumpectomy,    · Pathology from July 22, 2019 right breast biopsies in the office.  · Right breast 8:00, 4 cm from the nipple returned as focal atypical duct hyperplasia involving a single gland, 0.22 mm maximally.  Ramone cyst, sclerosing adenosis, fibroadenomatoid hyperplasia, associated micro calcifications.  · Right breast 11:00, 3 cm from the nipple, tender to palpation, no exam correlate, 1.6 cm on ultrasound, BI-RADS 4.Pathology from July 22, 2019 right breast biopsies in the office.Right breast 11:00, 3 center meter from the nipple, duct ectasia, apocrine metaplasia, sclerosing adenosis, usual duct hyperplasia, associated micro calcifications      2.  MRI showed a lesion in the left breast which was atypical and patient underwent ultrasound-guided biopsy of the left breast nodule.   LEFT breast 3:00 RA- seen on MRI done for ADH- US correlate 8mm, BR4-  Biopsied 8-2019- Pathology from her left breast in office biopsy August 13, 2019 returned as sclerosing adenosis, fibroadenomatous change, usual hyperplasia.  No atypia.       3.  September 12, 20 19, Arimidex started, patient had severe headaches like migraine headaches with Arimidex and subsequently switched to  ·  Femara started on 10/25/19.  Tolerating well.  · 6/15/2021: Patient with stiffness and arthralgias secondary to Femara.  · We will switch patient to Evista as patient does not want to consider tamoxifen because of side effects    4.  CT scan showed evidence of bilateral lung nodule CT done 3/26/2021 showed bandlike opacities in bilateral lower lobes which are pleural-based.  Medial to the biopsy-like opacity in the left lower lobe is a 1 cm noncalcified pulmonary nodule previously measured 0.7 cm on the prior examination on 3/7/2021.  This was associated with hypermetabolic activity on the PET CT study.  The  nodule was enlarged since prior exam.  PET/CT was performed on 3/17/2021.  Low-level activity was seen in the peripheral portion of the bandlike atelectasis in both lower lobes.  There is hypermetabolic 7 mm pleural-based nodule in the medial aspect of the left lower lobe.  There is a focus of hypermetabolic activity in the right submandibular gland of uncertain etiology.  This could be sialoadenitis.  Repeat CT suggested in 3 months.  · ENT Dr. Cramer evaluated 5/26/2021 and felt no concern on the CT neck and his neck exam  · Patient was seen by thoracic surgery and underwent VATS procedure.  May 2021 and the left lower lobe lung wedge resection was benign.  It was showing acute bronchitis but no malignancy      HISTORY OF PRESENT ILLNESS:  The patient is a 66 y.o. year old female who is here for an opinion about the above issue.  Patient is a 65-year-old female with history of atypical ductal hyperplasia s/p surgery of the right breast followed by Dr. Collier.  We had placed her on Arimidex on September 12, 2019 but patient had migraine-like headaches and hence switched to Femara and she is tolerating it very well.  She does have history of rheumatoid arthritis and her joint pains are slightly worse but she thinks she is doing reasonably well with Femara and she is not worried about the slight worsening of her joint pains.  She also has very minimal hot flashes.  She has not gained weight and she is trying to exercise.    She has appointment with Dr. Collier in November following MRI of the breast as well as screening mammogram.  Patient has been doing self breast exam and denies any breast mass, nipple discharge or skin changes or axillary adenopathy.    She has osteoporosis for which she is on Fosamax.  She takes it once a week.  She seems to be tolerating it well.  She does not want to go on Prolia injections and it is reasonable to continue Fosamax.  She also knows to take calcium 600 mg twice a day and  vitamin D 1000 international units daily.    Interval history:  Patient returns today for follow-up.  She continues on Evista 60 mg daily and is tolerating this well.  She does experience occasional hot flashes, occurring 1-2 times daily.  She is eating and drinking adequately.  Her bowels are moving regularly.  She denies nausea or vomiting.  Denies fever or chills.  She denies new or worsening arthralgias.    She is scheduled to follow-up with cardiology on Thursday where they plan to defibrillate her due to being in A. fib.    ONCOLOGIC HISTORY:  Patient is a 64 year-old female with history of atypical duct hyperplasia diagnosed back in July 2019 and was done on the right breast biopsy.  More recently Dr. Collier took her on August 29, 2019 for right needle localization lumpectomy but pathology is negative and there is no residual atypical hyperplasia present.    Patient was referred here for chemoprophylaxis given history of atypical duct hyperplasia.  Patient also has been arranged for MRI as well as mammogram in 2020.  She has been asked for self breast exam.  In the meantime we will start her on chemoprophylaxis now that we have the lumpectomy specimen, we will start aromatase inhibitor.      Oncologic history:     Patient is a 64-year-old female who underwent mammogram at [facility] in September 2018 and was asked to repeat diagnostic mammogram and ultrasound in 6 months    September 10, 2018  Findings  Breast is heterogeneously dense.  There is a small nodule projecting in the left breast centrally.  Small superficial nodule left lateral subareolar location stable.  Small nodule has also developed in the outer hemisphere right breast middle third.  Both nodules are imaged.  Larger nodule in the more central aspect of right breast containing a single coarse calcification appears stable.  Additional bilateral lateral breast imaging is recommended to include true lateral projections.  Suggested spot  compression views and targeted breast ultrasound.    October 18, 2018:  Bilateral digital diagnostic mammogram and diagnostic bilateral ultrasound done  Impression: Probable benign mammogram  1.  Benign-appearing simple cyst in the deep central left breast corresponding to the new mammographic density.  This can be followed up with yearly mammograms.  2.  Solid appearing nodule in the right breast along 8:00 which represented the developing nodular density on mammography and has likely been stable for several years.  This is favored to represent probably a benign fibroadenoma and can be followed up with right breast mammogram and ultrasound in 6 months to ensure stability.    6/27/2019: Diagnostic right digital mammogram and diagnostic right breast ultrasound  Right breast mass 8 to 9 o'clock position, 6.6 cm from the nipple, stable with circumscribed nodular lobular margins measuring 1.1 cm AP x0.7 cm.    Right breast focal asymmetry 10:00 to 11 o'clock position, 5 cm from the nipple contains stable coarse calcification which appears to have become larger on only the right spot since October 2018.    Ultrasound right breast the solid and cystic mass in the right breast at 8 o'clock position at 4 cm from the nipple is stable measuring 0.4 x 0.5 x 1 cm.  On October 11, 2018 ultrasound measured 0.5 x 1 x 0.5 cm and appeared to contain solid components on today's exam the mass consists of solid components and 2 small cystic areas within without thin septations.  The right breast sonographic irregularity mostly hypoechoic area 11:00 at 3 cm from the nipple measured 0.7 x 1.1 x 1.6 cm.  On October 11, 2018 this area measured 0.9 x 1.8 x 1.5 cm.  There is a new solid mural nodule 0.4 x 0.7 cm.  The new nodule is concerning for possible malignancy.    Impression the right breast ultrasound mixed solid and cystic mass at 8 o'clock position, 4 cm from the nipple needs to be biopsied under ultrasound guidance.    2.  The  breast right ultrasound calcified area at 11:00, 3 cm from the nipple needs to be biopsied under ultrasound guidance because of a new solid hypoechoic mural nodule.    July 22, 2019: Pathology  Ultrasound-guided biopsy  Final Diagnosis   1.  Breast, Right 8 o'clock Position, 4 cm FN, Core Biopsy:                 A.  Focal atypical duct hyperplasia involving a single gland, 0.22 mm maximally.               B.  Clustered cysts, sclerosing adenosis, and fibroadenomatoid hyperplasia with associated microcalcifications.       2.  Breast, Right 11 o'clock Position, 3 cm FN, Core Biopsy:                A.  Duct ectasia, apocrine metaplasia, sclerosing adenosis, and usual duct hyperplasia with associated                     microcalcifications.      August 7, 2019: Bilateral breast MRI  Artifact from a biopsy marking clip upper outer quadrant right breast.  No abnormal enhancement adjacent to the clips.  There is a 9 mm diameter nodule lateral retroareolar left breast immediately beneath the skin surface most likely an intramammary lymph node or fibroadenoma.  Further evaluation with ultrasound is recommended.  BI-RADS 0.     Right mammogram same day shows 2 new marking clips upper outer quadrant both are spring shaped.  No underlying mammographic abnormality.    August 12, 2019: Left breast ultrasound, retroareolar left breast there is microlobulated hypoechoic mass that measures 8 mm ultrasound-guided core biopsy recommended.    Patient underwent left breast ultrasound-guided biopsy by Dr. Collier as of August 13, 2019.    Patient was to have surgery on the right breast but because of this new lesion on the left breast that was seen on MRI she had to undergo biopsy first of the right breast lesion prior to surgery.    Patient is to hear from Dr. Collier about the surgery date    Obstetric/Gynecologic History:  Age menstrual periods began: 13  Patient is postmenopausal due to removal of her uterus and both ovaries in the  following year: [id number]   Number of pregnancies:1  Number of live births: 1  Number of abortions or miscarriages: 0  Age of delivery of first child: 21  Patient did not breast feed.  Length of time taking birth control pills: 4 yrs   Patient took hormone replacement during the following dates:  10 yrs  Patient had uterus and ovaries removed.     September 12, 2019: Started Arimidex but had severe migraine headaches.    Femara started on 10/25/19.    CT scan which showed evidence of small lung nodules.  There was basilar areas of atelectasis along with a right lower lobe 1.2 x 0.9 cm nodule and multiple pulmonary nodules in the right lower lobe.  PET/CT was suggested .  Patient saw pulmonary Dr. Chandler.    PET scan was obtained which showed low-level activity along the peripheral portion of the bands of atelectasis with activity level of 4.4.  There is a 8 mm nodular opacity on the left which is photopenic.  There is a 7 mm pleural-based nodular density in the left lower lobe with an activity of 4.7.  There is a subcentimeter focus of hypermetabolic activity in the right submandibular gland with a maximal SUV of 5.5.    Radiologist suggests 3 months follow-up CT scan.  Also there is a focal the focus of hypermetabolic activity in the right submandibular gland is of uncertain etiology.  Clinically does not palpable.  Patient is not symptomatic.  Abdomen pelvis is negative.  She is not a smoker.    We will have ENT evaluate this patient for the submandibular gland, otherwise we will repeat a CT scan of the neck and chest in 3 months.    Past Medical History:   Diagnosis Date   • Asthma    • Atypical ductal hyperplasia of right breast 07/2019   • Bronchiectasis (CMS/HCC)    • CAD (coronary artery disease)    • Depression with anxiety 9/21/2016   • Disease of thyroid gland     HYPOTHYROIDISM   • Elevated cholesterol    • Fibrocystic breast    • Fibromyalgia syndrome 6/6/2017   • GERD (gastroesophageal reflux disease)     • H/O Third nerve palsy of right eye 2012   • Heart palpitations    • HLD (hyperlipidemia) 9/21/2016   • Hypertension    • Hypothyroidism    • IBS (irritable bowel syndrome)    • Low back pain    • Lung nodule     RIGHT LOWER LOBE   • Moderate persistent asthma without complication 6/6/2017   • Osteoporosis    • PONV (postoperative nausea and vomiting)    • Rheumatoid arthritis (CMS/HCC)    • Salivary gland swelling     RIGHT-CT SCAN 5/5/21   • Scoliosis    • SOB (shortness of breath) on exertion    • Vitamin D deficiency 9/21/2016        Past Surgical History:   Procedure Laterality Date   • ABDOMINAL HERNIA REPAIR     • APPENDECTOMY     • BREAST BIOPSY      right and left benign 10-15 yrs ago   • BREAST LUMPECTOMY Right 8/29/2019    Procedure: right breast needle localized lumpectomy for atypical duct hyperplasia.;  Surgeon: Giovanna Collier MD;  Location: [facility] OR O[facility];  Service: General   • BRONCHOSCOPY N/A 5/11/2018    Procedure: BRONCHOSCOPY;  Surgeon: Emma Blakely MD;  Location: [facility] ENDOSCOPY;  Service: Pulmonary   • CARDIAC CATHETERIZATION     • CATARACT EXTRACTION WITH INTRAOCULAR LENS IMPLANT Bilateral    • CHOLECYSTECTOMY     • COLONOSCOPY  2015   • COLONOSCOPY  2020   • ENDOSCOPY  2015   • EPIDURAL BLOCK      LUMBAR   • HERNIA REPAIR     • HYSTERECTOMY     • KNEE ARTHROSCOPY Bilateral    • OOPHORECTOMY     • THORACOSCOPY VIDEO ASSISTED WITH LOBECTOMY Left 5/7/2021    Procedure: LEFT VIDEO ASSISTED THORACOSCOPYwith wedge resection, INTERCOSTAL NERVE BLOCK;  Surgeon: Ingrid Ch MD;  Location: [facility] OR;  Service: Thoracic;  Laterality: Left;   • TONSILLECTOMY     • WRIST FUSION Right         Current Outpatient Medications on File Prior to Visit   Medication Sig Dispense Refill   • albuterol (PROVENTIL) (2.5 MG/3ML) 0.083% nebulizer solution Take 2.5 mg by nebulization Every 6 (Six) Hours As Needed for Wheezing (asthma).     • albuterol sulfate  (90 Base) MCG/ACT inhaler Inhale 2  puffs Every 4 (Four) Hours As Needed for Wheezing.     • alendronate (FOSAMAX) 70 MG tablet TAKE 1 TABLET BY MOUTH ONCE WEEKLY BEFORE BREAKFAST, ON AN EMPTY STOMACH: REMAIN UPRIGHT FOR ONE HOUR:TAKE WITH 8 OUNCES OF WATER (Patient taking differently: Take 70 mg by mouth Every 7 (Seven) Days. SUNDAY) 4 tablet 0   • amiodarone (PACERONE) 200 MG tablet Take 200 mg by mouth Daily.     • amLODIPine (NORVASC) 5 MG tablet Take 1 tablet by mouth Every Night.     • apixaban (ELIQUIS) 5 MG tablet tablet Take 1 tablet by mouth Every 12 (Twelve) Hours. 60 tablet 0   • atorvastatin (LIPITOR) 40 MG tablet Take 40 mg by mouth Daily.     • baclofen (LIORESAL) 10 MG tablet Take 10 mg by mouth As Needed.     • budesonide-formoterol (SYMBICORT) 160-4.5 MCG/ACT inhaler Inhale 2 puffs Daily.     • Calcium Carbonate-Vit D-Min (Calcium 1200) 3022-9127 MG-UNIT chewable tablet Chew 1,000 mg As Needed.     • celecoxib (CeleBREX) 200 MG capsule Take 200 mg by mouth Daily.     • clotrimazole-betamethasone (LOTRISONE) 1-0.05 % cream Apply 1 application topically to the appropriate area as directed 2 (Two) Times a Day As Needed.     • Diclofenac Sodium (VOLTAREN) 1 % gel gel Apply 1 g topically to the appropriate area as directed 4 (Four) Times a Day. UP TO 4 TIMES DAILY     • FASENRA 30 MG/ML solution prefilled syringe Every 2 (Two) Months. EVERY 2 MONTHS  ASTHMA  1   • furosemide (LASIX) 20 MG tablet Take 20 mg by mouth As Needed.     • gabapentin (NEURONTIN) 300 MG capsule Take 600 mg by mouth Every Night.     • HYDROcodone-acetaminophen (NORCO)  MG per tablet Take 1 tablet by mouth 2 (Two) Times a Day.     • hydroxychloroquine (PLAQUENIL) 200 MG tablet Take 200 mg by mouth 2 (Two) Times a Day. RA     • hyoscyamine (LEVSIN) 0.125 MG SL tablet Dissolve one Tablet Under The Toungue Every 6  Hours as Needed for cramping (Patient taking differently: Take 0.125 mg by mouth Every 6 (Six) Hours As Needed. Dissolve one Tablet Under The Toungue  Every 6  Hours as Needed for cramping) 30 tablet 3   • isosorbide mononitrate (IMDUR) 120 MG 24 hr tablet Take 120 mg by mouth Daily.     • letrozole (FEMARA) 2.5 MG tablet Take 2.5 mg by mouth Daily.     • levothyroxine (SYNTHROID, LEVOTHROID) 75 MCG tablet      • levothyroxine (SYNTHROID, LEVOTHROID) 88 MCG tablet Take 88 mcg by mouth Daily.     • metoprolol succinate XL (TOPROL-XL) 25 MG 24 hr tablet Take 25 mg by mouth Every Night.     • nitroglycerin (NITROSTAT) 0.4 MG SL tablet Place 0.4 mg under the tongue every 5 (five) minutes.     • nystatin (MYCOSTATIN) 082997 UNIT/ML suspension Swish and swallow 500,000 Units As Needed. INHALERS     • oxyCODONE (Roxicodone) 5 MG immediate release tablet Take 1 tablet by mouth Every 4 (Four) Hours As Needed for Moderate Pain . 30 tablet 0   • pantoprazole (PROTONIX) 40 MG EC tablet TAKE ONE TABLET BY MOUTH ONCE NIGHTLY (Patient taking differently: Take 40 mg by mouth Every Night.) 30 tablet 0   • polyethylene glycol (MIRALAX) packet Take 17 g by mouth Every Night.     • raloxifene (EVISTA) 60 MG tablet Take 1 tablet by mouth Daily. 30 tablet 4   • ranolazine (RANEXA) 1000 MG 12 hr tablet Take 1,000 mg by mouth Every 12 (Twelve) Hours.     • SPIRIVA RESPIMAT 1.25 MCG/ACT aerosol solution inhaler Inhale 1 puff Every Morning.     • vitamin D (ERGOCALCIFEROL) 53555 UNITS capsule capsule Take 50,000 Units by mouth Every 7 (Seven) Days. SUNDAY       No current facility-administered medications on file prior to visit.        ALLERGIES:    Allergies   Allergen Reactions   • Adhesive Tape Swelling     Must use paper tape   • Morphine And Related Hives and Itching   • Nickel Rash      Patient is disabled, she is to work at [employer] in the past.  She does not smoke or drink alcohol.    Social History     Socioeconomic History   • Marital status:      Spouse name: Zbigniew   • Number of children: Not on file   • Years of education: High school   • Highest education  "level: Not on file   Tobacco Use   • Smoking status: Never Smoker   • Smokeless tobacco: Never Used   Vaping Use   • Vaping Use: Never used   Substance and Sexual Activity   • Alcohol use: No   • Drug use: No   • Sexual activity: Defer        Family History   Problem Relation Age of Onset   • COPD Mother    • Cancer Mother         heart tumor   • Heart disease Mother    • COPD Father    • Hypertension Father    • No Known Problems Sister    • Cancer Brother         testicular   • Breast cancer Paternal Aunt         unknown age    • Heart disease Maternal Grandmother    • Malig Hyperthermia Neg Hx      History is consistent with chest wall cancer in mother who  of cancer at age 68, unsure what type.  Brother had testicular cancer at age 2 but is now 59 and in good health.  Maternal aunt with history of breast cancer in her 40s and  from it    Review of systems as mentioned in the HPI.    Objective     Vitals:    21 0946   BP: 115/74   Pulse: 77   Resp: 18   Temp: 97.3 °F (36.3 °C)   TempSrc: Temporal   SpO2: 93%   Weight: 79.4 kg (175 lb 1.6 oz)   Height: 162.6 cm (64.02\")   PainSc: 0-No pain      Current Status 2021   ECOG score 0       Physical Exam    .  This patient's ACP documentation is up to date, and there's nothing further left to document.    CONSTITUTIONAL:  Vital signs reviewed.    No distress, looks comfortable.  EYES:  Conjunctiva and lids unremarkable.  Extraocular eye movements intact.  EARS,NOSE,MOUTH,THROAT:  Ears and nose appear unremarkable.  Lips, teeth, gums appear unremarkable.  RESPIRATORY:  Normal respiratory effort.    CARDIOVASCULAR: Regular rate rhythm, no murmur  BREAST: Right breast: No skin changes, no evidence of breast mass, no nipple discharge, no evidence of any right axillary adenopathy or right supraclavicular adenopathy  Left breast: No evidence of any skin changes, no evidence of any left breast mass and no evidence of left nipple discharge as well as no left " axillary adenopathy or left supraclavicular adenopathy.  No significant lower extremity edema.  SKIN: No wounds.  No rashes.  MUSCULOSKELETAL/EXTREMITIES: No clubbing or cyanosis.  No apparent unilateral weakness.  NEURO: CN 2-12 appear intact. No focal neurological deficits noted.  PSYCHIATRIC:  Normal judgment and insight.  Normal mood and affect.    RECENT LABS:  Hematology WBC   Date Value Ref Range Status   08/31/2021 5.41 3.40 - 10.80 10*3/mm3 Final   07/12/2021 6.53 4.5 - 11.0 10*3/uL Final     RBC   Date Value Ref Range Status   08/31/2021 4.35 3.77 - 5.28 10*6/mm3 Final   07/12/2021 4.41 4.0 - 5.2 10*6/uL Final     Hemoglobin   Date Value Ref Range Status   08/31/2021 13.0 12.0 - 15.9 g/dL Final   07/12/2021 13.6 12.0 - 16.0 g/dL Final     Hematocrit   Date Value Ref Range Status   08/31/2021 42.2 34.0 - 46.6 % Final   07/12/2021 44.4 36.0 - 46.0 % Final     Platelets   Date Value Ref Range Status   08/31/2021 216 140 - 450 10*3/mm3 Final   07/12/2021 239 140 - 440 10*3/uL Final       Assessment/Plan      1.  Focal atypical ductal hyperplasia of the right breast at 8 o'clock position.   Right breast, 8 o'clock position, 4 cm from the nipple, path returned as focal atypical duct hyperplasia involving the single gland, 0.22 mm.sclerosing adenosis, fibroadenomatoid hyperplasia, associated micro calcifications.  · August 29, 2019 patient underwent needle localization lumpectomy on the right breast and there was no evidence of any atypical hyperplasia.  Reviewed the pathology.  · Patient has been referred here for chemoprophylaxis and discussed in length the side effects of Arimidex.  · This was switched to Evista because of arthralgias and significant stiffness     Right breast, 11 o'clock position, 3 cm from the nipple 1.6 cm on ultrasound.  Pathology from July 2019 right breast biopsies  duct ectasia, apocrine metaplasia, sclerosing adenosis, usual duct hyperplasia, associated micro calcifications    2.  Left  breast, 3 o'clock position, MRI done shows abnormality, 9 mm nodular nodule lateral retroareolar left breast immediately beneath the skin surface most likely intramammary lymph node or fibroadenoma.  Ultrasound recommended.  August 12, 2019 left breast ultrasound: Retroareolar left breast, 3 o'clock position, microlobulated hypoechoic mass which measures 8 mm.  Ultrasound-guided biopsy done on August 13, 2019.  Results pending.    · Patient is now awaiting surgery by Dr. Collier on both breasts.  · Risk assessment done based on family history, reproductive history and recent atypical biopsy.  The 5-year breast cancer risk is 4.2%.  And lifetime breast cancer risk is 17 to 31%.  · Patient has been referred here but given that she is undergoing surgery we would need to await the path on surgery prior to making any final recommendations.  · If only atypical duct hyperplasia present then certainly she could be a candidate for chemoprophylaxis.  But given that she has not yet had surgery, she tells me she is undergoing lumpectomy , and await surgery prior to making any recommendation  · August 29, 2019 underwent right needle localization lumpectomy and pathology on this is benign  · September 12, 2019: Started Arimidex.  Developed severe migraine headaches.  · October 12, 20 19: Started on Femara.  Tolerating well, except mild arthralgias.  · March 19, 2021: Patient's arthralgias worsened.  We will need to discontinue Femara.  I have reviewed the results of both MRI of the breast and mammogram from November 2020 and it is negative.  · Explained to the patient that given a very small focal atypical ductal hyperplasia really surveillance with MRI and mammogram would be reasonable and we will discontinue Femara  · Started Evista  · 8/31/2021, the patient continues on Evista daily and is tolerating this well.  She has occasional hot flashes, approximately 1-2 times daily.    3.  History of fibromyalgia, followed by   [facility], stable     4.  Atypical angina, cardiac cath negative followed by Dr. Jett    5.  Rheumatoid arthritis followed by Dr. Keller on Celebrex and Plaquenil, stable    6. Osteoporosis of the lumbar spine  · Currently on Fosamax.  Discussed Prolia as well but patient prefers oral bisphosphonates.  · Continue calcium and vitamin D supplements    7.  Lung nodule: Patient states that she had a CT angiogram of the chest done which showed atelectasis at the bases and right lower lobe lung nodules and there was lesion 1.2 x 0.8 cm in the right lower lobe.  · Patient was seen by pulmonary who ordered PET scan  · I have reviewed both CT scan and PET scan with the patient in length.  There is evidence of small submandibular gland activity of 5.5 but clinically it is not palpable.  The nodules in the lung are very small and 3-month follow-up CT scan suggested  · Patient has follow-up with pulmonary next week  · We will obtain CT abdomen pelvis in 1 week.  · Refer to ENT for evaluation of the submandibular gland uptake by PET scan  · S/p wedge resection by Dr. Ingrid Nguyen 5/8/2021 and pathology is benign  · ENT exam of the neck was benign  ·  she had follow-up CT of the chest performed today.    Plan:  · Continue Evista daily.  Arimidex as well as Femara were discontinued because of arthralgias  · Patient is scheduled both for the mammogram and MRI of the breast as of November 2021 with follow-up with ROSANA Adames in November 2021  · Return in 6 months for MD follow-up.     CC: Emma Medina MD David Sun, MD Reid Brown, MD Gerard Siciliano, MD Mehdi Poorkay, MD Amir Piracha, MD Rinkoo Aggarwal, MD Gary Crump, MD Reji Romo MD

## 2021-09-03 ENCOUNTER — APPOINTMENT (OUTPATIENT)
Dept: PAIN MEDICINE | Facility: HOSPITAL | Age: 66
End: 2021-09-03

## 2021-09-14 ENCOUNTER — APPOINTMENT (OUTPATIENT)
Dept: PAIN MEDICINE | Facility: HOSPITAL | Age: 66
End: 2021-09-14

## 2021-09-14 DIAGNOSIS — R91.1 LUNG NODULE: Primary | ICD-10-CM

## 2021-11-11 ENCOUNTER — HOSPITAL ENCOUNTER (OUTPATIENT)
Dept: MRI IMAGING | Facility: HOSPITAL | Age: 66
Discharge: HOME | End: 2021-11-11

## 2021-11-11 ENCOUNTER — HOSPITAL ENCOUNTER (OUTPATIENT)
Dept: MAMMOGRAPHY | Facility: HOSPITAL | Age: 66
Discharge: HOME | End: 2021-11-11

## 2021-11-11 DIAGNOSIS — R92.2 INCONCLUSIVE MAMMOGRAM: ICD-10-CM

## 2021-11-11 DIAGNOSIS — N60.91 ATYPICAL DUCTAL HYPERPLASIA OF RIGHT BREAST: ICD-10-CM

## 2021-11-11 DIAGNOSIS — Z12.31 ENCOUNTER FOR SCREENING MAMMOGRAM FOR MALIGNANT NEOPLASM OF BREAST: ICD-10-CM

## 2021-11-11 PROCEDURE — A9577 INJ MULTIHANCE: HCPCS | Performed by: SURGERY

## 2021-11-11 PROCEDURE — 77067 SCR MAMMO BI INCL CAD: CPT

## 2021-11-11 PROCEDURE — C8937 CAD BREAST MRI: HCPCS

## 2021-11-11 PROCEDURE — 0 GADOBENATE DIMEGLUMINE 529 MG/ML SOLUTION: Performed by: SURGERY

## 2021-11-11 PROCEDURE — C8908 MRI W/O FOL W/CONT, BREAST,: HCPCS

## 2021-11-11 PROCEDURE — 77063 BREAST TOMOSYNTHESIS BI: CPT

## 2021-11-11 RX ADMIN — GADOBENATE DIMEGLUMINE 16 ML: 529 INJECTION, SOLUTION INTRAVENOUS at 12:04

## 2021-11-22 RX ORDER — RALOXIFENE HYDROCHLORIDE 60 MG/1
TABLET, FILM COATED ORAL
Qty: 30 TABLET | Refills: 4 | Status: SHIPPED | OUTPATIENT
Start: 2021-11-22 | End: 2022-04-27

## 2021-11-23 ENCOUNTER — TELEPHONE (OUTPATIENT)
Dept: SURGERY | Facility: CLINIC | Age: 66
End: 2021-11-23

## 2021-11-23 NOTE — TELEPHONE ENCOUNTER
Hub staff attempted to follow warm transfer process and was unsuccessful     Caller: Camelia Quintanilla    Relationship to patient: Self    Best call back number: [phone]    Patient is needing: NOT FEELING WELL TO COME TO APPT TODAY HOWEVER SHE WOULD JUST LIKE FOR SOMEONE TO CALL HER TO GO OVER HER RESULTS.         HUB UNABLE TO WARM TRANSFER PT.

## 2021-11-29 ENCOUNTER — TELEPHONE (OUTPATIENT)
Dept: SURGERY | Facility: CLINIC | Age: 66
End: 2021-11-29

## 2021-11-29 NOTE — TELEPHONE ENCOUNTER
Caller: JOANNE    Relationship to patient: SELF    Best call back number: [phone]    Type of visit: FOLLOW UP    If rescheduling, when is the original appointment: 11/29/21 -TODAY    Additional notes: PT NEEDS TO RESCHED AGAIN, SHE IS STILL SICK. PLEASE CALL HER TO RESCHEDULE APPT ASAP.        UNABLE TO WARM TRANSFER-NO ANSWER. WENT STRAIGHT TO HOLD MUSIC.

## 2021-12-15 ENCOUNTER — OFFICE VISIT (OUTPATIENT)
Dept: SURGERY | Facility: CLINIC | Age: 66
End: 2021-12-15

## 2021-12-15 ENCOUNTER — TELEPHONE (OUTPATIENT)
Dept: SURGERY | Facility: CLINIC | Age: 66
End: 2021-12-15

## 2021-12-15 VITALS
SYSTOLIC BLOOD PRESSURE: 115 MMHG | BODY MASS INDEX: 29.02 KG/M2 | WEIGHT: 170 LBS | DIASTOLIC BLOOD PRESSURE: 73 MMHG | HEART RATE: 70 BPM | HEIGHT: 64 IN | OXYGEN SATURATION: 97 %

## 2021-12-15 DIAGNOSIS — N60.99 ATYPICAL DUCTAL HYPERPLASIA OF BREAST: Primary | ICD-10-CM

## 2021-12-15 DIAGNOSIS — Z12.31 ENCOUNTER FOR SCREENING MAMMOGRAM FOR MALIGNANT NEOPLASM OF BREAST: ICD-10-CM

## 2021-12-15 DIAGNOSIS — Z91.89 INCREASED RISK OF BREAST CANCER: ICD-10-CM

## 2021-12-15 DIAGNOSIS — Z80.3 FH: BREAST CANCER: ICD-10-CM

## 2021-12-15 DIAGNOSIS — N64.4 MASTODYNIA: ICD-10-CM

## 2021-12-15 PROCEDURE — 99213 OFFICE O/P EST LOW 20 MIN: CPT | Performed by: NURSE PRACTITIONER

## 2021-12-15 NOTE — PROGRESS NOTES
Chief Complaint: Camelia Quintanilla is a 66 y.o. female who was seen in consultation at the request of   Reji Romo MD   for abnormal breast imaging.  Right Breast, Follow up.     History of Present Illness:  2019:  Seen by Dr Collier  Patient presents with abnormal breast imaging and breast pain.  She reports breast pain UOQ at the site of the 11:00 lesion on her imaging.   She noted no new palpable masses, skin changes, nipple discharge, nipple changes prior to her most recent imaging.    Her most recent imaging includes the followin/10/2018   Peter Quintanilla  BILATERAL SCREENING MAMMOGRAM  Heterogeneously dense. Interval occurring a small nodule projecting as central left breast. Small superficial nodule left lateral subareolar location is stable. Small nodule also developed outer hemisphere right breast middle third. Larger nodule central aspect of the right breast containing a single coarse calcification appears stable.  BIRADS Category 0    10/11/2018   Peter Quintanilla  BILATERAL DIAGNOSTIC MAMMOGRAM  BILATERAL BREAST ULTRASOUND  Heterogeneously dense. The developing nodular density deep central left breast along the nipple line does not efface. This will be further evaluated with left breast ultrasound today. Stable benign-appearing nodule in the anterior lateral periareolar left breast. The developing nodular density in the lateral hemisphere of the right breast also dos note efface.  ULTRASOUND  Left breast:  Nodule in the deep central left breast appears to correspond to a simple appearing cyst along the 12:00 retroareolar left 6 x 8 x 7 mm. no suspicious solid masses in the deep central left breast.  Right breast:  10:00 axis, 4 cm from the nipple, demonstrates an area of dense breast tissue measuring 1.5 x 1.8 x 0.9 cm. This corresponds to an island of benign dense breast tissue and coarse calcification that has been stable for several years. 8:00 axis, 4 cm  from the nipple, demonstrates an ovoid, solid-appearing structure measuring 1 x 0.5 x 0.5 cm. may correspond to the developing nodular density on mammography. This may represent a proabably benign fibroadenoma. Upon review 2011, this may have been present since that time. Six-month follow-up right breast mammogram and ultrasound will be recommended. There is also an incidental simple appearing cyst 9:00 axis, 3 cm from the nipple, measuring 5 x 3 x 6 mm.  BIRADS Category 3    06/27/2019   [facility]  DIAGNOSTIC RIGHT DIGITAL MAMMOGRAM  RIGHT BREAST ULTRASOUND  Heterogeneously dense.  The right breast mass 8:00-:00 at 6.6 cm from the nipple appears stable. Measuring 1.1 cm AP x 0.7 cm TRV.  Right breast focal asymmetry at 10:00-11:00 at mid depth at about 5 cm from the nipple, that contains a stable course calcifications, appears to have become larger since 10/11/2018.  ULTRASOUND: Right breast 8:00 at 4 cm from the nipple and 11:00 at 3 cm from the nipple.  Right breast mixed solid and cystic mass in the right breast at 8:00 at 4 cm from the nipple is stable in size, 0.4 x 0.5 x 1.0 cm. Since the margins of this sonographic mass have become partially irregular and angulated, the mass is considered to be suspicious for possible malignancy.  Right breast 11:00 at 3 cm from the nipple measures 0.7 x 1.1 x 1.6 cm. Today, a new solid mural nodule that measures 0.4 x 0.7 cm and is mostly hypoechoic with a punctate hyperechoic focus and no internal vascular flow was seen by the radiologist. This new nodule is concerning for possible malignancy.  IMPRESSION:  1. Right breast at 8:00 at 4 cm from the nipple needs to be biopsied. BIRADS Category 4.  2. The right breast sonographic calcified hypoechoic area at 11:00 at 3 cm from the nipple needs to be biopsied under ultrasound guidance. BIRADS Category 4.    On the right breast she has had 2 excisional biopsies for cyst 10 or 15 years ago.  In the left  breast she has had 2 excisional biopsies for cyst.  She has had her uterus and ovaries removed in 1978, is postmenopausal, and takes nor hormones.  Her family history includes the following: She has 1 daughter, 1 son, 2 sisters, one maternal aunt, 4 paternal aunts.  She has a paternal aunt who had breast cancer at uncertain age.  She has chronic pain from the fibromyalgia and takes baclofen, gabapentin, Norco one per day with Dr. Holm.  She has angina and sees Dr. Trimble for this and is not on any blood thinner.  She has rheumatoid arthritis and sees Dr. Keller for this and takes Celebrex and Plaquenil.    8/2/2019:  Pathology from July 22, 2019 right breast biopsies in the office.  Right breast 8:00, 4 cm from the nipple returned as focal atypical duct hyperplasia involving a single gland, 0.22 mm maximally.  Ramone cyst, sclerosing adenosis, fibroadenomatoid hyperplasia, associated micro calcifications.  Right breast 11:00, 3 center meter from the nipple, duct ectasia, apocrine metaplasia, sclerosing adenosis, usual duct hyperplasia, associated micro calcifications.    Reports some bruising. LOQ. Denies redness, warmth or drainage from the incision.    8/26/2019:  In the interim, Mrs. Quintanilla had her MRI and mammogram, as follows:  Bilateral breast MRI August 7, 2019 [facility]: Artifact from a biopsy marking clip upper outer quadrant right breast.  No abnormal enhancement adjacent to the clips.  There is a 9 mm diameter nodule lateral retroareolar left breast immediately beneath the skin surface most likely an intramammary lymph node or fibroadenoma.  Further evaluation with ultrasound is recommended.  BI-RADS 0.     Right mammogram same day shows 2 new marking clips upper outer quadrant both are spring shaped.  No underlying mammographic abnormality.    I then arranged for a LEFT breast ultrasound, as below:    [facility] August 12, 2019 left ultrasound.  Retroareolar left breast 3:00 there  is a microlobulated hypoechoic mass that measures 8 mm.  Correlation with ultrasound-guided biopsy is recommended BI-RADS 4.    9/10/2019:    8-29-19 RIGHT NL lumpectomy returned as  No residual atypical hyperplasia.    She denies redness warmth or drainage from her incision.  Her breast is still a little bit tender and she continues to wear her sports bra.    1/22/21:  In the interim,  Camelia Quintanilla  has done well.  She has noted no changes in her breast exam. No new masses, skin changes, nipple changes, nipple discharge either breast.   She denies headache, bone pain, belly pain, cough, changes in vision or gait.    Her most recent imaging includes the following:  [facility] bilateral breast MRI November 10, 2020: Scattered fibroglandular tissue.  Mild postsurgical architectural distortion right breast.  BI-RADS 2  Bilateral screening mammogram with tomosynthesis [facility] November 10, 2020: No evidence for malignancy in either breast.  Scattered fibroglandular densities.  BI-RADS 1    She has gained 8 # in the interim.    She reports bilateral upper outer quadrant diffuse breast pain.  Worsened over the past 12 months.  Achy.  Not alleviated by anything obvious.    She has seen Dr Saldivar and started 9-2019 arimidex then switched to femara due to intolerance in . She now reports muscle aches on the femara.  [facility] is here for review.    12/15/21, Interval History:  She returns today for follow up with no breast changes, continued mild breast tenderness, tolerating femara well.    Her last clinic visit with medical oncology was in 8/21: continues on evista, both arimidex and femara were discontinued due to arthralgias.    Screening mammogram with [facility] on 11/11/21 was stable, BiRads 2/ (see full report below)  Breast MRI on 11/11/21 stable, BiRAds 2.  (see full report below)    Review of Systems:  Review of Systems   Constitutional: Positive for unexpected weight change (8 lb wt  gain ).   HENT:   Positive for tinnitus.    Respiratory: Positive for wheezing.    Cardiovascular: Positive for palpitations.   Gastrointestinal: Positive for constipation.   Endocrine: Positive for hot flashes.   Musculoskeletal: Positive for arthralgias, back pain and myalgias.   Psychiatric/Behavioral: Positive for sleep disturbance. The patient is nervous/anxious.    All other systems reviewed and are negative.       Past Medical and Surgical History:  Breast Biopsy History:  Patient has had the following breast biopsies:Bilateral Breast Biopsies 10-15 yrs ago; benign   Breast Cancer HIstory:  Patient does not have a past medical history of breast cancer.  Breast Operations, and year:  None   Obstetric/Gynecologic History:  Age menstrual periods began: 13  Patient is postmenopausal due to removal of her uterus and both ovaries in the following year: [id number]   Number of pregnancies:1  Number of live births: 1  Number of abortions or miscarriages: 0  Age of delivery of first child: 21  Patient did not breast feed.  Length of time taking birth control pills: 4 yrs   Patient took hormone replacement during the following dates:  10 yrs  Patient had uterus and ovaries removed.     Past Surgical History:   Procedure Laterality Date   • ABDOMINAL HERNIA REPAIR     • APPENDECTOMY     • BREAST BIOPSY      right and left benign 10-15 yrs ago   • BREAST LUMPECTOMY Right 8/29/2019    Procedure: right breast needle localized lumpectomy for atypical duct hyperplasia.;  Surgeon: Giovanna Collier MD;  [place] [facility] [place] [facility] [place];  Service: General   • BRONCHOSCOPY N/A 5/11/2018    Procedure: BRONCHOSCOPY;  Surgeon: Emma Blakely MD;  Location: [facility] ENDOSCOPY;  Service: Pulmonary   • CARDIAC CATHETERIZATION     • CATARACT EXTRACTION WITH INTRAOCULAR LENS IMPLANT Bilateral    • CHOLECYSTECTOMY     • COLONOSCOPY  2015   • COLONOSCOPY  2020   • ENDOSCOPY  2015   • EPIDURAL BLOCK      LUMBAR   • HERNIA REPAIR     • HYSTERECTOMY      • KNEE ARTHROSCOPY Bilateral    • OOPHORECTOMY     • THORACOSCOPY VIDEO ASSISTED WITH LOBECTOMY Left 5/7/2021    Procedure: LEFT VIDEO ASSISTED THORACOSCOPYwith wedge resection, INTERCOSTAL NERVE BLOCK;  Surgeon: Ingrid Ch MD;  Location: [facility];  Service: Thoracic;  Laterality: Left;   • TONSILLECTOMY     • WRIST FUSION Right      Past Medical History:   Diagnosis Date   • Asthma    • Atypical ductal hyperplasia of right breast 07/2019   • Bronchiectasis (HCC)    • CAD (coronary artery disease)    • Depression with anxiety 9/21/2016   • Disease of thyroid gland     HYPOTHYROIDISM   • Elevated cholesterol    • Fibrocystic breast    • Fibromyalgia syndrome 6/6/2017   • GERD (gastroesophageal reflux disease)    • H/O Third nerve palsy of right eye 2012   • Heart palpitations    • HLD (hyperlipidemia) 9/21/2016   • Hypertension    • Hypothyroidism    • IBS (irritable bowel syndrome)    • Low back pain    • Lung nodule     RIGHT LOWER LOBE   • Moderate persistent asthma without complication 6/6/2017   • Osteoporosis    • PONV (postoperative nausea and vomiting)    • Rheumatoid arthritis (HCC)    • Salivary gland swelling     RIGHT-CT SCAN 5/5/21   • Scoliosis    • SOB (shortness of breath) on exertion    • Vitamin D deficiency 9/21/2016       Prior Hospitalizations, other than for surgery or childbirth, and year:  Heart Cath    Social History     Socioeconomic History   • Marital status:      Spouse name: Zbigniew   • Years of education: High school   Tobacco Use   • Smoking status: Never Smoker   • Smokeless tobacco: Never Used   Vaping Use   • Vaping Use: Never used   Substance and Sexual Activity   • Alcohol use: No   • Drug use: No   • Sexual activity: Defer     Patient is .  Patient is retired.  Patient does not drink caffeine.    Family History:  Family History   Problem Relation Age of Onset   • COPD Mother    • Cancer Mother         heart tumor   • Heart disease Mother    • COPD  "Father    • Hypertension Father    • No Known Problems Sister    • Cancer Brother         testicular   • Breast cancer Paternal Aunt         unknown age    • Heart disease Maternal Grandmother    • Malig Hyperthermia Neg Hx        Vital Signs:  /73 (BP Location: Left arm)   Pulse 70   Ht 162.6 cm (64\")   Wt 77.1 kg (170 lb)   LMP  (LMP Unknown)   SpO2 97%   BMI 29.18 kg/m²      Medications:    Current Outpatient Medications:   •  albuterol (PROVENTIL) (2.5 MG/3ML) 0.083% nebulizer solution, Take 2.5 mg by nebulization Every 6 (Six) Hours As Needed for Wheezing (asthma)., Disp: , Rfl:   •  albuterol sulfate  (90 Base) MCG/ACT inhaler, Inhale 2 puffs Every 4 (Four) Hours As Needed for Wheezing., Disp: , Rfl:   •  alendronate (FOSAMAX) 70 MG tablet, TAKE 1 TABLET BY MOUTH ONCE WEEKLY BEFORE BREAKFAST, ON AN EMPTY STOMACH: REMAIN UPRIGHT FOR ONE HOUR:TAKE WITH 8 OUNCES OF WATER (Patient taking differently: Take 70 mg by mouth Every 7 (Seven) Days. SUNDAY), Disp: 4 tablet, Rfl: 0  •  amiodarone (PACERONE) 200 MG tablet, Take 200 mg by mouth Daily., Disp: , Rfl:   •  amLODIPine (NORVASC) 5 MG tablet, Take 1 tablet by mouth Every Night., Disp: , Rfl:   •  apixaban (ELIQUIS) 5 MG tablet tablet, Take 1 tablet by mouth Every 12 (Twelve) Hours., Disp: 60 tablet, Rfl: 0  •  atorvastatin (LIPITOR) 40 MG tablet, Take 40 mg by mouth Daily., Disp: , Rfl:   •  baclofen (LIORESAL) 10 MG tablet, Take 10 mg by mouth As Needed., Disp: , Rfl:   •  budesonide-formoterol (SYMBICORT) 160-4.5 MCG/ACT inhaler, Inhale 2 puffs Daily., Disp: , Rfl:   •  Calcium Carbonate-Vit D-Min (Calcium 1200) 8458-4120 MG-UNIT chewable tablet, Chew 1,000 mg As Needed., Disp: , Rfl:   •  celecoxib (CeleBREX) 200 MG capsule, Take 200 mg by mouth Daily., Disp: , Rfl:   •  clotrimazole-betamethasone (LOTRISONE) 1-0.05 % cream, Apply 1 application topically to the appropriate area as directed 2 (Two) Times a Day As Needed., Disp: , Rfl:   •  " Diclofenac Sodium (VOLTAREN) 1 % gel gel, Apply 1 g topically to the appropriate area as directed 4 (Four) Times a Day. UP TO 4 TIMES DAILY, Disp: , Rfl:   •  FASENRA 30 MG/ML solution prefilled syringe, Every 2 (Two) Months. EVERY 2 MONTHS ASTHMA, Disp: , Rfl: 1  •  furosemide (LASIX) 20 MG tablet, Take 20 mg by mouth As Needed., Disp: , Rfl:   •  gabapentin (NEURONTIN) 300 MG capsule, Take 600 mg by mouth Every Night., Disp: , Rfl:   •  HYDROcodone-acetaminophen (NORCO)  MG per tablet, Take 1 tablet by mouth 2 (Two) Times a Day., Disp: , Rfl:   •  hydroxychloroquine (PLAQUENIL) 200 MG tablet, Take 200 mg by mouth 2 (Two) Times a Day. RA, Disp: , Rfl:   •  hyoscyamine (LEVSIN) 0.125 MG SL tablet, Dissolve one Tablet Under The Toungue Every 6  Hours as Needed for cramping (Patient taking differently: Take 0.125 mg by mouth Every 6 (Six) Hours As Needed. Dissolve one Tablet Under The Toungue Every 6  Hours as Needed for cramping), Disp: 30 tablet, Rfl: 3  •  isosorbide mononitrate (IMDUR) 120 MG 24 hr tablet, Take 120 mg by mouth Daily., Disp: , Rfl:   •  letrozole (FEMARA) 2.5 MG tablet, Take 2.5 mg by mouth Daily., Disp: , Rfl:   •  levothyroxine (SYNTHROID, LEVOTHROID) 75 MCG tablet, , Disp: , Rfl:   •  levothyroxine (SYNTHROID, LEVOTHROID) 88 MCG tablet, Take 88 mcg by mouth Daily., Disp: , Rfl:   •  metoprolol succinate XL (TOPROL-XL) 25 MG 24 hr tablet, Take 25 mg by mouth Every Night., Disp: , Rfl:   •  nitroglycerin (NITROSTAT) 0.4 MG SL tablet, Place 0.4 mg under the tongue every 5 (five) minutes., Disp: , Rfl:   •  nystatin (MYCOSTATIN) 834786 UNIT/ML suspension, Swish and swallow 500,000 Units As Needed. INHALERS, Disp: , Rfl:   •  oxyCODONE (Roxicodone) 5 MG immediate release tablet, Take 1 tablet by mouth Every 4 (Four) Hours As Needed for Moderate Pain ., Disp: 30 tablet, Rfl: 0  •  pantoprazole (PROTONIX) 40 MG EC tablet, TAKE ONE TABLET BY MOUTH ONCE NIGHTLY (Patient taking differently: Take 40  "mg by mouth Every Night.), Disp: 30 tablet, Rfl: 0  •  polyethylene glycol (MIRALAX) packet, Take 17 g by mouth Every Night., Disp: , Rfl:   •  raloxifene (EVISTA) 60 MG tablet, TAKE ONE TABLET BY MOUTH DAILY, Disp: 30 tablet, Rfl: 4  •  ranolazine (RANEXA) 1000 MG 12 hr tablet, Take 1,000 mg by mouth Every 12 (Twelve) Hours., Disp: , Rfl:   •  SPIRIVA RESPIMAT 1.25 MCG/ACT aerosol solution inhaler, Inhale 1 puff Every Morning., Disp: , Rfl:   •  vitamin D (ERGOCALCIFEROL) 26135 UNITS capsule capsule, Take 50,000 Units by mouth Every 7 (Seven) Days. SUNDAY, Disp: , Rfl:      Allergies:  Allergies   Allergen Reactions   • Adhesive Tape Swelling     Must use paper tape   • Freederm Adhesive Remover [New Skin] Hives   • Morphine And Related Hives and Itching   • Silver Hives   • Nickel Rash       Physical Examination:  /73 (BP Location: Left arm)   Pulse 70   Ht 162.6 cm (64\")   Wt 77.1 kg (170 lb)   LMP  (LMP Unknown)   SpO2 97%   BMI 29.18 kg/m²     /73 (BP Location: Left arm)   Pulse 70   Ht 162.6 cm (64\")   Wt 77.1 kg (170 lb)   LMP  (LMP Unknown)   SpO2 97%   BMI 29.18 kg/m²      I reviewed physical exam, no changes noted    General Appearance:  Patient is in no distress.  She is well kept and has an overweight build.   Psychiatric:  Patient with appropriate mood and affect. Alert and oriented to self, time, and place.    Breast, RIGHT:  medium sized, 38B,  symmetric with the contralateral side.  Breast skin is without erythema, edema, rashes.  There are no visible abnormalities upon inspection during the arm-raising maneuver or with hands on hips in the sitting position. There is no nipple retraction, discharge or nipple/areolar skin changes.There are no masses palpable in the sitting or supine positions.  There is a well-healed radial incision right lateral breast  from her August 2019 excision of atypical hyperplasia.    Breast, LEFT:  medium sized, 38B, symmetric with the contralateral " side.  Breast skin is without erythema, edema, rashes.  There are no visible abnormalities upon inspection during the arm-raising maneuver or with hands on hips in the sitting position. There is no nipple  discharge or nipple/areolar skin changes.there is nipple clefting on the left that is asymmetric from the right that has been there for many many years she tells me.  There are no masses palpable in the sitting or supine positions.  Radial incision lower inner quadrant from remote excisional biopsy of the cyst.  Irregular and somewhat hypertrophic excision lower outer quadrant left breast from remote excision of cyst.    Lymphatic:  There is no axillary, cervical, infraclavicular, or supraclavicular adenopathy bilaterally.  Musculoskeletal:  Good strength in all 4 extremities.   There is good range of motion in both shoulders.  Skin:  No new skin lesions or rashes on the skin excluding the breast (see breast exam above).        Imagin2021:  Screening mammogram with tomosynthesis at [facility]  FINDINGS: Bilateral digital CC and MLO mammographic and digital Tomosynthesis images were obtained. Comparison is made to prior studies dated 11/10/2020 and 2019 .   Scattered fibroglandular densities are seen throughout both breasts in a pattern which is unchanged. Stable mild postsurgical architectural distortion of the right breast is noted.  There is no evidence for axillary lymphadenopathy or nipple retraction.     IMPRESSION:  1. There is no evidence for malignancy or significant change in either breast. Routine followup mammography is recommended.  BI-RADS category 2: Benign.    2021: Breast MRI at [facility]  FINDINGS:Scattered fibroglandular tissue is seen throughout both breasts. Mild background parenchymal enhancement of both breasts is noted. Stable mild postsurgical architectural distortion of the right breast is noted. There remains stable minimal enhancement in the region of prior surgery. A 0.8 cm T1  hyperintense and T2 STIR hypointense oval circumscribed lesion in the right breast in the region is noted and is consistent with an area of fat necrosis. No areas of suspicious  enhancement or morphology are seen in either breast. There is no evidence for abnormal skin, nipple or chest wall enhancement of either breast and I see no evidence for axillary or internal mammary chain adenopathy.     IMPRESSION:  There are no findings suspicious for malignancy in either breast. Routine follow-up imaging is recommended.     BI-RADS category 2: Benign finding.     Pathology:   Pathology from July 22, 2019 right breast biopsies in the office.  Right breast 8:00, 4 cm from the nipple returned as focal atypical duct hyperplasia involving a single gland, 0.22 mm maximally.  Ramone cyst, sclerosing adenosis, fibroadenomatoid hyperplasia, associated micro calcifications.  Right breast 11:00, 3 center meter from the nipple, duct ectasia, apocrine metaplasia, sclerosing adenosis, usual duct hyperplasia, associated micro calcifications.           Final Diagnosis   1.  Breast, Right 8 o'clock Position, 4 cm FN, Core Biopsy:                 A.  Focal atypical duct hyperplasia involving a single gland, 0.22 mm maximally.               B.  Clustered cysts, sclerosing adenosis, and fibroadenomatoid hyperplasia with associated microcalcifications.       2.  Breast, Right 11 o'clock Position, 3 cm FN, Core Biopsy:                A.  Duct ectasia, apocrine metaplasia, sclerosing adenosis, and usual duct hyperplasia with associated                     microcalcifications.      [facility]/brb    Electronically signed by Rita Ramos MD on 7/23/2019 at 1332   Comment     This case is shared at internal consensus conference with Rao Davis and  who concur.  This case is discussed with Dr. Collier.     mec/brb         Pathology from her left breast in office biopsy August 13, 2019 returned as sclerosing adenosis, fibroadenomatous  change, usual hyperplasia.  No atypia.  This is concordant and we will let her know benign.     Final Diagnosis   1. Core Biopsies, Left Breast, 3 O'clock:               A. Sclerosing adenosis.               B. Background of fibroadenomatous change.               C. Florid intraductal hyperplasia, usual type.               D. No significant cytologic atypia is identified.     toni/rylan          8-29-19 RIGHT NL lumpectomy returned as  No residual atypical hyperplasia.  We will let her know     Final Diagnosis   1.  Right Breast, Needle-Localized Lumpectomy (17 grams):                A.  Benign breast parenchyma with calcifications associated with sclerosing adenosis and columnar cell change.               B.  No residual atypical hyperplasia.                C.  Background breast with fibroadenoma and usual ductal hyperplasia.                D.  Clip and biopsy site changes present.      jab/brb                Procedures:  Percutaneous ultrasound-guided vacuum-assisted excisional breast biopsy x 2 separate sites, with 2 separate incisions 7-22-19  Indication:  ultrasound-visible breast mass x 2 separate sites , BR4 at each site  Location: RIGHT 11:0, 3 CFN and RIGHT breast 8:00, 4 CFN  Consent:  The risks, benefits, and alternatives to the procedure were discussed with the patient, who understood and wished to proceed.  The risks described included, but were not limited to, bleeding, infection, pneumothorax, and inadequate sampling requiring either repeat percutaneous or open excisional biopsy.  Description of Procedure:   After the patient was positioned supine on the procedure table, I located each  lesion using ultrasound.  At the right breast 11:00 3 cm from the nipple location there is a hypoechoic irregularly marginated wider than tall mass. It measures in the antiradial dimension 1.39 x 1.0 cm and in the radial dimension 1.38 x 0.79 cm.  The second lesion is at the right breast 8:00, 4 cm from the nipple  location is a 1.1 x 0.6 cm hypoechoic lesion in the antiradial dimension, that is wider than tall with good through-transmission.  It measures in the radial dimension 0.68 x 0.56 cm.each of the 2 separate biopsy sites, I performed the following: I prepped and draped the breast skin in sterile fashion.  I anesthetized the breast skin at each separate site of anticipated mammotomy with 1% lidocaine with epinephrine.  I then anesthetized the underlying subcutaneous tissue and breast parenchyma surrounding each separate lesion with 1% lidocaine with epinephrine under ultrasound visualization and guidance. I then made a nicking incision each separate site with an 11blade and inserted the 10G encore biopsy device from inferolateral to superomedial  under each separate lesion under ultrasound guidance.   I then took 15 sequential core samples from the first site and 5 core samples from the second site, using the automated sampling of the encore device.  There will be a remnant from the first biopsy site lesion.  The second biopsy site lesion appeared to be removed in its entirety.   I removed the probe, then placed a hydromark marker, using a stiff introducer, into each separate biopsy site. We held manual compression for 10 minutes, placed steri-strips at the mammotomy site, and wrapped the patient in a 6 inch super ace wrap with an ice-pack.  Marker placed: hydromark x2  Tolerance: The patient tolerated the procedure well.  Disposition: We will see her back within a week to review her pathology.      Percutaneous ultrasound-guided vacuum-assisted excisional breast biopsy 8-13-19  Indication:  ultrasound-visible breast mass  Location: LEFT 3:00 RA  Consent:  The risks, benefits, and alternatives to the procedure were discussed with the patient, who understood and wished to proceed.  The risks described included, but were not limited to, bleeding, infection, pneumothorax, and inadequate sampling requiring either repeat  percutaneous or open excisional biopsy.  Description of Procedure:   After the patient was positioned supine on the procedure table, I located the lesion using ultrasound.  I prepped and draped the breast skin in sterile fashion.  I anesthetized the breast skin at the site of anticipated mammotomy with 1% lidocaine with epinephrine.  I then anesthetized the underlying subcutaneous tissue and breast parenchyma surrounding the lesion with 1% lidocaine with epinephrine under ultrasound visualization and guidance. I then made a nicking incision with an 11blade and inserted the 10G encore biopsy device from inferolateral to superomedial under the lesion under ultrasound guidance.   I then took 11 sequential core samples using the automated sampling of the encore device, until the lesion disappeared on ultrasound. There was no residual lesion visible at the conclusion of the procedure.  I removed the probe, then placed a hydromark marker, using a stiff introducer, into the biopsy site. We held manual compression for 10 minutes, placed steri-strips at the mammotomy site, and wrapped the patient in a 6 inch super ace wrap with an ice-pack.  Marker placed: hydromark  Tolerance: The patient tolerated the procedure well.  Disposition: We will see her back within a week to review her pathology.    Assessment:    1- right breast ADH, 7/2019  Right breast 8:00, 4 cm from the nipple, no exam correlate, 1 cm on ultrasound, BI-RADS 4.  Pathology from July 22, 2019 right breast biopsies in the office.  Right breast 8:00, 4 cm from the nipple returned as focal atypical duct hyperplasia involving a single gland, 0.22 mm maximally.  Ramone cyst, sclerosing adenosis, fibroadenomatoid hyperplasia, associated micro calcifications.    8-29-19 RIGHT NL lumpectomy returned as  No residual atypical hyperplasia.    2- benign bilateral breast biopsies  LEFT breast 3:00 RA- seen on MRI done for ADH- US correlate 8mm, BR4-  Biopsied 8-2019-  Pathology from her left breast in office biopsy August 13, 2019 returned as sclerosing adenosis, fibroadenomatous change, usual hyperplasia.  No atypia.    Right breast 11:00, 3 cm from the nipple, tender to palpation, no exam correlate, 1.6 cm on ultrasound, BI-RADS 4.    Pathology from July 22, 2019 right breast biopsies in the office.  Right breast 11:00, 3 center meter from the nipple, duct ectasia, apocrine metaplasia, sclerosing adenosis, usual duct hyperplasia, associated micro calcifications.    3- mastodynia, improved, not limiting  Diffuse bilateral    4- Increased risk, CRA risk assessment based on her family history, reproductive history and recent atypical biopsy.  This returned her lifetime breast cancer risk as 17 to 31%  Return to her 5-year breast cancer risk as 4.2%    Discussion:  Dr Collier has performed a CRA risk assessment based on her family history, reproductive history and recent atypical biopsy.  This returned her lifetime breast cancer risk as 17 to 31%  Return to her 5-year breast cancer risk as 4.2%  We discussed management options for individuals who are at increased risk (>20% lifetime risk):  1) High risk screening - Annual mammogram and annual breast MRI, alternating one test every 6 months, biannual clinical exam and monthly self breast exam. She meets criteria for high risk screening.  2) Chemoprevention with Tamoxifen, Raloxifene or Exemestane. These may reduce risk up to 50%. I reviewed that these particular medications are not without risks and the risk/benefit ratio must be considered carefully.   3) Risk reducing surgery such as prophylactic mastectomy  which may reduce risk by 90-95%. We discussed that this is a relatively radical strategy and is generally reserved for individuals with a known genetic mutation predisposing them to an increased risk (~50% risk) of breast cancer. She does not meet criteria for risk reducing surgery.   4) I discussed the importance of exercise  and weight management as part of a risk reducing strategy, since increased BMI is associated with an increased risk of breast cancer.     She is currently taking evista, both arimidex and femara were discontinued due to arthralgias.    She is interested in continue high risk imaging surveillance with mammogram alternating with MRI.  We discussed alternating those studies so they occur at 6 month intervals.  She is in agreement with this plan.    Next screening mammogram in 12 months, MRI in 18 months.    In regards to her breast discomfort: it is mild and not limiting at this time.    I also counseled her about the adverse effects of weight gain on breast cancer risk.  She was understanding.      Plan:  We reviewed her interval history, imaging, imaging reports, consultations, treatment and examination together today.    There is no evidence of disease on her imaging or examination.    - continue follow up with Dr. Saldivar     -screening mammogram with tomosynthesis in 12 months at [facility] followed by exam  She will have CBE in her medical oncology visits between follow up here.    I asked her to continue her self breast exam and to call us in the interim with concerns otherwise we will see her back after her next imaging.          ROSANA Mares/transcription disclaimer:  Dictated using Dragon dictation

## 2022-01-04 ENCOUNTER — ANESTHESIA EVENT (OUTPATIENT)
Dept: PAIN MEDICINE | Facility: HOSPITAL | Age: 67
End: 2022-01-04

## 2022-01-04 ENCOUNTER — HOSPITAL ENCOUNTER (OUTPATIENT)
Dept: PAIN MEDICINE | Facility: HOSPITAL | Age: 67
Discharge: HOME | End: 2022-01-04

## 2022-01-04 ENCOUNTER — ANESTHESIA (OUTPATIENT)
Dept: PAIN MEDICINE | Facility: HOSPITAL | Age: 67
End: 2022-01-04

## 2022-01-04 ENCOUNTER — HOSPITAL ENCOUNTER (OUTPATIENT)
Dept: GENERAL RADIOLOGY | Facility: HOSPITAL | Age: 67
Discharge: HOME | End: 2022-01-04

## 2022-01-04 VITALS
OXYGEN SATURATION: 98 % | HEART RATE: 62 BPM | TEMPERATURE: 97.3 F | DIASTOLIC BLOOD PRESSURE: 88 MMHG | RESPIRATION RATE: 16 BRPM | SYSTOLIC BLOOD PRESSURE: 139 MMHG

## 2022-01-04 DIAGNOSIS — M46.1 SACROILIITIS: Primary | Chronic | ICD-10-CM

## 2022-01-04 DIAGNOSIS — R52 PAIN: ICD-10-CM

## 2022-01-04 PROCEDURE — 77003 FLUOROGUIDE FOR SPINE INJECT: CPT

## 2022-01-04 PROCEDURE — 25010000002 MIDAZOLAM PER 1 MG: Performed by: ANESTHESIOLOGY

## 2022-01-04 PROCEDURE — 25010000002 METHYLPREDNISOLONE PER 80 MG: Performed by: ANESTHESIOLOGY

## 2022-01-04 PROCEDURE — 25010000002 FENTANYL CITRATE (PF) 50 MCG/ML SOLUTION: Performed by: ANESTHESIOLOGY

## 2022-01-04 RX ORDER — SODIUM CHLORIDE 0.9 % (FLUSH) 0.9 %
1-10 SYRINGE (ML) INJECTION AS NEEDED
Status: DISCONTINUED | OUTPATIENT
Start: 2022-01-04 | End: 2022-01-05 | Stop reason: HOSPADM

## 2022-01-04 RX ORDER — FENTANYL CITRATE 50 UG/ML
50 INJECTION, SOLUTION INTRAMUSCULAR; INTRAVENOUS AS NEEDED
Status: DISCONTINUED | OUTPATIENT
Start: 2022-01-04 | End: 2022-01-05 | Stop reason: HOSPADM

## 2022-01-04 RX ORDER — MIDAZOLAM HYDROCHLORIDE 1 MG/ML
1 INJECTION INTRAMUSCULAR; INTRAVENOUS AS NEEDED
Status: DISCONTINUED | OUTPATIENT
Start: 2022-01-04 | End: 2022-01-05 | Stop reason: HOSPADM

## 2022-01-04 RX ORDER — METHYLPREDNISOLONE ACETATE 80 MG/ML
40 INJECTION, SUSPENSION INTRA-ARTICULAR; INTRALESIONAL; INTRAMUSCULAR; SOFT TISSUE ONCE
Status: DISCONTINUED | OUTPATIENT
Start: 2022-01-04 | End: 2022-01-04

## 2022-01-04 RX ORDER — LIDOCAINE HYDROCHLORIDE 10 MG/ML
1 INJECTION, SOLUTION INFILTRATION; PERINEURAL ONCE AS NEEDED
Status: DISCONTINUED | OUTPATIENT
Start: 2022-01-04 | End: 2022-01-05 | Stop reason: HOSPADM

## 2022-01-04 RX ORDER — METHYLPREDNISOLONE ACETATE 80 MG/ML
80 INJECTION, SUSPENSION INTRA-ARTICULAR; INTRALESIONAL; INTRAMUSCULAR; SOFT TISSUE ONCE
Status: COMPLETED | OUTPATIENT
Start: 2022-01-04 | End: 2022-01-04

## 2022-01-04 RX ORDER — BUPIVACAINE HYDROCHLORIDE 5 MG/ML
10 INJECTION, SOLUTION EPIDURAL; INTRACAUDAL ONCE
Status: DISCONTINUED | OUTPATIENT
Start: 2022-01-04 | End: 2022-01-05 | Stop reason: HOSPADM

## 2022-01-04 RX ADMIN — FENTANYL CITRATE 50 MCG: 50 INJECTION INTRAMUSCULAR; INTRAVENOUS at 13:26

## 2022-01-04 RX ADMIN — METHYLPREDNISOLONE ACETATE 80 MG: 80 INJECTION, SUSPENSION INTRA-ARTICULAR; INTRALESIONAL; INTRAMUSCULAR; SOFT TISSUE at 13:29

## 2022-01-04 RX ADMIN — MIDAZOLAM 2 MG: 1 INJECTION INTRAMUSCULAR; INTRAVENOUS at 13:21

## 2022-01-04 NOTE — H&P
[facility]    History and Physical    Patient Name: Camelia Quintanilla  :  1955  MRN:  [id number]  Date of Admission: 2022    Subjective       Patient is a 66-year-old female with bilateral sacral pain which has responded favorably to sacroiliac injections in the past.  In fact, she reports approximate 85 to 90% relief following her last injection.  Today she rates her pain as an 8/10.  She is here for a repeat SI joint injection.      The following portions of the patients history were reviewed and updated as appropriate: current medications, allergies, past medical history, past surgical history, past family history, past social history and problem list                Objective     Past Medical History:   Past Medical History:   Diagnosis Date   • Asthma    • Atypical ductal hyperplasia of right breast 2019   • Bronchiectasis (HCC)    • CAD (coronary artery disease)    • Depression with anxiety 2016   • Disease of thyroid gland     HYPOTHYROIDISM   • Elevated cholesterol    • Fibrocystic breast    • Fibromyalgia syndrome 2017   • GERD (gastroesophageal reflux disease)    • H/O Third nerve palsy of right eye    • Heart palpitations    • HLD (hyperlipidemia) 2016   • Hypertension    • Hypothyroidism    • IBS (irritable bowel syndrome)    • Low back pain    • Lung nodule     RIGHT LOWER LOBE   • Moderate persistent asthma without complication 2017   • Osteoporosis    • PONV (postoperative nausea and vomiting)    • Rheumatoid arthritis (HCC)    • Salivary gland swelling     RIGHT-CT SCAN 21   • Scoliosis    • SOB (shortness of breath) on exertion    • Vitamin D deficiency 2016     Past Surgical History:   Past Surgical History:   Procedure Laterality Date   • ABDOMINAL HERNIA REPAIR     • ANGIOPLASTY  2021   • APPENDECTOMY     • BREAST BIOPSY      right and left benign 10-15 yrs ago   • BREAST LUMPECTOMY Right 2019    Procedure: right breast needle localized  lumpectomy for atypical duct hyperplasia.;  Surgeon: Giovanna Collier MD;  Location:  [facility];  Service: General   • BRONCHOSCOPY N/A 5/11/2018    Procedure: BRONCHOSCOPY;  Surgeon: Emma Blakely MD;  Location: [facility] ENDOSCOPY;  Service: Pulmonary   • CARDIAC CATHETERIZATION     • CATARACT EXTRACTION WITH INTRAOCULAR LENS IMPLANT Bilateral    • CHOLECYSTECTOMY     • COLONOSCOPY  2015   • COLONOSCOPY  2020   • ENDOSCOPY  2015   • EPIDURAL BLOCK      LUMBAR   • HERNIA REPAIR     • HYSTERECTOMY     • KNEE ARTHROSCOPY Bilateral    • OOPHORECTOMY     • THORACOSCOPY VIDEO ASSISTED WITH LOBECTOMY Left 5/7/2021    Procedure: LEFT VIDEO ASSISTED THORACOSCOPYwith wedge resection, INTERCOSTAL NERVE BLOCK;  Surgeon: Ingrid Ch MD;  Location: [facility] MAIN OR;  Service: Thoracic;  Laterality: Left;   • TONSILLECTOMY     • WRIST FUSION Right      Family History:   Family History   Problem Relation Age of Onset   • COPD Mother    • Cancer Mother         heart tumor   • Heart disease Mother    • COPD Father    • Hypertension Father    • No Known Problems Sister    • Cancer Brother         testicular   • Breast cancer Paternal Aunt         unknown age    • Heart disease Maternal Grandmother    • Malig Hyperthermia Neg Hx      Social History:   Social History     Socioeconomic History   • Marital status:      Spouse name: Zbigniew   • Years of education: High school   Tobacco Use   • Smoking status: Never Smoker   • Smokeless tobacco: Never Used   Vaping Use   • Vaping Use: Never used   Substance and Sexual Activity   • Alcohol use: No   • Drug use: No   • Sexual activity: Defer       Vital Signs Range for the last 24 hours  Temperature: Temp:  [36.3 °C (97.3 °F)] 36.3 °C (97.3 °F)   Temp Source: Temp src: Temporal   BP: BP: (162)/(84) 162/84   Pulse: Heart Rate:  [65] 65   Respirations: Resp:  [16] 16   SPO2: SpO2:  [97 %] 97 %   O2 Amount (l/min):     O2 Devices Device (Oxygen Therapy): room air   Weight:            --------------------------------------------------------------------------------    Current Outpatient Medications   Medication Sig Dispense Refill   • albuterol sulfate  (90 Base) MCG/ACT inhaler Inhale 2 puffs Every 4 (Four) Hours As Needed for Wheezing.     • alendronate (FOSAMAX) 70 MG tablet TAKE 1 TABLET BY MOUTH ONCE WEEKLY BEFORE BREAKFAST, ON AN EMPTY STOMACH: REMAIN UPRIGHT FOR ONE HOUR:TAKE WITH 8 OUNCES OF WATER (Patient taking differently: Take 70 mg by mouth Every 7 (Seven) Days. SUNDAY) 4 tablet 0   • amiodarone (PACERONE) 200 MG tablet Take 200 mg by mouth Daily.     • amLODIPine (NORVASC) 5 MG tablet Take 1 tablet by mouth Every Night.     • apixaban (ELIQUIS) 5 MG tablet tablet Take 1 tablet by mouth Every 12 (Twelve) Hours. 60 tablet 0   • atorvastatin (LIPITOR) 40 MG tablet Take 40 mg by mouth Daily.     • baclofen (LIORESAL) 10 MG tablet Take 10 mg by mouth As Needed.     • budesonide-formoterol (SYMBICORT) 160-4.5 MCG/ACT inhaler Inhale 2 puffs Daily.     • Calcium Carbonate-Vit D-Min (Calcium 1200) 0918-6786 MG-UNIT chewable tablet Chew 1,000 mg As Needed.     • celecoxib (CeleBREX) 200 MG capsule Take 200 mg by mouth Daily.     • clotrimazole-betamethasone (LOTRISONE) 1-0.05 % cream Apply 1 application topically to the appropriate area as directed 2 (Two) Times a Day As Needed.     • Diclofenac Sodium (VOLTAREN) 1 % gel gel Apply 1 g topically to the appropriate area as directed 4 (Four) Times a Day. UP TO 4 TIMES DAILY     • furosemide (LASIX) 20 MG tablet Take 20 mg by mouth As Needed.     • gabapentin (NEURONTIN) 300 MG capsule Take 600 mg by mouth Every Night.     • HYDROcodone-acetaminophen (NORCO)  MG per tablet Take 1 tablet by mouth 2 (Two) Times a Day.     • hydroxychloroquine (PLAQUENIL) 200 MG tablet Take 200 mg by mouth 2 (Two) Times a Day. RA     • hyoscyamine (LEVSIN) 0.125 MG SL tablet Dissolve one Tablet Under The Toungue Every 6  Hours as Needed for  cramping (Patient taking differently: Take 0.125 mg by mouth Every 6 (Six) Hours As Needed. Dissolve one Tablet Under The Toungue Every 6  Hours as Needed for cramping) 30 tablet 3   • isosorbide mononitrate (IMDUR) 120 MG 24 hr tablet Take 120 mg by mouth Daily.     • letrozole (FEMARA) 2.5 MG tablet Take 2.5 mg by mouth Daily.     • levothyroxine (SYNTHROID, LEVOTHROID) 75 MCG tablet      • metoprolol succinate XL (TOPROL-XL) 25 MG 24 hr tablet Take 25 mg by mouth Every Night.     • nystatin (MYCOSTATIN) 955091 UNIT/ML suspension Swish and swallow 500,000 Units As Needed. INHALERS     • pantoprazole (PROTONIX) 40 MG EC tablet TAKE ONE TABLET BY MOUTH ONCE NIGHTLY (Patient taking differently: Take 40 mg by mouth Every Night.) 30 tablet 0   • polyethylene glycol (MIRALAX) packet Take 17 g by mouth Every Night.     • raloxifene (EVISTA) 60 MG tablet TAKE ONE TABLET BY MOUTH DAILY 30 tablet 4   • ranolazine (RANEXA) 1000 MG 12 hr tablet Take 1,000 mg by mouth Every 12 (Twelve) Hours.     • SPIRIVA RESPIMAT 1.25 MCG/ACT aerosol solution inhaler Inhale 1 puff Every Morning.     • vitamin D (ERGOCALCIFEROL) 43911 UNITS capsule capsule Take 50,000 Units by mouth Every 7 (Seven) Days. SUNDAY     • albuterol (PROVENTIL) (2.5 MG/3ML) 0.083% nebulizer solution Take 2.5 mg by nebulization Every 6 (Six) Hours As Needed for Wheezing (asthma).     • FASENRA 30 MG/ML solution prefilled syringe Every 2 (Two) Months. EVERY 2 MONTHS  ASTHMA  1   • levothyroxine (SYNTHROID, LEVOTHROID) 88 MCG tablet Take 88 mcg by mouth Daily.     • nitroglycerin (NITROSTAT) 0.4 MG SL tablet Place 0.4 mg under the tongue every 5 (five) minutes.     • oxyCODONE (Roxicodone) 5 MG immediate release tablet Take 1 tablet by mouth Every 4 (Four) Hours As Needed for Moderate Pain . 30 tablet 0     Current Facility-Administered Medications   Medication Dose Route Frequency Provider Last Rate Last Admin   • bupivacaine (PF) (MARCAINE) 0.5 % injection 10 mL  10  mL Injection Once Steve Pinzon MD       • fentaNYL citrate (PF) (SUBLIMAZE) injection 50 mcg  50 mcg Intravenous PRN Steve Pinzon MD       • lidocaine (XYLOCAINE) 1 % injection 1 mL  1 mL Intradermal Once PRN Steve Pinzon MD       • methylPREDNISolone acetate (DEPO-medrol) injection 40 mg  40 mg Intra-articular Once Steve Pinzon MD       • midazolam (VERSED) injection 1 mg  1 mg Intravenous PRN Steve Pinzon MD       • sodium chloride 0.9 % flush 1-10 mL  1-10 mL Intravenous PRN Steve Pinzon MD           --------------------------------------------------------------------------------  Assessment/Plan      Anesthesia Evaluation     Patient summary reviewed and Nursing notes reviewed   history of anesthetic complications: PONV  NPO Solid Status: > 8 hours  NPO Liquid Status: > 2 hours           Airway   Mallampati: II  TM distance: >3 FB  Neck ROM: full  Dental - normal exam     Pulmonary - normal exam    breath sounds clear to auscultation  (+) asthma,  Cardiovascular - normal exam    Rhythm: regular  Rate: normal    (+) hypertension, CAD, STREET, PVD, hyperlipidemia,   (-) angina, orthopnea, PND      Neuro/Psych  (+) headaches, numbness, psychiatric history Anxiety and Depression,     GI/Hepatic/Renal/Endo    (+)  GERD,  diabetes mellitus,     Musculoskeletal     Abdominal    Substance History - negative use     OB/GYN negative ob/gyn ROS         Other   arthritis,                 Diagnosis and Plan    Treatment Plan  ASA 3   Patient has had previous injection/procedure with % improvement.   Procedures: Sacroiliac joint injection, With fluoroscopy,       Anesthetic plan and risks discussed with patient.          Diagnosis     * Sacroiliitis (HCC) [M46.1]

## 2022-01-04 NOTE — ANESTHESIA PROCEDURE NOTES
PAIN SI joint injection      Patient reassessed immediately prior to procedure    Patient location during procedure: Pain Clinic  Start time: 1/4/2022 1:16 PM  Stop time: 1/4/2022 1:35 PM    Reason for block: procedure for pain  Performed by  Anesthesiologist: Steve Pinzon MD  Preanesthetic Checklist  Completed: patient identified, site marked, risks and benefits discussed, surgical consent, monitors and equipment checked, pre-op evaluation and timeout performed  Prep:  Patient position: prone  Sterile barriers:gloves, mask, sterile barrier and cap  Prep: ChloraPrep  Patient monitoring: blood pressure monitoring, continuous pulse oximetry and EKG  Procedure:  Sedation:yes (Midazolam 2 mg and fentanyl 50 mcg IV)  Guidance:fluoroscopy  Contrast Medium:no  Location:Bilateral  Needle Type:Quincke  Needle Gauge:22 G  Aspiration:negative  Medications:  Depomedrol:80 mg  Comment:2 ml of 1/2% bupivacaine.    Post Assessment  Injection Assessment: negative  Patient Tolerance:comfortable throughout block  Complications:no  Additional Notes  Injection was performed under fluoroscopic guidance.    Post-Op Diagnosis Codes:     * Sacroiliitis (HCC) (M46.1)

## 2022-03-01 ENCOUNTER — OFFICE VISIT (OUTPATIENT)
Dept: ONCOLOGY | Facility: CLINIC | Age: 67
End: 2022-03-01

## 2022-03-01 ENCOUNTER — LAB (OUTPATIENT)
Dept: LAB | Facility: HOSPITAL | Age: 67
End: 2022-03-01

## 2022-03-01 VITALS
WEIGHT: 170.1 LBS | HEIGHT: 64 IN | OXYGEN SATURATION: 96 % | RESPIRATION RATE: 18 BRPM | SYSTOLIC BLOOD PRESSURE: 117 MMHG | BODY MASS INDEX: 29.04 KG/M2 | TEMPERATURE: 97.3 F | DIASTOLIC BLOOD PRESSURE: 74 MMHG | HEART RATE: 79 BPM

## 2022-03-01 DIAGNOSIS — N60.99 ATYPICAL DUCTAL HYPERPLASIA OF BREAST: ICD-10-CM

## 2022-03-01 DIAGNOSIS — D64.9 ANEMIA, UNSPECIFIED TYPE: ICD-10-CM

## 2022-03-01 DIAGNOSIS — M81.0 OSTEOPOROSIS, UNSPECIFIED OSTEOPOROSIS TYPE, UNSPECIFIED PATHOLOGICAL FRACTURE PRESENCE: Primary | ICD-10-CM

## 2022-03-01 LAB
ALBUMIN SERPL-MCNC: 4.1 G/DL (ref 3.5–5.2)
ALBUMIN/GLOB SERPL: 1.5 G/DL (ref 1.1–2.4)
ALP SERPL-CCNC: 66 U/L (ref 38–116)
ALT SERPL W P-5'-P-CCNC: 13 U/L (ref 0–33)
ANION GAP SERPL CALCULATED.3IONS-SCNC: 9.1 MMOL/L (ref 5–15)
AST SERPL-CCNC: 19 U/L (ref 0–32)
BASOPHILS # BLD AUTO: 0.02 10*3/MM3 (ref 0–0.2)
BASOPHILS NFR BLD AUTO: 0.3 % (ref 0–1.5)
BILIRUB SERPL-MCNC: 0.4 MG/DL (ref 0.2–1.2)
BUN SERPL-MCNC: 15 MG/DL (ref 6–20)
BUN/CREAT SERPL: 15.3 (ref 7.3–30)
CALCIUM SPEC-SCNC: 9.3 MG/DL (ref 8.5–10.2)
CHLORIDE SERPL-SCNC: 108 MMOL/L (ref 98–107)
CO2 SERPL-SCNC: 26.9 MMOL/L (ref 22–29)
CREAT SERPL-MCNC: 0.98 MG/DL (ref 0.6–1.1)
DEPRECATED RDW RBC AUTO: 51.5 FL (ref 37–54)
EGFRCR SERPLBLD CKD-EPI 2021: 63.8 ML/MIN/1.73
EOSINOPHIL # BLD AUTO: 0.18 10*3/MM3 (ref 0–0.4)
EOSINOPHIL NFR BLD AUTO: 2.8 % (ref 0.3–6.2)
ERYTHROCYTE [DISTWIDTH] IN BLOOD BY AUTOMATED COUNT: 14.3 % (ref 12.3–15.4)
GLOBULIN UR ELPH-MCNC: 2.7 GM/DL (ref 1.8–3.5)
GLUCOSE SERPL-MCNC: 86 MG/DL (ref 74–124)
HCT VFR BLD AUTO: 43.8 % (ref 34–46.6)
HGB BLD-MCNC: 13.5 G/DL (ref 12–15.9)
IMM GRANULOCYTES # BLD AUTO: 0.04 10*3/MM3 (ref 0–0.05)
IMM GRANULOCYTES NFR BLD AUTO: 0.6 % (ref 0–0.5)
LYMPHOCYTES # BLD AUTO: 1.43 10*3/MM3 (ref 0.7–3.1)
LYMPHOCYTES NFR BLD AUTO: 22.4 % (ref 19.6–45.3)
MCH RBC QN AUTO: 30.1 PG (ref 26.6–33)
MCHC RBC AUTO-ENTMCNC: 30.8 G/DL (ref 31.5–35.7)
MCV RBC AUTO: 97.8 FL (ref 79–97)
MONOCYTES # BLD AUTO: 0.43 10*3/MM3 (ref 0.1–0.9)
MONOCYTES NFR BLD AUTO: 6.7 % (ref 5–12)
NEUTROPHILS NFR BLD AUTO: 4.28 10*3/MM3 (ref 1.7–7)
NEUTROPHILS NFR BLD AUTO: 67.2 % (ref 42.7–76)
NRBC BLD AUTO-RTO: 0 /100 WBC (ref 0–0.2)
PLATELET # BLD AUTO: 249 10*3/MM3 (ref 140–450)
PMV BLD AUTO: 10.3 FL (ref 6–12)
POTASSIUM SERPL-SCNC: 4.6 MMOL/L (ref 3.5–4.7)
PROT SERPL-MCNC: 6.8 G/DL (ref 6.3–8)
RBC # BLD AUTO: 4.48 10*6/MM3 (ref 3.77–5.28)
SODIUM SERPL-SCNC: 144 MMOL/L (ref 134–145)
WBC NRBC COR # BLD: 6.38 10*3/MM3 (ref 3.4–10.8)

## 2022-03-01 PROCEDURE — 36415 COLL VENOUS BLD VENIPUNCTURE: CPT

## 2022-03-01 PROCEDURE — 80053 COMPREHEN METABOLIC PANEL: CPT

## 2022-03-01 PROCEDURE — 85025 COMPLETE CBC W/AUTO DIFF WBC: CPT

## 2022-03-01 PROCEDURE — 99214 OFFICE O/P EST MOD 30 MIN: CPT | Performed by: INTERNAL MEDICINE

## 2022-03-01 NOTE — PROGRESS NOTES
Subjective     REASON FOR FOLLOW UP:  1.  Focal atypical ductal hyperplasia of the right breast who was to be scheduled for right breast lumpectomy,    · Pathology from July 22, 2019 right breast biopsies in the office.  · Right breast 8:00, 4 cm from the nipple returned as focal atypical duct hyperplasia involving a single gland, 0.22 mm maximally.  Ramone cyst, sclerosing adenosis, fibroadenomatoid hyperplasia, associated micro calcifications.  · Right breast 11:00, 3 cm from the nipple, tender to palpation, no exam correlate, 1.6 cm on ultrasound, BI-RADS 4.Pathology from July 22, 2019 right breast biopsies in the office.Right breast 11:00, 3 center meter from the nipple, duct ectasia, apocrine metaplasia, sclerosing adenosis, usual duct hyperplasia, associated micro calcifications      2.  MRI showed a lesion in the left breast which was atypical and patient underwent ultrasound-guided biopsy of the left breast nodule.   LEFT breast 3:00 RA- seen on MRI done for ADH- US correlate 8mm, BR4-  Biopsied 8-2019- Pathology from her left breast in office biopsy August 13, 2019 returned as sclerosing adenosis, fibroadenomatous change, usual hyperplasia.  No atypia.       3.  September 12, 20 19, Arimidex started, patient had severe headaches like migraine headaches with Arimidex and subsequently switched to  ·  Femara started on 10/25/19.  Tolerating well.  · 6/15/2021: Patient with stiffness and arthralgias secondary to Femara.  · We will switch patient to Evista as patient does not want to consider tamoxifen because of side effects    4.  CT scan showed evidence of bilateral lung nodule CT done 3/26/2021 showed bandlike opacities in bilateral lower lobes which are pleural-based.  Medial to the biopsy-like opacity in the left lower lobe is a 1 cm noncalcified pulmonary nodule previously measured 0.7 cm on the prior examination on 3/7/2021.  This was associated with hypermetabolic activity on the PET CT study.  The  nodule was enlarged since prior exam.  PET/CT was performed on 3/17/2021.  Low-level activity was seen in the peripheral portion of the bandlike atelectasis in both lower lobes.  There is hypermetabolic 7 mm pleural-based nodule in the medial aspect of the left lower lobe.  There is a focus of hypermetabolic activity in the right submandibular gland of uncertain etiology.  This could be sialoadenitis.  Repeat CT suggested in 3 months.  · ENT Dr. Cramer evaluated 5/26/2021 and felt no concern on the CT neck and his neck exam  · Patient was seen by thoracic surgery and underwent VATS procedure.  May 2021 and the left lower lobe lung wedge resection was benign.  It was showing acute bronchitis but no malignancy      HISTORY OF PRESENT ILLNESS:  The patient is a 66 y.o. year old female who is here for an opinion about the above issue.  Patient is a 65-year-old female with history of atypical ductal hyperplasia s/p surgery of the right breast followed by Dr. Collier.  We had placed her on Arimidex on September 12, 2019 but patient had migraine-like headaches and hence switched to Femara and she is tolerating it very well.  She does have history of rheumatoid arthritis and her joint pains are slightly worse but she thinks she is doing reasonably well with Femara and she is not worried about the slight worsening of her joint pains.  She also has very minimal hot flashes.  She has not gained weight and she is trying to exercise.    She has appointment with Dr. Collier in November following MRI of the breast as well as screening mammogram.  Patient has been doing self breast exam and denies any breast mass, nipple discharge or skin changes or axillary adenopathy.    She has osteoporosis for which she is on Fosamax.  She takes it once a week.  She seems to be tolerating it well.  She does not want to go on Prolia injections and it is reasonable to continue Fosamax.  She also knows to take calcium 600 mg twice a day and  vitamin D 1000 international units daily.    Interval history:  Patient has history of atypical ductal hyperplasia for which she was initially treated with Arimidex and subsequently Femara and could not tolerate both as a result she is on Evista.  She is tolerating Evista very well.  She saw ROSANA Adames on December 15, 2021.  She had screening mammogram November 11, 2021 and MRI of the breast November 11, 2021.  Both of them were negative.  She is not due till November 2022.  And that is being scheduled by ROSANA Adames              ONCOLOGIC HISTORY:  Patient is a 64 year-old female with history of atypical duct hyperplasia diagnosed back in July 2019 and was done on the right breast biopsy.  More recently Dr. Collier took her on August 29, 2019 for right needle localization lumpectomy but pathology is negative and there is no residual atypical hyperplasia present.    Patient was referred here for chemoprophylaxis given history of atypical duct hyperplasia.  Patient is 3 years since she started her chemoprophylaxis.  Initially was on Arimidex and could not tolerate secondary to arthralgias.  Subsequently she was treated with Femara which she could not tolerate.  As a result she is now on Evista.  She is tolerating Evista reasonably well.  She states she is going to move to [place] to be closer to her son and grandchildren and she will make an appointment with an oncologist there.  We discussed about another 2 years of Evista to complete 5 years of chemoprophylaxis.        Oncologic history:     Patient is a 64-year-old female who underwent mammogram at [facility] in September 2018 and was asked to repeat diagnostic mammogram and ultrasound in 6 months    September 10, 2018  Findings  Breast is heterogeneously dense.  There is a small nodule projecting in the left breast centrally.  Small superficial nodule left lateral subareolar location stable.  Small nodule has also developed in the outer  hemisphere right breast middle third.  Both nodules are imaged.  Larger nodule in the more central aspect of right breast containing a single coarse calcification appears stable.  Additional bilateral lateral breast imaging is recommended to include true lateral projections.  Suggested spot compression views and targeted breast ultrasound.    October 18, 2018:  Bilateral digital diagnostic mammogram and diagnostic bilateral ultrasound done  Impression: Probable benign mammogram  1.  Benign-appearing simple cyst in the deep central left breast corresponding to the new mammographic density.  This can be followed up with yearly mammograms.  2.  Solid appearing nodule in the right breast along 8:00 which represented the developing nodular density on mammography and has likely been stable for several years.  This is favored to represent probably a benign fibroadenoma and can be followed up with right breast mammogram and ultrasound in 6 months to ensure stability.    6/27/2019: Diagnostic right digital mammogram and diagnostic right breast ultrasound  Right breast mass 8 to 9 o'clock position, 6.6 cm from the nipple, stable with circumscribed nodular lobular margins measuring 1.1 cm AP x0.7 cm.    Right breast focal asymmetry 10:00 to 11 o'clock position, 5 cm from the nipple contains stable coarse calcification which appears to have become larger on only the right spot since October 2018.    Ultrasound right breast the solid and cystic mass in the right breast at 8 o'clock position at 4 cm from the nipple is stable measuring 0.4 x 0.5 x 1 cm.  On October 11, 2018 ultrasound measured 0.5 x 1 x 0.5 cm and appeared to contain solid components on today's exam the mass consists of solid components and 2 small cystic areas within without thin septations.  The right breast sonographic irregularity mostly hypoechoic area 11:00 at 3 cm from the nipple measured 0.7 x 1.1 x 1.6 cm.  On October 11, 2018 this area measured 0.9 x 1.8  x 1.5 cm.  There is a new solid mural nodule 0.4 x 0.7 cm.  The new nodule is concerning for possible malignancy.    Impression the right breast ultrasound mixed solid and cystic mass at 8 o'clock position, 4 cm from the nipple needs to be biopsied under ultrasound guidance.    2.  The breast right ultrasound calcified area at 11:00, 3 cm from the nipple needs to be biopsied under ultrasound guidance because of a new solid hypoechoic mural nodule.    July 22, 2019: Pathology  Ultrasound-guided biopsy  Final Diagnosis   1.  Breast, Right 8 o'clock Position, 4 cm FN, Core Biopsy:                 A.  Focal atypical duct hyperplasia involving a single gland, 0.22 mm maximally.               B.  Clustered cysts, sclerosing adenosis, and fibroadenomatoid hyperplasia with associated microcalcifications.       2.  Breast, Right 11 o'clock Position, 3 cm FN, Core Biopsy:                A.  Duct ectasia, apocrine metaplasia, sclerosing adenosis, and usual duct hyperplasia with associated                     microcalcifications.      August 7, 2019: Bilateral breast MRI  Artifact from a biopsy marking clip upper outer quadrant right breast.  No abnormal enhancement adjacent to the clips.  There is a 9 mm diameter nodule lateral retroareolar left breast immediately beneath the skin surface most likely an intramammary lymph node or fibroadenoma.  Further evaluation with ultrasound is recommended.  BI-RADS 0.     Right mammogram same day shows 2 new marking clips upper outer quadrant both are spring shaped.  No underlying mammographic abnormality.    August 12, 2019: Left breast ultrasound, retroareolar left breast there is microlobulated hypoechoic mass that measures 8 mm ultrasound-guided core biopsy recommended.    Patient underwent left breast ultrasound-guided biopsy by Dr. Collier as of August 13, 2019.    Patient was to have surgery on the right breast but because of this new lesion on the left breast that was seen on MRI  she had to undergo biopsy first of the right breast lesion prior to surgery.    Patient is to hear from Dr. Collier about the surgery date    Obstetric/Gynecologic History:  Age menstrual periods began: 13  Patient is postmenopausal due to removal of her uterus and both ovaries in the following year: 0577-2031   Number of pregnancies:1  Number of live births: 1  Number of abortions or miscarriages: 0  Age of delivery of first child: 21  Patient did not breast feed.  Length of time taking birth control pills: 4 yrs   Patient took hormone replacement during the following dates:  10 yrs  Patient had uterus and ovaries removed.     September 12, 2019: Started Arimidex but had severe migraine headaches.    Femara started on 10/25/19.    CT scan which showed evidence of small lung nodules.  There was basilar areas of atelectasis along with a right lower lobe 1.2 x 0.9 cm nodule and multiple pulmonary nodules in the right lower lobe.  PET/CT was suggested .  Patient saw pulmonary Dr. Chandler.    PET scan was obtained which showed low-level activity along the peripheral portion of the bands of atelectasis with activity level of 4.4.  There is a 8 mm nodular opacity on the left which is photopenic.  There is a 7 mm pleural-based nodular density in the left lower lobe with an activity of 4.7.  There is a subcentimeter focus of hypermetabolic activity in the right submandibular gland with a maximal SUV of 5.5.    Radiologist suggests 3 months follow-up CT scan.  Also there is a focal the focus of hypermetabolic activity in the right submandibular gland is of uncertain etiology.  Clinically does not palpable.  Patient is not symptomatic.  Abdomen pelvis is negative.  She is not a smoker.    We will have ENT evaluate this patient for the submandibular gland, otherwise we will repeat a CT scan of the neck and chest in 3 months.    Past Medical History:   Diagnosis Date   • Asthma    • Atypical ductal hyperplasia of right breast  07/2019   • Bronchiectasis (HCC)    • CAD (coronary artery disease)    • Depression with anxiety 09/21/2016   • Disease of thyroid gland     HYPOTHYROIDISM   • Elevated cholesterol    • Fibrocystic breast    • Fibromyalgia syndrome 06/06/2017   • GERD (gastroesophageal reflux disease)    • H/O Third nerve palsy of right eye 2012   • Heart palpitations    • HLD (hyperlipidemia) 09/21/2016   • Hypertension    • Hypothyroidism    • IBS (irritable bowel syndrome)    • Low back pain    • Lung nodule     RIGHT LOWER LOBE   • Moderate persistent asthma without complication 06/06/2017   • Osteoporosis    • PONV (postoperative nausea and vomiting)    • Rheumatoid arthritis (HCC)    • Sacroiliitis (HCC) 01/04/2022   • Salivary gland swelling     RIGHT-CT SCAN 5/5/21   • Scoliosis    • SOB (shortness of breath) on exertion    • Vitamin D deficiency 09/21/2016        Past Surgical History:   Procedure Laterality Date   • ABDOMINAL HERNIA REPAIR     • ANGIOPLASTY  07/2021   • APPENDECTOMY     • BREAST BIOPSY      right and left benign 10-15 yrs ago   • BREAST LUMPECTOMY Right 8/29/2019    Procedure: right breast needle localized lumpectomy for atypical duct hyperplasia.;  Surgeon: Giovanna Collier MD;  Location: [facility] OR [facility];  Service: General   • BRONCHOSCOPY N/A 5/11/2018    Procedure: BRONCHOSCOPY;  Surgeon: Emma Blakely MD;  Location: [facility] ENDOSCOPY;  Service: Pulmonary   • CARDIAC CATHETERIZATION     • CATARACT EXTRACTION WITH INTRAOCULAR LENS IMPLANT Bilateral    • CHOLECYSTECTOMY     • COLONOSCOPY  2015   • COLONOSCOPY  2020   • ENDOSCOPY  2015   • EPIDURAL BLOCK      LUMBAR   • HERNIA REPAIR     • HYSTERECTOMY     • KNEE ARTHROSCOPY Bilateral    • OOPHORECTOMY     • THORACOSCOPY VIDEO ASSISTED WITH LOBECTOMY Left 5/7/2021    Procedure: LEFT VIDEO ASSISTED THORACOSCOPYwith wedge resection, INTERCOSTAL NERVE BLOCK;  Surgeon: Ingrid Ch MD;  Location: [facility] MAIN OR;  Service: Thoracic;  Laterality: Left;   •  TONSILLECTOMY     • WRIST FUSION Right         Current Outpatient Medications on File Prior to Visit   Medication Sig Dispense Refill   • albuterol (PROVENTIL) (2.5 MG/3ML) 0.083% nebulizer solution Take 2.5 mg by nebulization Every 6 (Six) Hours As Needed for Wheezing (asthma).     • albuterol sulfate  (90 Base) MCG/ACT inhaler Inhale 2 puffs Every 4 (Four) Hours As Needed for Wheezing.     • alendronate (FOSAMAX) 70 MG tablet TAKE 1 TABLET BY MOUTH ONCE WEEKLY BEFORE BREAKFAST, ON AN EMPTY STOMACH: REMAIN UPRIGHT FOR ONE HOUR:TAKE WITH 8 OUNCES OF WATER (Patient taking differently: Take 70 mg by mouth Every 7 (Seven) Days. SUNDAY) 4 tablet 0   • amiodarone (PACERONE) 200 MG tablet Take 200 mg by mouth Daily.     • amLODIPine (NORVASC) 5 MG tablet Take 1 tablet by mouth Every Night.     • apixaban (ELIQUIS) 5 MG tablet tablet Take 1 tablet by mouth Every 12 (Twelve) Hours. 60 tablet 0   • atorvastatin (LIPITOR) 40 MG tablet Take 40 mg by mouth Daily.     • baclofen (LIORESAL) 10 MG tablet Take 10 mg by mouth As Needed.     • budesonide-formoterol (SYMBICORT) 160-4.5 MCG/ACT inhaler Inhale 2 puffs Daily.     • Calcium Carbonate-Vit D-Min (Calcium 1200) 4381-3444 MG-UNIT chewable tablet Chew 1,000 mg As Needed.     • celecoxib (CeleBREX) 200 MG capsule Take 200 mg by mouth Daily.     • clotrimazole-betamethasone (LOTRISONE) 1-0.05 % cream Apply 1 application topically to the appropriate area as directed 2 (Two) Times a Day As Needed.     • Diclofenac Sodium (VOLTAREN) 1 % gel gel Apply 1 g topically to the appropriate area as directed 4 (Four) Times a Day. UP TO 4 TIMES DAILY     • FASENRA 30 MG/ML solution prefilled syringe Every 2 (Two) Months. EVERY 2 MONTHS  ASTHMA  1   • furosemide (LASIX) 20 MG tablet Take 20 mg by mouth As Needed.     • gabapentin (NEURONTIN) 300 MG capsule Take 600 mg by mouth Every Night.     • HYDROcodone-acetaminophen (NORCO)  MG per tablet Take 1 tablet by mouth 2 (Two) Times  a Day.     • hydroxychloroquine (PLAQUENIL) 200 MG tablet Take 200 mg by mouth 2 (Two) Times a Day. RA     • hyoscyamine (LEVSIN) 0.125 MG SL tablet Dissolve one Tablet Under The Toungue Every 6  Hours as Needed for cramping (Patient taking differently: Take 0.125 mg by mouth Every 6 (Six) Hours As Needed. Dissolve one Tablet Under The Toungue Every 6  Hours as Needed for cramping) 30 tablet 3   • isosorbide mononitrate (IMDUR) 120 MG 24 hr tablet Take 120 mg by mouth Daily.     • levothyroxine (SYNTHROID, LEVOTHROID) 75 MCG tablet      • levothyroxine (SYNTHROID, LEVOTHROID) 88 MCG tablet Take 88 mcg by mouth Daily.     • metoprolol succinate XL (TOPROL-XL) 25 MG 24 hr tablet Take 25 mg by mouth Every Night.     • nitroglycerin (NITROSTAT) 0.4 MG SL tablet Place 0.4 mg under the tongue every 5 (five) minutes.     • nystatin (MYCOSTATIN) 863537 UNIT/ML suspension Swish and swallow 500,000 Units As Needed. INHALERS     • pantoprazole (PROTONIX) 40 MG EC tablet TAKE ONE TABLET BY MOUTH ONCE NIGHTLY (Patient taking differently: Take 40 mg by mouth Every Night.) 30 tablet 0   • polyethylene glycol (MIRALAX) packet Take 17 g by mouth Every Night.     • raloxifene (EVISTA) 60 MG tablet TAKE ONE TABLET BY MOUTH DAILY 30 tablet 4   • ranolazine (RANEXA) 1000 MG 12 hr tablet Take 1,000 mg by mouth Every 12 (Twelve) Hours.     • SPIRIVA RESPIMAT 1.25 MCG/ACT aerosol solution inhaler Inhale 1 puff Every Morning.     • vitamin D (ERGOCALCIFEROL) 31395 UNITS capsule capsule Take 50,000 Units by mouth Every 7 (Seven) Days. SUNDAY     • letrozole (FEMARA) 2.5 MG tablet Take 2.5 mg by mouth Daily.     • oxyCODONE (Roxicodone) 5 MG immediate release tablet Take 1 tablet by mouth Every 4 (Four) Hours As Needed for Moderate Pain . 30 tablet 0     No current facility-administered medications on file prior to visit.        ALLERGIES:    Allergies   Allergen Reactions   • Adhesive Tape Swelling     Must use paper tape   • Britni  "Adhesive Remover [New Skin] Hives   • Morphine And Related Hives and Itching   • Silver Hives   • Nickel Rash      Patient is disabled, she is to work at [employer] in the past.  She does not smoke or drink alcohol.    Social History     Socioeconomic History   • Marital status:      Spouse name: Zbigniew   • Years of education: High school   Tobacco Use   • Smoking status: Never Smoker   • Smokeless tobacco: Never Used   Vaping Use   • Vaping Use: Never used   Substance and Sexual Activity   • Alcohol use: No   • Drug use: No   • Sexual activity: Defer        Family History   Problem Relation Age of Onset   • COPD Mother    • Cancer Mother         heart tumor   • Heart disease Mother    • COPD Father    • Hypertension Father    • No Known Problems Sister    • Cancer Brother         testicular   • Breast cancer Paternal Aunt         unknown age    • Heart disease Maternal Grandmother    • Malig Hyperthermia Neg Hx      History is consistent with chest wall cancer in mother who  of cancer at age 68, unsure what type.  Brother had testicular cancer at age 2 but is now 59 and in good health.  Maternal aunt with history of breast cancer in her 40s and  from it    Review of systems as mentioned in the HPI.    Objective     Vitals:    22 1105   BP: 117/74   Pulse: 79   Resp: 18   Temp: 97.3 °F (36.3 °C)   TempSrc: Temporal   SpO2: 96%   Weight: 77.2 kg (170 lb 1.6 oz)   Height: 162.6 cm (64.02\")   PainSc: 0-No pain      Current Status 3/1/2022   ECOG score 0           Review of Systems   Constitutional: Negative for appetite change, chills, diaphoresis, fatigue, fever and unexpected weight change.   HENT: Negative for hearing loss, sore throat and trouble swallowing.    Respiratory: Negative for cough, chest tightness, shortness of breath and wheezing.    Cardiovascular: Negative for chest pain, palpitations and leg swelling.   Gastrointestinal: Positive for constipation. Negative for abdominal " distention, abdominal pain, diarrhea, nausea and vomiting.   Genitourinary: Negative for dysuria, frequency, hematuria and urgency.   Musculoskeletal: Negative for joint swelling.        No muscle weakness.   Skin: Negative for rash and wound.   Neurological: Negative for seizures, syncope, speech difficulty, weakness, numbness and headaches.   Hematological: Negative for adenopathy. Does not bruise/bleed easily.   Psychiatric/Behavioral: Negative for behavioral problems, confusion and suicidal ideas.   All other systems reviewed and are negative.    Physical exam       This patient's ACP documentation is up to date, and there's nothing further left to document.      CONSTITUTIONAL:  Vital signs reviewed.  No distress, looks comfortable.  EYES:  Conjunctivae and lids unremarkable.  PERRLA  EARS,NOSE,MOUTH,THROAT:  Ears and nose appear unremarkable.  Lips, teeth, gums appear unremarkable.  RESPIRATORY:  Normal respiratory effort.  Lungs clear to auscultation bilaterally.  CARDIOVASCULAR:  Normal S1, S2.  No murmurs rubs or gallops.  No significant lower extremity edema.  BREAST: Right breast: No skin changes, no evidence of breast mass, no nipple discharge, no evidence of any right axillary adenopathy or right supraclavicular adenopathy  Left breast: No evidence of any skin changes, no evidence of any left breast mass and no evidence of left nipple discharge as well as no left axillary adenopathy or left supraclavicular adenopathy.  GASTROINTESTINAL: Abdomen appears unremarkable.  Nontender.  No hepatomegaly.  No splenomegaly.  LYMPHATIC:  No cervical, supraclavicular, axillary lymphadenopathy.  SKIN:  Warm.  No rashes.  PSYCHIATRIC:  Normal judgment and insight.  Normal mood and affect.    RECENT LABS:  Hematology WBC   Date Value Ref Range Status   03/01/2022 6.38 3.40 - 10.80 10*3/mm3 Final   07/12/2021 6.53 4.5 - 11.0 10*3/uL Final     RBC   Date Value Ref Range Status   03/01/2022 4.48 3.77 - 5.28 10*6/mm3 Final    07/12/2021 4.41 4.0 - 5.2 10*6/uL Final     Hemoglobin   Date Value Ref Range Status   03/01/2022 13.5 12.0 - 15.9 g/dL Final   07/12/2021 13.6 12.0 - 16.0 g/dL Final     Hematocrit   Date Value Ref Range Status   03/01/2022 43.8 34.0 - 46.6 % Final   07/12/2021 44.4 36.0 - 46.0 % Final     Platelets   Date Value Ref Range Status   03/01/2022 249 140 - 450 10*3/mm3 Final   07/12/2021 239 140 - 440 10*3/uL Final       Assessment/Plan      1.  Focal atypical ductal hyperplasia of the right breast at 8 o'clock position.   Right breast, 8 o'clock position, 4 cm from the nipple, path returned as focal atypical duct hyperplasia involving the single gland, 0.22 mm.sclerosing adenosis, fibroadenomatoid hyperplasia, associated micro calcifications.  · August 29, 2019 patient underwent needle localization lumpectomy on the right breast and there was no evidence of any atypical hyperplasia.  Reviewed the pathology.  · Patient has been referred here for chemoprophylaxis and discussed in length the side effects of Arimidex.  · This was switched to Evista because of arthralgias and significant stiffness  · Currently tolerating Evista very well and patient is planning to move to [place] and will find an oncologist there.  We discussed with her that she has 2 more years of Evista left.  · Both mammogram and MRI of the breast done November 2021 have been negative.     Right breast, 11 o'clock position, 3 cm from the nipple 1.6 cm on ultrasound.  Pathology from July 2019 right breast biopsies  duct ectasia, apocrine metaplasia, sclerosing adenosis, usual duct hyperplasia, associated micro calcifications    2.  Left breast, 3 o'clock position, MRI done shows abnormality, 9 mm nodular nodule lateral retroareolar left breast immediately beneath the skin surface most likely intramammary lymph node or fibroadenoma.  Ultrasound recommended.  August 12, 2019 left breast ultrasound: Retroareolar left breast, 3 o'clock position,  microlobulated hypoechoic mass which measures 8 mm.  Ultrasound-guided biopsy done on August 13, 2019.  Results pending.    · Patient is now awaiting surgery by Dr. Collier on both breasts.  · Risk assessment done based on family history, reproductive history and recent atypical biopsy.  The 5-year breast cancer risk is 4.2%.  And lifetime breast cancer risk is 17 to 31%.  · Patient has been referred here but given that she is undergoing surgery we would need to await the path on surgery prior to making any final recommendations.  · If only atypical duct hyperplasia present then certainly she could be a candidate for chemoprophylaxis.  But given that she has not yet had surgery, she tells me she is undergoing lumpectomy , and await surgery prior to making any recommendation  · August 29, 2019 underwent right needle localization lumpectomy and pathology on this is benign  · September 12, 2019: Started Arimidex.  Developed severe migraine headaches.  · October 12, 20 19: Started on Femara.  Tolerating well, except mild arthralgias.  · March 19, 2021: Patient's arthralgias worsened.  We will need to discontinue Femara.  I have reviewed the results of both MRI of the breast and mammogram from November 2020 and it is negative.  · Explained to the patient that given a very small focal atypical ductal hyperplasia really surveillance with MRI and mammogram would be reasonable and we will discontinue Femara  · Started Evista  · 8/31/2021, the patient continues on Evista daily and is tolerating this well.  She has occasional hot flashes, approximately 1-2 times daily.    3.  History of fibromyalgia, followed by kristopher Allen     4.  Atypical angina, cardiac cath negative followed by Dr. Jett    5.  Rheumatoid arthritis followed by Dr. Keller on Celebrex and Plaquenil, stable    6. Osteoporosis of the lumbar spine  · Currently on Fosamax.  Discussed Prolia as well but patient prefers oral bisphosphonates.  · Continue  calcium and vitamin D supplements  · We will schedule DEXA scan    7.  Lung nodule: Patient states that she had a CT angiogram of the chest done which showed atelectasis at the bases and right lower lobe lung nodules and there was lesion 1.2 x 0.8 cm in the right lower lobe.  · Patient was seen by pulmonary who ordered PET scan  · I have reviewed both CT scan and PET scan with the patient in length.  There is evidence of small submandibular gland activity of 5.5 but clinically it is not palpable.  The nodules in the lung are very small and 3-month follow-up CT scan suggested  · Patient has follow-up with pulmonary next week  · We will obtain CT abdomen pelvis in 1 week.  · Refer to ENT for evaluation of the submandibular gland uptake by PET scan  · S/p wedge resection by Dr. Ingrid Nguyen 5/8/2021 and pathology is benign  · ENT exam of the neck was benign  ·  she had follow-up CT of the chest performed today.    Plan:  · Reviewed the results of mammogram and MRI of the breast from November 2021 which is both negative  · Patient is moving to [place] and will find an oncologist there and inform us at which time we can send the records  · Currently tolerating Evista and we will continue that  · No follow-up given here as patient is moving in the next few weeks but will obtain a DEXA scan in 2 weeks  · No follow-up given here as she will follow up in [place].    Monitoring high risk medication    Kayla Saldivar MD     CC: Emma Medina MD David Sun, MD Reid Brown, MD Gerard Siciliano, MD Mehdi Poorkay, MD Amir Piracha, MD Rinkoo Aggarwal, MD Gary Crump, MD Robert Sasser, MD

## 2022-03-14 ENCOUNTER — HOSPITAL ENCOUNTER (OUTPATIENT)
Dept: CT IMAGING | Facility: HOSPITAL | Age: 67
Discharge: HOME | End: 2022-03-14
Admitting: THORACIC SURGERY (CARDIOTHORACIC VASCULAR SURGERY)

## 2022-03-14 DIAGNOSIS — R91.1 LUNG NODULE: ICD-10-CM

## 2022-03-14 PROCEDURE — 71250 CT THORAX DX C-: CPT

## 2022-03-19 ENCOUNTER — HOSPITAL ENCOUNTER (OUTPATIENT)
Dept: BONE DENSITY | Facility: HOSPITAL | Age: 67
Discharge: HOME | End: 2022-03-19
Admitting: INTERNAL MEDICINE

## 2022-03-19 DIAGNOSIS — M81.0 OSTEOPOROSIS, UNSPECIFIED OSTEOPOROSIS TYPE, UNSPECIFIED PATHOLOGICAL FRACTURE PRESENCE: ICD-10-CM

## 2022-03-19 PROCEDURE — 77080 DXA BONE DENSITY AXIAL: CPT

## 2022-03-21 ENCOUNTER — OFFICE VISIT (OUTPATIENT)
Dept: SURGERY | Facility: CLINIC | Age: 67
End: 2022-03-21

## 2022-03-21 VITALS
HEART RATE: 71 BPM | SYSTOLIC BLOOD PRESSURE: 122 MMHG | OXYGEN SATURATION: 93 % | HEIGHT: 64 IN | BODY MASS INDEX: 29.02 KG/M2 | WEIGHT: 170 LBS | DIASTOLIC BLOOD PRESSURE: 72 MMHG

## 2022-03-21 DIAGNOSIS — R91.1 LUNG NODULE: Primary | ICD-10-CM

## 2022-03-21 DIAGNOSIS — R06.02 SHORTNESS OF BREATH ON EXERTION: ICD-10-CM

## 2022-03-21 PROCEDURE — 99213 OFFICE O/P EST LOW 20 MIN: CPT | Performed by: THORACIC SURGERY (CARDIOTHORACIC VASCULAR SURGERY)

## 2022-03-21 RX ORDER — RIVAROXABAN 20 MG/1
TABLET, FILM COATED ORAL
COMMUNITY
Start: 2022-03-14

## 2022-03-21 NOTE — PROGRESS NOTES
"Chief Complaint  Lung nodule surveillance  Subjective          Camelia Quintanilla presents to [facility] THORACIC SURGERY  History of Present Illness     Ms. Quintanilla is a very pleasant 67-year-old lady status post left VATS wedge resection for left lower lobe lung nodule, consistent with casting granulomas inflammation. She presents today for a follow-up CT scan. She consents to being recorded using [facility].    Objective   Vital Signs:   /72 (BP Location: Right arm, Patient Position: Sitting, Cuff Size: Adult)   Pulse 71   Ht 162.6 cm (64\")   Wt 77.1 kg (170 lb)   SpO2 93%   BMI 29.18 kg/m²     Physical Exam  Vitals and nursing note reviewed.   Constitutional:       Appearance: She is well-developed.   HENT:      Head: Normocephalic and atraumatic.      Nose: Nose normal.   Eyes:      Conjunctiva/sclera: Conjunctivae normal.   Cardiovascular:      Rate and Rhythm: Normal rate.   Pulmonary:      Effort: Pulmonary effort is normal.   Abdominal:      Palpations: Abdomen is soft.   Musculoskeletal:      Cervical back: Neck supple.   Skin:     General: Skin is warm and dry.   Neurological:      Mental Status: She is alert and oriented to person, place, and time.   Psychiatric:         Behavior: Behavior normal.         Thought Content: Thought content normal.         Judgment: Judgment normal.        Result Review :            I have independently reviewed the CT scan of the chest performed on 3/14/2022 which demonstrates bilateral sub centimeter lung nodules that have increased in number since the last CT scan. There is also a new 8 mm lung nodule in the left lower lobe and a new 4 mm nodule in the right lower lobe. There is no mediastyle or hydrolymphadenopathy. There is no pleural or pericardial effusion.         Assessment and Plan    Ms. Quintanilla is a very pleasant 67-year-old lady with bilateral lymph nodules with a biopsy consistent with caseating granulomas inflammation.  Her nodules have increased in " number, but not size since her last CT scan.  She will need continued surveillance of her lung nodules given that one of these nodules is new. We will plan a short interval CT scan in 4 months to document stability.  If at her next CT any of the nodules have grown over a centimeter, we will plan to repeat the biopsy.    Diagnoses and all orders for this visit:    1. Lung nodule (Primary)    2. Shortness of breath on exertion      I spent 25 minutes caring for Camelia on this date of service. This time includes time spent by me in the following activities:preparing for the visit, reviewing tests, obtaining and/or reviewing a separately obtained history, performing a medically appropriate examination and/or evaluation , counseling and educating the patient/family/caregiver, ordering medications, tests, or procedures, documenting information in the medical record and independently interpreting results and communicating that information with the patient/family/caregiver  Follow Up   Return in about 4 months (around 7/21/2022) for Recheck.  Patient was given instructions and counseling regarding her condition or for health maintenance advice. Please see specific information pulled into the AVS if appropriate.     Transcribed from ambient dictation for Ingrid Ch MD by Iraida Knutson.  03/21/22   16:35 EDT    Patient verbalized consent to the visit recording.  I have personally performed the services described in this document as transcribed by the above individual, and it is both accurate and complete.  Ingrid Ch MD  3/28/2022  17:15 EDT

## 2022-03-22 ENCOUNTER — TRANSCRIBE ORDERS (OUTPATIENT)
Dept: ADMINISTRATIVE | Facility: HOSPITAL | Age: 67
End: 2022-03-22

## 2022-03-22 DIAGNOSIS — M54.9 BACK PAIN, UNSPECIFIED BACK LOCATION, UNSPECIFIED BACK PAIN LATERALITY, UNSPECIFIED CHRONICITY: Primary | ICD-10-CM

## 2022-03-28 ENCOUNTER — HOSPITAL ENCOUNTER (OUTPATIENT)
Dept: GENERAL RADIOLOGY | Facility: HOSPITAL | Age: 67
Discharge: HOME | End: 2022-03-28

## 2022-03-28 ENCOUNTER — HOSPITAL ENCOUNTER (OUTPATIENT)
Dept: PAIN MEDICINE | Facility: HOSPITAL | Age: 67
Discharge: HOME | End: 2022-03-28

## 2022-03-28 ENCOUNTER — ANESTHESIA EVENT (OUTPATIENT)
Dept: PAIN MEDICINE | Facility: HOSPITAL | Age: 67
End: 2022-03-28

## 2022-03-28 ENCOUNTER — ANESTHESIA (OUTPATIENT)
Dept: PAIN MEDICINE | Facility: HOSPITAL | Age: 67
End: 2022-03-28

## 2022-03-28 VITALS
SYSTOLIC BLOOD PRESSURE: 143 MMHG | WEIGHT: 170 LBS | HEART RATE: 62 BPM | OXYGEN SATURATION: 99 % | DIASTOLIC BLOOD PRESSURE: 67 MMHG | TEMPERATURE: 97.3 F | BODY MASS INDEX: 29.02 KG/M2 | RESPIRATION RATE: 14 BRPM | HEIGHT: 64 IN

## 2022-03-28 DIAGNOSIS — M46.1 SACROILIITIS: Primary | Chronic | ICD-10-CM

## 2022-03-28 DIAGNOSIS — R52 PAIN: ICD-10-CM

## 2022-03-28 PROCEDURE — 25010000002 MIDAZOLAM PER 1 MG: Performed by: ANESTHESIOLOGY

## 2022-03-28 PROCEDURE — 0 IOPAMIDOL 41 % SOLUTION: Performed by: ANESTHESIOLOGY

## 2022-03-28 PROCEDURE — 25010000002 METHYLPREDNISOLONE PER 80 MG: Performed by: ANESTHESIOLOGY

## 2022-03-28 PROCEDURE — 25010000002 FENTANYL CITRATE (PF) 50 MCG/ML SOLUTION: Performed by: ANESTHESIOLOGY

## 2022-03-28 RX ORDER — FENTANYL CITRATE 50 UG/ML
50 INJECTION, SOLUTION INTRAMUSCULAR; INTRAVENOUS AS NEEDED
Status: DISCONTINUED | OUTPATIENT
Start: 2022-03-28 | End: 2022-03-29 | Stop reason: HOSPADM

## 2022-03-28 RX ORDER — BUPIVACAINE HYDROCHLORIDE AND EPINEPHRINE 2.5; 5 MG/ML; UG/ML
10 INJECTION, SOLUTION EPIDURAL; INFILTRATION; INTRACAUDAL; PERINEURAL ONCE
Status: COMPLETED | OUTPATIENT
Start: 2022-03-28 | End: 2022-03-28

## 2022-03-28 RX ORDER — MIDAZOLAM HYDROCHLORIDE 1 MG/ML
1 INJECTION INTRAMUSCULAR; INTRAVENOUS AS NEEDED
Status: DISCONTINUED | OUTPATIENT
Start: 2022-03-28 | End: 2022-03-29 | Stop reason: HOSPADM

## 2022-03-28 RX ORDER — METHYLPREDNISOLONE ACETATE 80 MG/ML
80 INJECTION, SUSPENSION INTRA-ARTICULAR; INTRALESIONAL; INTRAMUSCULAR; SOFT TISSUE ONCE
Status: COMPLETED | OUTPATIENT
Start: 2022-03-28 | End: 2022-03-28

## 2022-03-28 RX ORDER — SODIUM CHLORIDE 0.9 % (FLUSH) 0.9 %
10 SYRINGE (ML) INJECTION AS NEEDED
Status: DISCONTINUED | OUTPATIENT
Start: 2022-03-28 | End: 2022-03-29 | Stop reason: HOSPADM

## 2022-03-28 RX ORDER — SODIUM CHLORIDE 0.9 % (FLUSH) 0.9 %
10 SYRINGE (ML) INJECTION EVERY 12 HOURS SCHEDULED
Status: DISCONTINUED | OUTPATIENT
Start: 2022-03-28 | End: 2022-03-29 | Stop reason: HOSPADM

## 2022-03-28 RX ORDER — LIDOCAINE HYDROCHLORIDE 10 MG/ML
1 INJECTION, SOLUTION INFILTRATION; PERINEURAL ONCE AS NEEDED
Status: DISCONTINUED | OUTPATIENT
Start: 2022-03-28 | End: 2022-03-29 | Stop reason: HOSPADM

## 2022-03-28 RX ORDER — SODIUM CHLORIDE 0.9 % (FLUSH) 0.9 %
1-10 SYRINGE (ML) INJECTION AS NEEDED
Status: DISCONTINUED | OUTPATIENT
Start: 2022-03-28 | End: 2022-03-29 | Stop reason: HOSPADM

## 2022-03-28 RX ADMIN — METHYLPREDNISOLONE ACETATE 80 MG: 80 INJECTION, SUSPENSION INTRA-ARTICULAR; INTRALESIONAL; INTRAMUSCULAR; SOFT TISSUE at 12:43

## 2022-03-28 RX ADMIN — BUPIVACAINE HYDROCHLORIDE AND EPINEPHRINE BITARTRATE 10 ML: 2.5; .005 INJECTION, SOLUTION EPIDURAL; INFILTRATION; INTRACAUDAL; PERINEURAL at 12:43

## 2022-03-28 RX ADMIN — FENTANYL CITRATE 50 MCG: 50 INJECTION INTRAMUSCULAR; INTRAVENOUS at 12:33

## 2022-03-28 RX ADMIN — IOPAMIDOL 10 ML: 408 INJECTION, SOLUTION INTRATHECAL at 12:43

## 2022-03-28 RX ADMIN — MIDAZOLAM 2 MG: 1 INJECTION INTRAMUSCULAR; INTRAVENOUS at 12:31

## 2022-03-28 NOTE — ANESTHESIA PROCEDURE NOTES
PAIN Bilateral sacro-iliac joint steroid injection     Pre-sedation assessment completed: 3/28/2022 12:22 PM    Patient reassessed immediately prior to procedure    Patient location during procedure: Pain Clinic  Start time: 3/28/2022 12:33 PM  Stop time: 3/28/2022 12:46 PM    Reason for block: procedure for pain  Performed by  Anesthesiologist: Dorian Rainey MD  Preanesthetic Checklist  Completed: patient identified, IV checked, site marked, risks and benefits discussed, surgical consent, monitors and equipment checked, pre-op evaluation and timeout performed  Prep:  Patient position: prone  Sterile barriers:mask, gloves and cap  Prep: ChloraPrep  Patient monitoring: blood pressure monitoring, continuous pulse oximetry and EKG  Procedure:  Sedation:yes  Guidance:fluoroscopy  Contrast Medium:yes  Location:Bilateral  Needle Type:Tuohy  Needle Gauge:22 G  Aspiration:negative  Medications:  Depomedrol:80 mg  Comment:Bupiv 0.25% w/epi - 3cc each side mixed with depo    Post Assessment  Injection Assessment: negative  Patient Tolerance:comfortable throughout block  Complications:no  Additional Notes  DX:  Bilateral sacroiliitis

## 2022-03-28 NOTE — H&P
INTERVAL HISTORY:    The patient returns for anotherBilateral sacro-iliac joint steroid injection today.  They have received 75% improvement since their last injection with a pain level of 4-6/10 at its worst recently.    Conservative measures tried in the interim     Current Outpatient Medications on File Prior to Encounter   Medication Sig Dispense Refill   • Xarelto 20 MG tablet      • [DISCONTINUED] apixaban (ELIQUIS) 5 MG tablet tablet Take 1 tablet by mouth Every 12 (Twelve) Hours. 60 tablet 0   • albuterol (PROVENTIL) (2.5 MG/3ML) 0.083% nebulizer solution Take 2.5 mg by nebulization Every 6 (Six) Hours As Needed for Wheezing (asthma).     • albuterol sulfate  (90 Base) MCG/ACT inhaler Inhale 2 puffs Every 4 (Four) Hours As Needed for Wheezing.     • alendronate (FOSAMAX) 70 MG tablet TAKE 1 TABLET BY MOUTH ONCE WEEKLY BEFORE BREAKFAST, ON AN EMPTY STOMACH: REMAIN UPRIGHT FOR ONE HOUR:TAKE WITH 8 OUNCES OF WATER (Patient taking differently: Take 70 mg by mouth Every 7 (Seven) Days. SUNDAY) 4 tablet 0   • amiodarone (PACERONE) 200 MG tablet Take 200 mg by mouth Daily.     • amLODIPine (NORVASC) 5 MG tablet Take 1 tablet by mouth Every Night.     • atorvastatin (LIPITOR) 40 MG tablet Take 40 mg by mouth Daily.     • baclofen (LIORESAL) 10 MG tablet Take 10 mg by mouth As Needed.     • budesonide-formoterol (SYMBICORT) 160-4.5 MCG/ACT inhaler Inhale 2 puffs Daily.     • Calcium Carbonate-Vit D-Min (Calcium 1200) 2077-4070 MG-UNIT chewable tablet Chew 1,000 mg As Needed.     • celecoxib (CeleBREX) 200 MG capsule Take 200 mg by mouth Daily.     • clotrimazole-betamethasone (LOTRISONE) 1-0.05 % cream Apply 1 application topically to the appropriate area as directed 2 (Two) Times a Day As Needed.     • Diclofenac Sodium (VOLTAREN) 1 % gel gel Apply 1 g topically to the appropriate area as directed 4 (Four) Times a Day. UP TO 4 TIMES DAILY     • FASENRA 30 MG/ML solution prefilled syringe Every 2 (Two) Months.  EVERY 2 MONTHS  ASTHMA  1   • furosemide (LASIX) 20 MG tablet Take 20 mg by mouth As Needed.     • gabapentin (NEURONTIN) 300 MG capsule Take 600 mg by mouth Every Night.     • HYDROcodone-acetaminophen (NORCO)  MG per tablet Take 1 tablet by mouth 2 (Two) Times a Day.     • hydroxychloroquine (PLAQUENIL) 200 MG tablet Take 200 mg by mouth 2 (Two) Times a Day. RA     • hyoscyamine (LEVSIN) 0.125 MG SL tablet Dissolve one Tablet Under The Toungue Every 6  Hours as Needed for cramping (Patient taking differently: Take 0.125 mg by mouth Every 6 (Six) Hours As Needed. Dissolve one Tablet Under The Toungue Every 6  Hours as Needed for cramping) 30 tablet 3   • isosorbide mononitrate (IMDUR) 120 MG 24 hr tablet Take 120 mg by mouth Daily.     • levothyroxine (SYNTHROID, LEVOTHROID) 75 MCG tablet      • levothyroxine (SYNTHROID, LEVOTHROID) 88 MCG tablet Take 88 mcg by mouth Daily.     • metoprolol succinate XL (TOPROL-XL) 25 MG 24 hr tablet Take 25 mg by mouth Every Night.     • nitroglycerin (NITROSTAT) 0.4 MG SL tablet Place 0.4 mg under the tongue every 5 (five) minutes.     • nystatin (MYCOSTATIN) 572867 UNIT/ML suspension Swish and swallow 500,000 Units As Needed. INHALERS     • pantoprazole (PROTONIX) 40 MG EC tablet TAKE ONE TABLET BY MOUTH ONCE NIGHTLY (Patient taking differently: Take 40 mg by mouth Every Night.) 30 tablet 0   • polyethylene glycol (MIRALAX) packet Take 17 g by mouth Every Night.     • raloxifene (EVISTA) 60 MG tablet TAKE ONE TABLET BY MOUTH DAILY 30 tablet 4   • ranolazine (RANEXA) 1000 MG 12 hr tablet Take 1,000 mg by mouth Every 12 (Twelve) Hours.     • SPIRIVA RESPIMAT 1.25 MCG/ACT aerosol solution inhaler Inhale 1 puff Every Morning.     • vitamin D (ERGOCALCIFEROL) 68407 UNITS capsule capsule Take 50,000 Units by mouth Every 7 (Seven) Days. SUNDAY       No current facility-administered medications on file prior to encounter.       Past Medical History:   Diagnosis Date   • Asthma   "  • Atypical ductal hyperplasia of right breast 07/2019   • Bronchiectasis (HCC)    • CAD (coronary artery disease)    • COPD (chronic obstructive pulmonary disease) (HCC)    • Depression with anxiety 09/21/2016   • Disease of thyroid gland     HYPOTHYROIDISM   • Elevated cholesterol    • Fibrocystic breast    • Fibromyalgia syndrome 06/06/2017   • GERD (gastroesophageal reflux disease)    • H/O Third nerve palsy of right eye 2012   • Heart palpitations    • HLD (hyperlipidemia) 09/21/2016   • Hypertension    • Hypothyroidism    • IBS (irritable bowel syndrome)    • Low back pain    • Lung nodule     RIGHT LOWER LOBE   • Moderate persistent asthma without complication 06/06/2017   • Osteoporosis    • PONV (postoperative nausea and vomiting)    • Rheumatoid arthritis (HCC)    • Sacroiliitis (HCC) 01/04/2022   • Salivary gland swelling     RIGHT-CT SCAN 5/5/21   • Scoliosis    • SOB (shortness of breath) on exertion    • Vitamin D deficiency 09/21/2016       No hematologic infectious or constitutional symptoms  Negative screen for MARGARITA      Exam:  /69 (BP Location: Left arm, Patient Position: Sitting)   Pulse 63   Temp 36.3 °C (97.3 °F) (Oral)   Resp 16   Ht 162.6 cm (64\")   Wt 77.1 kg (170 lb)   LMP  (LMP Unknown)   SpO2 98%   BMI 29.18 kg/m²   Airway Mallampatti 2  Alert and oriented      Diagnosis:  Post-Op Diagnosis Codes:     * Sacroiliitis (HCC) [M46.1]    Plan:  Bilateral sacro-iliac joint steroid injection under fluoroscopic guidance    I have encouraged them to continue:  1.  Physical therapy exercises at home as prescribed by physical therapy or from the pain clinic handout (given to the patient).  Continuation of these exercises every day, or multiple times per week, even when the patient has good pain relief, was stressed to the patient as a preventative measure to decrease the frequency and severity of future pain episodes.  2.  Continue pain medicines as already prescribed.  If patient not " currently taking any, it is recommended to begin Acetaminophen 1000 mg po q 8 hours.  If other medicines containing Acetaminophen are currently prescribed, maintain daily dose at 3000mg.    3.  If they can tolerate NSAIDS, it is recommended to take Ibuprofen 600 mg po q 6 hours for 7 days during pain exacerbations.   Alternatively, they may substitute an NSAID of their choice (e.g. Aleve)  4.  Heat and ice to the affected area as tolerated for pain control.  It was discussed that heating pads can cause burns.  5.  Low impact exercise such as walking or water exercise was recommended to maintain overall health and aid in weight control.   6.  Follow up as needed for subsequent injections.  7.  Patient was counseled to abstain from tobacco products.

## 2022-04-27 RX ORDER — RALOXIFENE HYDROCHLORIDE 60 MG/1
TABLET, FILM COATED ORAL
Qty: 30 TABLET | Refills: 4 | Status: SHIPPED | OUTPATIENT
Start: 2022-04-27 | End: 2022-10-12

## 2022-07-07 ENCOUNTER — APPOINTMENT (OUTPATIENT)
Dept: PAIN MEDICINE | Facility: HOSPITAL | Age: 67
End: 2022-07-07

## 2022-07-19 ENCOUNTER — ANESTHESIA (OUTPATIENT)
Dept: PAIN MEDICINE | Facility: HOSPITAL | Age: 67
End: 2022-07-19

## 2022-07-19 ENCOUNTER — HOSPITAL ENCOUNTER (OUTPATIENT)
Dept: GENERAL RADIOLOGY | Facility: HOSPITAL | Age: 67
Discharge: HOME | End: 2022-07-19

## 2022-07-19 ENCOUNTER — ANESTHESIA EVENT (OUTPATIENT)
Dept: PAIN MEDICINE | Facility: HOSPITAL | Age: 67
End: 2022-07-19

## 2022-07-19 ENCOUNTER — HOSPITAL ENCOUNTER (OUTPATIENT)
Dept: PAIN MEDICINE | Facility: HOSPITAL | Age: 67
Discharge: HOME | End: 2022-07-19

## 2022-07-19 VITALS
TEMPERATURE: 97.3 F | HEART RATE: 67 BPM | HEIGHT: 64 IN | BODY MASS INDEX: 28.98 KG/M2 | RESPIRATION RATE: 16 BRPM | SYSTOLIC BLOOD PRESSURE: 112 MMHG | WEIGHT: 169.75 LBS | DIASTOLIC BLOOD PRESSURE: 58 MMHG | OXYGEN SATURATION: 92 %

## 2022-07-19 DIAGNOSIS — R52 PAIN: ICD-10-CM

## 2022-07-19 DIAGNOSIS — M46.1 SACROILIITIS: Primary | Chronic | ICD-10-CM

## 2022-07-19 PROCEDURE — 25010000002 MIDAZOLAM PER 1 MG: Performed by: ANESTHESIOLOGY

## 2022-07-19 PROCEDURE — 25010000002 METHYLPREDNISOLONE PER 80 MG: Performed by: ANESTHESIOLOGY

## 2022-07-19 PROCEDURE — 0 IOPAMIDOL 41 % SOLUTION: Performed by: ANESTHESIOLOGY

## 2022-07-19 PROCEDURE — 25010000002 FENTANYL CITRATE (PF) 50 MCG/ML SOLUTION: Performed by: ANESTHESIOLOGY

## 2022-07-19 RX ORDER — MIDAZOLAM HYDROCHLORIDE 1 MG/ML
1 INJECTION INTRAMUSCULAR; INTRAVENOUS AS NEEDED
Status: DISCONTINUED | OUTPATIENT
Start: 2022-07-19 | End: 2022-07-20 | Stop reason: HOSPADM

## 2022-07-19 RX ORDER — FENTANYL CITRATE 50 UG/ML
50 INJECTION, SOLUTION INTRAMUSCULAR; INTRAVENOUS AS NEEDED
Status: DISCONTINUED | OUTPATIENT
Start: 2022-07-19 | End: 2022-07-20 | Stop reason: HOSPADM

## 2022-07-19 RX ORDER — SODIUM CHLORIDE 0.9 % (FLUSH) 0.9 %
10 SYRINGE (ML) INJECTION EVERY 12 HOURS SCHEDULED
Status: DISCONTINUED | OUTPATIENT
Start: 2022-07-19 | End: 2022-07-20 | Stop reason: HOSPADM

## 2022-07-19 RX ORDER — BUPIVACAINE HYDROCHLORIDE AND EPINEPHRINE 2.5; 5 MG/ML; UG/ML
30 INJECTION, SOLUTION EPIDURAL; INFILTRATION; INTRACAUDAL; PERINEURAL ONCE
Status: COMPLETED | OUTPATIENT
Start: 2022-07-19 | End: 2022-07-19

## 2022-07-19 RX ORDER — LIDOCAINE HYDROCHLORIDE 10 MG/ML
1 INJECTION, SOLUTION INFILTRATION; PERINEURAL ONCE AS NEEDED
Status: DISCONTINUED | OUTPATIENT
Start: 2022-07-19 | End: 2022-07-20 | Stop reason: HOSPADM

## 2022-07-19 RX ORDER — SODIUM CHLORIDE 0.9 % (FLUSH) 0.9 %
1-10 SYRINGE (ML) INJECTION AS NEEDED
Status: DISCONTINUED | OUTPATIENT
Start: 2022-07-19 | End: 2022-07-20 | Stop reason: HOSPADM

## 2022-07-19 RX ORDER — SODIUM CHLORIDE 0.9 % (FLUSH) 0.9 %
10 SYRINGE (ML) INJECTION AS NEEDED
Status: DISCONTINUED | OUTPATIENT
Start: 2022-07-19 | End: 2022-07-20 | Stop reason: HOSPADM

## 2022-07-19 RX ORDER — METHYLPREDNISOLONE ACETATE 80 MG/ML
80 INJECTION, SUSPENSION INTRA-ARTICULAR; INTRALESIONAL; INTRAMUSCULAR; SOFT TISSUE ONCE
Status: COMPLETED | OUTPATIENT
Start: 2022-07-19 | End: 2022-07-19

## 2022-07-19 RX ADMIN — FENTANYL CITRATE 50 MCG: 50 INJECTION INTRAMUSCULAR; INTRAVENOUS at 08:41

## 2022-07-19 RX ADMIN — MIDAZOLAM 2 MG: 1 INJECTION INTRAMUSCULAR; INTRAVENOUS at 08:41

## 2022-07-19 RX ADMIN — IOPAMIDOL 10 ML: 408 INJECTION, SOLUTION INTRATHECAL at 08:48

## 2022-07-19 RX ADMIN — METHYLPREDNISOLONE ACETATE 80 MG: 80 INJECTION, SUSPENSION INTRA-ARTICULAR; INTRALESIONAL; INTRAMUSCULAR; SOFT TISSUE at 08:48

## 2022-07-19 RX ADMIN — BUPIVACAINE HYDROCHLORIDE AND EPINEPHRINE BITARTRATE 30 ML: 2.5; .005 INJECTION, SOLUTION EPIDURAL; INFILTRATION; INTRACAUDAL; PERINEURAL at 08:48

## 2022-07-19 NOTE — H&P
INTERVAL HISTORY:    The patient returns for another SI Joint steroid injection today.  They have received 60% improvement since their last injection with a pain level of 6-8/10 at its worst recently.    Conservative measures tried in the interim     Current Outpatient Medications on File Prior to Encounter   Medication Sig Dispense Refill   • albuterol (PROVENTIL) (2.5 MG/3ML) 0.083% nebulizer solution Take 2.5 mg by nebulization Every 6 (Six) Hours As Needed for Wheezing (asthma).     • albuterol sulfate  (90 Base) MCG/ACT inhaler Inhale 2 puffs Every 4 (Four) Hours As Needed for Wheezing.     • alendronate (FOSAMAX) 70 MG tablet TAKE 1 TABLET BY MOUTH ONCE WEEKLY BEFORE BREAKFAST, ON AN EMPTY STOMACH: REMAIN UPRIGHT FOR ONE HOUR:TAKE WITH 8 OUNCES OF WATER (Patient taking differently: Take 70 mg by mouth Every 7 (Seven) Days. SUNDAY) 4 tablet 0   • amiodarone (PACERONE) 200 MG tablet Take 200 mg by mouth Daily.     • amLODIPine (NORVASC) 5 MG tablet Take 1 tablet by mouth Every Night.     • atorvastatin (LIPITOR) 40 MG tablet Take 40 mg by mouth Daily.     • baclofen (LIORESAL) 10 MG tablet Take 10 mg by mouth As Needed.     • budesonide-formoterol (SYMBICORT) 160-4.5 MCG/ACT inhaler Inhale 2 puffs Daily.     • Calcium Carbonate-Vit D-Min (Calcium 1200) 3917-3984 MG-UNIT chewable tablet Chew 1,000 mg As Needed.     • celecoxib (CeleBREX) 200 MG capsule Take 200 mg by mouth Daily.     • clotrimazole-betamethasone (LOTRISONE) 1-0.05 % cream Apply 1 application topically to the appropriate area as directed 2 (Two) Times a Day As Needed.     • Diclofenac Sodium (VOLTAREN) 1 % gel gel Apply 1 g topically to the appropriate area as directed 4 (Four) Times a Day. UP TO 4 TIMES DAILY     • FASENRA 30 MG/ML solution prefilled syringe Every 2 (Two) Months. EVERY 2 MONTHS  ASTHMA  1   • furosemide (LASIX) 20 MG tablet Take 20 mg by mouth As Needed.     • gabapentin (NEURONTIN) 300 MG capsule Take 600 mg by mouth  Every Night.     • HYDROcodone-acetaminophen (NORCO)  MG per tablet Take 1 tablet by mouth 2 (Two) Times a Day.     • hydroxychloroquine (PLAQUENIL) 200 MG tablet Take 200 mg by mouth 2 (Two) Times a Day. RA     • hyoscyamine (LEVSIN) 0.125 MG SL tablet Dissolve one Tablet Under The Toungue Every 6  Hours as Needed for cramping (Patient taking differently: Take 0.125 mg by mouth Every 6 (Six) Hours As Needed. Dissolve one Tablet Under The Toungue Every 6  Hours as Needed for cramping) 30 tablet 3   • isosorbide mononitrate (IMDUR) 120 MG 24 hr tablet Take 120 mg by mouth Daily.     • levothyroxine (SYNTHROID, LEVOTHROID) 75 MCG tablet      • levothyroxine (SYNTHROID, LEVOTHROID) 88 MCG tablet Take 88 mcg by mouth Daily.     • metoprolol succinate XL (TOPROL-XL) 25 MG 24 hr tablet Take 25 mg by mouth Every Night.     • nystatin (MYCOSTATIN) 087359 UNIT/ML suspension Swish and swallow 500,000 Units As Needed. INHALERS     • pantoprazole (PROTONIX) 40 MG EC tablet TAKE ONE TABLET BY MOUTH ONCE NIGHTLY (Patient taking differently: Take 40 mg by mouth Every Night.) 30 tablet 0   • polyethylene glycol (MIRALAX) packet Take 17 g by mouth Every Night.     • raloxifene (EVISTA) 60 MG tablet TAKE ONE TABLET BY MOUTH DAILY 30 tablet 4   • ranolazine (RANEXA) 1000 MG 12 hr tablet Take 1,000 mg by mouth Every 12 (Twelve) Hours.     • SPIRIVA RESPIMAT 1.25 MCG/ACT aerosol solution inhaler Inhale 1 puff Every Morning.     • vitamin D (ERGOCALCIFEROL) 33668 UNITS capsule capsule Take 50,000 Units by mouth Every 7 (Seven) Days. SUNDAY     • Xarelto 20 MG tablet      • nitroglycerin (NITROSTAT) 0.4 MG SL tablet Place 0.4 mg under the tongue every 5 (five) minutes.       No current facility-administered medications on file prior to encounter.       Past Medical History:   Diagnosis Date   • Asthma    • Atypical ductal hyperplasia of right breast 07/2019   • Bronchiectasis (HCC)    • CAD (coronary artery disease)    • COPD  "(chronic obstructive pulmonary disease) (HCC)    • Depression with anxiety 09/21/2016   • Disease of thyroid gland     HYPOTHYROIDISM   • Elevated cholesterol    • Fibrocystic breast    • Fibromyalgia syndrome 06/06/2017   • GERD (gastroesophageal reflux disease)    • H/O Third nerve palsy of right eye 2012   • Heart palpitations    • HLD (hyperlipidemia) 09/21/2016   • Hypertension    • Hypothyroidism    • IBS (irritable bowel syndrome)    • Low back pain    • Lung nodule     RIGHT LOWER LOBE   • Moderate persistent asthma without complication 06/06/2017   • Osteoporosis    • PONV (postoperative nausea and vomiting)    • Rheumatoid arthritis (HCC)    • Sacroiliitis (HCC) 01/04/2022   • Salivary gland swelling     RIGHT-CT SCAN 5/5/21   • Scoliosis    • SOB (shortness of breath) on exertion    • Vitamin D deficiency 09/21/2016       No hematologic infectious or constitutional symptoms  Negative screen for MARGARITA      Exam:  /69 (BP Location: Left arm, Patient Position: Lying)   Pulse 70   Temp 36.3 °C (97.3 °F) (Infrared)   Resp 14   Ht 162 cm (63.78\")   Wt 77 kg (169 lb 12.1 oz)   LMP  (LMP Unknown)   SpO2 90%   BMI 29.34 kg/m²   Airway Mallampatti 2  Alert and oriented      Diagnosis:  Post-Op Diagnosis Codes:     * Sacroiliitis (HCC) [M46.1]    Plan:  Bilateral SI Joint steroid injection under fluoroscopic guidance    I have encouraged them to continue:  1.  Physical therapy exercises at home as prescribed by physical therapy or from the pain clinic handout.  Continuation of these exercises every day, or multiple times per week, even when the patient has good pain relief, was stressed to the patient as a preventative measure to decrease the frequency and severity of future pain episodes.  2.  Continue pain medicines as already prescribed.  If patient not currently taking any, it is recommended to begin Acetaminophen 1000 mg po q 8 hours.  If other medicines containing Acetaminophen are currently " prescribed, maintain daily dose at 3000mg.    3.  If they can tolerate NSAIDS, it is recommended to take Ibuprofen 600 mg po q 6 hours for 7 days during pain exacerbations.   Alternatively, they may substitute an NSAID of their choice (e.g. Aleve)  4.  Heat and ice to the affected area as tolerated for pain control.   5.  Low impact exercise such as walking or water exercise was recommended to maintain overall health and aid in weight control.   6.  Follow up as needed for subsequent injections.

## 2022-07-19 NOTE — ANESTHESIA PROCEDURE NOTES
PAIN SI joint injection    Pre-sedation assessment completed: 7/19/2022 8:33 AM    Patient reassessed immediately prior to procedure    Patient location during procedure: Pain Clinic  Start time: 7/19/2022 8:39 AM  Stop time: 7/19/2022 8:49 AM    Reason for block: procedure for pain  Performed by  Anesthesiologist: Dorian Rainey MD  Preanesthetic Checklist  Completed: patient identified, IV checked, site marked, risks and benefits discussed, surgical consent, monitors and equipment checked, pre-op evaluation and timeout performed  Prep:  Patient position: supine  Sterile barriers:mask, gloves and cap  Prep: ChloraPrep  Patient monitoring: blood pressure monitoring, continuous pulse oximetry and EKG  Procedure:  Sedation:yes  Guidance:fluoroscopy  Contrast Medium:yes  Location:Bilateral  Needle Type:Tuohy  Needle gauge: 20 G.  Aspiration:negative  Medications:  Depomedrol:80 mg  Comment:Bupiv 0.25% w/epi - 3cc added to each side    Post Assessment  Injection Assessment: negative  Patient Tolerance:comfortable throughout block  Complications:no  Additional Notes  DX:  Bilateral Sacroiliitis

## 2022-07-20 ENCOUNTER — HOSPITAL ENCOUNTER (OUTPATIENT)
Dept: CT IMAGING | Facility: HOSPITAL | Age: 67
Discharge: HOME | End: 2022-07-20
Admitting: THORACIC SURGERY (CARDIOTHORACIC VASCULAR SURGERY)

## 2022-07-20 DIAGNOSIS — R91.1 LUNG NODULE: ICD-10-CM

## 2022-07-20 PROCEDURE — 71250 CT THORAX DX C-: CPT

## 2022-07-25 ENCOUNTER — OFFICE VISIT (OUTPATIENT)
Dept: SURGERY | Facility: CLINIC | Age: 67
End: 2022-07-25

## 2022-07-25 VITALS
DIASTOLIC BLOOD PRESSURE: 70 MMHG | WEIGHT: 172 LBS | BODY MASS INDEX: 29.37 KG/M2 | HEART RATE: 70 BPM | HEIGHT: 64 IN | OXYGEN SATURATION: 96 % | SYSTOLIC BLOOD PRESSURE: 132 MMHG

## 2022-07-25 DIAGNOSIS — R91.1 LUNG NODULE: Primary | ICD-10-CM

## 2022-07-25 PROCEDURE — 99213 OFFICE O/P EST LOW 20 MIN: CPT | Performed by: THORACIC SURGERY (CARDIOTHORACIC VASCULAR SURGERY)

## 2022-07-27 DIAGNOSIS — R91.1 LUNG NODULE: Primary | ICD-10-CM

## 2022-10-12 RX ORDER — RALOXIFENE HYDROCHLORIDE 60 MG/1
TABLET, FILM COATED ORAL
Qty: 30 TABLET | Refills: 4 | Status: SHIPPED | OUTPATIENT
Start: 2022-10-12

## 2022-11-16 ENCOUNTER — TELEPHONE (OUTPATIENT)
Dept: SURGERY | Facility: CLINIC | Age: 67
End: 2022-11-16

## 2022-11-16 NOTE — TELEPHONE ENCOUNTER
Pt cancelled appt with Elen on Mon 11/21/2022 due to moving to [place].    Pt let me know she would be receiving f/u care at [facility] with Dr. Jazlyn Damon.    Pt states she is scheduled to see Dr. Damon next month.    MEB

## 2022-11-16 NOTE — TELEPHONE ENCOUNTER
----- Message from Sherly Dixon sent at 11/16/2022  8:18 AM EST -----  Pt returned your call.    Pt has bronchitis really bad.     Pt also let me know she recently moved to [place].    Therefore, pt has requested we cancel her appt with us and let me know that she will receive follow up care in [place].    She is seeing oncologist Dr. Jazlyn Damon next month and will get her mmg scheduled through her or once established with another breast specialist.

## 2022-11-16 NOTE — TELEPHONE ENCOUNTER
CRISTO for call back regarding MGM that was scheduled 11/14/22. It looks like she was a n/s. Need to get that r/s for her. Need to know what days and times work best.

## 2023-01-20 ENCOUNTER — TELEPHONE (OUTPATIENT)
Dept: SURGERY | Facility: CLINIC | Age: 68
End: 2023-01-20
Payer: MEDICARE

## 2023-01-20 NOTE — TELEPHONE ENCOUNTER
Spoke w/ pt over the phone regarding appt w/ Loraine Steve on 1/25/23. Pt stated she has moved out of state and her oncologist will be taking over her care.

## 2023-10-10 NOTE — PROGRESS NOTES
Chief Complaint: Camelia Quintanilla is a 64 y.o. female who was seen in consultation at the request of   Reji Romo MD   for abnormal breast imaging.  Right Breast, Follow up.     History of Present Illness:  Patient presents with abnormal breast imaging and breast pain.  She reports breast pain UOQ at the site of the 11:00 lesion on her imaging.   She noted no new palpable masses, skin changes, nipple discharge, nipple changes prior to her most recent imaging.    Her most recent imaging includes the followin/10/2018   Peter Quintanilla  BILATERAL SCREENING MAMMOGRAM  Heterogeneously dense. Interval occurring a small nodule projecting as central left breast. Small superficial nodule left lateral subareolar location is stable. Small nodule also developed outer hemisphere right breast middle third. Larger nodule central aspect of the right breast containing a single coarse calcification appears stable.  BIRADS Category 0    10/11/2018   Peter Quintanilla  BILATERAL DIAGNOSTIC MAMMOGRAM  BILATERAL BREAST ULTRASOUND  Heterogeneously dense. The developing nodular density deep central left breast along the nipple line does not efface. This will be further evaluated with left breast ultrasound today. Stable benign-appearing nodule in the anterior lateral periareolar left breast. The developing nodular density in the lateral hemisphere of the right breast also dos note efface.  ULTRASOUND  Left breast:  Nodule in the deep central left breast appears to correspond to a simple appearing cyst along the 12:00 retroareolar left 6 x 8 x 7 mm. no suspicious solid masses in the deep central left breast.  Right breast:  10:00 axis, 4 cm from the nipple, demonstrates an area of dense breast tissue measuring 1.5 x 1.8 x 0.9 cm. This corresponds to an island of benign dense breast tissue and coarse calcification that has been stable for several years. 8:00 axis, 4 cm from the nipple, demonstrates an  ovoid, solid-appearing structure measuring 1 x 0.5 x 0.5 cm. may correspond to the developing nodular density on mammography. This may represent a proabably benign fibroadenoma. Upon review 2011, this may have been present since that time. Six-month follow-up right breast mammogram and ultrasound will be recommended. There is also an incidental simple appearing cyst 9:00 axis, 3 cm from the nipple, measuring 5 x 3 x 6 mm.  BIRADS Category 3    06/27/2019   [facility]  DIAGNOSTIC RIGHT DIGITAL MAMMOGRAM  RIGHT BREAST ULTRASOUND  Heterogeneously dense.  The right breast mass 8:00-:00 at 6.6 cm from the nipple appears stable. Measuring 1.1 cm AP x 0.7 cm TRV.  Right breast focal asymmetry at 10:00-11:00 at mid depth at about 5 cm from the nipple, that contains a stable course calcifications, appears to have become larger since 10/11/2018.  ULTRASOUND: Right breast 8:00 at 4 cm from the nipple and 11:00 at 3 cm from the nipple.  Right breast mixed solid and cystic mass in the right breast at 8:00 at 4 cm from the nipple is stable in size, 0.4 x 0.5 x 1.0 cm. Since the margins of this sonographic mass have become partially irregular and angulated, the mass is considered to be suspicious for possible malignancy.  Right breast 11:00 at 3 cm from the nipple measures 0.7 x 1.1 x 1.6 cm. Today, a new solid mural nodule that measures 0.4 x 0.7 cm and is mostly hypoechoic with a punctate hyperechoic focus and no internal vascular flow was seen by the radiologist. This new nodule is concerning for possible malignancy.  IMPRESSION:  1. Right breast at 8:00 at 4 cm from the nipple needs to be biopsied. BIRADS Category 4.  2. The right breast sonographic calcified hypoechoic area at 11:00 at 3 cm from the nipple needs to be biopsied under ultrasound guidance. BIRADS Category 4.    On the right breast she has had 2 excisional biopsies for cyst 10 or 15 years ago.  In the left breast she has had 2 excisional  biopsies for cyst.  She has had her uterus and ovaries removed in 1978, is postmenopausal, and takes nor hormones.  Her family history includes the following: She has 1 daughter, 1 son, 2 sisters, one maternal aunt, 4 paternal aunts.  She has a paternal aunt who had breast cancer at uncertain age.  She has chronic pain from the fibromyalgia and takes baclofen, gabapentin, Norco one per day with Dr. Holm.  She has angina and sees Dr. Trimble for this and is not on any blood thinner.  She has rheumatoid arthritis and sees Dr. Keller for this and takes Celebrex and Plaquenil.    Pathology from July 22, 2019 right breast biopsies in the office.  Right breast 8:00, 4 cm from the nipple returned as focal atypical duct hyperplasia involving a single gland, 0.22 mm maximally.  Ramone cyst, sclerosing adenosis, fibroadenomatoid hyperplasia, associated micro calcifications.  Right breast 11:00, 3 center meter from the nipple, duct ectasia, apocrine metaplasia, sclerosing adenosis, usual duct hyperplasia, associated micro calcifications.    Reports some bruising. LOQ. Denies redness, warmth or drainage from the incision.      In the interim, Mrs. Quintanilla had her MRI and mammogram, as follows:  Bilateral breast MRI August 7, 2019 [facility]: Artifact from a biopsy marking clip upper outer quadrant right breast.  No abnormal enhancement adjacent to the clips.  There is a 9 mm diameter nodule lateral retroareolar left breast immediately beneath the skin surface most likely an intramammary lymph node or fibroadenoma.  Further evaluation with ultrasound is recommended.  BI-RADS 0.     Right mammogram same day shows 2 new marking clips upper outer quadrant both are spring shaped.  No underlying mammographic abnormality.    I then arranged for a LEFT breast ultrasound, as below:    [facility] 12[facility] left [facility].  Retroareolar left breast 3:00 there is a microlobulated hypoechoic mass that measures 8  mm.  Correlation with ultrasound-guided biopsy is recommended BI-RADS 4.    Interval History:  8-29-19 RIGHT NL lumpectomy returned as  No residual atypical hyperplasia.    She denies redness warmth or drainage from her incision.  Her breast is still a little bit tender and she continues to wear her sports bra.  She is here to review.    Review of Systems:  Review of Systems   Constitutional: Negative for unexpected weight change (3 lb wt loss).   HENT:   Positive for tinnitus.    Respiratory: Positive for wheezing.    Cardiovascular: Positive for palpitations.   Gastrointestinal: Positive for constipation.   Endocrine: Positive for hot flashes.   Musculoskeletal: Positive for arthralgias, back pain and myalgias.   Psychiatric/Behavioral: Positive for sleep disturbance. The patient is nervous/anxious.    All other systems reviewed and are negative.       Past Medical and Surgical History:  Breast Biopsy History:  Patient has had the following breast biopsies:Bilateral Breast Biopsies 10-15 yrs ago; benign   Breast Cancer HIstory:  Patient does not have a past medical history of breast cancer.  Breast Operations, and year:  None   Obstetric/Gynecologic History:  Age menstrual periods began: 13  Patient is postmenopausal due to removal of her uterus and both ovaries in the following year: [id number]   Number of pregnancies:1  Number of live births: 1  Number of abortions or miscarriages: 0  Age of delivery of first child: 21  Patient did not breast feed.  Length of time taking birth control pills: 4 yrs   Patient took hormone replacement during the following dates:  10 yrs  Patient had uterus and ovaries removed.     Past Surgical History:   Procedure Laterality Date   • ABDOMINAL HERNIA REPAIR     • APPENDECTOMY     • BREAST BIOPSY      right and left benign 10-15 yrs ago   • BREAST LUMPECTOMY Right 8/29/2019    Procedure: right breast needle localized lumpectomy for atypical duct hyperplasia.;  Surgeon: Amira  Giovanna DIAMOND MD;  Location:  [facility];  Service: General   • BRONCHOSCOPY N/A 5/11/2018    Procedure: BRONCHOSCOPY;  Surgeon: Emma Blakely MD;  Location: [facility] ENDOSCOPY;  Service: Pulmonary   • CARDIAC CATHETERIZATION     • CHOLECYSTECTOMY     • COLONOSCOPY  2015   • ENDOSCOPY  2015   • EPIDURAL BLOCK     • HERNIA REPAIR     • HYSTERECTOMY     • KNEE SURGERY     • OOPHORECTOMY     • TONSILLECTOMY     • WRIST FUSION Right      Past Medical History:   Diagnosis Date   • Asthma    • Breast lump     RIGHT   • Bronchiectasis (CMS/HCC)    • Depression with anxiety 9/21/2016   • Disease of thyroid gland     HYPOTHYROIDISM   • Fibrocystic breast    • Fibromyalgia syndrome 6/6/2017   • GERD (gastroesophageal reflux disease)    • Heart palpitations    • History of thyroid nodule    • HLD (hyperlipidemia) 9/21/2016   • Hypertension    • Hypothyroidism    • IBS (irritable bowel syndrome)    • Moderate persistent asthma without complication 6/6/2017   • PONV (postoperative nausea and vomiting)    • Precordial pain 9/21/2016   • Rheumatoid arthritis (CMS/HCC)    • Scoliosis    • Thrush    • Vitamin D deficiency 9/21/2016       Prior Hospitalizations, other than for surgery or childbirth, and year:  [facility]    Social History     Socioeconomic History   • Marital status:      Spouse name: Zbigniew   • Number of children: Not on file   • Years of education: High school   • Highest education level: Not on file   Occupational History     Employer: DISABLED   Tobacco Use   • Smoking status: Never Smoker   • Smokeless tobacco: Never Used   Substance and Sexual Activity   • Alcohol use: No   • Drug use: No     Patient is .  Patient is retired.  Patient does not drink caffeine.    Family History:  Family History   Problem Relation Age of Onset   • COPD Mother    • Cancer Mother         heart tumor   • Heart disease Mother    • COPD Father    • Hypertension Father    • No Known Problems Sister    • Cancer Brother         " testicular   • Breast cancer Paternal Aunt         unknown age    • Heart disease Maternal Grandmother    • Malig Hyperthermia Neg Hx        Vital Signs:  /68 (BP Location: Left arm, Patient Position: Sitting, Cuff Size: Adult)   Pulse 71   Ht 162.6 cm (64\")   Wt 76.2 kg (168 lb)   LMP  (LMP Unknown)   SpO2 98%   Breastfeeding? No   BMI 28.84 kg/m²      Medications:    Current Outpatient Medications:   •  albuterol (PROVENTIL) (2.5 MG/3ML) 0.083% nebulizer solution, Take 2.5 mg by nebulization Every 6 (Six) Hours As Needed for Wheezing (asthma)., Disp: , Rfl:   •  albuterol sulfate  (90 Base) MCG/ACT inhaler, Inhale 2 puffs Every 4 (Four) Hours As Needed for Wheezing., Disp: , Rfl:   •  amLODIPine (NORVASC) 2.5 MG tablet, Take 2.5 mg by mouth Every Night., Disp: , Rfl:   •  atorvastatin (LIPITOR) 20 MG tablet, Take 20 mg by mouth Daily., Disp: , Rfl:   •  baclofen (LIORESAL) 10 MG tablet, Take 10 mg by mouth Daily., Disp: , Rfl:   •  budesonide-formoterol (SYMBICORT) 160-4.5 MCG/ACT inhaler, Inhale 2 puffs 2 (Two) Times a Day., Disp: , Rfl:   •  celecoxib (CeleBREX) 200 MG capsule, Take 200 mg by mouth 2 (two) times a day., Disp: , Rfl:   •  clotrimazole-betamethasone (LOTRISONE) 1-0.05 % cream, Apply  topically to the appropriate area as directed 2 (Two) Times a Day As Needed., Disp: , Rfl:   •  diphenhydrAMINE-acetaminophen (TYLENOL PM)  MG tablet per tablet, Take 1 tablet by mouth At Night As Needed for Sleep., Disp: , Rfl:   •  fluticasone (FLONASE) 50 MCG/ACT nasal spray, 1 spray into each nostril 2 (Two) Times a Day. (Patient taking differently: 1 spray into the nostril(s) as directed by provider 2 (Two) Times a Day As Needed.), Disp: 16 g, Rfl: 12  •  furosemide (LASIX) 20 MG tablet, Take 20 mg by mouth As Needed., Disp: , Rfl:   •  gabapentin (NEURONTIN) 300 MG capsule, Take 300 mg by mouth 2 (Two) Times a Day., Disp: , Rfl:   •  HYDROcodone-acetaminophen (NORCO)  MG per " tablet, Take 1 tablet by mouth Daily As Needed., Disp: , Rfl:   •  hydroxychloroquine (PLAQUENIL) 200 MG tablet, Take 200 mg by mouth 2 (Two) Times a Day., Disp: , Rfl:   •  hyoscyamine (LEVSIN) 0.125 MG SL tablet, Dissolve one Tablet Under The Toungue Every 6  Hours as Needed for cramping (Patient taking differently: Take 0.125 mg by mouth Every 6 (Six) Hours As Needed. Dissolve one Tablet Under The Toungue Every 6  Hours as Needed for cramping), Disp: 30 tablet, Rfl: 3  •  isosorbide mononitrate (IMDUR) 120 MG 24 hr tablet, Take 120 mg by mouth Every Morning., Disp: , Rfl:   •  levothyroxine (SYNTHROID, LEVOTHROID) 75 MCG tablet, Take 75 mcg by mouth Daily., Disp: , Rfl:   •  montelukast (SINGULAIR) 10 MG tablet, Take 1 tablet by mouth Every Night., Disp: 30 tablet, Rfl: 12  •  nebivolol (BYSTOLIC) 5 MG tablet, Take 10 mg by mouth 2 (Two) Times a Day., Disp: , Rfl:   •  nitroglycerin (NITROSTAT) 0.4 MG SL tablet, Place 0.4 mg under the tongue every 5 (five) minutes., Disp: , Rfl:   •  nystatin (MYCOSTATIN) 420862 UNIT/ML suspension, Swish and swallow 500,000 Units 2 (Two) Times a Day., Disp: , Rfl:   •  pantoprazole (PROTONIX) 40 MG EC tablet, Take 1 tablet by mouth Every Night., Disp: 90 tablet, Rfl: 3  •  polyethylene glycol (MIRALAX) packet, Take 17 g by mouth Daily., Disp: , Rfl:   •  ranolazine (RANEXA) 1000 MG 12 hr tablet, Take 1,000 mg by mouth Every 12 (Twelve) Hours., Disp: , Rfl:   •  Simethicone (GAS RELIEF EXTRA STRENGTH PO), As Needed., Disp: , Rfl:   •  SPIRIVA RESPIMAT 1.25 MCG/ACT aerosol solution inhaler, Inhale 1 puff Every Morning., Disp: , Rfl:   •  vitamin C (ASCORBIC ACID) 500 MG tablet, Take 500 mg by mouth 2 (Two) Times a Day., Disp: , Rfl:   •  vitamin D (ERGOCALCIFEROL) 95690 UNITS capsule capsule, Take 50,000 Units by mouth Every 7 (Seven) Days., Disp: , Rfl:      Allergies:  Allergies   Allergen Reactions   • Adhesive Tape Swelling     Must use paper tape   • Morphine And Related Hives  "and Itching   • Metal Rash       Physical Examination:  /68 (BP Location: Left arm, Patient Position: Sitting, Cuff Size: Adult)   Pulse 71   Ht 162.6 cm (64\")   Wt 76.2 kg (168 lb)   LMP  (LMP Unknown)   SpO2 98%   Breastfeeding? No   BMI 28.84 kg/m²     /68 (BP Location: Left arm, Patient Position: Sitting, Cuff Size: Adult)   Pulse 71   Ht 162.6 cm (64\")   Wt 76.2 kg (168 lb)   LMP  (LMP Unknown)   SpO2 98%   Breastfeeding? No   BMI 28.84 kg/m²   General Appearance:  Patient is in no distress.  She is well kept and has an overweight build.   Psychiatric:  Patient with appropriate mood and affect. Alert and oriented to self, time, and place.    Breast, RIGHT:  medium sized, 38B,  symmetric with the contralateral side.  Breast skin is without erythema, edema, rashes.  There are no visible abnormalities upon inspection during the arm-raising maneuver or with hands on hips in the sitting position. There is no nipple retraction, discharge or nipple/areolar skin changes.There are no masses palpable in the sitting or supine positions.  There is a well-healing radial incision right lateral breast with no erythema warmth or drainage from her August 2019 excision of atypical hyperplasia.    Breast, LEFT:  medium sized, 38B, symmetric with the contralateral side.  Breast skin is without erythema, edema, rashes.  There are no visible abnormalities upon inspection during the arm-raising maneuver or with hands on hips in the sitting position. There is no nipple  discharge or nipple/areolar skin changes.there is nipple clefting on the left that is asymmetric from the right that has been there for many many years she tells me.  There are no masses palpable in the sitting or supine positions.  Radial incision lower inner quadrant from remote excisional biopsy of the cyst.  Irregular and somewhat hypertrophic excision lower outer quadrant left breast from remote excision of cyst.    Lymphatic:  There is " no axillary, cervical, infraclavicular, or supraclavicular adenopathy bilaterally.  Eyes:  Pupils are round and reactive to light.  Cardiovascular:  Heart rate and rhythm are regular.  Respiratory:  Lungs are clear bilaterally with no crackles or wheezes in any lung field.  Gastrointestinal:  Abdomen is soft, nondistended, and nontender.     Musculoskeletal:  Good strength in all 4 extremities.   There is good range of motion in both shoulders.    Skin:  No new skin lesions or rashes on the skin excluding the breast (see breast exam above).        Imagin/10/2018   [facility]  BILATERAL SCREENING MAMMOGRAM  Heterogeneously dense. Interval occurring a small nodule projecting as central left breast. Small superficial nodule left lateral subareolar location is stable. Small nodule also developed outer hemisphere right breast middle third. Larger nodule central aspect of the right breast containing a single coarse calcification appears stable.  BIRADS Category 0    10/11/2018   [facility]  BILATERAL DIAGNOSTIC MAMMOGRAM  BILATERAL BREAST ULTRASOUND  Heterogeneously dense. The developing nodular density deep central left breast along the nipple line does not efface. This will be further evaluated with left breast ultrasound today. Stable benign-appearing nodule in the anterior lateral periareolar left breast. The developing nodular density in the lateral hemisphere of the right breast also dos note efface.  ULTRASOUND  Left breast:  Nodule in the deep central left breast appears to correspond to a simple appearing cyst along the 12:00 retroareolar left 6 x 8 x 7 mm. no suspicious solid masses in the deep central left breast.  Right breast:  10:00 axis, 4 cm from the nipple, demonstrates an area of dense breast tissue measuring 1.5 x 1.8 x 0.9 cm. This corresponds to an island of benign dense breast tissue and coarse calcification that has been stable for several years. 8:00  axis, 4 cm from the nipple, demonstrates an ovoid, solid-appearing structure measuring 1 x 0.5 x 0.5 cm. may correspond to the developing nodular density on mammography. This may represent a proabably benign fibroadenoma. Upon review 2011, this may have been present since that time. Six-month follow-up right breast mammogram and ultrasound will be recommended. There is also an incidental simple appearing cyst 9:00 axis, 3 cm from the nipple, measuring 5 x 3 x 6 mm.  BIRADS Category 3    06/27/2019   [facility]  DIAGNOSTIC RIGHT DIGITAL MAMMOGRAM  RIGHT BREAST ULTRASOUND  Heterogeneously dense.  The right breast mass 8:00-:00 at 6.6 cm from the nipple appears stable. Measuring 1.1 cm AP x 0.7 cm TRV.  Right breast focal asymmetry at 10:00-11:00 at mid depth at about 5 cm from the nipple, that contains a stable course calcifications, appears to have become larger since 10/11/2018.  ULTRASOUND: Right breast 8:00 at 4 cm from the nipple and 11:00 at 3 cm from the nipple.  Right breast mixed solid and cystic mass in the right breast at 8:00 at 4 cm from the nipple is stable in size, 0.4 x 0.5 x 1.0 cm. Since the margins of this sonographic mass have become partially irregular and angulated, the mass is considered to be suspicious for possible malignancy.  Right breast 11:00 at 3 cm from the nipple measures 0.7 x 1.1 x 1.6 cm. Today, a new solid mural nodule that measures 0.4 x 0.7 cm and is mostly hypoechoic with a punctate hyperechoic focus and no internal vascular flow was seen by the radiologist. This new nodule is concerning for possible malignancy.  IMPRESSION:  3. Right breast at 8:00 at 4 cm from the nipple needs to be biopsied. BIRADS Category 4.  4. The right breast sonographic calcified hypoechoic area at 11:00 at 3 cm from the nipple needs to be biopsied under ultrasound guidance. BIRADS Category 4.    Bilateral breast MRI August 7, 2019 [facility]: Artifact from a  biopsy marking clip upper outer quadrant right breast.  No abnormal enhancement adjacent to the clips.  There is a 9 mm diameter nodule lateral retroareolar left breast immediately beneath the skin surface most likely an intramammary lymph node or fibroadenoma.  Further evaluation with ultrasound is recommended.  BI-RADS 0.     Right mammogram same day shows 2 new marking clips upper outer quadrant both are spring shaped.  No underlying mammographic abnormality.    [facility] August 12, 2019 left ultrasound.  Retroareolar left breast 3:00 there is a microlobulated hypoechoic mass that measures 8 mm.  Correlation with ultrasound-guided biopsy is recommended BI-RADS 4.    Pathology:   Pathology from July 22, 2019 right breast biopsies in the office.  Right breast 8:00, 4 cm from the nipple returned as focal atypical duct hyperplasia involving a single gland, 0.22 mm maximally.  Ramone cyst, sclerosing adenosis, fibroadenomatoid hyperplasia, associated micro calcifications.  Right breast 11:00, 3 center meter from the nipple, duct ectasia, apocrine metaplasia, sclerosing adenosis, usual duct hyperplasia, associated micro calcifications.           Final Diagnosis   1.  Breast, Right 8 o'clock Position, 4 cm FN, Core Biopsy:                 A.  Focal atypical duct hyperplasia involving a single gland, 0.22 mm maximally.               B.  Clustered cysts, sclerosing adenosis, and fibroadenomatoid hyperplasia with associated microcalcifications.       2.  Breast, Right 11 o'clock Position, 3 cm FN, Core Biopsy:                A.  Duct ectasia, apocrine metaplasia, sclerosing adenosis, and usual duct hyperplasia with associated                     microcalcifications.      [facility]b    Electronically signed by Rita Ramos MD on 7/23/2019 at 1332   Comment     This case is shared at internal consensus conference with Rao Davis and  who concur.  This case is discussed with Dr. Collier.     [facility]          Pathology from her left breast in office biopsy August 13, 2019 returned as sclerosing adenosis, fibroadenomatous change, usual hyperplasia.  No atypia.  This is concordant and we will let her know benign.     Final Diagnosis   1. Core Biopsies, Left Breast, 3 O'clock:               A. Sclerosing adenosis.               B. Background of fibroadenomatous change.               C. Florid intraductal hyperplasia, usual type.               D. No significant cytologic atypia is identified.     toni/rylan          8-29-19 RIGHT NL lumpectomy returned as  No residual atypical hyperplasia.  We will let her know     Final Diagnosis   1.  Right Breast, Needle-Localized Lumpectomy (17 grams):                A.  Benign breast parenchyma with calcifications associated with sclerosing adenosis and columnar cell change.               B.  No residual atypical hyperplasia.                C.  Background breast with fibroadenoma and usual ductal hyperplasia.                D.  Clip and biopsy site changes present.      jab/brb                Procedures:  Percutaneous ultrasound-guided vacuum-assisted excisional breast biopsy x 2 separate sites, with 2 separate incisions 7-22-19  Indication:  ultrasound-visible breast mass x 2 separate sites , BR4 at each site  Location: RIGHT 11:0, 3 CFN and RIGHT breast 8:00, 4 CFN  Consent:  The risks, benefits, and alternatives to the procedure were discussed with the patient, who understood and wished to proceed.  The risks described included, but were not limited to, bleeding, infection, pneumothorax, and inadequate sampling requiring either repeat percutaneous or open excisional biopsy.  Description of Procedure:   After the patient was positioned supine on the procedure table, I located each  lesion using ultrasound.  At the right breast 11:00 3 cm from the nipple location there is a hypoechoic irregularly marginated wider than tall mass. It measures in the antiradial dimension 1.39 x 1.0 cm  and in the radial dimension 1.38 x 0.79 cm.  The second lesion is at the right breast 8:00, 4 cm from the nipple location is a 1.1 x 0.6 cm hypoechoic lesion in the antiradial dimension, that is wider than tall with good through-transmission.  It measures in the radial dimension 0.68 x 0.56 cm.each of the 2 separate biopsy sites, I performed the following: I prepped and draped the breast skin in sterile fashion.  I anesthetized the breast skin at each separate site of anticipated mammotomy with 1% lidocaine with epinephrine.  I then anesthetized the underlying subcutaneous tissue and breast parenchyma surrounding each separate lesion with 1% lidocaine with epinephrine under ultrasound visualization and guidance. I then made a nicking incision each separate site with an 11blade and inserted the 10G encore biopsy device from inferolateral to superomedial  under each separate lesion under ultrasound guidance.   I then took 15 sequential core samples from the first site and 5 core samples from the second site, using the automated sampling of the encore device.  There will be a remnant from the first biopsy site lesion.  The second biopsy site lesion appeared to be removed in its entirety.   I removed the probe, then placed a hydromark marker, using a stiff introducer, into each separate biopsy site. We held manual compression for 10 minutes, placed steri-strips at the mammotomy site, and wrapped the patient in a 6 inch super ace wrap with an ice-pack.  Marker placed: hydromark x2  Tolerance: The patient tolerated the procedure well.  Disposition: We will see her back within a week to review her pathology.      Percutaneous ultrasound-guided vacuum-assisted excisional breast biopsy 8-13-19  Indication:  ultrasound-visible breast mass  Location: LEFT 3:00 RA  Consent:  The risks, benefits, and alternatives to the procedure were discussed with the patient, who understood and wished to proceed.  The risks described  included, but were not limited to, bleeding, infection, pneumothorax, and inadequate sampling requiring either repeat percutaneous or open excisional biopsy.  Description of Procedure:   After the patient was positioned supine on the procedure table, I located the lesion using ultrasound.  I prepped and draped the breast skin in sterile fashion.  I anesthetized the breast skin at the site of anticipated mammotomy with 1% lidocaine with epinephrine.  I then anesthetized the underlying subcutaneous tissue and breast parenchyma surrounding the lesion with 1% lidocaine with epinephrine under ultrasound visualization and guidance. I then made a nicking incision with an 11blade and inserted the 10G encore biopsy device from inferolateral to superomedial under the lesion under ultrasound guidance.   I then took 11 sequential core samples using the automated sampling of the encore device, until the lesion disappeared on ultrasound. There was no residual lesion visible at the conclusion of the procedure.  I removed the probe, then placed a hydromark marker, using a stiff introducer, into the biopsy site. We held manual compression for 10 minutes, placed steri-strips at the mammotomy site, and wrapped the patient in a 6 inch super ace wrap with an ice-pack.  Marker placed: hydromark  Tolerance: The patient tolerated the procedure well.  Disposition: We will see her back within a week to review her pathology.    Assessment:   Diagnosis Plan   1. Abnormal MRI, breast  Mammo Screening Digital Tomosynthesis Bilateral With CAD    Mammo Screening Digital Tomosynthesis Bilateral With CAD    MRI Breast Bilateral With & Without Contrast   2. Abnormal ultrasound of breast     3. Fibrocystic disease of right breast     4. Atypical ductal hyperplasia of right breast  Mammo Screening Digital Tomosynthesis Bilateral With CAD    Mammo Screening Digital Tomosynthesis Bilateral With CAD    MRI Breast Bilateral With & Without Contrast   5.  Rheumatoid arthritis, involving unspecified site, unspecified rheumatoid factor presence (CMS/HCC)     6. Other chronic pain     7. Angina effort (CMS/HCC)     8. Encounter for screening mammogram for malignant neoplasm of breast   Mammo Screening Digital Tomosynthesis Bilateral With CAD    Mammo Screening Digital Tomosynthesis Bilateral With CAD     1-3  LEFT breast 3:00 RA- seen on MRI done for ADH- US correlate 8mm, BR4-  Biopsied 8-2019- Pathology from her left breast in office biopsy August 13, 2019 returned as sclerosing adenosis, fibroadenomatous change, usual hyperplasia.  No atypia.    Right breast 11:00, 3 cm from the nipple, tender to palpation, no exam correlate, 1.6 cm on ultrasound, BI-RADS 4.  Pathology from July 22, 2019 right breast biopsies in the office.  Right breast 11:00, 3 center meter from the nipple, duct ectasia, apocrine metaplasia, sclerosing adenosis, usual duct hyperplasia, associated micro calcifications.    4  Right breast 8:00, 4 cm from the nipple, no exam correlate, 1 cm on ultrasound, BI-RADS 4.  Pathology from July 22, 2019 right breast biopsies in the office.  Right breast 8:00, 4 cm from the nipple returned as focal atypical duct hyperplasia involving a single gland, 0.22 mm maximally.  Ramone cyst, sclerosing adenosis, fibroadenomatoid hyperplasia, associated micro calcifications.    8-29-19 RIGHT NL lumpectomy returned as  No residual atypical hyperplasia.    5-  Dr. Keller sees and she is taking Celebrex and Plaquenil.    6-  For fibromyalgia, baclofen, gabapentin, Norco 10/325, 1/day,- managed by Dr Rae    7-  Angina, managed by her cardiologist Dr Teran- takes Imdur, mononitrate, Bystolic, Nitrostat, Ranexa, Lasix, amlodipine    Plan:  We reviewed her interval history, pathology report and examination together today.  She is healing nicely.  We were pleased with her pathology report.  She is seen Dr. Saldivar back on Thursday.  Her next bilateral mammogram is due [facility]  October 12, 2019.  We will arrange for this and I will call that report    I will arrange for next year's mammogram and MRI [facility] for November 2020 and we will see her back at that time.  I asked her to continue her self breast exam and to call us in the interim with concerns would be happy to see her back sooner.     Previously, I performed a CRA risk assessment based on her family history, reproductive history and recent atypical biopsy.  This returned her lifetime breast cancer risk as 17 to 31%  Return to her 5-year breast cancer risk as 4.2%      Giovanna Collier MD    Next Appointment:  Return for Next scheduled follow up, after imaging.      EMR Dragon/transcription disclaimer:    Much of this encounter note is an electronic transcription/translocation of spoken language to printed text.  The electronic translation of spoken language may permit erroneous, or at times, nonsensical words or phrases to be inadvertently transcribed.  Although I have reviewed the note from such areas, some may still exist.                 Zoryve Counseling:  I discussed with the patient that Zoryve is not for use in the eyes, mouth or vagina. The most commonly reported side effects include diarrhea, headache, insomnia, application site pain, upper respiratory tract infections, and urinary tract infections.  All of the patient's questions and concerns were addressed.

## (undated) DEVICE — VLV SXN ENDO DISP STRL

## (undated) DEVICE — SUT VIC 2/0 CT1 36IN

## (undated) DEVICE — PATIENT RETURN ELECTRODE, SINGLE-USE, CONTACT QUALITY MONITORING, ADULT, WITH 9FT CORD, FOR PATIENTS WEIGING OVER 33LBS. (15KG): Brand: MEGADYNE

## (undated) DEVICE — VITAL SIGNS™ JACKSON-REES CIRCUITS: Brand: VITAL SIGNS™

## (undated) DEVICE — LOU THORACIC: Brand: MEDLINE INDUSTRIES, INC.

## (undated) DEVICE — MSK AIRWY LARYNG LMA UNIQUE STD PK SZ4

## (undated) DEVICE — VLV PRT BRONCH LIP LTX DISP

## (undated) DEVICE — SKIN PREP TRAY W/CHG: Brand: MEDLINE INDUSTRIES, INC.

## (undated) DEVICE — TOWEL,OR,DSP,ST,WHITE,DLX,4/PK,20PK/CS: Brand: MEDLINE

## (undated) DEVICE — UNIVERSAL PACK: Brand: CARDINAL HEALTH

## (undated) DEVICE — DRSNG WND GZ PAD BORDERED 4X8IN STRL

## (undated) DEVICE — SINGLE USE BIOPSY VALVE MAJ-210: Brand: SINGLE USE BIOPSY VALVE (STERILE)

## (undated) DEVICE — 3M™ STERI-STRIP™ REINFORCED ADHESIVE SKIN CLOSURES, R1547, 1/2 IN X 4 IN (12 MM X 100 MM), 6 STRIPS/ENVELOPE: Brand: 3M™ STERI-STRIP™

## (undated) DEVICE — SUT SILK 0 TIES 30IN A306H

## (undated) DEVICE — LN SMPL O2 NASL/ORL SMART/CAPNOLINE PLS A/

## (undated) DEVICE — SPNG LAP CIGARETTE KITTNER 5MM STRL PK/5

## (undated) DEVICE — SINGLE USE SUCTION VALVE MAJ-209: Brand: SINGLE USE SUCTION VALVE (STERILE)

## (undated) DEVICE — TUBING, SUCTION, 1/4" X 10', STRAIGHT: Brand: MEDLINE

## (undated) DEVICE — CANNULA,ADULT,SOFT-TOUCH,7'TUBE,UC: Brand: PENDING

## (undated) DEVICE — SUT SILK 0 PSL 18IN 580H

## (undated) DEVICE — CONTAINER,SPECIMEN,OR STERILE,4OZ: Brand: MEDLINE

## (undated) DEVICE — SPNG LAP 18X18IN LF STRL PK/5

## (undated) DEVICE — APPL CHLORAPREP HI/LITE 26ML ORNG

## (undated) DEVICE — SUT VIC 3/0 SH CR8 18IN J864D

## (undated) DEVICE — ADHS SKIN SURG TISS VISC PREMIERPRO EXOFIN HI/VISC FAST/DRY

## (undated) DEVICE — PENCL ES MEGADINE EZ/CLEAN BUTN W/HOLSTR 10FT

## (undated) DEVICE — GLV SURG BIOGEL LTX PF 7

## (undated) DEVICE — DRP SLUSH WARMR MACH CIR 44X44IN

## (undated) DEVICE — TOTAL TRAY, 16FR 10ML SIL FOLEY, URN: Brand: MEDLINE

## (undated) DEVICE — SPNG GZ WOVN 4X4IN 12PLY 10/BX STRL

## (undated) DEVICE — 24 FR STRAIGHT – SOFT PVC CATHETER: Brand: PVC THORACIC CATHETERS

## (undated) DEVICE — CONTN STRL 32OZ

## (undated) DEVICE — ADAPT SWVL FIBROPTIC BRONCH

## (undated) DEVICE — TB PROSHIELD PROTECT PLSTC CLR

## (undated) DEVICE — GAUZE,SPONGE,FLUFF,6"X6.75",STRL,10/TRAY: Brand: MEDLINE

## (undated) DEVICE — WOUND RETRACTOR AND PROTECTOR: Brand: ALEXIS WOUND PROTECTOR-RETRACTOR

## (undated) DEVICE — GLV SURG BIOGEL LTX PF 6

## (undated) DEVICE — IRRIGATOR BULB ASEPTO 60CC STRL

## (undated) DEVICE — PK PROC MINOR TOWER 40

## (undated) DEVICE — INTENDED FOR TISSUE SEPARATION, AND OTHER PROCEDURES THAT REQUIRE A SHARP SURGICAL BLADE TO PUNCTURE OR CUT.: Brand: BARD-PARKER ® CARBON RIB-BACK BLADES

## (undated) DEVICE — ANTIBACTERIAL UNDYED BRAIDED (POLYGLACTIN 910), SYNTHETIC ABSORBABLE SUTURE: Brand: COATED VICRYL

## (undated) DEVICE — DRAPE,CHEST,FENES,15X10,STERIL: Brand: MEDLINE

## (undated) DEVICE — TRAP,MUCUS SPECIMEN, 80CC: Brand: MEDLINE

## (undated) DEVICE — SUT MNCRYL 4/0 PS2 18 IN

## (undated) DEVICE — 30977 SEE SHARP - ENHANCED INTRAOPERATIVE LAPAROSCOPE CLEANING & DEFOGGING: Brand: 30977 SEE SHARP - ENHANCED INTRAOPERATIVE LAPAROSCOPE CLEANING & DEFOGGING

## (undated) DEVICE — ELECTRD BLD EZ CLN MOD XLNG 2.75IN

## (undated) DEVICE — BNDG ELAS ELITE V/CLOSE 6IN 5YD LF STRL

## (undated) DEVICE — ELECTRD BLD EZ CLN MOD 6.5IN